# Patient Record
Sex: FEMALE
[De-identification: names, ages, dates, MRNs, and addresses within clinical notes are randomized per-mention and may not be internally consistent; named-entity substitution may affect disease eponyms.]

---

## 2018-12-10 ENCOUNTER — HOSPITAL ENCOUNTER (EMERGENCY)
Dept: HOSPITAL 97 - ER | Age: 23
Discharge: HOME | End: 2018-12-10
Payer: COMMERCIAL

## 2018-12-10 DIAGNOSIS — N20.9: Primary | ICD-10-CM

## 2018-12-10 DIAGNOSIS — Z87.442: ICD-10-CM

## 2018-12-10 LAB
ALBUMIN SERPL BCP-MCNC: 4.3 G/DL (ref 3.4–5)
ALP SERPL-CCNC: 70 U/L (ref 45–117)
ALT SERPL W P-5'-P-CCNC: 33 U/L (ref 12–78)
AST SERPL W P-5'-P-CCNC: 12 U/L (ref 15–37)
BUN BLD-MCNC: 10 MG/DL (ref 7–18)
GLUCOSE SERPLBLD-MCNC: 86 MG/DL (ref 74–106)
HCT VFR BLD CALC: 41.1 % (ref 36–45)
LIPASE SERPL-CCNC: 88 U/L (ref 73–393)
LYMPHOCYTES # SPEC AUTO: 2.9 K/UL (ref 0.7–4.9)
MCH RBC QN AUTO: 30.3 PG (ref 27–35)
MCV RBC: 88.9 FL (ref 80–100)
PMV BLD: 8.4 FL (ref 7.6–11.3)
POTASSIUM SERPL-SCNC: 3.8 MMOL/L (ref 3.5–5.1)
RBC # BLD: 4.62 M/UL (ref 3.86–4.86)
UA COMPLETE W REFLEX CULTURE PNL UR: (no result)

## 2018-12-10 PROCEDURE — 87088 URINE BACTERIA CULTURE: CPT

## 2018-12-10 PROCEDURE — 83690 ASSAY OF LIPASE: CPT

## 2018-12-10 PROCEDURE — 99284 EMERGENCY DEPT VISIT MOD MDM: CPT

## 2018-12-10 PROCEDURE — 81015 MICROSCOPIC EXAM OF URINE: CPT

## 2018-12-10 PROCEDURE — 74176 CT ABD & PELVIS W/O CONTRAST: CPT

## 2018-12-10 PROCEDURE — 87086 URINE CULTURE/COLONY COUNT: CPT

## 2018-12-10 PROCEDURE — 85025 COMPLETE CBC W/AUTO DIFF WBC: CPT

## 2018-12-10 PROCEDURE — 81025 URINE PREGNANCY TEST: CPT

## 2018-12-10 PROCEDURE — 80076 HEPATIC FUNCTION PANEL: CPT

## 2018-12-10 PROCEDURE — 96375 TX/PRO/DX INJ NEW DRUG ADDON: CPT

## 2018-12-10 PROCEDURE — 81003 URINALYSIS AUTO W/O SCOPE: CPT

## 2018-12-10 PROCEDURE — 76377 3D RENDER W/INTRP POSTPROCES: CPT

## 2018-12-10 PROCEDURE — 96361 HYDRATE IV INFUSION ADD-ON: CPT

## 2018-12-10 PROCEDURE — 96374 THER/PROPH/DIAG INJ IV PUSH: CPT

## 2018-12-10 PROCEDURE — 80048 BASIC METABOLIC PNL TOTAL CA: CPT

## 2018-12-10 PROCEDURE — 36415 COLL VENOUS BLD VENIPUNCTURE: CPT

## 2018-12-10 NOTE — EDPHYS
Physician Documentation                                                                           

 [facility]                                                                

Name: Selin Cui                                                                           

Age: 22 yrs                                                                                       

Sex: Female                                                                                       

: 1995                                                                                   

MRN: [id number]                                                                                   

Arrival Date: 12/10/2018                                                                          

Time: 03:19                                                                                       

Account#: [id number]                                                                            

Bed 14                                                                                            

Private MD:                                                                                       

ED Physician Michelet Nichols                                                                     

HPI:                                                                                              

12/10                                                                                             

05:09 This 22 yrs old  Female presents to ER via Ambulatory with complaints of Back   tw4 

      Pain, Abdominal Pain, Blood In Urine.                                                       

05:09 The patient presents with pain that is chronic, with no known mechanism of injury. The  tw4 

      symptoms are located in the low back. Onset: The symptoms/episode began/occurred last       

      week. The pain does not radiate. Associated signs and symptoms: Pertinent positives:        

      hematuria, Pertinent negatives: abdominal pain, chest pain, constipation, fever,            

      incontinence, nausea, numbness, tingling, urinary retention, vomiting, weakness. The        

      problem was sustained without known cause. Modifying factors: The patient symptoms are      

      alleviated by nothing, the patient symptoms are aggravated by nothing. The patient has      

      not experienced similar symptoms in the past.                                               

                                                                                                  

OB/GYN:                                                                                           

03:31 LMP 2018                                                                          jd3 

                                                                                                  

Historical:                                                                                       

- Allergies:                                                                                      

03:31 No Known Allergies;                                                                     jd3 

- Home Meds:                                                                                      

03:31 None [Active];                                                                          jd3 

- PMHx:                                                                                           

03:31 Kidney stones;                                                                          jd3 

- PSHx:                                                                                           

03:31 Lithotripsy;                                                                            jd3 

                                                                                                  

- Immunization history:: Adult Immunizations up to date.                                          

- Social history:: Smoking status: Patient/guardian denies using tobacco.                         

- Ebola Screening: : Patient negative for fever greater than or equal to 101.5 degrees            

  Fahrenheit, and additional compatible Ebola Virus Disease symptoms.                             

                                                                                                  

                                                                                                  

ROS:                                                                                              

05:09 Constitutional: Negative for fever, chills, and weight loss, Eyes: Negative for injury, tw4 

      pain, redness, and discharge, Cardiovascular: Negative for chest pain, palpitations,        

      and edema, Respiratory: Negative for shortness of breath, cough, wheezing, and              

      pleuritic chest pain, Abdomen/GI: Negative for abdominal pain, nausea, vomiting,            

      diarrhea, and constipation.                                                                 

05:09 MS/Extremity: Negative for injury and deformity, Skin: Negative for injury, rash, and       

      discoloration, Neuro: Negative for headache, weakness, numbness, tingling, and seizure.     

05:09 Back: Positive for flank pain, bilaterally.                                                 

                                                                                                  

Exam:                                                                                             

05:09 Constitutional:  This is a well developed, well nourished patient who is awake, alert,  tw4 

      and in no acute distress. Head/Face:  Normocephalic, atraumatic. Chest/axilla:  Normal      

      chest wall appearance and motion.  Nontender with no deformity.  No lesions are             

      appreciated. Cardiovascular:  Regular rate and rhythm with a normal S1 and S2.  No          

      gallops, murmurs, or rubs.  Normal PMI, no JVD.  No pulse deficits. Respiratory:  Lungs     

      have equal breath sounds bilaterally, clear to auscultation and percussion.  No rales,      

      rhonchi or wheezes noted.  No increased work of breathing, no retractions or nasal          

      flaring. Abdomen/GI:  Soft, non-tender, with normal bowel sounds.  No distension or         

      tympany.  No guarding or rebound.  No evidence of tenderness throughout.                    

05:09 MS/ Extremity:  Pulses equal, no cyanosis.  Neurovascular intact.  Full, normal range       

      of motion. Neuro:  Awake and alert, GCS 15, oriented to person, place, time, and            

      situation.  Cranial nerves II-XII grossly intact.  Motor strength 5/5 in all                

      extremities.  Sensory grossly intact.  Cerebellar exam normal.  Normal gait.                

05:09 Back: pain, is absent, ROM is normal, normal spinal alignment noted, CVA tenderness,        

      that is moderate, is noted on the left.                                                     

                                                                                                  

Vital Signs:                                                                                      

03:31  / 86; Pulse 94; Resp 16 S; Temp 99.1(O); Pulse Ox 99% on R/A; Weight 70.31 kg    jd3 

      (R); Height 5 ft. 2 in. (157.48 cm) (R); Pain 6/10;                                         

04:16  / 75; Pulse 81; Resp 15 S; Pulse Ox 98% on R/A;                                  jd3 

05:13  / 79; Pulse 72; Resp 16 S; Pulse Ox 97% on R/A;                                  jd3 

03:31 Body Mass Index 28.35 (70.31 kg, 157.48 cm)                                             jd3 

                                                                                                  

MDM:                                                                                              

03:23 Patient medically screened.                                                             tw4 

05:09 Data reviewed: vital signs, nurses notes. Counseling: I had a detailed discussion with  tw4 

      the patient and/or guardian regarding: the historical points, exam findings, and any        

      diagnostic results supporting the discharge/admit diagnosis, lab results, radiology         

      results. Medication response: morphine relieved the patient's pain. Symptoms have           

      resolved, Toradol partially relieved the patient's pain. Response to treatment: the         

      patient's symptoms have markedly improved after treatment, and as a result, I will          

      discharge patient. Special discussion: Based on the patient's Hx, exam, and Dx              

      evaluation, there is no indication for emergent surgery or inpatient Tx. It is              

      understood by the patient/guardian that if the Sx's persist or worsen they need to          

      return immediately for re-evaluation. Based on the patient's history, exam and DX           

      evaluation, there is no indication for emergent intervention or inpatient TX. It is         

      understood by the patient/guardian that if the SXs persist or worsen they need to           

      return immediately for re-evaluation. Based on the history and exam findings, there is      

      no indication for further emergent testing or inpatient evaluation. I discussed with        

      the patient/guardian the need to see the urologist for further evaluation of the            

      symptoms.                                                                                   

                                                                                                  

12/10                                                                                             

03:23 Order name: Basic Metabolic Panel; Complete Time: 04:58                                 tw4 

12/10                                                                                             

04:59 Interpretation: Normal except: .                                                  tw4 

12/10                                                                                             

03:23 Order name: CBC with Diff; Complete Time: 04:58                                         tw4 

12/10                                                                                             

04:59 Interpretation: Within normal limits.                                                   tw4 

12/10                                                                                             

03:23 Order name: Creatinine for Radiology; Complete Time: 04:58                              tw4 

12/10                                                                                             

03:23 Order name: Hepatic Function; Complete Time: 04:58                                      tw4 

12/10                                                                                             

04:59 Interpretation: Normal except: AST 12.                                                  tw4 

12/10                                                                                             

03:23 Order name: Lipase; Complete Time: 04:58                                                tw4 

12/10                                                                                             

03:23 Order name: Urine Microscopic Only; Complete Time: 04:58                                tw4 

12/10                                                                                             

03:23 Order name: IV Saline Lock; Complete Time: 03:42                                        tw4 

12/10                                                                                             

03:38 Order name: CT Stone Protocol                                                           [facility] 

12/10                                                                                             

04:16 Order name: Urine Dipstick--Ancillary (enter results)                                   [facility] 

12/10                                                                                             

04:16 Order name: Urine Pregnancy--Ancillary (enter results)                                  [facility] 

12/10                                                                                             

04:47 Order name: Urine Culture                                                               [facility] [place]

12/10                                                                                             

03:23 Order name: Labs collected and sent; Complete Time: 03:42                               [facility] 

12/10                                                                                             

03:23 Order name: Urine Dipstick-Ancillary (obtain specimen); Complete Time: 04:15            tw4 

12/10                                                                                             

03:23 Order name: Urine Pregnancy Test (obtain specimen); Complete Time: 04:15                tw4 

                                                                                                  

Administered Medications:                                                                         

03:42 Drug: NS 0.9% 1000 ml Route: IV; Rate: 1 bolus; Site: left antecubital;                 jd3 

05:27 Follow up: Response: No adverse reaction; IV Status: Completed infusion; IV Intake:     jd3 

      1000ml                                                                                      

05:04 Drug: morphine 4 mg Route: IVP; Site: left antecubital;                                 jd3 

05:26 Follow up: Response: No adverse reaction                                                jd3 

05:27 Follow up: Response: Pain is decreased                                                  jd3 

05:04 Drug: Zofran 4 mg Route: IVP; Site: left antecubital;                                   jd3 

05:26 Follow up: Response: No adverse reaction                                                jd3 

                                                                                                  

                                                                                                  

Disposition:                                                                                      

12/10/18 05:12 Discharged to Home. Impression: Calculus of lower urinary tract, unspecified.      

- Condition is Stable.                                                                            

- Discharge Instructions: Kidney Stones, Easy-to-Read.                                            

- Prescriptions for Ibuprofen 800 mg Oral Tablet - take 1 tablet by ORAL route every 8            

  hours As needed take with food; 30 tablet. Tylenol- Codeine #3 300-30 mg Oral Tablet            

  - take 2 tablet by ORAL route every 6 hours As needed; 6 tablet.                                

- Work release form, Medication Reconciliation Form, Thank You Letter, Antibiotic                 

  Education, Prescription Opioid Use form.                                                        

- Follow up: Private Physician; When: Upon discharge from the Emergency Department;               

  Reason: If symptoms return, Recheck today's complaints, Continuance of care.                    

- Problem is an ongoing problem.                                                                  

- Symptoms have improved.                                                                         

                                                                                                  

                                                                                                  

                                                                                                  

Signatures:                                                                                       

Dispatcher [employer]                           Iftikhar Palmer RN                    RN   Michelet Garcia MD MD   tw4                                                  

                                                                                                  

Corrections: (The following items were deleted from the chart)                                    

05:31 05:12 12/10/2018 05:12 Discharged to Home. Impression: Calculus of lower urinary tract, jd3 

      unspecified. Condition is Stable. Forms are Medication Reconciliation Form, Thank You       

      Letter, Antibiotic Education, Prescription Opioid Use. Follow up: Private Physician;        

      When: Upon discharge from the Emergency Department; Reason: If symptoms return, Recheck     

      today's complaints, Continuance of care. Problem is an ongoing problem. Symptoms have       

      improved. tw4                                                                               

                                                                                                  

**************************************************************************************************

## 2018-12-10 NOTE — XMS REPORT
Clinical Summary

 Created on:December 10, 2018



Patient:Selin Cui

Sex:Female

:1995

External Reference #:[id number]





Demographics







 Address  [address] [place] [address]

 

 Home Phone  [phone]

 

 Mobile Phone  [phone]

 

 Email Address  [email or web address]

 

 Preferred Language  English

 

 Marital Status  Single

 

 Caodaism Affiliation  Unknown

 

 Race  White

 

 Ethnic Group   or 









Author







 Organization  [facility]

 

 Address  [address] [place] [address]









Support







 Name  Relationship  Address  Phone

 

 Itzel Cui  Unavailable  [phone]

 

 Jr Wolff  Unavailable  Unavailable  [phone]









Care Team Providers







 Name  Role  Phone

 

 Asked, No Pcp  Primary Care Provider  Unavailable









Allergies

No Known Allergies



Medications

No known medications



Active Problems







 Problem  Noted Date

 

 Left ureteral stone  2017







Encounters







 Date  Type  Specialty  Care Team  Description

 

 2018  Telephone  Urology  Annamaria Vázquez  Flank pain (Primary Dx)



       MD  

 

 2018  Telephone  Urology  Annamaria Vázquez MD  

 

 2018  Hospital Encounter  Radiology  Annamaria Vázquez,  Kidney stones



       MD  

 

 2018  Orders Only  Urology  Annamaria Vázquez,  Kidney stones (Primary



       MD  Dx)

 

 2018  Telephone  Urology  Annamaria Vázquez MD  

 

 2018  Orders Only  Urology  Annamaria Vázquez MD  

 

 2018  Telephone  Urology  Annamaria Vázquez MD  



after 2017



Family History







 Medical History  Relation  Name  Comments

 

 Seizures  Mother    









 Relation  Name  Status  Comments

 

 Father    Alive  

 

 Mother      







Social History







 Tobacco Use  Types  Packs/Day  Years Used  Date

 

 Never Smoker        









 Smokeless Tobacco: Never Used      









 Alcohol Use  Drinks/Week  oz/Week  Comments

 

 No      









 Sex Assigned at Birth  Date Recorded

 

 Not on file  









 Job Start Date  Occupation  Industry

 

 Not on file  Not on file  Not on file









 Travel History  Travel Start  Travel End









 No recent travel history available.







Last Filed Vital Signs

Not on file



Plan of Treatment







 Health Maintenance  Due Date  Last Done  Comments

 

 CHLAMYDIA SCREENING  12/15/2012    

 

 MENINGOCOCCAL VACCINE (1 - 2-dose  12/15/2012    



 series)      

 

 CERVICAL CANCER SCREENING  12/15/2017    

 

 INFLUENZA VACCINE  2018    

 

 HEPATITIS B VACCINES  Aged Out    No longer eligible based on



       patient's age to complete



       this topic

 

 IPV VACCINES  Aged Out    No longer eligible based on



       patient's age to complete



       this topic







Implants







 Implanted  Type  Area    Device  Shelf  Model /



         Identifier  Expiration  Serial /



           Date  Lot

 

 Stent Uretl S-Flx Kwart Retro-Inject 6fr 24cm - [id number]  Peripheral or    
[email or web address]    2020  [id number] /



 Implanted: Qty: 1 on 2017 by Annamaria Vázquez MD  Biliary Stents      
     /



             [id number]

 

 Stent Uretl S-Flx Kwart Retro-Inject 6fr 24cm - [id number]  Peripheral or  N/A:
  [employer]    2020  [id number] /



 Implanted: 05/15/2017 (Quantity not on file)  Biliary Stents  N/A         /



             [id number]

 

 Catheter Uretl 6/10fr 50cm Flx-Tp Dlmn Std Accs - [id number]  Surgical  N/A:  
[email or web address]      [id number] /



 Implanted: 05/15/2017 (Quantity not on file)  Implants;  N/A         /



   Expanders;          



   Extenders;          



   Surgical Wires          







Procedures







 Procedure Name  Priority  Date/Time  Associated  Comments



       Diagnosis  

 

 CT RENAL STONE  Routine  2018 11:17 AM  Kidney stones  Results for this



 PROTOCOL    CDT    procedure are in



         the results



         section.

 

 RENAL ULTRASOUND  Routine  2018    

 

 RENAL ULTRASOUND  Routine  2018    



after 2017



Results

CT Renal Stone Protocol (2018 11:17 AM CDT)





 Narrative  Performed At

 

 CT RENAL STONE PROTOCOL



  [facility]



 CLINICAL INDICATION:N20.0 Calculus of kidney, kindey stones



  



  



  



 TECHNIQUE: Multidetector CT examination of the abdomen and pelvis  



 performed without intravenous contrast per renal stone protocol with  



 automated exposure control and/or iterative reconstruction techniques to  



  radiation dose.



  



  



  



 COMPARISON:None. 



  



  



  



 FINDINGS:



  



  



  



 Note, the lack of intravenous contrast decreases sensitivity of this  



 exam and limits evaluation of the abdominal and pelvic viscera.



  



  



  



 URINARY TRACT:There are nonobstructive calculi involving the left  



 kidney, a couple less than 3 mm calculi in the upper and mid pole with a  



 larger lobulated bilobed 8 mm calculus in the lower pole. There is no  



 ureteral calculus or obstruction. A mildly 



  



 prominent extrarenal pelvis is noted on the left. There is no  



 perinephric stranding. Urinary bladder is unremarkable.



  



  



  



 LUNG BASES:Clear.



  



  



  



 LIVER:Normal.



  



  



  



 BILIARY:Normal.



  



  



  



 SPLEEN:Normal.



  



  



  



 PANCREAS:Normal.



  



  



  



 ADRENALS:Normal. 



  



  



  



 GI:Large and small bowel are normal in caliber.There is moderate  



 stool throughout the distal colon without wall thickening or  



 inflammatory changes.Appendix is visualized and appears normal.



  



  



  



 VASCULAR:Unremarkable



  



  



  



 LYMPH NODES:No enlarged lymph nodes in the abdomen or pelvis.



  



  



  



 PELVIS:No lymphadenopathy or abnormal fluid collection.Urinary  



 bladder is unremarkable. Ovaries appear within normal limits.



  



  



  



 BONES:Normal.



  



  



  



 OTHER: 



  



  



  



 IMPRESSION:



  



  



  



 Nonobstructive left renal calculi as described. 



  



  



  



  



  



 Thank you for allowing us to participate in the care of your patient.



  



 [facility]-[id number]



  



   









 Procedure Note

 

 Hm Interface, Radiology Results Incoming - 2018 11:26 AM CDT



CT RENAL STONE PROTOCOL



 CLINICAL INDICATION:  N20.0 Calculus of kidney, kindey stones



 



 TECHNIQUE: Multidetector CT examination of the abdomen and pelvis performed 
without intravenous contrast per renal stone protocol with automated exposure 
control and/or iterative reconstruction techniques to  radiation dose.



 



 COMPARISON:  None.



 



 FINDINGS:



 



 Note, the lack of intravenous contrast decreases sensitivity of this exam and 
limits evaluation of the abdominal and pelvic viscera.



 



 URINARY TRACT:  There are nonobstructive calculi involving the left kidney, a 
couple less than 3 mm calculi in the upper and mid pole with a larger lobulated 
bilobed 8 mm calculus in the lower pole. There is no



 ureteral calculus or obstruction. A mildly



 prominent extrarenal pelvis is noted on the left. There is no perinephric 
stranding. Urinary bladder is unremarkable.



 



 LUNG BASES:  Clear.



 



 LIVER:    Normal.



 



 BILIARY:  Normal.



 



 SPLEEN:  Normal.



 



 PANCREAS:  Normal.



 



 ADRENALS:  Normal.



 



 GI:  Large and small bowel are normal in caliber.  There is moderate stool 
throughout the distal colon without wall thickening or inflammatory changes.  
Appendix is visualized and appears normal.



 



 VASCULAR:  Unremarkable



 



 LYMPH NODES:  No enlarged lymph nodes in the abdomen or pelvis.



 



 PELVIS:  No lymphadenopathy or abnormal fluid collection.  Urinary bladder is 
unremarkable. Ovaries appear within normal limits.



 



 BONES:  Normal.



 



 OTHER:



 



 IMPRESSION:



 



 Nonobstructive left renal calculi as described.



 



 



 Thank you for allowing us to participate in the care of your patient.



 [facility]-[id number]









 Performing Organization  Address  City/State/Zipcode  Phone Number

 

  [facility]  [address] [place] [address]  



RENAL ULTRASOUND (2018)





 Narrative  Performed At

 

 This result has an attachment that is not available.



  



RENAL ULTRASOUND (2018)after 2017



Insurance







 Payer  Benefit Plan / Group  Subscriber ID  Type  Phone  Address

 

 [facility]  xxxxxxxxxxx  HMO    









 Guarantor Name  Account Type  Relation to  Date of  Phone  Billing



     Patient  Birth    Address

 

 Selin Cui  Personal/Family  Self  1995  [phone]  [address] [facility]







         ([place])  [place]



           [address]







Advance Directives

Patient has advance care planning documents on file. For more information, 
please contact:Shahzad Echevarria[address] [place] [address]

## 2018-12-10 NOTE — ER
Nurse's Notes                                                                                     

 [facility]                                                                

Name: Selin Cui                                                                           

Age: 22 yrs                                                                                       

Sex: Female                                                                                       

: 1995                                                                                   

MRN: [id number]                                                                                   

Arrival Date: 12/10/2018                                                                          

Time: 03:19                                                                                       

Account#: [id number]                                                                            

Bed 14                                                                                            

Private MD:                                                                                       

Diagnosis: Calculus of lower urinary tract, unspecified                                           

                                                                                                  

Presentation:                                                                                     

12/10                                                                                             

03:28 Presenting complaint: Patient states: "I have been having back pain for about a week    jd3 

      and today I started to have lower abdominal pain and blood in my urine. I also have a       

      history of kidney stones so I don't know if it could be that as well.". Transition of       

      care: patient was not received from another setting of care. Onset of symptoms was          

      December 10, 2018. Risk Assessment: Do you want to hurt yourself or someone else?           

      Patient reports no desire to harm self or others. Initial Sepsis Screen: Does the           

      patient meet any 2 criteria? No. Patient's initial sepsis screen is negative. Does the      

      patient have a suspected source of infection? No. Patient's initial sepsis screen is        

      negative. Care prior to arrival: None.                                                      

03:28 Method Of Arrival: Ambulatory                                                           jd3 

03:28 Acuity: AWILDA 3                                                                           jd3 

                                                                                                  

OB/GYN:                                                                                           

03:31 LMP 2018                                                                          jd3 

                                                                                                  

Historical:                                                                                       

- Allergies:                                                                                      

03:31 No Known Allergies;                                                                     jd3 

- Home Meds:                                                                                      

03:31 None [Active];                                                                          jd3 

- PMHx:                                                                                           

03:31 Kidney stones;                                                                          jd3 

- PSHx:                                                                                           

03:31 Lithotripsy;                                                                            jd3 

                                                                                                  

- Immunization history:: Adult Immunizations up to date.                                          

- Social history:: Smoking status: Patient/guardian denies using tobacco.                         

- Ebola Screening: : Patient negative for fever greater than or equal to 101.5 degrees            

  Fahrenheit, and additional compatible Ebola Virus Disease symptoms.                             

                                                                                                  

                                                                                                  

Screenin:36 Abuse screen: Denies threats or abuse. Nutritional screening: No deficits noted.        jd3 

      Tuberculosis screening: No symptoms or risk factors identified. Fall Risk Ambulatory        

      Aid- None/Bed Rest/Nurse Assist (0 pts). Gait- Normal/Bed Rest/Wheelchair (0 pts)           

      Mental Status- Oriented to own ability (0 pts). Total Peres Fall Scale indicates No         

      Risk (0-24 pts).                                                                            

                                                                                                  

Assessment:                                                                                       

03:33 General: Appears in no apparent distress. uncomfortable, Behavior is calm, cooperative, jd3 

      appropriate for age. Pain: Complains of pain in back Pain radiates to suprapubic area       

      Quality of pain is described as aching, radiating, sharp. Neuro: Level of Consciousness     

      is awake, alert, obeys commands, Oriented to person, place, time, situation.                

      Cardiovascular: Capillary refill < 3 seconds Patient's skin is warm and dry.                

      Respiratory: Airway is patent Respiratory effort is even, unlabored, Respiratory            

      pattern is regular, symmetrical. GI: Abdomen is flat, non-distended, Bowel sounds           

      present X 4 quads. Abd is soft and non tender X 4 quads. Patient currently denies           

      nausea, vomiting. : Reports blood in urine. EENT: No signs and/or symptoms were           

      reported regarding the EENT system. Derm: Skin is intact, Skin is dry, Skin is normal,      

      Skin temperature is warm. Musculoskeletal: Circulation, motion, and sensation intact.       

      Range of motion: intact in all extremities.                                                 

04:16 Reassessment: Patient appears in no apparent distress at this time. Patient and/or      jd3 

      family updated on plan of care and expected duration. Pain level reassessed. Patient is     

      alert, oriented x 3, equal unlabored respirations, skin warm/dry/pink.                      

05:12 Reassessment: Patient appears in no apparent distress at this time. Patient and/or      jd3 

      family updated on plan of care and expected duration. Pain level reassessed. Patient is     

      alert, oriented x 3, equal unlabored respirations, skin warm/dry/pink.                      

05:30 Reassessment: Patient appears in no apparent distress at this time. Patient and/or      jd3 

      family updated on plan of care and expected duration. Pain level reassessed. Patient is     

      alert, oriented x 3, equal unlabored respirations, skin warm/dry/pink. Patient states       

      feeling better.                                                                             

                                                                                                  

Vital Signs:                                                                                      

03:31  / 86; Pulse 94; Resp 16 S; Temp 99.1(O); Pulse Ox 99% on R/A; Weight 70.31 kg    jd3 

      (R); Height 5 ft. 2 in. (157.48 cm) (R); Pain 6/10;                                         

04:16  / 75; Pulse 81; Resp 15 S; Pulse Ox 98% on R/A;                                  jd3 

05:13  / 79; Pulse 72; Resp 16 S; Pulse Ox 97% on R/A;                                  jd3 

03:31 Body Mass Index 28.35 (70.31 kg, 157.48 cm)                                             jd3 

                                                                                                  

ED Course:                                                                                        

03:19 Patient arrived in ED.                                                                  ds1 

03:21 Iftikhar Cardozo, RN is Primary Nurse.                                                  jd3 

03:22 Michelet Nichols MD is Attending Physician.                                            tw4 

03:30 Triage completed.                                                                       jd3 

03:33 Arm band placed on.                                                                     jd3 

03:36 Patient has correct armband on for positive identification. Placed in gown. Bed in low  jd3 

      position. Call light in reach. Side rails up X 1. Adult w/ patient.                         

03:42 Inserted saline lock: 20 gauge in left antecubital area, using aseptic technique. Blood oe  

      collected.                                                                                  

03:47 Radiology exam delayed due to pregnancy test not completed at this time.                kw1 

04:24 Patient moved to CT via wheelchair.                                                     kw1 

04:29 CT Stone Protocol In Process Unspecified.                                               EDMS

04:31 CT completed. Patient tolerated procedure well. Patient moved back from CT.             kw1 

05:30 No provider procedures requiring assistance completed. IV discontinued, intact,         jd3 

      bleeding controlled, No redness/swelling at site. Pressure dressing applied.                

                                                                                                  

Administered Medications:                                                                         

03:42 Drug: NS 0.9% 1000 ml Route: IV; Rate: 1 bolus; Site: left antecubital;                 jd3 

05:27 Follow up: Response: No adverse reaction; IV Status: Completed infusion; IV Intake:     jd3 

      1000ml                                                                                      

05:04 Drug: morphine 4 mg Route: IVP; Site: left antecubital;                                 jd3 

05:26 Follow up: Response: No adverse reaction                                                jd3 

05:27 Follow up: Response: Pain is decreased                                                  jd3 

05:04 Drug: Zofran 4 mg Route: IVP; Site: left antecubital;                                   jd3 

05:26 Follow up: Response: No adverse reaction                                                jd3 

                                                                                                  

                                                                                                  

Intake:                                                                                           

05:27 IV: 1000ml; Total: 1000ml.                                                              jd3 

                                                                                                  

Outcome:                                                                                          

05:12 Discharge ordered by MD.                                                                tw4 

05:30 Discharged to home ambulatory, with family.                                             jd3 

05:30 Condition: stable                                                                           

05:30 Discharge instructions given to patient, family, Instructed on discharge instructions,      

      follow up and referral plans. medication usage, Demonstrated understanding of               

      instructions, follow-up care, medications, Prescriptions given X 2.                         

05:31 Patient left the ED.                                                                    jd3 

                                                                                                  

Signatures:                                                                                       

Dispatcher [employer]                           Jill Chávez                                ds1                                                  

Mehran Walters Jonathon, RN RN   jd3                                                  

Swetha Morales                            kw1                                                  

Michelet Nichols MD MD   tw4                                                  

                                                                                                  

**************************************************************************************************

## 2018-12-10 NOTE — RAD REPORT
EXAM DESCRIPTION:  CT - Stone Protocol - 12/10/2018 6:02 am

 

CLINICAL HISTORY:  Flank pain.

ABD PAIN

 

COMPARISON:  CT ABD PELVIS W CONTRAST dated 7/9/2013

 

TECHNIQUE:  Axial images were obtained without oral or IV contrast. Lack of contrast limits solid org
an and vascular assessment. The field-of-view spans the entirety of the  system partially obscuring
 uppermost abdomen and lung bases. Coronal reformatted images were obtained and reviewed.

 

All CT scans are performed using dose optimization technique as appropriate and may include automated
 exposure control or mA/KV adjustment according to patient size.

 

FINDINGS:  The lower lung fields are clear.

 

Imaged portions of the liver and spleen show no suspicious findings on non-contrast imaging. The panc
reas and adrenal glands are normal. No pathologic lymphadenopathy in the abdomen or pelvis.

 

Multiple left-sided renal caliceal stones are present largest in the inferior left renal calyx measur
ing 7 mm. Mild dilatation of the left renal pelvis is present, which is similar to comparative study.


 

No bowel obstruction, free air, free fluid or abscess. Normal appendix noted.

 

No significant bony abnormality.

 

IMPRESSION:  Multiple left-sided caliceal stones are present.

 

Mild chronic dilatation of the left renal pelvis seen.

## 2019-03-03 ENCOUNTER — HOSPITAL ENCOUNTER (EMERGENCY)
Dept: HOSPITAL 97 - ER | Age: 24
Discharge: HOME | End: 2019-03-03
Payer: COMMERCIAL

## 2019-03-03 ENCOUNTER — HOSPITAL ENCOUNTER (EMERGENCY)
Dept: HOSPITAL 97 - ER | Age: 24
LOS: 1 days | Discharge: HOME | End: 2019-03-04
Payer: COMMERCIAL
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
## 2019-03-03 DIAGNOSIS — S06.0X9A: Primary | ICD-10-CM
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
## 2019-03-03 DIAGNOSIS — L50.9: Primary | ICD-10-CM

## 2019-03-03 DIAGNOSIS — S00.01XA: ICD-10-CM

## 2019-03-03 PROCEDURE — 76377 3D RENDER W/INTRP POSTPROCES: CPT

## 2019-03-03 PROCEDURE — 99284 EMERGENCY DEPT VISIT MOD MDM: CPT

## 2019-03-03 PROCEDURE — 99283 EMERGENCY DEPT VISIT LOW MDM: CPT

## 2019-03-03 PROCEDURE — 81025 URINE PREGNANCY TEST: CPT

## 2019-03-03 PROCEDURE — 81003 URINALYSIS AUTO W/O SCOPE: CPT

## 2019-03-03 PROCEDURE — 70486 CT MAXILLOFACIAL W/O DYE: CPT

## 2019-03-03 PROCEDURE — 70450 CT HEAD/BRAIN W/O DYE: CPT

## 2019-03-03 PROCEDURE — 72125 CT NECK SPINE W/O DYE: CPT

## 2019-03-03 NOTE — EDPHYS
Physician Documentation                                                                           

 [facility]                                                                

Name: Selin Cui                                                                           

Age: 23 yrs                                                                                       

Sex: Female                                                                                       

: 1995                                                                                   

MRN: [id number]                                                                                   

Arrival Date: 2019                                                                          

Time: 12:51                                                                                       

Account#: [id number]                                                                            

Bed 24                                                                                            

Private MD:                                                                                       

ED Physician Sae Guo                                                                       

HPI:                                                                                              

                                                                                             

14:00 This 23 yrs old  Female presents to ER via Ambulatory with complaints of        pm1 

      Headache, Nausea.                                                                           

14:00 The patient complains of pain to the left occipital area and right occipital area. The  pm1 

      patient describes the headache as aching. Onset: The symptoms/episode began/occurred        

      yesterday. Associated signs and symptoms: Pertinent positives: nausea, Pertinent            

      negatives: dizziness, fever, paresthesias, vision changes, vomiting, weakness. Severity     

      of symptoms: in the emergency department the pain is unchanged. Headache History:           

      Denies prior headaches. The symptoms are alleviated by nothing. the symptoms are            

      aggravated by nothing. The patient has not experienced similar symptoms in the past.        

      The patient has not recently seen a physician. Patient was dancing last night and her       

      friend was falling. Her friend grabbed onto her to catch her fall resulting in the          

      patient hitting the back of her head against a rail. Patient with LOC, bleeding from        

      the back of her head and nasal pain with a bloody nose..                                    

                                                                                                  

OB/GYN:                                                                                           

13:05 LMP 3/3/2019                                                                            la1 

                                                                                                  

Historical:                                                                                       

- Allergies:                                                                                      

13:05 No Known Allergies;                                                                     la1 

- PMHx:                                                                                           

13:05 Kidney stones;                                                                          la1 

- PSHx:                                                                                           

13:05 kidney stone removal;                                                                   la1 

                                                                                                  

- Immunization history:: Adult Immunizations up to date.                                          

- Social history:: Smoking status: Patient/guardian denies using tobacco.                         

- Ebola Screening: : No symptoms or risks identified at this time.                                

                                                                                                  

                                                                                                  

ROS:                                                                                              

14:00 Constitutional: Negative for fever, chills, and weight loss, Eyes: Negative for injury, pm1 

      pain, redness, and discharge, ENT: Negative for injury, pain, and discharge, Neck:          

      Negative for injury, pain, and swelling, Cardiovascular: Negative for chest pain,           

      palpitations, and edema, Respiratory: Negative for shortness of breath, cough,              

      wheezing, and pleuritic chest pain.                                                         

14:00 Back: Negative for injury and pain, : Negative for injury, bleeding, discharge, and       

      swelling, MS/Extremity: Negative for injury and deformity, Skin: Negative for injury,       

      rash, and discoloration.                                                                    

14:00 Abdomen/GI: Positive for nausea, Negative for abdominal pain, vomiting, diarrhea,           

      constipation.                                                                               

14:00 Neuro: Positive for headache, loss of consciousness, Negative for altered mental            

      status, numbness, tingling, weakness.                                                       

                                                                                                  

Exam:                                                                                             

14:00 Constitutional:  This is a well developed, well nourished patient who is awake, alert,  pm1 

      and in no acute distress.                                                                   

14:00 Eyes:  Pupils equal round and reactive to light, extra-ocular motions intact.  Lids and     

      lashes normal.  Conjunctiva and sclera are non-icteric and not injected.  Cornea within     

      normal limits.  Periorbital areas with no swelling, redness, or edema. ENT:  Nares          

      patent. No nasal discharge, no septal abnormalities noted.  Tympanic membranes are          

      normal and external auditory canals are clear.  Oropharynx with no redness, swelling,       

      or masses, exudates, or evidence of obstruction, uvula midline.  Mucous membranes           

      moist. Neck:  Trachea midline, no thyromegaly or masses palpated, and no cervical           

      lymphadenopathy.  Supple, full range of motion without nuchal rigidity, or vertebral        

      point tenderness.  No Meningismus. Chest/axilla:  Normal chest wall appearance and          

      motion.  Nontender with no deformity.  No lesions are appreciated. Cardiovascular:          

      Regular rate and rhythm with a normal S1 and S2.  No gallops, murmurs, or rubs.  Normal     

      PMI, no JVD.  No pulse deficits. Respiratory:  Lungs have equal breath sounds               

      bilaterally, clear to auscultation and percussion.  No rales, rhonchi or wheezes noted.     

       No increased work of breathing, no retractions or nasal flaring. Abdomen/GI:  Soft,        

      non-tender, with normal bowel sounds.  No distension or tympany.  No guarding or            

      rebound.  No evidence of tenderness throughout. Back:  No spinal tenderness.  No            

      costovertebral tenderness.  Full range of motion. Skin:  Warm, dry with normal turgor.      

      Normal color with no rashes, no lesions, and no evidence of cellulitis. MS/ Extremity:      

      Pulses equal, no cyanosis.  Neurovascular intact.  Full, normal range of motion.            

14:00 Head/face: Exam is negative for zimmer signs, raccoon eyes, Noted is no obvious of          

      injury or deformity except abrasion(s), that are mild, of the  occipital area.              

14:00 Head/face: Noted is contusion, that is superficial, of the  bridge of nose, swelling,       

      that is mild, of the  bridge of nose.                                                       

14:00 Neuro: Orientation: is normal, Motor: moves all fours, Gait: is steady, at a normal         

      pace, without difficulty.                                                                   

                                                                                                  

Vital Signs:                                                                                      

13:05  / 91; Pulse 88; Resp 18; Temp 97.5; Pulse Ox 98% on R/A; Weight 68.04 kg; Height la1 

      5 ft. 2 in. (157.48 cm);                                                                    

14:00  / 74; Pulse 78; Resp 19; Pulse Ox 100% on R/A;                                   ca1 

14:30  / 88; Pulse 85; Resp 18; Pulse Ox 100% on R/A;                                   jp3 

13:05 Body Mass Index 27.44 (68.04 kg, 157.48 cm)                                             la1 

                                                                                                  

MDM:                                                                                              

13:07 Patient medically screened.                                                             pm1 

14:31 Data reviewed: vital signs. Data interpreted: Pulse oximetry: on room air is 100 %.     pm1 

      Interpretation: normal. Counseling: I had a detailed discussion with the patient and/or     

      guardian regarding: the historical points, exam findings, and any diagnostic results        

      supporting the discharge/admit diagnosis, lab results, radiology results, the need for      

      outpatient follow up, to return to the emergency department if symptoms worsen or           

      persist or if there are any questions or concerns that arise at home.                       

                                                                                                  

                                                                                             

13:44 Order name: Urine Dipstick--Ancillary (enter results)                                     

                                                                                             

13:44 Order name: Urine Pregnancy--Ancillary (enter results)                                    

                                                                                             

13:22 Order name: CT Head C Spine; Complete Time: 14:30                                       pm1 

                                                                                             

13:22 Order name: CT Facial Bones W/O Con; Complete Time: 14:30                               pm1 

                                                                                             

13:22 Order name: Urine Pregnancy Test (obtain specimen); Complete Time: 13:50                pm1 

                                                                                             

13:22 Order name: Urine Dipstick-Ancillary (obtain specimen); Complete Time: 13:50            pm1 

                                                                                                  

Administered Medications:                                                                         

14:35 Drug: Zofran 4 mg Route: PO;                                                            ca1 

14:49 Follow up: Response: Medication administered at discharge.                              ca1 

                                                                                                  

                                                                                                  

Disposition:                                                                                      

                                                                                             

11:54 Co-signature as Attending Physician, Sae Guo MD.                                  gs  

                                                                                                  

Disposition:                                                                                      

19 14:32 Discharged to Home. Impression: Concussion, Abrasion of scalp, Superficial         

  injury of head.                                                                                 

- Condition is Stable.                                                                            

- Discharge Instructions: Abrasion, Concussion, Adult, Head Injury, Adult.                        

- Prescriptions for Zofran 4 mg Oral Tablet - take 1 tablet by ORAL route every 12                

  hours As needed; 20 tablet.                                                                     

- Medication Reconciliation Form, Thank You Letter, Work release form form.                       

- Follow up: Emergency Department; When: As needed; Reason: Worsening of condition.               

  Follow up: Private Physician; When: 2 - 3 days; Reason: Recheck today's complaints,             

  Continuance of care, Re-evaluation by your physician.                                           

- Problem is new.                                                                                 

- Symptoms have improved.                                                                         

                                                                                                  

                                                                                                  

                                                                                                  

Signatures:                                                                                       

Dispatcher [employer]                           EDMS                                                 

Pedro Luis Merida RN                         RN   la1                                                  

Ravin Hannon, NP                    NP   pm1                                                  

Sae Guo MD MD                                                      

Acob, Klarissa RN                        RN   ca1                                                  

                                                                                                  

Corrections: (The following items were deleted from the chart)                                    

                                                                                             

14:32 14:32 2019 14:32 Discharged to Home. Impression: Abrasion of scalp; Superficial   pm1 

      injury of head; Concussion. Condition is Stable. Forms are Medication Reconciliation        

      Form, Thank You Letter, Antibiotic Education, Prescription Opioid Use. Follow up:           

      Emergency Department; When: As needed; Reason: Worsening of condition. Follow up:           

      Private Physician; When: 2 - 3 days; Reason: Recheck today's complaints, Continuance of     

      care, Re-evaluation by your physician. Problem is new. Symptoms have improved. pm1          

14:50 14:32 2019 14:32 Discharged to Home. Impression: ConcussionAbrasion of scalp;     ca1 

      Superficial injury of head. Condition is Stable. Forms are Medication Reconciliation        

      Form, Thank You Letter, Antibiotic Education, Prescription Opioid Use. Follow up:           

      Emergency Department; When: As needed; Reason: Worsening of condition. Follow up:           

      Private Physician; When: 2 - 3 days; Reason: Recheck today's complaints, Continuance of     

      care, Re-evaluation by your physician. Problem is new. Symptoms have improved. pm1          

                                                                                                  

**************************************************************************************************

## 2019-03-03 NOTE — RAD REPORT
EXAM DESCRIPTION:  CT - Facial Bones W/ Mpr - 3/3/2019 1:56 pm

 

CLINICAL HISTORY:  Fall, head, neck and facial injury, facial pain

 

COMPARISON:  None.

 

TECHNIQUE:  Axial 2 millimeter thick images of the facial bones were obtained with sagittal and coron
al reconstruction imaging.

 

All CT scans are performed using dose optimization technique as appropriate and may include automated
 exposure control or mA/KV adjustment according to patient size.

 

FINDINGS:  No fracture of the mandible. Condyles are normally positioned. Mastoid air cells are clear
. No skullbase fracture identified. Paranasal sinuses are clear. A mild right deviation of the nasal 
septum is present. No facial fracture identified. No air or foreign body in the soft tissues.

 

IMPRESSION:  No facial bone fracture. Globes, orbital contents and sinuses are without significant fi
nding.
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
## 2019-03-03 NOTE — XMS REPORT
Clinical Summary

 Created on:March 3, 2019



Patient:Selin Cui

Sex:Female

:1995

External Reference #:[id number]





Demographics







 Address  [address] [place] [address]

 

 Home Phone  [phone]

 

 Mobile Phone  [phone]

 

 Email Address  [email or web address]

 

 Preferred Language  English

 

 Marital Status  Single

 

 Anabaptist Affiliation  Unknown

 

 Race  White

 

 Ethnic Group   or 









Author







 Organization  [facility]

 

 Address  [address] [place] [address]









Support







 Name  Relationship  Address  Phone

 

 Itzel Cui  Unavailable  [phone]

 

 Jr Wolff  Unavailable  Unavailable  [phone]









Care Team Providers







 Name  Role  Phone

 

 Asked, No Pcp  Primary Care Provider  Unavailable









Allergies

No Known Allergies



Medications







 Medication  Sig  Dispensed  Refills  Start Date  End Date  Status

 

 acetaminophen-codeine  TK 2 TS PO Q 6 H    0  12/10/2018    Active



 (TYLENOL WITH CODEINE  PRF PAIN CONTROL.          



 #3) 300-30 mg per            



 tablet            

 

 ibuprofen  TK 1 T PO Q 8 H    0  12/10/2018    Active



 (ADVIL,MOTRIN) 800 MG  PRF PAIN CONTROL.          



 tablet            







Active Problems







 Problem  Noted Date

 

 Left ureteral stone  2017







Encounters







 Date  Type  Specialty  Care Team  Description

 

 2019  Telephone  Urology  Maria Isabel Ac MA  

 

 2018  Telephone  Urology  Annamaria Vázquez MD  

 

 2018  Hospital Encounter  Radiology  Annamaria Vázquez,  Kidney stones



       MD  

 

 2018  Office Visit  Urology  Annamaria Vázquez,  Kidney stones (Primary



       MD  Dx)

 

 12/10/2018  Telephone  Urology  Annamaria Vázquez MD  

 

 2018  Telephone  Urology  Annamaria Vázquez,  Flank pain (Primary Dx)



       MD  

 

 2018  Telephone  UrologAnnamaria Connors MD  

 

 2018  Hospital Encounter  Radiology  Annamaria Vázquez,  Kidney stones



       MD  

 

 2018  Orders Only  Urology  Annamaria Vázquez,  Kidney stones (Primary



       MD  Dx)

 

 2018  Telephone  UrologAnnamaria Connors MD  

 

 2018  Orders Only  Urology  Annamaria Vázquez MD  

 

 2018  Telephone  Urology  Annamaria Vázquez MD  



after 2018



Family History







 Medical History  Relation  Name  Comments

 

 Seizures  Mother    









 Relation  Name  Status  Comments

 

 Father    Alive  

 

 Mother      







Social History







 Tobacco Use  Types  Packs/Day  Years Used  Date

 

 Never Smoker        









 Smokeless Tobacco: Never Used      









 Alcohol Use  Drinks/Week  oz/Week  Comments

 

 No      









 Sex Assigned at Birth  Date Recorded

 

 Not on file  









 Job Start Date  Occupation  Industry

 

 Not on file  Not on file  Not on file









 Travel History  Travel Start  Travel End









 No recent travel history available.







Last Filed Vital Signs

Not on file



Plan of Treatment







 Health Maintenance  Due Date  Last Done  Comments

 

 CHLAMYDIA SCREENING  12/15/2012    

 

 CERVICAL CANCER SCREENING  12/15/2017    

 

 INFLUENZA VACCINE  2018    







Implants







 Implanted  Type  Area    Device  Shelf  Model /



         Identifier  Expiration  Serial /



           Date  Lot

 

 Stent Uretl S-Flx Kwart Retro-Inject 6fr 24cm - [id number]  Peripheral or    
[employer] UROLOGICAL    2020  [id number] /



 Implanted: Qty: 1 on 2017 by Annamaria Vázquez MD  Biliary Stents      
     /



             [id number]

 

 Stent Uretl S-Flx Kwart Retro-Inject 6fr 24cm - [id number]  Peripheral or  N/A:
  [employer] UROLOGICAL    2020  [id number] /



 Implanted: 05/15/2017 (Quantity not on file)  Biliary Stents  N/A         /



             [id number]

 

 Catheter Uretl 6/10fr 50cm Flx-Tp Dlmn Std Accs - [id number]  Surgical  N/A:  
[employer] UROLOGICAL      [id number] /



 Implanted: 05/15/2017 (Quantity not on file)  Implants;  N/A         /



   Expanders;          



   Extenders;          



   Surgical Wires          







Procedures







 Procedure Name  Priority  Date/Time  Associated  Comments



       Diagnosis  

 

 XR KUB KIDNEY URETER  Routine  2018  2:37 PM  Kidney stones  Results for 
this



 BLADDER    CST    procedure are in



         the results



         section.

 

 POC URINALYSIS  Routine  2018 10:24 AM  Kidney stones  Results for this



 DIPSTICK    CST    procedure are in



         the results



         section.

 

 CT RENAL STONE  Routine  2018 11:17 AM  Kidney stones  Results for this



 PROTOCOL    CDT    procedure are in



         the results



         section.

 

 RENAL ULTRASOUND  Routine  2018    

 

 RENAL ULTRASOUND  Routine  2018    



after 2018



Results

XR Kub Kidney Ureter Bladder (2018  2:37 PM CST)





 Narrative  Performed At

 

 EXAMINATION: XR KUB KIDNEY URETER BLADDER



  [facility]



  



  



 INDICATION: N20.0 Calculus of kidney, kidney stone - left



  



  



  



 COMPARISON: None



  



  



  



 IMPRESSION:



  



 There are 2 calculi overlying the lower pole of the left renal shadow  



 measuring 5 and 4 mm. Otherwise unremarkable.



  



  



  



 [facility]-[id number]



  



   









 Procedure Note

 

 Hm Interface, Radiology Results Incoming - 2018  2:43 PM CST



EXAMINATION: XR KUB KIDNEY URETER BLADDER



 



 INDICATION: N20.0 Calculus of kidney, kidney stone - left



 



 COMPARISON: None



 



 IMPRESSION:



 There are 2 calculi overlying the lower pole of the left renal shadow 
measuring 5 and 4 mm. Otherwise unremarkable.



 



 [facility]-[id number]









 Performing Organization  Address  City/State/Zipcode  Phone Number

 

 [facility]  [address] [place] [address]  



POC urinalysis dipstick (2018 10:24 AM CST)





 Component  Value  Ref Range  Performed At

 

 Color urine, POC  Yellow    

 

 Clarity urine, POC  Clear    

 

 Glucose urine, POC  Negative  Negative  

 

 Bilirubin urine, POC  Positive (A)  Negative  

 

 Ketones urine, POC  2+ (A)  Negative  

 

 Specific gravity urine, POC  1.025  1.005 - 1.030  

 

 Blood urine, POC  Large (A)  Negative  

 

 pH urine, POC  5.5  5.0, 5.5, 6.0, 6.5, 7.0, 7.5,  



     8.0, 8.5  

 

 Protein urine, POC  Negative  Negative  

 

 Urobilinogen urine, POC  <2.0  <2.0  

 

 Nitrite urine, POC  Negative  Negative  

 

 Leukocyte esterase urine, POC  Negative  Negative  









 Specimen

 

 Urine



CT Renal Stone Protocol (2018 11:17 AM CDT)





 Narrative  Performed At

 

 CT RENAL STONE PROTOCOL



   [facility]



 CLINICAL INDICATION:N20.0 Calculus of kidney, kindey stones



  



  



  



 TECHNIQUE: Multidetector CT examination of the abdomen and pelvis  



 performed without intravenous contrast per renal stone protocol with  



 automated exposure control and/or iterative reconstruction techniques to  



  radiation dose.



  



  



  



 COMPARISON:None. 



  



  



  



 FINDINGS:



  



  



  



 Note, the lack of intravenous contrast decreases sensitivity of this  



 exam and limits evaluation of the abdominal and pelvic viscera.



  



  



  



 URINARY TRACT:There are nonobstructive calculi involving the left  



 kidney, a couple less than 3 mm calculi in the upper and mid pole with a  



 larger lobulated bilobed 8 mm calculus in the lower pole. There is no  



 ureteral calculus or obstruction. A mildly 



  



 prominent extrarenal pelvis is noted on the left. There is no  



 perinephric stranding. Urinary bladder is unremarkable.



  



  



  



 LUNG BASES:Clear.



  



  



  



 LIVER:Normal.



  



  



  



 BILIARY:Normal.



  



  



  



 SPLEEN:Normal.



  



  



  



 PANCREAS:Normal.



  



  



  



 ADRENALS:Normal. 



  



  



  



 GI:Large and small bowel are normal in caliber.There is moderate  



 stool throughout the distal colon without wall thickening or  



 inflammatory changes.Appendix is visualized and appears normal.



  



  



  



 VASCULAR:Unremarkable



  



  



  



 LYMPH NODES:No enlarged lymph nodes in the abdomen or pelvis.



  



  



  



 PELVIS:No lymphadenopathy or abnormal fluid collection.Urinary  



 bladder is unremarkable. Ovaries appear within normal limits.



  



  



  



 BONES:Normal.



  



  



  



 OTHER: 



  



  



  



 IMPRESSION:



  



  



  



 Nonobstructive left renal calculi as described. 



  



  



  



  



  



 Thank you for allowing us to participate in the care of your patient.



  



 [facility]-[id number]



  



   









 Procedure Note

 

 Hm Interface, Radiology Results Incoming - 2018 11:26 AM CDT



CT RENAL STONE PROTOCOL



 CLINICAL INDICATION:  N20.0 Calculus of kidney, kindey stones



 



 TECHNIQUE: Multidetector CT examination of the abdomen and pelvis performed 
without intravenous contrast per renal stone protocol with automated exposure 
control and/or iterative reconstruction techniques to  radiation dose.



 



 COMPARISON:  None.



 



 FINDINGS:



 



 Note, the lack of intravenous contrast decreases sensitivity of this exam and 
limits evaluation of the abdominal and pelvic viscera.



 



 URINARY TRACT:  There are nonobstructive calculi involving the left kidney, a 
couple less than 3 mm calculi in the upper and mid pole with a larger lobulated 
bilobed 8 mm calculus in the lower pole. There is no



 ureteral calculus or obstruction. A mildly



 prominent extrarenal pelvis is noted on the left. There is no perinephric 
stranding. Urinary bladder is unremarkable.



 



 LUNG BASES:  Clear.



 



 LIVER:    Normal.



 



 BILIARY:  Normal.



 



 SPLEEN:  Normal.



 



 PANCREAS:  Normal.



 



 ADRENALS:  Normal.



 



 GI:  Large and small bowel are normal in caliber.  There is moderate stool 
throughout the distal colon without wall thickening or inflammatory changes.  
Appendix is visualized and appears normal.



 



 VASCULAR:  Unremarkable



 



 LYMPH NODES:  No enlarged lymph nodes in the abdomen or pelvis.



 



 PELVIS:  No lymphadenopathy or abnormal fluid collection.  Urinary bladder is 
unremarkable. Ovaries appear within normal limits.



 



 BONES:  Normal.



 



 OTHER:



 



 IMPRESSION:



 



 Nonobstructive left renal calculi as described.



 



 



 Thank you for allowing us to participate in the care of your patient.



 [facility]-[id number]









 Performing Organization  Address  City/State/Zipcode  Phone Number

 

  [facility]  [address] [place] [address]  



RENAL ULTRASOUND (2018)





 Narrative  Performed At

 

 This result has an attachment that is not available.



  



RENAL ULTRASOUND (2018)after 2018



Insurance







 Payer  Benefit Plan / Group  Subscriber ID  Type  Phone  Address

 

 [employer]  xxxxxxxxxxx  HMO    









 Guarantor Name  Account Type  Relation to  Date of  Phone  Billing



     Patient  Birth    Address

 

 Selin Cui  Personal/Family  Self  1995  [phone]  [address] [facility]







         ([place])  [place]



           [address]







Advance Directives

Patient has advance care planning documents on file. For more information, 
please contact:Shahzad Echevarria[address] [place] [address]

## 2019-03-03 NOTE — RAD REPORT
EXAM DESCRIPTION:  CT - [id number] - 3/3/2019 1:56 pm

 

CLINICAL HISTORY:  Fall, head, neck and facial injuries, headache and neck pain

 

COMPARISON:  None.

 

TECHNIQUE:  Axial 5 mm thick images of the head were obtained.  Axial 2 mm thick images of the cervic
al spine were obtained with sagittal and coronal reconstruction images generated and reviewed.

 

All CT scans are performed using dose optimization technique as appropriate and may include automated
 exposure control or mA/KV adjustment according to patient size.

 

FINDINGS:

No intracranial hemorrhage, mass, edema or acute intracranial finding. No suspicion for acute infarct
ion. No extra-axial fluid collections. Mastoid air cells are clear. Sinuses, orbits and facial bones 
are separately detailed.

 

Cervical body height and alignment are normal. No disk space narrowing. No fracture or acute bony abn
ormality.

 

No paraspinal mass or hematoma.

 

IMPRESSION:  Negative CT head examination for acute or significant finding.

 

Negative CT cervical spine examination for acute or significant finding.

## 2019-03-03 NOTE — XMS REPORT
Clinical Summary

 Created on:March 3, 2019



Patient:Selin Cui

Sex:Female

:1995

External Reference #:[id number]





Demographics







 Address  [address] [place] [address]

 

 Home Phone  [phone]

 

 Mobile Phone  [phone]

 

 Email Address  [email or web address]

 

 Preferred Language  English

 

 Marital Status  Single

 

 Jain Affiliation  Unknown

 

 Race  White

 

 Ethnic Group   or 









Author







 Organization  [facility]

 

 Address  [address] [place] [address]









Support







 Name  Relationship  Address  Phone

 

 Itzel Cui  Unavailable  [phone]

 

 Jr Wolff  Unavailable  Unavailable  [phone]









Care Team Providers







 Name  Role  Phone

 

 Asked, No Pcp  Primary Care Provider  Unavailable









Allergies

No Known Allergies



Medications







 Medication  Sig  Dispensed  Refills  Start Date  End Date  Status

 

 acetaminophen-codeine  TK 2 TS PO Q 6 H    0  12/10/2018    Active



 (TYLENOL WITH CODEINE  PRF PAIN CONTROL.          



 #3) 300-30 mg per            



 tablet            

 

 ibuprofen  TK 1 T PO Q 8 H    0  12/10/2018    Active



 (ADVIL,MOTRIN) 800 MG  PRF PAIN CONTROL.          



 tablet            







Active Problems







 Problem  Noted Date

 

 Left ureteral stone  2017







Encounters







 Date  Type  Specialty  Care Team  Description

 

 2019  Telephone  Urology  Maria Isabel Ac MA  

 

 2018  Telephone  Urology  Annamaria Vázquez MD  

 

 2018  Hospital Encounter  Radiology  Annamaria Vázquez,  Kidney stones



       MD  

 

 2018  Office Visit  Urology  Annamaria Vázquez,  Kidney stones (Primary



       MD  Dx)

 

 12/10/2018  Telephone  Urology  Annamaria Vázquez MD  

 

 2018  Telephone  Urology  Annamaria Vázquez,  Flank pain (Primary Dx)



       MD  

 

 2018  Telephone  UrologAnnamaria Connors MD  

 

 2018  Hospital Encounter  Radiology  Annamaria Vázquez,  Kidney stones



       MD  

 

 2018  Orders Only  Urology  Annamaria Vázquez,  Kidney stones (Primary



       MD  Dx)

 

 2018  Telephone  UrologAnnamaria Connors MD  

 

 2018  Orders Only  Urology  Annamaria Vázquze MD  

 

 2018  Telephone  Urology  Annamaria Vázquez MD  



after 2018



Family History







 Medical History  Relation  Name  Comments

 

 Seizures  Mother    









 Relation  Name  Status  Comments

 

 Father    Alive  

 

 Mother      







Social History







 Tobacco Use  Types  Packs/Day  Years Used  Date

 

 Never Smoker        









 Smokeless Tobacco: Never Used      









 Alcohol Use  Drinks/Week  oz/Week  Comments

 

 No      









 Sex Assigned at Birth  Date Recorded

 

 Not on file  









 Job Start Date  Occupation  Industry

 

 Not on file  Not on file  Not on file









 Travel History  Travel Start  Travel End









 No recent travel history available.







Last Filed Vital Signs

Not on file



Plan of Treatment







 Health Maintenance  Due Date  Last Done  Comments

 

 CHLAMYDIA SCREENING  12/15/2012    

 

 CERVICAL CANCER SCREENING  12/15/2017    

 

 INFLUENZA VACCINE  2018    







Implants







 Implanted  Type  Area    Device  Shelf  Model /



         Identifier  Expiration  Serial /



           Date  Lot

 

 Stent Uretl S-Flx Kwart Retro-Inject 6fr 24cm - [id number]  Peripheral or    
[employer]    2020  [id number] /



 Implanted: Qty: 1 on 2017 by Annamaria Vázquez MD  Biliary Stents      
     /



             [id number]

 

 Stent Uretl S-Flx Kwart Retro-Inject 6fr 24cm - [id number]  Peripheral or  N/A:
  [employer]    2020  [id number] /



 Implanted: 05/15/2017 (Quantity not on file)  Biliary Stents  N/A         /



             [id number]

 

 Catheter Uretl 6/10fr 50cm Flx-Tp Dlmn Std Accs - [id number]  Surgical  N/A:  
[employer]      [id number] /



 Implanted: 05/15/2017 (Quantity not on file)  Implants;  N/A         /



   Expanders;          



   Extenders;          



   Surgical Wires          







Procedures







 Procedure Name  Priority  Date/Time  Associated  Comments



       Diagnosis  

 

 XR KUB KIDNEY URETER  Routine  2018  2:37 PM  Kidney stones  Results for 
this



 BLADDER    CST    procedure are in



         the results



         section.

 

 POC URINALYSIS  Routine  2018 10:24 AM  Kidney stones  Results for this



 DIPSTICK    CST    procedure are in



         the results



         section.

 

 CT RENAL STONE  Routine  2018 11:17 AM  Kidney stones  Results for this



 PROTOCOL    CDT    procedure are in



         the results



         section.

 

 RENAL ULTRASOUND  Routine  2018    

 

 RENAL ULTRASOUND  Routine  2018    



after 2018



Results

XR Kub Kidney Ureter Bladder (2018  2:37 PM CST)





 Narrative  Performed At

 

 EXAMINATION: XR KUB KIDNEY URETER BLADDER



  [facility]



  



  



 INDICATION: N20.0 Calculus of kidney, kidney stone - left



  



  



  



 COMPARISON: None



  



  



  



 IMPRESSION:



  



 There are 2 calculi overlying the lower pole of the left renal shadow  



 measuring 5 and 4 mm. Otherwise unremarkable.



  



  



  



 [facility]-[id number]



  



   









 Procedure Note

 

 Hm Interface, Radiology Results Incoming - 2018  2:43 PM CST



EXAMINATION: XR KUB KIDNEY URETER BLADDER



 



 INDICATION: N20.0 Calculus of kidney, kidney stone - left



 



 COMPARISON: None



 



 IMPRESSION:



 There are 2 calculi overlying the lower pole of the left renal shadow 
measuring 5 and 4 mm. Otherwise unremarkable.



 



 [facility]-[id number]









 Performing Organization  Address  City/State/Zipcode  Phone Number

 

 [facility]  [address] [place] [address]  



POC urinalysis dipstick (2018 10:24 AM CST)





 Component  Value  Ref Range  Performed At

 

 Color urine, POC  Yellow    

 

 Clarity urine, POC  Clear    

 

 Glucose urine, POC  Negative  Negative  

 

 Bilirubin urine, POC  Positive (A)  Negative  

 

 Ketones urine, POC  2+ (A)  Negative  

 

 Specific gravity urine, POC  1.025  1.005 - 1.030  

 

 Blood urine, POC  Large (A)  Negative  

 

 pH urine, POC  5.5  5.0, 5.5, 6.0, 6.5, 7.0, 7.5,  



     8.0, 8.5  

 

 Protein urine, POC  Negative  Negative  

 

 Urobilinogen urine, POC  <2.0  <2.0  

 

 Nitrite urine, POC  Negative  Negative  

 

 Leukocyte esterase urine, POC  Negative  Negative  









 Specimen

 

 Urine



CT Renal Stone Protocol (2018 11:17 AM CDT)





 Narrative  Performed At

 

 CT [facility]NAL STONE PROTOCOL



   [facility]



 CLINICAL INDICATION:N20.0 Calculus of kidney, kindey stones



  



  



  



 TECHNIQUE: Multidetector CT examination of the abdomen and pelvis  



 performed without intravenous contrast per renal stone protocol with  



 automated exposure control and/or iterative reconstruction techniques to  



  radiation dose.



  



  



  



 COMPARISON:None. 



  



  



  



 FINDINGS:



  



  



  



 Note, the lack of intravenous contrast decreases sensitivity of this  



 exam and limits evaluation of the abdominal and pelvic viscera.



  



  



  



 URINARY TRACT:There are nonobstructive calculi involving the left  



 kidney, a couple less than 3 mm calculi in the upper and mid pole with a  



 larger lobulated bilobed 8 mm calculus in the lower pole. There is no  



 ureteral calculus or obstruction. A mildly 



  



 prominent extrarenal pelvis is noted on the left. There is no  



 perinephric stranding. Urinary bladder is unremarkable.



  



  



  



 LUNG BASES:Clear.



  



  



  



 LIVER:Normal.



  



  



  



 BILIARY:Normal.



  



  



  



 SPLEEN:Normal.



  



  



  



 PANCREAS:Normal.



  



  



  



 ADRENALS:Normal. 



  



  



  



 GI:Large and small bowel are normal in caliber.There is moderate  



 stool throughout the distal colon without wall thickening or  



 inflammatory changes.Appendix is visualized and appears normal.



  



  



  



 VASCULAR:Unremarkable



  



  



  



 LYMPH NODES:No enlarged lymph nodes in the abdomen or pelvis.



  



  



  



 PELVIS:No lymphadenopathy or abnormal fluid collection.Urinary  



 bladder is unremarkable. Ovaries appear within normal limits.



  



  



  



 BONES:Normal.



  



  



  



 OTHER: 



  



  



  



 IMPRESSION:



  



  



  



 Nonobstructive left renal calculi as described. 



  



  



  



  



  



 Thank you for allowing us to participate in the care of your patient.



  



 [facility]-[id number]



  



   









 Procedure Note

 

 Hm Interface, Radiology Results Incoming - 2018 11:26 AM CDT



CT RENAL STONE PROTOCOL



 CLINICAL INDICATION:  N20.0 Calculus of kidney, kindey stones



 



 TECHNIQUE: Multidetector CT examination of the abdomen and pelvis performed 
without intravenous contrast per renal stone protocol with automated exposure 
control and/or iterative reconstruction techniques to  radiation dose.



 



 COMPARISON:  None.



 



 FINDINGS:



 



 Note, the lack of intravenous contrast decreases sensitivity of this exam and 
limits evaluation of the abdominal and pelvic viscera.



 



 URINARY TRACT:  There are nonobstructive calculi involving the left kidney, a 
couple less than 3 mm calculi in the upper and mid pole with a larger lobulated 
bilobed 8 mm calculus in the lower pole. There is no



 ureteral calculus or obstruction. A mildly



 prominent extrarenal pelvis is noted on the left. There is no perinephric 
stranding. Urinary bladder is unremarkable.



 



 LUNG BASES:  Clear.



 



 LIVER:    Normal.



 



 BILIARY:  Normal.



 



 SPLEEN:  Normal.



 



 PANCREAS:  Normal.



 



 ADRENALS:  Normal.



 



 GI:  Large and small bowel are normal in caliber.  There is moderate stool 
throughout the distal colon without wall thickening or inflammatory changes.  
Appendix is visualized and appears normal.



 



 VASCULAR:  Unremarkable



 



 LYMPH NODES:  No enlarged lymph nodes in the abdomen or pelvis.



 



 PELVIS:  No lymphadenopathy or abnormal fluid collection.  Urinary bladder is 
unremarkable. Ovaries appear within normal limits.



 



 BONES:  Normal.



 



 OTHER:



 



 IMPRESSION:



 



 Nonobstructive left renal calculi as described.



 



 



 Thank you for allowing us to participate in the care of your patient.



 [facility]-[id number]









 Performing Organization  Address  City/State/Zipcode  Phone Number

 

  [facility]  [address] [place] [address]  



RENAL ULTRASOUND (2018)





 Narrative  Performed At

 

 This result has an attachment that is not available.



  



RENAL ULTRASOUND (2018)after 2018



Insurance







 Payer  Benefit Plan / Group  Subscriber ID  Type  Phone  Address

 

 [employer]  xxxxxxxxxxx  HMO    









 Guarantor Name  Account Type  Relation to  Date of  Phone  Billing



     Patient  Birth    Address

 

 Selin Cui  Personal/Family  Self  1995  [phone]  [address] [facility]







         ([place])  [place]



           [address]







Advance Directives

Patient has advance care planning documents on file. For more information, 
please contact:Shahzad Echevarria[address] [place] [address]

## 2019-03-04 ENCOUNTER — HOSPITAL ENCOUNTER (EMERGENCY)
Dept: HOSPITAL 97 - ER | Age: 24
Discharge: HOME | End: 2019-03-04
Payer: COMMERCIAL

## 2019-03-04 DIAGNOSIS — G44.309: Primary | ICD-10-CM

## 2019-03-04 PROCEDURE — 99283 EMERGENCY DEPT VISIT LOW MDM: CPT

## 2019-03-04 NOTE — EDPHYS
Physician Documentation                                                                           

 [facility]                                                                

Name: Selin Cui                                                                           

Age: 23 yrs                                                                                       

Sex: Female                                                                                       

: 1995                                                                                   

MRN: [id number]                                                                                   

Arrival Date: 2019                                                                          

Time: 23:57                                                                                       

Account#: [id number]                                                                            

Bed 5                                                                                             

Private MD:                                                                                       

ED Physician Edward Heath                                                                      

HPI:                                                                                              

                                                                                             

01:00 This 23 yrs old  Female presents to ER via Ambulatory with complaints of Rash - cp  

      on legs.                                                                                    

01:00 The patient's rash thought to be caused by an unknown cause. The rash is located on the cp  

      right leg and left leg. The rash can be described as erythematous, urticarial. Onset:       

      The symptoms/episode began/occurred 2 day(s) ago. Associated signs and symptoms:            

      Pertinent positives: itching. Severity of symptoms: in the emergency department the         

      symptoms are worse. Treatment given at home: Benadryl. Patient reports noticing rash on     

      leg with itching shortly after eating at [employer] Friday. Rash became worse.        

      Patient reports she ate what she usually eats when she eats at [employer]. Denies     

      change in soaps or detergents.                                                              

                                                                                                  

OB/GYN:                                                                                           

00:10 LMP 3/4/2019                                                                            fc  

                                                                                                  

Historical:                                                                                       

- Allergies:                                                                                      

00:29 No Known Allergies;                                                                     fc  

- Home Meds:                                                                                      

00:29 None [Active];                                                                          fc  

- PMHx:                                                                                           

00:29 Kidney stones;                                                                          fc  

- PSHx:                                                                                           

00:29 Removal of kidney stones;                                                               fc  

                                                                                                  

- Immunization history:: Last tetanus immunization: up to date Flu vaccine is up to               

  date.                                                                                           

- Social history:: Smoking status: Patient/guardian denies using tobacco, Patient uses            

  alcohol, occasionally.                                                                          

- Ebola Screening: : Patient negative for fever greater than or equal to 101.5 degrees            

  Fahrenheit, and additional compatible Ebola Virus Disease symptoms Patient denies               

  exposure to infectious person Patient denies travel to an Ebola-affected area in the            

  21 days before illness onset.                                                                   

                                                                                                  

                                                                                                  

ROS:                                                                                              

01:05 Constitutional: Negative for body aches, chills, fever, poor PO intake.                 cp  

01:05 Eyes: Negative for injury, pain, redness, and discharge.                                cp  

01:05 ENT: Negative for drainage from ear(s), ear pain, sore throat, difficulty swallowing,       

      difficulty handling secretions.                                                             

01:05 Cardiovascular: Negative for chest pain, palpitations.                                      

01:05 Respiratory: Negative for cough, shortness of breath, wheezing.                             

01:05 Abdomen/GI: Negative for abdominal pain, nausea, vomiting, and diarrhea.                    

01:05 Skin: Positive for rash, of the right leg and left leg.                                     

01:05 Neuro: Negative for altered mental status, weakness.                                        

01:05 All other systems are negative.                                                             

                                                                                                  

Exam:                                                                                             

01:10 Constitutional: The patient appears in no acute distress, alert, awake, non-toxic, well cp  

      developed, well nourished.                                                                  

01:10 Head/Face:  Normocephalic, atraumatic.                                                  cp  

01:10 Eyes: Periorbital structures: appear normal, Conjunctiva: normal, no exudate, no            

      injection, Lids and lashes: appear normal, bilaterally.                                     

01:10 ENT: External ear(s): are unremarkable, Nose: is normal, Mouth: Lips: moist, Oral           

      mucosa: moist, Posterior pharynx: Airway: no evidence of obstruction, patent, swelling,     

      is not appreciated, erythema, is not appreciated, exudate, is not appreciated.              

01:10 Chest/axilla: Inspection: normal.                                                           

01:10 Cardiovascular: Rate: normal, Rhythm: regular.                                              

01:10 Respiratory: the patient does not display signs of respiratory distress,  Respirations:     

      normal, no use of accessory muscles, no retractions, no splinting, no tachypnea,            

      labored breathing, is not present, Breath sounds: are clear throughout, no decreased        

      breath sounds, no stridor, no wheezing.                                                     

01:10 Abdomen/GI: Inspection: abdomen appears normal.                                             

01:10 Skin: consistent with  urticaria, hives, on the right leg and left leg.                     

                                                                                                  

Vital Signs:                                                                                      

00:10  / 86; Pulse 101; Resp 18; Temp 98.6(O); Pulse Ox 99% on R/A; Weight 68.04 kg     fc  

      (R); Height 5 ft. 2 in. (157.48 cm) (R); Pain 8/10;                                         

01:26  / 80; Pulse 80; Resp 16; Temp 98.6; Pulse Ox 100% on R/A;                        ag4 

00:10 Body Mass Index 27.44 (68.04 kg, 157.48 cm)                                               

                                                                                                  

MDM:                                                                                              

00:11 Patient medically screened.                                                             cp  

01:24 Data reviewed: vital signs, nurses notes, and as a result, I will discharge patient.    cp  

01:24 Differential diagnosis: allergic reaction, parasite infection, cellulitis. Counseling:  cp  

      I had a detailed discussion with the patient and/or guardian regarding: the historical      

      points, exam findings, and any diagnostic results supporting the discharge/admit            

      diagnosis, to return to the emergency department if symptoms worsen or persist or if        

      there are any questions or concerns that arise at home.                                     

                                                                                                  

Administered Medications:                                                                         

01:00 Drug: Pepcid 20 mg Route: PO;                                                           tl1 

01:35 Follow up: Response: No adverse reaction; No change in condition                        tl1 

01:00 Drug: predniSONE 60 mg Route: PO;                                                       tl1 

01:35 Follow up: Response: No adverse reaction; No change in condition                        tl1 

                                                                                                  

                                                                                                  

Disposition:                                                                                      

19 01:25 Discharged to Home. Impression: Urticaria, unspecified.                            

- Condition is Stable.                                                                            

- Discharge Instructions: Hives.                                                                  

- Prescriptions for Pepcid 20 mg Oral Tablet - take 1 tablet by ORAL route every 12               

  hours for 5 days; 10 tablet. Prednisone 20 mg Oral Tablet - take 2 tablet by ORAL               

  route once daily for 5 days; 10 tablet.                                                         

- Medication Reconciliation Form, Thank You Letter, Antibiotic Education, Prescription            

  Opioid Use form.                                                                                

- Work release form (19 11:12).                                                         bd  

- Follow up: Private Physician; When: 2 - 3 days; Reason: Recheck today's complaints.             

- Problem is new.                                                                                 

- Symptoms have improved.                                                                         

                                                                                                  

                                                                                                  

                                                                                                  

Addendum:                                                                                         

2019                                                                                        

     11:17 Co-signature as Attending Physician, Edwadr Heath MD I agree with the assessment and  c
ha

           plan of care.                                                                          

                                                                                                  

Signatures:                                                                                       

Edward Heath MD MD cha Chretien, Felicia RN                   RN                                                      

Itzel Vargas RN                      RN   tl1                                                  

Edward Gamble PA PA cp Dirrim, Barbara bd                                                   

                                                                                                  

Corrections: (The following items were deleted from the chart)                                    

                                                                                             

01:34 01:25 2019 01:25 Discharged to Home. Impression: Urticaria, unspecified.          tl1 

      Condition is Stable. Forms are Medication Reconciliation Form, Thank You Letter,            

      Antibiotic Education, Prescription Opioid Use. Follow up: Private Physician; When: 2 -      

      3 days; Reason: Recheck today's complaints. Problem is new. Symptoms have improved. cp      

16:28  01:10 Constitutional: The patient appears in no acute distress, alert, awake,     cp  

      non-toxic, well developed, well nourished, cp                                               

16: 01:10 Head/Face: Normocephalic, atraumatic. cp                                    cp  

16: 01:10 Eyes: Periorbital structures: appear normal, Conjunctiva: normal, no        cp  

      exudate, no injection, Lids and lashes: appear normal, bilaterally, cp                      

16: 01:10 ENT: External ear(s): are unremarkable, Ear canal(s): are normal, clear,    cp  

      TM's: bulging, is not appreciated, bilaterally, Nose: is normal, Mouth: Lips: moist,        

      Oral mucosa: pink and intact, moist, Posterior pharynx: is normal, airway is patent, no     

      erythema, no exudate, Voice: is normal, cp                                                  

16: 01:10 Chest/axilla: Inspection: normal, cp                                        cp  

16: 01:10 Cardiovascular: Rate: normal, Rhythm: regular, cp                           cp  

16: 01:10 Respiratory: the patient does not display signs of respiratory distress,    cp  

      Respirations: normal, no use of accessory muscles, no retractions, no splinting, no         

      tachypnea, labored breathing, is not present, Breath sounds: are clear throughout, no       

      decreased breath sounds, no stridor, no wheezing, cp                                        

16: 01:10 Abdomen/GI: Inspection: abdomen appears normal, cp                          cp  

16: 01:10 Skin: consistent with urticaria, hives, on the right leg and left leg, cp   cp  

                                                                                                  

**************************************************************************************************

## 2019-03-04 NOTE — ER
Nurse's Notes                                                                                     

 [facility]                                                                

Name: Selin Cui                                                                           

Age: 23 yrs                                                                                       

Sex: Female                                                                                       

: 1995                                                                                   

MRN: [id number]                                                                                   

Arrival Date: 2019                                                                          

Time: 16:08                                                                                       

Account#: [id number]                                                                            

Bed 19                                                                                            

Private MD:                                                                                       

Diagnosis: Acute post-traumatic headache                                                          

                                                                                                  

Presentation:                                                                                     

                                                                                             

16:15 Presenting complaint: Patient states: "I was just here with a concussion yesterday and  aa5 

      I got released to go back to work today". 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       

      [facility] and [facility]". Pt reports she is still having the        

      symptoms and not feeling worse. Transition of care: patient was not received from           

      another setting of care. Onset of symptoms was 2019. Risk Assessment: Do you want     

      to hurt yourself or someone else? Patient reports no desire to harm self or others.         

      Initial Sepsis Screen: Does the patient meet any 2 criteria? No. Patient's initial          

      sepsis screen is negative. Does the patient have a suspected source of infection? No.       

      Patient's initial sepsis screen is negative. Care prior to arrival: None.                   

16:15 Method Of Arrival: Ambulatory                                                           aa5 

16:15 Acuity: AWILDA 5                                                                           aa5 

                                                                                                  

OB/GYN:                                                                                           

16:18 LMP 3/3/2019                                                                            aa5 

                                                                                                  

Historical:                                                                                       

- Allergies:                                                                                      

16:18 No Known Allergies;                                                                     aa5 

- PMHx:                                                                                           

16:18 Kidney stones;                                                                          aa5 

- PSHx:                                                                                           

16:18 Removal of kidney stones;                                                               aa5 

                                                                                                  

- Immunization history:: Flu vaccine is up to date.                                               

- Social history:: Smoking status: Patient/guardian denies using tobacco,                         

  Patient/guardian denies using alcohol, street drugs, The patient lives with family.             

- Ebola Screening: : No symptoms or risks identified at this time.                                

- Family history:: not pertinent.                                                                 

                                                                                                  

                                                                                                  

Screenin:45 Abuse screen: Denies threats or abuse. Denies injuries from another. Nutritional        ph  

      screening: No deficits noted. Tuberculosis screening: No symptoms or risk factors           

      identified. Fall Risk None identified.                                                      

                                                                                                  

Assessment:                                                                                       

16:45 General: Appears in no apparent distress. comfortable, slender, well groomed, Behavior  ph  

      is calm, cooperative, appropriate for age, Denies fever, feeling ill. Pain: Complains       

      of pain in forehead Pain does not radiate. Neuro: Level of Consciousness is awake,          

      alert, obeys commands, Oriented to person, place, time, situation, Reports headache         

      frontal area, Denies weakness blurred vision dizziness, diplopia. Cardiovascular:           

      Capillary refill < 3 seconds in bilateral fingers Patient's skin is warm and dry.           

      Respiratory: Airway is patent Respiratory effort is even, unlabored. GI: Patient            

      currently denies nausea, vomiting. Derm: Skin is intact, is healthy with good turgor,       

      Skin is pink, warm \T\ dry. Musculoskeletal: Circulation, motion, and sensation intact.     

      Range of motion: intact in all extremities.                                                 

                                                                                                  

Vital Signs:                                                                                      

16:18  / 86; Pulse 66; Resp 18 S; Temp 97.9(TE); Pulse Ox 97% on R/A; Weight 68.04 kg   aa5 

      (R); Height 5 ft. 2 in. (157.48 cm) (R); Pain 7/10;                                         

16:18 Body Mass Index 27.44 (68.04 kg, 157.48 cm)                                             aa5 

                                                                                                  

Paramjit Coma Score:                                                                               

16:56 Eye Response: spontaneous(4). Verbal Response: oriented(5). Motor Response: obeys       [facility] 

      commands(6). Total: 15.                                                                     

                                                                                                  

ED Course:                                                                                        

16:08 Patient arrived in ED.                                                                  mr  

16:14 Arm band placed on.                                                                     aa5 

16:18 Triage completed.                                                                       aa5 

16:22 Annie Benites RN is Primary Nurse.                                                    ph  

16:23 Jennifer Casas MD is Attending Physician.                                           ma2 

16:45 Patient has correct armband on for positive identification. Bed in low position. Call   ph  

      light in reach. Side rails up X 1. Pulse ox on. NIBP on.                                    

17:14 No provider procedures requiring assistance completed. Patient did not have IV access   ph  

      during this emergency room visit.                                                           

                                                                                                  

Administered Medications:                                                                         

No medications were administered                                                                  

                                                                                                  

                                                                                                  

Outcome:                                                                                          

16:59 Discharge ordered by MD.                                                                ma2 

17:14 Patient left the ED.                                                                    ph  

17:14 Discharged to home ambulatory.                                                          ph  

17:14 Condition: good                                                                             

17:14 Discharge instructions given to patient, Instructed on discharge instructions, follow       

      up and referral plans. Demonstrated understanding of instructions, follow-up care.          

                                                                                                  

Signatures:                                                                                       

Alis Holm                                 mr DuffAnn Marie RN                     RN   [facility]                                                  

Annie Beintes RN                      RN                                                      

Jennifer Casas MD MD   [facility]                                                  

                                                                                                  

**************************************************************************************************

## 2019-03-04 NOTE — EDPHYS
Physician Documentation                                                                           

 [facility]                                                                

Name: Selin Cui                                                                           

Age: 23 yrs                                                                                       

Sex: Female                                                                                       

: 1995                                                                                   

MRN: [id number]                                                                                   

Arrival Date: 2019                                                                          

Time: 16:08                                                                                       

Account#: [id number]                                                                            

Bed 19                                                                                            

Private MD:                                                                                       

ED Physician Jennifer Casas                                                                    

HPI:                                                                                              

                                                                                             

16:56 This 23 yrs old  Female presents to ER via Ambulatory with complaints of        ma2 

      Headache.                                                                                   

16:56 The patient complains of pain to the forehead. Onset: The symptoms/episode              ma2 

      began/occurred gradually, 3 day(s) ago. Associated signs and symptoms: Pertinent            

      negatives: dizziness, malaise, paresthesias, rash, vomiting, weakness. Severity of          

      symptoms: At its worst the pain was mild, in the emergency department the pain is           

      unchanged.                                                                                  

                                                                                                  

OB/GYN:                                                                                           

16:18 LMP 3/3/2019                                                                            aa5 

                                                                                                  

Historical:                                                                                       

- Allergies:                                                                                      

16:18 No Known Allergies;                                                                     aa5 

- PMHx:                                                                                           

16:18 Kidney stones;                                                                          aa5 

- PSHx:                                                                                           

16:18 Removal of kidney stones;                                                               aa5 

                                                                                                  

- Immunization history:: Flu vaccine is up to date.                                               

- Social history:: Smoking status: Patient/guardian denies using tobacco,                         

  Patient/guardian denies using alcohol, street drugs, The patient lives with family.             

- Ebola Screening: : No symptoms or risks identified at this time.                                

- Family history:: not pertinent.                                                                 

                                                                                                  

                                                                                                  

ROS:                                                                                              

16:56 Constitutional: Negative for fever, chills, and weight loss.                            ma2 

16:56 All other systems are negative.                                                             

                                                                                                  

Exam:                                                                                             

16:56 Constitutional:  This is a well developed, well nourished patient who is awake, alert,  ma2 

      and in no acute distress. Chest/axilla:  Normal chest wall appearance and motion.           

      Nontender with no deformity.  No lesions are appreciated. Cardiovascular:  Regular rate     

      and rhythm with a normal S1 and S2.  No gallops, murmurs, or rubs.  Normal PMI, no JVD.     

       No pulse deficits. Respiratory:  Lungs have equal breath sounds bilaterally, clear to      

      auscultation and percussion.  No rales, rhonchi or wheezes noted.  No increased work of     

      breathing, no retractions or nasal flaring. Abdomen/GI:  Soft, non-tender, with normal      

      bowel sounds.  No distension or tympany.  No guarding or rebound.  No evidence of           

      tenderness throughout. MS/ Extremity:  Pulses equal, no cyanosis.  Neurovascular            

      intact.  Full, normal range of motion. Neuro:  Awake and alert, GCS 15, oriented to         

      person, place, time, and situation.  Cranial nerves II-XII grossly intact.  Motor           

      strength 5/5 in all extremities.  Sensory grossly intact.  Cerebellar exam normal.          

      Normal gait.                                                                                

                                                                                                  

Vital Signs:                                                                                      

16:18  / 86; Pulse 66; Resp 18 S; Temp 97.9(TE); Pulse Ox 97% on R/A; Weight 68.04 kg   aa5 

      (R); Height 5 ft. 2 in. (157.48 cm) (R); Pain 7/10;                                         

16:18 Body Mass Index 27.44 (68.04 kg, 157.48 cm)                                             aa5 

                                                                                                  

Paramjit Coma Score:                                                                               

16:56 Eye Response: spontaneous(4). Verbal Response: oriented(5). Motor Response: obeys       ma2 

      commands(6). Total: 15.                                                                     

                                                                                                  

MDM:                                                                                              

16:23 Patient medically screened.                                                             ma2 

16:56 Differential diagnosis: tension headache. Data reviewed: vital signs, nurses notes.     ma2 

      Counseling: I had a detailed discussion with the patient and/or guardian regarding: the     

      historical points, exam findings, and any diagnostic results supporting the                 

      discharge/admit diagnosis, the presence of at least one elevated blood pressure reading     

      (>120/80) during this emergency department visit, the need for outpatient follow up.        

      Response to treatment: declined rx as her headache resolved her efor work clearance         

      only, headache post fall, workup yesterday negative including cth.                          

                                                                                                  

Administered Medications:                                                                         

No medications were administered                                                                  

                                                                                                  

                                                                                                  

Disposition:                                                                                      

19 16:59 Discharged to Home. Impression: Acute post-traumatic headache.                     

- Condition is Stable.                                                                            

- Discharge Instructions: Concussion, Adult.                                                      

                                                                                                  

- Work release form, Medication Reconciliation Form, Thank You Letter, Antibiotic                 

  Education, Prescription Opioid Use form.                                                        

- Follow up: Private Physician; When: Tomorrow; Reason: Continuance of care.                      

                                                                                                  

                                                                                                  

                                                                                                  

Signatures:                                                                                       

Ann Marie Duff RN                     RN   aa5                                                  

Annie Benites RN                      RN                                                      

Jennifer Casas MD MD   ma2                                                  

                                                                                                  

Corrections: (The following items were deleted from the chart)                                    

17:14 16:59 2019 16:59 Discharged to Home. Impression: Acute post-traumatic headache.   ph  

      Condition is Stable. Forms are Medication Reconciliation Form, Thank You Letter,            

      Antibiotic Education, Prescription Opioid Use. Follow up: Private Physician; When:          

      Tomorrow; Reason: Continuance of care. ma2                                                  

                                                                                                  

**************************************************************************************************

## 2019-03-04 NOTE — ER
Nurse's Notes                                                                                     

 [facility]                                                                

Name: Selin Cui                                                                           

Age: 23 yrs                                                                                       

Sex: Female                                                                                       

: 1995                                                                                   

MRN: [id number]                                                                                   

Arrival Date: 2019                                                                          

Time: 23:57                                                                                       

Account#: [id number]                                                                            

Bed 5                                                                                             

Private MD:                                                                                       

Diagnosis: Urticaria, unspecified                                                                 

                                                                                                  

Presentation:                                                                                     

                                                                                             

00:10 Presenting complaint: Patient states: that on Friday she noticed a rash on her right      

      thigh. She took allergy medication and it went away. Tonight it came back to both her       

      legs and is very itchy and burning. Denies anything new. Transition of care: patient        

      was not received from another setting of care. Onset of symptoms was 2019.        

      Risk Assessment: Do you want to hurt yourself or someone else? Patient reports no           

      desire to harm self or others. Initial Sepsis Screen: Does the patient meet any 2           

      criteria? HR > 90 bpm. Yes Does the patient have a suspected source of infection? No.       

      Patient's initial sepsis screen is negative. Care prior to arrival: None.                   

00:10 Method Of Arrival: Ambulatory                                                             

00:10 Acuity: AWILDA 4                                                                             

                                                                                                  

Triage Assessment:                                                                                

00:10 General: Appears comfortable, slender, Behavior is calm, cooperative, appropriate for     

      age. Pain: Complains of pain in right leg and left leg Pain currently is 8 out of 10 on     

      a pain scale. Quality of pain is described as burning, Is continuous. EENT: No deficits     

      noted. Neuro: Level of Consciousness is awake, alert, obeys commands, Oriented to           

      person, place, time, situation, Appropriate for age. Cardiovascular: No deficits noted.     

      Respiratory: No deficits noted. GI: No deficits noted. : No deficits noted. Derm:         

      Skin is pink, warm \T\ dry. Rash noted that is itchy, red, raised, urticaria, on right      

      leg and left leg. Musculoskeletal: Circulation, motion, and sensation intact. Capillary     

      refill < 3 seconds, Range of motion: intact in all extremities.                             

                                                                                                  

OB/GYN:                                                                                           

00:10 LMP 3/4/2019                                                                              

                                                                                                  

Historical:                                                                                       

- Allergies:                                                                                      

00:29 No Known Allergies;                                                                     fc  

- Home Meds:                                                                                      

00:29 None [Active];                                                                          fc  

- PMHx:                                                                                           

00:29 Kidney stones;                                                                          fc  

- PSHx:                                                                                           

00:29 Removal of kidney stones;                                                               fc  

                                                                                                  

- Immunization history:: Last tetanus immunization: up to date Flu vaccine is up to               

  date.                                                                                           

- Social history:: Smoking status: Patient/guardian denies using tobacco, Patient uses            

  alcohol, occasionally.                                                                          

- Ebola Screening: : Patient negative for fever greater than or equal to 101.5 degrees            

  Fahrenheit, and additional compatible Ebola Virus Disease symptoms Patient denies               

  exposure to infectious person Patient denies travel to an Ebola-affected area in the            

  21 days before illness onset.                                                                   

                                                                                                  

                                                                                                  

Screenin:28 Abuse screen: Denies threats or abuse. Nutritional screening: No deficits noted.          

      Tuberculosis screening: No symptoms or risk factors identified. Fall Risk None              

      identified.                                                                                 

                                                                                                  

Assessment:                                                                                       

01:30 Reassessment: Patient appears in no apparent distress at this time. Patient and/or      tl1 

      family updated on plan of care and expected duration. Pain level reassessed. Patient is     

      alert, oriented x 3, equal unlabored respirations, skin warm/dry/pink. General: Appears     

      in no apparent distress. Derm: Rash noted that is itchy, raised, urticaria, on left leg     

      and right leg.                                                                              

                                                                                                  

Vital Signs:                                                                                      

00:10  / 86; Pulse 101; Resp 18; Temp 98.6(O); Pulse Ox 99% on R/A; Weight 68.04 kg       

      (R); Height 5 ft. 2 in. (157.48 cm) (R); Pain 8/10;                                         

01:26  / 80; Pulse 80; Resp 16; Temp 98.6; Pulse Ox 100% on R/A;                        ag4 

00:10 Body Mass Index 27.44 (68.04 kg, 157.48 cm)                                               

                                                                                                  

ED Course:                                                                                        

                                                                                             

23:57 Patient arrived in ED.                                                                  am2 

04                                                                                             

00:10 Arm band placed on Patient placed in an exam room, on a stretcher.                      fc  

00:11 Edward Gamble PA is PHCP.                                                                cp  

00:11 Edward Heath MD is Attending Physician.                                             cp  

00:27 Triage completed.                                                                       fc  

00:28 Patient has correct armband on for positive identification. Bed in low position. Call     

      light in reach. Side rails up X 1. Pulse ox on. NIBP on.                                    

00:53 Itzel Vargas, RN is Primary Nurse.                                                    tl1 

01:31 No provider procedures requiring assistance completed. Patient did not have IV access   tl1 

      during this emergency room visit.                                                           

                                                                                                  

Administered Medications:                                                                         

01:00 Drug: Pepcid 20 mg Route: PO;                                                           tl1 

01:35 Follow up: Response: No adverse reaction; No change in condition                        tl1 

01:00 Drug: predniSONE 60 mg Route: PO;                                                       tl1 

01:35 Follow up: Response: No adverse reaction; No change in condition                        tl1 

                                                                                                  

                                                                                                  

Outcome:                                                                                          

01:25 Discharge ordered by MD. south  

01:31 Discharged to home ambulatory.                                                          tl1 

01:31 Condition: good                                                                             

01:31 Discharge instructions given to patient, Instructed on discharge instructions, follow       

      up and referral plans. medication usage, Demonstrated understanding of instructions,        

      follow-up care, medications, Prescriptions given X 2.                                       

01:34 Patient left the ED.                                                                    tl1 

                                                                                                  

Signatures:                                                                                       

Paris Branch RN                   RN                                                      

Itzel Vargas RN                      RN   tl1                                                  

Edward Gamble PA PA cp Moreno, Amanda                               am2                                                  

Dennys Mcduffie                                 ag4                                                  

                                                                                                  

**************************************************************************************************

## 2019-03-04 NOTE — XMS REPORT
Clinical Summary

 Created on:2019



Patient:Selin Cui

Sex:Female

:1995

External Reference #:[id number]





Demographics







 Address  [address] [place] [address]

 

 Home Phone  [phone]

 

 Mobile Phone  [phone]

 

 Email Address  [email or web address]

 

 Preferred Language  English

 

 Marital Status  Single

 

 Bahai Affiliation  Unknown

 

 Race  White

 

 Ethnic Group   or 









Author







 Organization  [facility]

 

 Address  [address] [place] [id number]









Support







 Name  Relationship  Address  Phone

 

 Itzel Cui  Unavailable  [phone]

 

 Jr Wolff  Unavailable  Unavailable  [phone]









Care Team Providers







 Name  Role  Phone

 

 Asked, No Pcp  Primary Care Provider  Unavailable









Allergies

No Known Allergies



Medications







 Medication  Sig  Dispensed  Refills  Start Date  End Date  Status

 

 acetaminophen-codeine  TK 2 TS PO Q 6 H    0  12/10/2018    Active



 (TYLENOL WITH CODEINE  PRF PAIN CONTROL.          



 #3) 300-30 mg per            



 tablet            

 

 ibuprofen  TK 1 T PO Q 8 H    0  12/10/2018    Active



 (ADVIL,MOTRIN) 800 MG  PRF PAIN CONTROL.          



 tablet            







Active Problems







 Problem  Noted Date

 

 Left ureteral stone  2017







Encounters







 Date  Type  Specialty  Care Team  Description

 

 2019  Telephone  Urology  Maria Isabel Ac MA  

 

 2018  Telephone  Urology  Annamaria Vázquez MD  

 

 2018  Hospital Encounter  Radiology  Annamaria Vázquez,  Kidney stones



       MD  

 

 2018  Office Visit  Urology  Annamaria Vázquez,  Kidney stones (Primary



       MD  Dx)

 

 12/10/2018  Telephone  Urology  Annamaria Vázquez MD  

 

 2018  Telephone  Urology  Annamaria Vázquez,  Flank pain (Primary Dx)



       MD  

 

 2018  Telephone  UrologAnnamaria Connors MD  

 

 2018  Hospital Encounter  Radiology  Annamaria Vázquez,  Kidney stones



       MD  

 

 2018  Orders Only  Urology  Annamaria Vázquez,  Kidney stones (Primary



       MD  Dx)

 

 2018  Telephone  UrologAnnamaria Connors MD  

 

 2018  Orders Only  Urology  Annamaria Vázquez MD  

 

 2018  Telephone  Urology  Annamaria Vázquez MD  



after 2018



Family History







 Medical History  Relation  Name  Comments

 

 Seizures  Mother    









 Relation  Name  Status  Comments

 

 Father    Alive  

 

 Mother      







Social History







 Tobacco Use  Types  Packs/Day  Years Used  Date

 

 Never Smoker        









 Smokeless Tobacco: Never Used      









 Alcohol Use  Drinks/Week  oz/Week  Comments

 

 No      









 Sex Assigned at Birth  Date Recorded

 

 Not on file  









 Job Start Date  Occupation  Industry

 

 Not on file  Not on file  Not on file









 Travel History  Travel Start  Travel End









 No recent travel history available.







Last Filed Vital Signs

Not on file



Plan of Treatment







 Health Maintenance  Due Date  Last Done  Comments

 

 CHLAMYDIA SCREENING  12/15/2012    

 

 CERVICAL CANCER SCREENING  12/15/2017    

 

 INFLUENZA VACCINE  2018    







Implants







 Implanted  Type  Area    Device  Shelf  Model /



         Identifier  Expiration  Serial /



           Date  Lot

 

 Stent Uretl S-Flx Kwart Retro-Inject 6fr 24cm - [id number]  Peripheral or    
[id number]    2020  [id number] /



 Implanted: Qty: 1 on 2017 by Annamaria Vázquez MD  Biliary Stents      
     /



             [id number]

 

 Stent Uretl S-Flx Kwart Retro-Inject 6fr 24cm - [id number]  Peripheral or  N/A:
  [employer]OLOG[employer]    2020  [id number] /



 Implanted: 05/15/2017 (Quantity not on file)  Biliary Stents  N/A         /



             [id number]

 

 Catheter Uretl 6/10fr 50cm Flx-Tp Dlmn Std Accs - [id number]  Surgical  N/A:  
[employer]OLOGICAL      [id number] /



 Implanted: 05/15/2017 (Quantity not on file)  Implants;  N/A         /



   Expanders;          



   Extenders;          



   Surgical Wires          







Procedures







 Procedure Name  Priority  Date/Time  Associated  Comments



       Diagnosis  

 

 XR KUB KIDNEY URETER  Routine  2018  2:37 PM  Kidney stones  Results for 
this



 BLADDER    CST    procedure are in



         the results



         section.

 

 POC URINALYSIS  Routine  2018 10:24 AM  Kidney stones  Results for this



 DIPSTICK    CST    procedure are in



         the results



         section.

 

 CT RENAL STONE  Routine  2018 11:17 AM  Kidney stones  Results for this



 PROTOCOL    CDT    procedure are in



         the results



         section.

 

 RENAL ULTRASOUND  Routine  2018    

 

 RENAL ULTRASOUND  Routine  2018    



after 2018



Results

XR Kub Kidney Ureter Bladder (2018  2:37 PM CST)





 Narrative  Performed At

 

 EXAMINATION: XR KUB KIDNEY URETER BLADDER



  [facility]



  



  



 INDICATION: N20.0 Calculus of kidney, kidney stone - left



  



  



  



 COMPARISON: None



  



  



  



 IMPRESSION:



  



 There are 2 calculi overlying the lower pole of the left renal shadow  



 measuring 5 and 4 mm. Otherwise unremarkable.



  



  



  



 [facility]-[id number]



  



   









 Procedure Note

 

 Hm Interface, Radiology Results Incoming - 2018  2:43 PM CST



EXAMINATION: XR KUB KIDNEY URETER BLADDER



 



 INDICATION: N20.0 Calculus of kidney, kidney stone - left



 



 COMPARISON: None



 



 IMPRESSION:



 There are 2 calculi overlying the lower pole of the left renal shadow 
measuring 5 and 4 mm. Otherwise unremarkable.



 



 [facility]-[id number]









 Performing Organization  Address  City/State/Zipcode  Phone Number

 

 [facility]  [address] [place] [address]  



POC urinalysis dipstick (2018 10:24 AM CST)





 Component  Value  Ref Range  Performed At

 

 Color urine, POC  Yellow    

 

 Clarity urine, POC  Clear    

 

 Glucose urine, POC  Negative  Negative  

 

 Bilirubin urine, POC  Positive (A)  Negative  

 

 Ketones urine, POC  2+ (A)  Negative  

 

 Specific gravity urine, POC  1.025  1.005 - 1.030  

 

 Blood urine, POC  Large (A)  Negative  

 

 pH urine, POC  5.5  5.0, 5.5, 6.0, 6.5, 7.0, 7.5,  



     8.0, 8.5  

 

 Protein urine, POC  Negative  Negative  

 

 Urobilinogen urine, POC  <2.0  <2.0  

 

 Nitrite urine, POC  Negative  Negative  

 

 Leukocyte esterase urine, POC  Negative  Negative  









 Specimen

 

 Urine



CT Renal Stone Protocol (2018 11:17 AM CDT)





 Narrative  Performed At

 

 CT [facility]NAL STONE PROTOCOL



   [facility]



 CLINICAL INDICATION:N20.0 Calculus of kidney, kindey stones



  



  



  



 TECHNIQUE: Multidetector CT examination of the abdomen and pelvis  



 performed without intravenous contrast per renal stone protocol with  



 automated exposure control and/or iterative reconstruction techniques to  



  radiation dose.



  



  



  



 COMPARISON:None. 



  



  



  



 FINDINGS:



  



  



  



 Note, the lack of intravenous contrast decreases sensitivity of this  



 exam and limits evaluation of the abdominal and pelvic viscera.



  



  



  



 URINARY TRACT:There are nonobstructive calculi involving the left  



 kidney, a couple less than 3 mm calculi in the upper and mid pole with a  



 larger lobulated bilobed 8 mm calculus in the lower pole. There is no  



 ureteral calculus or obstruction. A mildly 



  



 prominent extrarenal pelvis is noted on the left. There is no  



 perinephric stranding. Urinary bladder is unremarkable.



  



  



  



 LUNG BASES:Clear.



  



  



  



 LIVER:Normal.



  



  



  



 BILIARY:Normal.



  



  



  



 SPLEEN:Normal.



  



  



  



 PANCREAS:Normal.



  



  



  



 ADRENALS:Normal. 



  



  



  



 GI:Large and small bowel are normal in caliber.There is moderate  



 stool throughout the distal colon without wall thickening or  



 inflammatory changes.Appendix is visualized and appears normal.



  



  



  



 VASCULAR:Unremarkable



  



  



  



 LYMPH NODES:No enlarged lymph nodes in the abdomen or pelvis.



  



  



  



 PELVIS:No lymphadenopathy or abnormal fluid collection.Urinary  



 bladder is unremarkable. Ovaries appear within normal limits.



  



  



  



 BONES:Normal.



  



  



  



 OTHER: 



  



  



  



 IMPRESSION:



  



  



  



 Nonobstructive left renal calculi as described. 



  



  



  



  



  



 Thank you for allowing us to participate in the care of your patient.



  



 [facility]-[id number]



  



   









 Procedure Note

 

 Hm Interface, Radiology Results Incoming - 2018 11:26 AM CDT



CT RENAL STONE PROTOCOL



 CLINICAL INDICATION:  N20.0 Calculus of kidney, kindey stones



 



 TECHNIQUE: Multidetector CT examination of the abdomen and pelvis performed 
without intravenous contrast per renal stone protocol with automated exposure 
control and/or iterative reconstruction techniques to  radiation dose.



 



 COMPARISON:  None.



 



 FINDINGS:



 



 Note, the lack of intravenous contrast decreases sensitivity of this exam and 
limits evaluation of the abdominal and pelvic viscera.



 



 URINARY TRACT:  There are nonobstructive calculi involving the left kidney, a 
couple less than 3 mm calculi in the upper and mid pole with a larger lobulated 
bilobed 8 mm calculus in the lower pole. There is no



 ureteral calculus or obstruction. A mildly



 prominent extrarenal pelvis is noted on the left. There is no perinephric 
stranding. Urinary bladder is unremarkable.



 



 LUNG BASES:  Clear.



 



 LIVER:    Normal.



 



 BILIARY:  Normal.



 



 SPLEEN:  Normal.



 



 PANCREAS:  Normal.



 



 ADRENALS:  Normal.



 



 GI:  Large and small bowel are normal in caliber.  There is moderate stool 
throughout the distal colon without wall thickening or inflammatory changes.  
Appendix is visualized and appears normal.



 



 VASCULAR:  Unremarkable



 



 LYMPH NODES:  No enlarged lymph nodes in the abdomen or pelvis.



 



 PELVIS:  No lymphadenopathy or abnormal fluid collection.  Urinary bladder is 
unremarkable. Ovaries appear within normal limits.



 



 BONES:  Normal.



 



 OTHER:



 



 IMPRESSION:



 



 Nonobstructive left renal calculi as described.



 



 



 Thank you for allowing us to participate in the care of your patient.



 [facility]-[id number]









 Performing Organization  Address  City/State/Zipcode  Phone Number

 

  [facility]  [address] [place] [address]  



RENAL ULTRASOUND (2018)





 Narrative  Performed At

 

 This result has an attachment that is not available.



  



RENAL ULTRASOUND (2018)after 2018



Insurance







 Payer  Benefit Plan / Group  Subscriber ID  Type  Phone  Address

 

 [employer]  xxxxxxxxxxx  HMO    









 Guarantor Name  Account Type  Relation to  Date of  Phone  Billing



     Patient  Birth    Address

 

 Selin Cui  Personal/Family  Self  1995  [phone]  [address] [facility]







         ([place])  [place]



           [address]







Advance Directives

Patient has advance care planning documents on file. For more information, 
please contact:[facility] Echevarria[address] [place] [address]

## 2019-04-15 ENCOUNTER — HOSPITAL ENCOUNTER (EMERGENCY)
Dept: HOSPITAL 97 - ER | Age: 24
Discharge: HOME | End: 2019-04-15
Payer: COMMERCIAL

## 2019-04-15 DIAGNOSIS — N13.2: Primary | ICD-10-CM

## 2019-04-15 LAB — UA COMPLETE W REFLEX CULTURE PNL UR: (no result)

## 2019-04-15 PROCEDURE — 81025 URINE PREGNANCY TEST: CPT

## 2019-04-15 PROCEDURE — 99284 EMERGENCY DEPT VISIT MOD MDM: CPT

## 2019-04-15 PROCEDURE — 74176 CT ABD & PELVIS W/O CONTRAST: CPT

## 2019-04-15 PROCEDURE — 81003 URINALYSIS AUTO W/O SCOPE: CPT

## 2019-04-15 PROCEDURE — 76377 3D RENDER W/INTRP POSTPROCES: CPT

## 2019-04-15 PROCEDURE — 81015 MICROSCOPIC EXAM OF URINE: CPT

## 2019-04-15 NOTE — RAD REPORT
EXAM DESCRIPTION:  CT - Stone Protocol - 4/15/2019 4:28 pm

 

CLINICAL HISTORY:  Abdominal pain. Left flank pain

 

COMPARISON:  December 2018

 

TECHNIQUE:  Computed axial tomography of the abdomen pelvis was obtained without oral or IV contrast.
 Lack of IV and oral contrast limits evaluation of solid organs, bowel, and vessels. Coronal reformat
maddy images were obtained and reviewed.

 

All CT scans are performed using dose optimization technique as appropriate and may include automated
 exposure control or mA/KV adjustment according to patient size.

 

FINDINGS:  Multiple left renal calculi. Go mild left hydronephrosis is unchanged. A ureteral calculus
 is not seen. A bladder calculus is not noted.

 

A right renal calculus is not seen. .

 

The liver, spleen, pancreas and adrenals appear grossly normal

 

There is no evidence of diverticulitis. The appendix appears normal

 

IMPRESSION:  Multiple left renal calculi

 

Chronic mild left hydronephrosis

## 2019-04-15 NOTE — XMS REPORT
Clinical Summary

 Created on:April 15, 2019



Patient:Selin Cui

Sex:Female

:1995

External Reference #:[id number]





Demographics







 Address  [address] [place] [address]

 

 Home Phone  [phone]

 

 Mobile Phone  [phone]

 

 Email Address  [email or web address]

 

 Preferred Language  English

 

 Marital Status  Single

 

 Rastafari Affiliation  Unknown

 

 Race  White

 

 Ethnic Group   or 









Author







 Organization  [facility]

 

 Address  [address] [place] [id number]









Support







 Name  Relationship  Address  Phone

 

 Itzel Cui  Unavailable  [phone]

 

 Jr Wolff  Unavailable  Unavailable  [phone]









Care Team Providers







 Name  Role  Phone

 

 Asked, No Pcp  Primary Care Provider  Unavailable









Allergies

No Known Allergies



Medications







 Medication  Sig  Dispensed  Refills  Start Date  End Date  Status

 

 acetaminophen-codeine  TK 2 TS PO Q 6 H    0  12/10/2018    Active



 (TYLENOL WITH CODEINE  PRF PAIN CONTROL.          



 #3) 300-30 mg per            



 tablet            

 

 ibuprofen  TK 1 T PO Q 8 H    0  12/10/2018    Active



 (ADVIL,MOTRIN) 800 MG  PRF PAIN CONTROL.          



 tablet            







Active Problems







 Problem  Noted Date

 

 Left ureteral stone  2017







Encounters







 Date  Type  Specialty  Care Team  Description

 

 04/10/2019  Orders Only  Urology  Annamaria Vázquez,  Kidney stones (Primary Dx
);



       MD  Left ureteral stone

 

 04/10/2019  Telephone  Urology  Annamaria Vázquez MD  

 

 2019  Telephone  Urology  Annamaria Vázquez MD  

 

 2019  Telephone  Urology  Maria Isable Ac MA  

 

 2018  Telephone  Urology  Annamaria Vázquez MD  

 

 2018  Hospital Encounter  Radiology  Annamaria Vázquez,  Kidney stones



       MD  

 

 2018  Office Visit  Urology  Annamaria Vázquez,  Kidney stones (Primary



       MD  Dx)

 

 12/10/2018  Telephone  Urology  Annamaria Vázquez MD  

 

 2018  Telephone  Urology  Annamaria Vázquez,  Flank pain (Primary Dx)



       MD  

 

 2018  Telephone  Urology  Annamaria Vázquez MD  

 

 2018  Hospital Encounter  Radiology  Annamaria Vázquez,  Kidney stones



       MD  

 

 2018  Orders Only  Urology  Annamaria Vázquez,  Kidney stones (Primary



       MD  Dx)

 

 2018  Telephone  Urology  Annamaria Vázquez MD  

 

 2018  Orders Only  Urology  Annamaria Vázquez MD  



after 2018



Family History







 Medical History  Relation  Name  Comments

 

 Seizures  Mother    









 Relation  Name  Status  Comments

 

 Father    Alive  

 

 Mother      







Social History







 Tobacco Use  Types  Packs/Day  Years Used  Date

 

 Never Smoker        









 Smokeless Tobacco: Never Used      









 Alcohol Use  Drinks/Week  oz/Week  Comments

 

 No      









 Sex Assigned at Birth  Date Recorded

 

 Not on file  









 Job Start Date  Occupation  Industry

 

 Not on file  Not on file  Not on file









 Travel History  Travel Start  Travel End









 No recent travel history available.







Last Filed Vital Signs

Not on file



Plan of Treatment







 Health Maintenance  Due Date  Last Done  Comments

 

 CHLAMYDIA SCREENING  12/15/2012    

 

 CERVICAL CANCER SCREENING  12/15/2017    

 

 INFLUENZA VACCINE  2019    







Implants







 Implanted  Type  Area    Device  Shelf  Model /



         Identifier  Expiration  Serial /



           Date  Lot

 

 Stent Uretl S-Flx Kwart Retro-Inject 6fr 24cm - [id number]  Peripheral or    
[employer]    2020  [id number] /



 Implanted: Qty: 1 on 2017 by Annamaria Vázquez MD  Biliary Stents      
     /



             [id number]

 

 Stent Uretl S-Flx Kwart Retro-Inject 6fr 24cm - [id number]  Peripheral or  N/A:
  [employer]    2020  [id number] /



 Implanted: 05/15/2017 (Quantity not on file)  Biliary Stents  N/A         /



             [id number]

 

 Catheter Uretl 6/10fr 50cm Flx-Tp Dlmn Std Accs - [id number]  Surgical  N/A:  
[employer] UROLOGICAL      [id number] /



 Implanted: 05/15/2017 (Quantity not on file)  Implants;  N/A         /



   Expanders;          



   Extenders;          



   Surgical Wires          







Procedures







 Procedure Name  Priority  Date/Time  Associated  Comments



       Diagnosis  

 

 XR KUB KIDNEY URETER  Routine  2018  2:37 PM  Kidney stones  Results for 
this



 BLADDER    CST    procedure are in



         the results



         section.

 

 POC URINALYSIS  Routine  2018 10:24 AM  Kidney stones  Results for this



 DIPSTICK    CST    procedure are in



         the results



         section.

 

 CT RENAL STONE  Routine  2018 11:17 AM  Kidney stones  Results for this



 PROTOCOL    CDT    procedure are in



         the results



         section.

 

 RENAL ULTRASOUND  Routine  2018    

 

 RENAL ULTRASOUND  Routine  2018    



after 2018



Results

XR Kub Kidney Ureter Bladder (2018  2:37 PM CST)





 Narrative  Performed At

 

 EXAMINATION: XR KUB KIDNEY URETER BLADDER



   [facility]



  



  



 INDICATION: N20.0 Calculus of kidney, kidney stone - left



  



  



  



 COMPARISON: None



  



  



  



 IMPRESSION:



  



 There are 2 calculi overlying the lower pole of the left renal shadow  



 measuring 5 and 4 mm. Otherwise unremarkable.



  



  



  



 [facility]-[id number]



  



   









 Procedure Note

 

  Interface, Radiology Results Incoming - 2018  2:43 PM CST



EXAMINATION: XR KUB KIDNEY URETER BLADDER



 



 INDICATION: N20.0 Calculus of kidney, kidney stone - left



 



 COMPARISON: None



 



 IMPRESSION:



 There are 2 calculi overlying the lower pole of the left renal shadow 
measuring 5 and 4 mm. Otherwise unremarkable.



 



 [facility]-[id number]









 Performing Organization  Address  City/State/Zipcode  Phone Number

 

  [facility]  [address] [place] [address]  



POC urinalysis dipstick (2018 10:24 AM CST)





 Component  Value  Ref Range  Performed At

 

 Color urine, POC  Yellow    

 

 Clarity urine, POC  Clear    

 

 Glucose urine, POC  Negative  Negative  

 

 Bilirubin urine, POC  Positive (A)  Negative  

 

 Ketones urine, POC  2+ (A)  Negative  

 

 Specific gravity urine, POC  1.025  1.005 - 1.030  

 

 Blood urine, POC  Large (A)  Negative  

 

 pH urine, POC  5.5  5.0, 5.5, 6.0, 6.5, 7.0, 7.5,  



     8.0, 8.5  

 

 Protein urine, POC  Negative  Negative  

 

 Urobilinogen urine, POC  <2.0  <2.0  

 

 Nitrite urine, POC  Negative  Negative  

 

 Leukocyte esterase urine, POC  Negative  Negative  









 Specimen

 

 Urine



CT Renal Stone Protocol (2018 11:17 AM CDT)





 Narrative  Performed At

 

 CT RENAL STONE PROTOCOL



   [facility]



 CLINICAL INDICATION:N20.0 Calculus of kidney, kindey stones



  



  



  



 TECHNIQUE: Multidetector CT examination of the abdomen and pelvis  



 performed without intravenous contrast per renal stone protocol with  



 automated exposure control and/or iterative reconstruction techniques to  



  radiation dose.



  



  



  



 COMPARISON:None. 



  



  



  



 FINDINGS:



  



  



  



 Note, the lack of intravenous contrast decreases sensitivity of this  



 exam and limits evaluation of the abdominal and pelvic viscera.



  



  



  



 URINARY TRACT:There are nonobstructive calculi involving the left  



 kidney, a couple less than 3 mm calculi in the upper and mid pole with a  



 larger lobulated bilobed 8 mm calculus in the lower pole. There is no  



 ureteral calculus or obstruction. A mildly 



  



 prominent extrarenal pelvis is noted on the left. There is no  



 perinephric stranding. Urinary bladder is unremarkable.



  



  



  



 LUNG BASES:Clear.



  



  



  



 LIVER:Normal.



  



  



  



 BILIARY:Normal.



  



  



  



 SPLEEN:Normal.



  



  



  



 PANCREAS:Normal.



  



  



  



 ADRENALS:Normal. 



  



  



  



 GI:Large and small bowel are normal in caliber.There is moderate  



 stool throughout the distal colon without wall thickening or  



 inflammatory changes.Appendix is visualized and appears normal.



  



  



  



 VASCULAR:Unremarkable



  



  



  



 LYMPH NODES:No enlarged lymph nodes in the abdomen or pelvis.



  



  



  



 PELVIS:No lymphadenopathy or abnormal fluid collection.Urinary  



 bladder is unremarkable. Ovaries appear within normal limits.



  



  



  



 BONES:Normal.



  



  



  



 OTHER: 



  



  



  



 IMPRESSION:



  



  



  



 Nonobstructive left renal calculi as described. 



  



  



  



  



  



 Thank you for allowing us to participate in the care of your patient.



  



 [facility]-[id number]



  



   









 Procedure Note

 

  Interface, Radiology Results Incoming - 2018 11:26 AM CDT



CT RENAL STONE PROTOCOL



 CLINICAL INDICATION:  N20.0 Calculus of kidney, kindey stones



 



 TECHNIQUE: Multidetector CT examination of the abdomen and pelvis performed 
without intravenous contrast per renal stone protocol with automated exposure 
control and/or iterative reconstruction techniques to  radiation dose.



 



 COMPARISON:  None.



 



 FINDINGS:



 



 Note, the lack of intravenous contrast decreases sensitivity of this exam and 
limits evaluation of the abdominal and pelvic viscera.



 



 URINARY TRACT:  There are nonobstructive calculi involving the left kidney, a 
couple less than 3 mm calculi in the upper and mid pole with a larger lobulated 
bilobed 8 mm calculus in the lower pole. There is no



 ureteral calculus or obstruction. A mildly



 prominent extrarenal pelvis is noted on the left. There is no perinephric 
stranding. Urinary bladder is unremarkable.



 



 LUNG BASES:  Clear.



 



 LIVER:    Normal.



 



 BILIARY:  Normal.



 



 SPLEEN:  Normal.



 



 PANCREAS:  Normal.



 



 ADRENALS:  Normal.



 



 GI:  Large and small bowel are normal in caliber.  There is moderate stool 
throughout the distal colon without wall thickening or inflammatory changes.  
Appendix is visualized and appears normal.



 



 VASCULAR:  Unremarkable



 



 LYMPH NODES:  No enlarged lymph nodes in the abdomen or pelvis.



 



 PELVIS:  No lymphadenopathy or abnormal fluid collection.  Urinary bladder is 
unremarkable. Ovaries appear within normal limits.



 



 BONES:  Normal.



 



 OTHER:



 



 IMPRESSION:



 



 Nonobstructive left renal calculi as described.



 



 



 Thank you for allowing us to participate in the care of your patient.



 [facility]-[id number]









 Performing Organization  Address  City/State/Zipcode  Phone Number

 

 [facility]  [address] [place] [address]  



RENAL ULTRASOUND (2018)





 Narrative  Performed At

 

 This result has an attachment that is not available.



  



RENAL ULTRASOUND (2018)after 2018



Insurance







 Payer  Benefit Plan / Group  Subscriber ID  Type  Phone  Address

 

 [employer]  xxxxxxxxxxx  HMO    









 Guarantor Name  Account Type  Relation to  Date of  Phone  Billing



     Patient  Birth    Address

 

 Selin Cui  Personal/Family  Self  1995  [phone]  [address] [facility]







         ([place])  [place]



           [address]







Advance Directives

Patient has advance care planning documents on file. For more information, 
please contact:Shahzad Echevarria[address] [place] [address]

## 2019-04-15 NOTE — ER
Nurse's Notes                                                                                     

 [facility]                                                                 

Name: Selin Cui                                                                           

Age: 23 yrs                                                                                       

Sex: Female                                                                                       

: 1995                                                                                   

MRN: [id number]                                                                                   

Arrival Date: 04/15/2019                                                                          

Time: 15:45                                                                                       

Account#: [id number]                                                                            

Bed 12                                                                                            

Private MD:                                                                                       

Diagnosis: Calculus of kidney                                                                     

                                                                                                  

Presentation:                                                                                     

04/15                                                                                             

16:07 Presenting complaint: Patient states: passed a kidney stone 1 week ago, but began       ss  

      having L flank pain 3 days. ago. Transition of care: patient was not received from          

      another setting of care. Onset of symptoms was 2019. Risk Assessment: Do you      

      want to hurt yourself or someone else? Patient reports no desire to harm self or            

      others. Initial Sepsis Screen: Does the patient meet any 2 criteria? No. Patient's          

      initial sepsis screen is negative. Does the patient have a suspected source of              

      infection? No. Patient's initial sepsis screen is negative. Care prior to arrival: None.    

16:07 Method Of Arrival: Ambulatory                                                           ss  

16:07 Acuity: AWILDA 3                                                                           ss  

                                                                                                  

Historical:                                                                                       

- Allergies:                                                                                      

16:09 No Known Allergies;                                                                     ss  

- Home Meds:                                                                                      

16:09 None [Active];                                                                          ss  

- PMHx:                                                                                           

16:09 Kidney stones;                                                                          ss  

- PSHx:                                                                                           

16:09 Lithotripsy;                                                                            ss  

                                                                                                  

- Immunization history:: Adult Immunizations up to date.                                          

- Social history:: Smoking status: Patient/guardian denies using tobacco.                         

- Ebola Screening: : Patient denies exposure to infectious person Patient denies travel           

  to an Ebola-affected area in the 21 days before illness onset.                                  

                                                                                                  

                                                                                                  

Screenin:12 Abuse screen: Denies threats or abuse. Denies injuries from another. Nutritional        aj1 

      screening: No deficits noted. Tuberculosis screening: No symptoms or risk factors           

      identified. Fall Risk None identified.                                                      

                                                                                                  

Assessment:                                                                                       

17:12 General: Appears in no apparent distress. uncomfortable, Behavior is calm, cooperative, aj1 

      appropriate for age. Pain: Complains of pain in left flank. Neuro: Level of                 

      Consciousness is awake, alert, obeys commands, Oriented to person, place, time,             

      situation. Cardiovascular: Patient's skin is warm and dry. Respiratory: Airway is           

      patent Respiratory effort is even, unlabored, Respiratory pattern is regular,               

      symmetrical. GI: No signs and/or symptoms were reported involving the gastrointestinal      

      system.                                                                                     

                                                                                                  

Vital Signs:                                                                                      

16:09  / 80; Pulse 72; Resp 15; Temp 98.6(TE); Pulse Ox 97% on R/A; Weight 65.77 kg;      

      Height 5 ft. 2 in. (157.48 cm); Pain 6/10;                                                  

16:09 Body Mass Index 26.52 (65.77 kg, 157.48 cm)                                               

                                                                                                  

ED Course:                                                                                        

15:45 Patient arrived in ED.                                                                  as  

16:06 Samantha Bernal FNP-C is [facility].                                                        kb  

16:06 Edward Heath MD is Attending Physician.                                             kb  

16:08 Triage completed.                                                                       ss  

16:09 Arm band placed on left wrist.                                                          ss  

16:17 Patient moved to CT via wheelchair.                                                     jg6 

16:19 Rafaela Amezcua, RN is Primary Nurse.                                                   aj1 

16:28 CT Stone Protocol In Process Unspecified.                                               EDMS

17:12 Patient has correct armband on for positive identification. Bed in low position. Call   aj1 

      light in reach. Side rails up X 1.                                                          

17:12 No provider procedures requiring assistance completed. Patient did not have IV access   aj 

      during this emergency room visit.                                                           

                                                                                                  

Administered Medications:                                                                         

No medications were administered                                                                  

                                                                                                  

                                                                                                  

Outcome:                                                                                          

16:56 Discharge ordered by MD.                                                                kb  

17:12 Discharged to home ambulatory.                                                          aj1 

17:12 Condition: good                                                                             

17:12 Discharge instructions given to patient, Instructed on discharge instructions, follow       

      up and referral plans. medication usage, Demonstrated understanding of instructions,        

      follow-up care, medications, Prescriptions given X 1.                                       

17:15 Patient left the ED.                                                                    aj1 

                                                                                                  

Signatures:                                                                                       

Dispatcher [employer]                           EDMS                                                 

Samantha Bernal FNP-C FNP-Ckb Johnson, Angela, RN                     RN   aj                                                  

Leonarda Katz Shelby, RN RN                                                      

Jesica Ivey                              jg6                                                  

                                                                                                  

**************************************************************************************************

## 2019-04-15 NOTE — EDPHYS
Physician Documentation                                                                           

 [facility]                                                                 

Name: Selin Cui                                                                           

Age: 23 yrs                                                                                       

Sex: Female                                                                                       

: 1995                                                                                   

MRN: [id number]                                                                                   

Arrival Date: 04/15/2019                                                                          

Time: 15:45                                                                                       

Account#: [id number]                                                                            

Bed 12                                                                                            

Private MD:                                                                                       

ED Physician Edward Heath                                                                      

HPI:                                                                                              

04/15                                                                                             

16:26 This 23 yrs old  Female presents to ER via Ambulatory with complaints of Flank  kb  

      Pain, Urinary Problem.                                                                      

16:26 The patient complains of pain in the left flank. The pain does not radiate. Onset: The  kb  

      symptoms/episode began/occurred 2 day(s) ago. Modifying factors: The symptoms are           

      alleviated by nothing. the symptoms are aggravated by nothing. Associated signs and         

      symptoms: Pertinent positives: dysuria, hematuria, Pertinent negatives: diarrhea,           

      dizziness, fever, urinary frequency, headache, nausea, pain radiating to the lower          

      extremities, vomiting. Severity of pain: At its worst the pain was moderate in the          

      emergency department the pain is unchanged. The patient has experienced similar             

      episodes in the past, a few times. The patient has not recently seen a physician. Pt        

      reports she passed a kidney stone 10 days ago, then started having left flank pain on       

      Friday. Saturday started having dysuria and hematuria. Pain has continued today. Pt         

      reports history of kidney stones and they are always on the left side.                      

                                                                                                  

Historical:                                                                                       

- Allergies:                                                                                      

16:09 No Known Allergies;                                                                     ss  

- Home Meds:                                                                                      

16:09 None [Active];                                                                          ss  

- PMHx:                                                                                           

16:09 Kidney stones;                                                                          ss  

- PSHx:                                                                                           

16:09 Lithotripsy;                                                                            ss  

                                                                                                  

- Immunization history:: Adult Immunizations up to date.                                          

- Social history:: Smoking status: Patient/guardian denies using tobacco.                         

- Ebola Screening: : Patient denies exposure to infectious person Patient denies travel           

  to an Ebola-affected area in the 21 days before illness onset.                                  

                                                                                                  

                                                                                                  

ROS:                                                                                              

16:25 Constitutional: Negative for fever, chills, and weight loss, Neck: Negative for injury, kb  

      pain, and swelling, Cardiovascular: Negative for chest pain, palpitations, and edema,       

      Respiratory: Negative for shortness of breath, cough, wheezing, and pleuritic chest         

      pain, Abdomen/GI: Negative for abdominal pain, nausea, vomiting, diarrhea, and              

      constipation, MS/Extremity: Negative for injury and deformity, Skin: Negative for           

      injury, rash, and discoloration, Neuro: Negative for headache, weakness, numbness,          

      tingling, and seizure.                                                                      

16:25 : Positive for urinary symptoms, flank pain, hematuria, burning with urination.           

                                                                                                  

Exam:                                                                                             

16:25 Constitutional:  This is a well developed, well nourished patient who is awake, alert,  kb  

      and in no acute distress. Head/Face:  Normocephalic, atraumatic. ENT:  Nares patent. No     

      nasal discharge, no septal abnormalities noted.  Tympanic membranes are normal and          

      external auditory canals are clear.  Oropharynx with no redness, swelling, or masses,       

      exudates, or evidence of obstruction, uvula midline.  Mucous membranes moist. Neck:         

      Trachea midline, no thyromegaly or masses palpated, and no cervical lymphadenopathy.        

      Supple, full range of motion without nuchal rigidity, or vertebral point tenderness.        

      No Meningismus. Chest/axilla:  Normal chest wall appearance and motion.  Nontender with     

      no deformity.  No lesions are appreciated. Cardiovascular:  Regular rate and rhythm         

      with a normal S1 and S2.  No gallops, murmurs, or rubs.  Normal PMI, no JVD.  No pulse      

      deficits. Respiratory:  Lungs have equal breath sounds bilaterally, clear to                

      auscultation and percussion.  No rales, rhonchi or wheezes noted.  No increased work of     

      breathing, no retractions or nasal flaring. Abdomen/GI:  Soft, non-tender, with normal      

      bowel sounds.  No distension or tympany.  No guarding or rebound.  No evidence of           

      tenderness throughout. Skin:  Warm, dry with normal turgor.  Normal color with no           

      rashes, no lesions, and no evidence of cellulitis. MS/ Extremity:  Pulses equal, no         

      cyanosis.  Neurovascular intact.  Full, normal range of motion. Neuro:  Awake and           

      alert, GCS 15, oriented to person, place, time, and situation.  Cranial nerves II-XII       

      grossly intact.  Motor strength 5/5 in all extremities.  Sensory grossly intact.            

      Cerebellar exam normal.  Normal gait.                                                       

16:25 Back: CVA tenderness, that is moderate, that is severe, is noted on the left.               

                                                                                                  

Vital Signs:                                                                                      

16:09  / 80; Pulse 72; Resp 15; Temp 98.6(TE); Pulse Ox 97% on R/A; Weight 65.77 kg;    ss  

      Height 5 ft. 2 in. (157.48 cm); Pain 6/10;                                                  

16:09 Body Mass Index 26.52 (65.77 kg, 157.48 cm)                                             ss  

                                                                                                  

MDM:                                                                                              

16:20 Patient medically screened.                                                             kb  

16:25 Data reviewed: vital signs, nurses notes. Data interpreted: Pulse oximetry: on room air kb  

      is 97 %. Interpretation: normal.                                                            

16:56 Counseling: I had a detailed discussion with the patient and/or guardian regarding: the kb  

      historical points, exam findings, and any diagnostic results supporting the                 

      discharge/admit diagnosis, lab results, radiology results, the need for outpatient          

      follow up, a family practitioner, a urologist, to return to the emergency department if     

      symptoms worsen or persist or if there are any questions or concerns that arise at home.    

                                                                                                  

04/15                                                                                             

16:10 Order name: Urine Microscopic Only                                                      kb  

04/15                                                                                             

16:16 Order name: Urine Pregnancy--Ancillary (enter results)                                  kb  

04/15                                                                                             

16:10 Order name: Urine Pregnancy Test (obtain specimen); Complete Time: 16:19                kb  

04/15                                                                                             

16:10 Order name: Urine Dipstick-Ancillary (obtain specimen); Complete Time: 16:20            kb  

04/15                                                                                             

16:15 Order name: CT Stone Protocol; Complete Time: 16:47                                     kb  

04/15                                                                                             

16:16 Order name: Urine Dipstick--Ancillary (enter results)                                   kb  

                                                                                                  

Administered Medications:                                                                         

No medications were administered                                                                  

                                                                                                  

                                                                                                  

Disposition:                                                                                      

                                                                                             

07:00 Co-signature as Attending Physician, Edward Heath MD I agree with the assessment and  rox 

      plan of care.                                                                               

                                                                                                  

Disposition:                                                                                      

04/15/19 16:56 Discharged to Home. Impression: Calculus of kidney.                                

- Condition is Stable.                                                                            

- Discharge Instructions: Kidney Stones, Easy-to-Read.                                            

- Prescriptions for Diclofenac Sodium 75 mg Oral Tablet, Delayed Release (E.C.) - take            

  1 tablet by ORAL route 2 times per day As needed; 30 tablet.                                    

- Medication Reconciliation Form, Thank You Letter, Antibiotic Education, Prescription            

  Opioid Use form.                                                                                

- Follow up: Emergency Department; When: As needed; Reason: Worsening of condition.               

  Follow up: Private Physician; When: 2 - 3 days; Reason: Recheck today's complaints,             

  Continuance of care, Re-evaluation by your physician.                                           

                                                                                                  

                                                                                                  

                                                                                                  

Signatures:                                                                                       

Dispatcher [employer]                           Samantha Wilde, MUMTAZ-C                 MUMTAZ-Rafaela Camara RN                     RN   aj1                                                  

KumarEdward MD MD cha Smirch, Shelby, RN                      RN   ss                                                   

                                                                                                  

Corrections: (The following items were deleted from the chart)                                    

04/15                                                                                             

16:28 16:26 Pt reports she passed a kidney stone 10 days ago, then started having left flank  kb  

      pain on Friday. Saturday started having dysuria and hematuria. Pain has continued           

      today. kb                                                                                   

17:15 16:56 04/15/2019 16:56 Discharged to Home. Impression: Calculus of kidney. Condition is aj1 

      Stable. Forms are Medication Reconciliation Form, Thank You Letter, Antibiotic              

      Education, Prescription Opioid Use. Follow up: Emergency Department; When: As needed;       

      Reason: Worsening of condition. Follow up: Private Physician; When: 2 - 3 days; Reason:     

      Recheck today's complaints, Continuance of care, Re-evaluation by your physician. kb        

                                                                                                  

**************************************************************************************************

## 2019-07-30 ENCOUNTER — HOSPITAL ENCOUNTER (EMERGENCY)
Dept: HOSPITAL 97 - ER | Age: 24
Discharge: HOME | End: 2019-07-30
Payer: COMMERCIAL

## 2019-07-30 DIAGNOSIS — N39.0: Primary | ICD-10-CM

## 2019-07-30 LAB
ALBUMIN SERPL BCP-MCNC: 4.6 G/DL (ref 3.4–5)
ALP SERPL-CCNC: 91 U/L (ref 45–117)
ALT SERPL W P-5'-P-CCNC: 30 U/L (ref 12–78)
AST SERPL W P-5'-P-CCNC: 15 U/L (ref 15–37)
BUN BLD-MCNC: 13 MG/DL (ref 7–18)
GLUCOSE SERPLBLD-MCNC: 91 MG/DL (ref 74–106)
HCT VFR BLD CALC: 42.4 % (ref 36–45)
LIPASE SERPL-CCNC: 91 U/L (ref 73–393)
LYMPHOCYTES # SPEC AUTO: 2.8 K/UL (ref 0.7–4.9)
PMV BLD: 8.2 FL (ref 7.6–11.3)
POTASSIUM SERPL-SCNC: 3.6 MMOL/L (ref 3.5–5.1)
RBC # BLD: 4.78 M/UL (ref 3.86–4.86)
UA COMPLETE W REFLEX CULTURE PNL UR: (no result)

## 2019-07-30 PROCEDURE — 36415 COLL VENOUS BLD VENIPUNCTURE: CPT

## 2019-07-30 PROCEDURE — 87086 URINE CULTURE/COLONY COUNT: CPT

## 2019-07-30 PROCEDURE — 99284 EMERGENCY DEPT VISIT MOD MDM: CPT

## 2019-07-30 PROCEDURE — 85025 COMPLETE CBC W/AUTO DIFF WBC: CPT

## 2019-07-30 PROCEDURE — 74176 CT ABD & PELVIS W/O CONTRAST: CPT

## 2019-07-30 PROCEDURE — 76377 3D RENDER W/INTRP POSTPROCES: CPT

## 2019-07-30 PROCEDURE — 81025 URINE PREGNANCY TEST: CPT

## 2019-07-30 PROCEDURE — 80048 BASIC METABOLIC PNL TOTAL CA: CPT

## 2019-07-30 PROCEDURE — 83690 ASSAY OF LIPASE: CPT

## 2019-07-30 PROCEDURE — 87088 URINE BACTERIA CULTURE: CPT

## 2019-07-30 PROCEDURE — 96375 TX/PRO/DX INJ NEW DRUG ADDON: CPT

## 2019-07-30 PROCEDURE — 80076 HEPATIC FUNCTION PANEL: CPT

## 2019-07-30 PROCEDURE — 81003 URINALYSIS AUTO W/O SCOPE: CPT

## 2019-07-30 PROCEDURE — 96365 THER/PROPH/DIAG IV INF INIT: CPT

## 2019-07-30 PROCEDURE — 96361 HYDRATE IV INFUSION ADD-ON: CPT

## 2019-07-30 PROCEDURE — 81015 MICROSCOPIC EXAM OF URINE: CPT

## 2019-07-30 NOTE — XMS REPORT
Clinical Summary

 Created on:2019



Patient:Selin Cui

Sex:Female

:1995

External Reference #:[id number]





Demographics







 Address  [address] [place] [address]

 

 Home Phone  [phone]

 

 Mobile Phone  [phone]

 

 Email Address  [email or web address]

 

 Preferred Language  English

 

 Marital Status  Single

 

 U[employer]din Affiliation  Unknown

 

 Race  White

 

 Ethnic Group   or 









Author







 Organization  [facility]

 

 Address  [address] [place] [address]









Support







 Name  Relationship  Address  Phone

 

 Rebeca Cui  Parent  Unavailable  [phone]









Care Team Providers







 Name  Role  Phone

 

 Asked, No Pcp  Primary Care Provider  Unavailable









Allergies







 Active Allergy  Reactions  Severity  Noted Date  Comments

 

 Hydrocodone  GI Intolerance    2019  Nausea







Medications







 Medication  Sig  Dispensed  Refills  Start Date  End Date  Status

 

 acetaminophen-code  TK 2 TS PO Q 6 H    0  12/10/2018  07/16/20  Discontinued



 ine (TYLENOL WITH  PRF PAIN CONTROL.        19  



 CODEINE #3) 300-30            



 mg per tablet            

 

 ibuprofen  TK 1 T PO Q 8 H    0  12/10/2018  07/16/20  Discontinued



 (ADVIL,MOTRIN) 800  PRF PAIN CONTROL.        19  



 MG tablet            

 

 docusate sodium  Take 1 capsule  14 capsule  0  2019  



 (COLACE) 100 MG  (100 mg total) by        19  



 capsule  mouth 2 (two)          



   times a day as          



   needed for          



   constipation for          



   up to 7 days.          

 

 ciprofloxacin  Take 1 tablet  6 tablet  0  2019  



 (CIPRO) 500 MG  (500 mg total) by        19  



 tablet  mouth 2 (two)          



   times a day for 3          



   days.          

 

 acetaminophen  Take 2 tablets  28 tablet  0  2019  



 (TYLENOL) 325 MG  (650 mg total) by        19  



 tablet  mouth every 6          



   (six) hours as          



   needed for          



   moderate pain for          



   up to 7 days.          

 

 traMADol (ULTRAM)  Take 1 tablet (50  10 tablet  0  2019  




 50 mg tablet  mg total) by        19  



   mouth every 6          



   (six) hours as          



   needed for severe          



   pain for up to 7          



   days.          







Active Problems







 Problem  Noted Date

 

 Hydronephrosis, left  2019

 

 Left ureteral stone  2017







Encounters







 Date  Type  Specialty  Care Team  Description

 

 2019  Telephone  Urology  Maria Isabel Ac MA  

 

 2019  Telephone  Urology  Amina Benito, MA  

 

 2019  Office Visit  Urology  Sergio,  Hydronephrosis of left



       MD Amalia  kidney (Primary Dx)

 

 2019 -  Hospital Encounter  General Internal  Sergio,  Hydronephrosis, 
left



 2019    Medicine  MD Amalia  

 

 07/15/2019  Telephone  Urology  Amalia Hill MD  

 

 2019  Hospital Encounter  Radiology  Amalia Hill MD  

 

 2019  Telephone  Urology  Maria Isabel Ac, MA  

 

 2019  Office Visit  Urology  Sergio,  Hydronephrosis, left (Primary Dx);



       MD Amalia  Nephrolithiasis

 

 2019  Telephone  Urology  Annamaria Vázquez MD  

 

 2019  Hospital Encounter  Radiology  Gurpreet,  Hydronephrosis, left



       Annamaria CHAPMAN MD  

 

 2019  Office Visit  Urology  Gurpreet,  Kidney stones (Primary Dx);



       Annamaria CHAPMAN MD  Hydronephrosis, left

 

 2019  Telephone  Urology  Annamaria Vázquez MD  

 

 2019  Orders Only  Urology  Annamaria Vázquez MD  

 

 04/10/2019  Orders Only  Urology  Gurpreet,  Kidney stones (Primary Dx);



       Annamaria CHAPMAN MD  Left ureteral stone

 

 04/10/2019  Telephone  Urology  Annamaria Vázquez MD  

 

 2019  Telephone  Urology  Annamaria Vázquez MD  

 

 2019  Telephone  Urology  Maria Isabel Ac MA  

 

 2018  Telephone  Urology  Annamaria Vázquez MD  

 

 2018  Hospital Encounter  Radiology  Gurpreet,  Kidney stones



       Annamaria CHAPMAN MD  

 

 2018  Office Visit  Urology  Gurpreet,  Kidney stones (Primary



       Annamaria CHAPMAN MD  Dx)

 

 12/10/2018  Telephone  Urology  Annamaria Vázquez MD  

 

 2018  Telephone  Urology  Gurpreet  Flank pain (Primary Dx)



       Annamaria CHAPMAN MD  



after 2018



Family History







 Medical History  Relation  Name  Comments

 

 Seizures  Mother    









 Relation  Name  Status  Comments

 

 Father    Alive  

 

 Mother      







Social History







 Tobacco Use  Types  Packs/Day  Years Used  Date

 

 Never Smoker        









 Smokeless Tobacco: Never Used      









 Alcohol Use  Drinks/Week  oz/Week  Comments

 

 No      









 Sex Assigned at Birth  Date Recorded

 

 Not on file  









 Job Start Date  Occupation  Industry

 

 Not on file  Not on file  Not on file









 Travel History  Travel Start  Travel End









 No recent travel history available.







Last Filed Vital Signs







 Vital Sign  Reading  Time Taken

 

 Blood Pressure  104/67  2019  6:18 AM CDT

 

 Pulse  67  2019  6:18 AM CDT

 

 Temperature  35.8 C (96.5 F)  2019  4:45 AM CDT

 

 Respiratory Rate  18  2019  6:18 AM CDT

 

 Oxygen Saturation  100%  2019  4:45 AM CDT

 

 Inhaled Oxygen Concentration  -  -

 

 Weight  68 kg (150 lb)  2019  9:45 AM CDT

 

 Height  157.5 cm (5' 2")  2019  9:45 AM CDT

 

 Body Mass Index  27.44  2019  9:45 AM CDT







Plan of Treatment







 Date  Type  Specialty  Care Team  Description

 

 2019  Office Visit  Urology  Amalia Hill MD



  



       [address] [facility]



  



       [address]



  



       [place] [address] [phone] (Fax)  









 Health Maintenance  Due Date  Last Done  Comments

 

 CHLAMYDIA SCREENING  12/15/2012    

 

 INFLUENZA VACCINE  2019    







Implants







 Implanted  Type  Area    Device  Shelf  Model /



         Identifier  Expiration  Serial /



           Date  Lot

 

 Stent Uretl S-Flx Kwart Retro-Inject 6fr 24cm - [id number]  Peripheral or    
[employer]    2020  [id number] /



 Implanted: Qty: 1 on 2017 by Annamaria Vázquez MD  Biliary Stents      
     /



             [id number]

 

 Stent Uretl S-Flx Kwart Retro-Inject 6fr 24cm - [id number]  Peripheral or  N/A:
  [employer]    2020  [id number] /



 Implanted: 05/15/2017 (Quantity not on file)  Biliary Stents  N/A         /



             [id number]

 

 Catheter Uretl 6/10fr 50cm Flx-Tp Dlmn Std Accs - [id number]  Surgical  N/A:  
[employer]      [id number] /



 Implanted: 05/15/2017 (Quantity not on file)  Implants;  N/A         /



   Expanders;          



   Extenders;          



   Surgical Wires          







Procedures







 Procedure Name  Priority  Date/Time  Associated  Comments



       Diagnosis  

 

 HC COMPLETE BLD COUNT  Routine  2019  4:50    Results for this



 W/AUTO DIFF    AM CDT    procedure are in



         the results



         section.

 

 ESTIMATED GFR  Routine  2019  4:00    Results for this



     AM CDT    procedure are in



         the results



         section.

 

 BASIC METABOLIC PANEL  Routine  2019  4:00    Results for this



     AM CDT    procedure are in



         the results



         section.

 

 POC GLUCOSE  Routine  2019  9:07    Results for this



     PM CDT    procedure are in



         the results



         section.

 

 IR LEFT NEPHROSTOMY  Routine  2019 11:19  Hydronephrosis,  Results for 
this



 INITIAL PLACEMENT    AM CDT  left  procedure are in



         the results



         section.

 

 URINE CULTURE  Routine  2019 10:29  Hydronephrosis,  Results for this



     AM CDT  left  procedure are in



         the results



         section.

 

 MICROSCOPIC EXAMINATION  Routine  2019 10:28    Results for this



     AM CDT    procedure are in



         the results



         section.

 

 URINALYSIS, AUTOMATED  Routine  2019 10:28  Hydronephrosis,  Results for 
this



 WITH MICROSCOPY    AM CDT  left  procedure are in



         the results



         section.

 

 PROTHROMBIN TIME WITH  Routine  2019 10:14  Hydronephrosis,  Results for 
this



 INR    AM CDT  left  procedure are in



         the results



         section.

 

 PARTIAL THROMBOPLASTIN  Routine  2019 10:14  Hydronephrosis,  Results 
for this



 TIME (PTT)    AM CDT  left  procedure are in



         the results



         section.

 

 COMPREHENSIVE METABOLIC  Routine  2019 10:14  Hydronephrosis,  Results 
for this



 PANEL    AM CDT  left  procedure are in



         the results



         section.

 

 CBC WITH PLATELET AND  Routine  2019 10:14  Hydronephrosis,  Results for 
this



 DIFFERENTIAL    AM CDT  left  procedure are in



         the results



         section.

 

 NM RENAL SCAN WFLOW  Routine  2019  1:09  Hydronephrosis,  Results for 
this



 FUNCT SGL INT W/MAG 3    PM CDT  left  procedure are in



         the results



         section.

 

 [id number]  Routine  2019  9:25  Kidney stones  Results for this



     AM CDT    procedure are in



         the results



         section.

 

 POC URINALYSIS DIPSTICK  Routine  2019  9:25  Kidney stones  Results for 
this



     AM CDT    procedure are in



         the results



         section.

 

 CT ABDOMEN PELVIS WO  Routine  2019    



 CONTRAST        

 

 CT ABD/PELVIC EXTERNAL  Routine  04/15/2019  4:27    Results for this



 STUDY    PM CDT    procedure are in



         the results



         section.

 

 XR KUB KIDNEY URETER  Routine  2018  2:37  Kidney stones  Results for 
this



 BLADDER    PM CST    procedure are in



         the results



         section.

 

 POC URINALYSIS DIPSTICK  Routine  2018 10:24  Kidney stones  Results for 
this



     AM CST    procedure are in



         the results



         section.



after 2018



Results

CBC with platelet and differential (2019  4:50 AM CDT)Only the most 
recent of2 resultswithin the time period is included.





 Component  Value  Ref Range  Performed At  Pathologist



         Signature

 

 WBC  6.87  4.50 - 11.00  [facility]  



     k/uL  HOSPITAL  

 

 RBC  4.33  4.20 - 5.50  [facility]  



     m/uL  \[facility]\""  

 

 HGB  12.8  12.0 - 16.0  [facility]  



     g/dL  HOSPITAL  

 

 HCT  40.4  37.0 - 47.0 %  [facility]  

 

 MCV  93.3  82.0 - 100.0  [facility]  

 

 MCH  29.6  27.0 - 34.0 pg  [facility]  

 

 MCHC  31.7  31.0 - 37.0  [facility]  

 

 RDW - SD  42.3  37.0 - 55.0 fL  [facility]  

 

 MPV  9.7  8.8 - 13.2 fL  [facility]  

 

 Platelet count  258  150 - 400 k/uL  [facility]  

 

 Nucleated RBC  0.00  /100 WBC  [facility]  

 

 Neutrophils  49.9  39.0 - 69.0 %  [facility]  

 

 Lymphocytes  40.5  25.0 - 45.0 %  [facility]  

 

 Monocytes  7.6  0.0 - 10.0 %  [facility]  

 

 Eosinophils  1.3  0.0 - 5.0 %  [facility]  

 

 Basophils  0.1  0.0 - 1.0 %  [facility]  

 

 Immature granulocytes  0.6Comment:  0.0 - 1.0 %  [facility]  



   "Immature    HOSPITAL  



   granulocytes"      



   (promyelocytes      



   , myelocytes,      



   metamyelocytes      



   )      









 Specimen

 

 Blood









 Performing Organization  Address  City/State/Zipcode  Phone Number

 

 [facility] DEPARTMENT OF PATHOLOGY AND  [address] [place] [address]  



 GENOMIC MEDICINE      

 

 [address] [address]  



Estimated GFR (2019  4:00 AM CDT)





 Component  Value  Ref Range  Performed At  Pathologist



         Signature

 

 Estimated GFR  >=90  mL/min/1.73  [facility]  



   Comment:  m2  HOSPITAL  



   CatergoryUnitsInterpretation      



   G1 >=90 Normal or high      



   G2 60-89Mildly decreased      



   D7d01-54Edksfi to moderately decreased      



   X7s58-10Nsaywsgwns to severely decreased      



   G4 15-29Severely decreased      



   G5 <15Kidney failure      



   The eGFR was calculated using the Chronic Kidney Disease      



   Epidemiology Collaboration (CKD-EPI) equation.      



   Interpretation is based on recommendations of the      



   National Kidney Foundation-Kidney Disease Outcomes Quality      



   Initiative (NKF-KDOQI) published in 2014.      



         









 Specimen

 

 Plasma specimen









 Performing Organization  Address  City/State/Zipcode  Phone Number

 

 [facility] DEPARTMENT OF PATHOLOGY AND  [address] [place] [address] [phone] [place] [phone]  



Basic metabolic panel (2019  4:00 AM CDT)





 Component  Value  Ref Range  Performed At  Pathologist Signature

 

 Sodium  140  135 - 148 mEq/L  [facility]  

 

 Potassium  4.5  3.5 - 5.0 mEq/L  [facility]  

 

 Chloride  104  98 - 112 mEq/L  [facility]  

 

 CO2  24  24 - 31 mEq/L  [facility]  

 

 Anion gap  12@ANIO  7 - 15 mEq/L  [facility]  

 

 BUN  9  6 - 20 mg/dL  [facility]  

 

 Creatinine  0.62  0.50 - 0.90 mg/dL  [facility]  

 

 Glucose  94  65 - 99 mg/dL  [facility]  

 

 Calcium  9.3  8.3 - 10.2 mg/dL  [facility]  









 Specimen

 

 Plasma specimen









 Performing Organization  Address  City/[facility]  Phone Number

 

 [facility] DEPARTMENT OF PATHOLOGY AND  [address] [place] [id number]  



POC glucose (2019  9:07 PM CDT)





 Component  Value  Ref Range  Performed At  Pathologist Signature

 

 POC glucose  107 (H)  65 - 99 mg/dL  [facility]  



   Comment:    HOSPITAL  



   No Action Needed      



   Meter ID: [id number]      



   : Julia Gage      



         









 Specimen

 

 









 Performing Organization  Address  City/State/Zipcode  Phone Number

 

 [facility] DEPARTMENT OF PATHOLOGY AND  [address] [place] [address] [phone] [place] [phone]  



IR Nephrostomy Insertion Left (2019 11:19 AM CDT)





 Specimen

 

 









 Narrative  Performed At

 

 PROCEDURE: Left percutaneous nephrostomy catheter placement



  [facility]



  



  



 Procedural Personnel



  



 Attending physician(s): Castillo Jones



  



 Fellow physician(s): None



  



 Resident physician(s): None



  



 Advanced practice provider(s): None



  



  



  



 Pre-procedure diagnosis: Nephrolithiasis



  



 Post-procedure diagnosis: Same



  



 Indication: Antegrade nephrostogram and access for possible lithotripsy



  



 Additional clinical history: None



  



  



  



 Complications: No immediate complications.



  



  



  



 IMPRESSION:



  



  



  



 1.Percutaneous placement of a 8.5 French drainage catheter into  



 interpole of the left kidney.



  



  



  



 2.Antegrade nephrostogram from the proximal third of the ureter  



 demonstrates expected flow of contrast into the urinary bladder with no  



 evidence of ureteral obstruction.



  



  



  



 Plan: 



  



  



  



 Left nephrostomy tube to gravity bag drainage. Access may be used for  



 possible future lithotripsy per urology.



  



 ______________________________________________________________________



  



  



  



 PROCEDURE SUMMARY:



  



 - Left nephrostomy catheter placement under ultrasound and fluoroscopic  



 guidance



  



 - Additional procedure(s): None



  



  



  



 PROCEDURE DETAILS:



  



  



  



 Pre-procedure



  



 Consent: Informed consent for the procedure including risks, benefits  



 and alternatives was obtained and time-out was performed prior to the  



 procedure.



  



 Preparation: The left flank site was prepared and draped using maximal  



 sterile barrier technique including cutaneous antisepsis.



  



  



  



 Anesthesia/sedation



  



 Level of anesthesia/sedation: Moderate sedation (conscious sedation)



  



 Anesthesia/sedation administered by: Independent trained observer under  



 attending supervision with continuous monitoring of the patient's level  



 of consciousness and physiologic status



  



 Total intra-service sedation time (minutes): 45



  



  



  



 Drainage catheter placement



  



 The patient was positioned prone. Initial imaging was performed. Local  



 anesthesia was administered. Initially, the 22-gauge needle was used to  



 attempt access to the lower pole of the left kidney where the echogenic  



 calculus was noted. However after 



  



 attempts to access the inferior pole calyx unsuccessfully, the 22-gauge  



 needle was used to subsequently accessed the left interpole calyx. After  



 a Nitrex wire was advanced into the left ureter, the needle was  



 exchanged for a AccuStick catheter the inner 



  



 dilator was removed and the Nitrex wire was exchanged for a short  



 Amplatz wire over which the 8.5 French nephrostomy catheter was placed  



 after dilation with an 8 French dilator. Position of the nephrostomy  



 catheter within the left renal pelvis was 



  



 confirmed. 



  



 - Initial imaging findings: Moderate left hydronephrosis is noted.  



 Antegrade nephrostogram demonstrated free flow of contrast through the  



 left ureter into the urinary bladder. 



  



 -Left nephrostomy catheter placed: 8.5 French multipurpose drainage  



 catheter



  



 - External catheter securement: Non-absorbable suture



  



 - Post-drainage imaging findings: Pigtail within the left renal pelvis.  



 There is minimal flow to the proximal left ureter from the renal pelvis  



 when final nephrostogram was performed which may be secondary to  



 ureteral peristalsis or possible stenosis at 



  



 the ureteropelvic junction.



  



  



  



 Radiation Dose



  



 Reference air kerma (mGy): 4 



  



  



  



 Additional Details



  



 Additional description of procedure: None



  



 Equipment details: None



  



 Specimens removed: Aspirated fluid was not sent for analysis.



  



 Estimated blood loss (mL): Less than 10



  



 Standardized report: [email or web address]_v2



  



  



  



 Attestation



  



 Signer name: Castillo Jones M.D.



  



 I attest that I was present for the entire procedure. I reviewed the  



 stored images and agree with the report as written.



  



  



  



  



  



  



  



   









 Procedure Note

 

 Hm Interface, Radiology Results Incoming - 2019  5:44 PM CDT



PROCEDURE: Left percutaneous nephrostomy catheter placement



 



 Procedural Personnel



 Attending physician(s): Castillo Jones



 Fellow physician(s): None



 Resident physician(s): None



 Advanced practice provider(s): None



 



 Pre-procedure diagnosis: Nephrolithiasis



 Post-procedure diagnosis: Same



 Indication: Antegrade nephrostogram and access for possible lithotripsy



 Additional clinical history: None



 



 Complications: No immediate complications.



 



 IMPRESSION:



 



 1.  Percutaneous placement of a 8.5 French drainage catheter into interpole of 
the left kidney.



 



 2.  Antegrade nephrostogram from the proximal third of the ureter demonstrates 
expected flow of contrast into the urinary bladder with no evidence of ureteral 
obstruction.



 



 Plan:



 



 Left nephrostomy tube to gravity bag drainage. Access may be used for possible 
future lithotripsy per urology.



 ______________________________________________________________________



 



 PROCEDURE SUMMARY:



 - Left nephrostomy catheter placement under ultrasound and fluoroscopic 
guidance



 - Additional procedure(s): None



 



 PROCEDURE DETAILS:



 



 Pre-procedure



 Consent: Informed consent for the procedure including risks, benefits and 
alternatives was obtained and time-out was performed prior to the procedure.



 Preparation: The left flank site was prepared and draped using maximal sterile 
barrier technique including cutaneous antisepsis.



 



 Anesthesia/sedation



 Level of anesthesia/sedation: Moderate sedation (conscious sedation)



 Anesthesia/sedation administered by: Independent trained observer under 
attending supervision with continuous monitoring of the patient's level of 
consciousness and physiologic status



 Total intra-service sedation time (minutes): 45



 



 Drainage catheter placement



 The patient was positioned prone. Initial imaging was performed. Local 
anesthesia was administered. Initially, the 22-gauge needle was used to attempt 
access to the lower pole of the left kidney where the echogenic calculus was 
noted. However after



 attempts to access the inferior pole calyx unsuccessfully, the 22-gauge needle 
was used to subsequently accessed the left interpole calyx. After a Nitrex wire 
was advanced into the left ureter, the needle was exchanged



 for a AccuStick catheter the inner



 dilator was removed and the Nitrex wire was exchanged for a short Amplatz wire 
over which the 8.5 French nephrostomy catheter was placed after dilation with 
an 8 French dilator. Position of the nephrostomy catheter within the left renal 
pelvis was



 confirmed.



 - Initial imaging findings: Moderate left hydronephrosis is noted. Antegrade 
nephrostogram demonstrated free flow of contrast through the left ureter into 
the urinary bladder.



 -Left nephrostomy catheter placed: 8.5 French multipurpose drainage catheter



 - External catheter securement: Non-absorbable suture



 - Post-drainage imaging findings: Pigtail within the left renal pelvis. There 
is minimal flow to the proximal left ureter from the renal pelvis when final 
nephrostogram was performed which may be secondary to ureteral



 peristalsis or possible stenosis at



 the ureteropelvic junction.



 



 Radiation Dose



 Reference air kerma (mGy): 4



 



 Additional Details



 Additional description of procedure: None



 Equipment details: None



 Specimens removed: Aspirated fluid was not sent for analysis.



 Estimated blood loss (mL): Less than 10



 Standardized report: SIR_DrainPlacement_v2



 



 Attestation



 Signer name: Castillo Jones M.D.



 I attest that I was present for the entire procedure. I reviewed the stored 
images and agree with the report as written.









 Performing Organization  Address  City/State/Zipcode  Phone Number

 

 [facility]  [address] [place] [address]  



Urine culture (2019 10:29 AM CDT)





 Component  Value  Ref Range  Performed At  Pathologist Signature

 

 Urine culture  No growth    [facility]  









 Specimen

 

 Urine









 Narrative  Performed At

 

 Performed at:[facility] - [facility]



  [facility]



 [facility] [place], [id number]



  



 : Dalton Rice MD, Phone:[phone]  









 Performing Organization  Address  City/[facility] [place]  Phone Number

 

 [facility]      



Microscopic Examination (2019 10:28 AM CDT)





 Component  Value  Ref Range  Performed At  Pathologist [facility]

 

 WBC, UA  0-5  0 - 5 /hpf  [facility]  

 

 RBC, UA  0-2  0 - 2 /hpf  [facility]  

 

 Epithelial cells (non  0-10  0 - 10 /hpf  [facility]  



 renal)        

 

 Mucus, UA  Present  Not Estab.  [facility]  

 

 Bacteria, UA  None seen  None seen/Few  [facility]  









 Specimen

 

 









 Narrative  Performed At

 

 Performed at: [facility]



  [facility]



 [address] [place], [place] [id number]



  



 : Dalton Rice MD, Phone:[phone]  









 Performing Organization  Address  City[place] [place]  Phone Number

 

 [facility]      



Urinalysis, automated with microscopy (2019 10:28 AM CDT)





 Component  Value  Ref Range  Performed At  Pathologist



         Signature

 

 Specific gravity,  1.014  1.005 - 1.030  [facility]  



 urine        

 

 pH, urine  6.0  5.0 - 7.5  [facility]  

 

 Color, UA  Yellow  Yellow  [facility]  

 

 Appearance  Clear  Clear  [facility]  

 

 WBC esterase, urine  Negative  Negative  [facility]  

 

 Protein, UA  Negative  Negative/Trace  [facility]  

 

 Glucose, urine  Negative  Negative  [facility]  

 

 Ketones, UA  Negative  Negative  [facility]  

 

 Occult blood, urine  Negative  Negative  [facility]  

 

 Bilirubin, UA  Negative  Negative  [facility]  

 

 Urobilinogen, UA  0.2  0.2 - 1.0 mg/dL  [facility]  

 

 Nitrite, UA  Negative  Negative  [facility]  

 

 Microscopic  CommentComment:    [facility]  



 examination  Microscopic follows      



   if indicated.      

 

 Microscopic  See below:Comment:    [facility]  



 examination  Microscopic was      



   indicated and was      



   performed.      









 Specimen

 

 Urine









 Narrative  Performed At

 

 Performed at:  [facility]



  [id number]



  



 : Dalton Rice MD, Phone:[phone]  









 Performing Organization  Address  [place] [place]  Phone [place]

 

 [facility]      



Partial thromboplastin time, activated (2019 10:14 AM CDT)





 Component  Value  Ref Range  Performed At  Pathologist [facility]

 

 aPTT  30  24 - 33 sec  [facility]  



   Comment:      



   This test has not been validated for monitoring unfractionated heparin      



   therapy. aPTT-based therapeutic ranges for unfractionated heparin      



   therapy have not been established. For general guidelines on      



   Heparin monitoring, refer to the [facility] Directory of Services.      



         









 Specimen

 

 Blood









 Narrative  Performed At

 

 Performed at: [id number]



  



 : Dalton Rice MD, Phone:[phone]  









 Performing Organization  Address  [place] [place]

 

 [facility]      



Prothrombin time with INR (2019 10:14 AM CDT)





 Component  Value  Ref Range  Performed At  Pathologist



         Signature

 

 INR  1.0  0.8 - 1.2  [facility]  



   Comment:      



   Reference interval is for non-anticoagulated patients.      



   Suggested INR therapeutic range for Vitamin K      



   antagonist therapy:      



    Standard Dose (moderate intensity      



   therapeutic range): 2.0 - 
3.0      



    Higher intensity therapeutic range 2.5 - 3.5      



         

 

 Prothrombin time  10.6  9.1 - 12.0 sec  [facility]  









 Specimen

 

 Blood









 Narrative  Performed At

 

 Performed at: [id number]



  



 : Dalton Rice MD, Phone:[phone]  









 Performing Organization  Address  [place] [place]

 

 [facility]      



Comprehensive metabolic panel (2019 10:14 AM CDT)





 Component  Value  Ref Range  Performed At  Pathologist Signature

 

 Glucose  84  65 - 99 mg/dL  [facility]  

 

 BUN, whole blood  7  6 - 20 mg/dL  [facility]  

 

 Creatinine  0.65  0.57 - 1.00 mg/dL  [facility]  

 

 EGFR Non-Afr. American  126  >59 mL/min/1.73  [facility]  

 

 EGFR African American  146  >59 mL/min/1.73  [facility]  

 

 BUN/creatinine ratio  11  9 - 23  [facility]  

 

 Sodium  139  134 - 144 mmol/L  [facility]  

 

 Potassium  4.3  3.5 - 5.2 mmol/L  [facility]  

 

 Chloride  98  96 - 106 mmol/L  [facility]  

 

 CO2  25  20 - 29 mmol/L  [facility]  

 

 Calcium  9.6  8.7 - 10.2 mg/dL  [facility]  

 

 Protein  7.3  6.0 - 8.5 g/dL  [facility]  

 

 Albumin, S  4.9  3.5 - 5.5 g/dL  [facility]  

 

 Globulin, total  2.4  1.5 - 4.5 g/dL  [facility]  

 

 Albumin/globulin ratio  2.0  1.2 - 2.2  [facility]  

 

 Total bilirubin  0.5  0.0 - 1.2 mg/dL  [facility]  

 

 Alkaline phosphatase  76  39 - 117 IU/L  [facility]  

 

 AST  20  0 - 40 IU/L  [facility]  

 

 ALT  27  0 - 32 IU/L  [facility]  









 Specimen

 

 Blood









 Narrative  Performed At

 

 [facility] at: - [facility]



  [facility]



 [address], [place]



  



 : Dalton Rice MD, Phone:[phone]  









 Performing Organization  Address  City/State/Zipcode  Phone Number

 

 [facility]      



NM Renal Scan Wflow Funct Sgl Int W/Mag 3 (2019  1:09 PM CDT)





 Specimen

 

 









 Narrative  Performed At

 

 PROCEDURE: NM RENAL SCAN WFLOW FUNCT SGL INT W MAG 3



   [facility]



  



  



 INDICATION: N13.30 Unspecified hydronephrosis, left hydronephrosis -  



 suspect left UPJ obstruction



  



  



  



 COMPARISON: No prior comparison examinations.



  



  



  



 TECHNIQUE: The patient was injected with 10 mCi of Tc-99m MAG-3, IV.  



 Dynamic images of the abdomen were acquired for 40 minutes. At 20  



 minutes, 34 mg of IV lasix was administered.



  



  



  



 FINDINGS:



  



  



  



 Perfusion to the bilateral kidneys is preserved. Tracer uptake,  



 extraction, and excretion is preserved bilaterally. There is some  



 pooling of excreted tracer in the renal pelves/collecting systems more  



 pronounced on the left. Urinary clearance proceeds 



  



 without significant impediment following diuretic administration.



  



  



  



 SPLIT FUNCTION:



  



  



  



 Left kidney=47%



  



  



  



 Right kidney=53%



  



  



  



 IMPRESSION:



  



  



  



 1.Bilateral kidneys demonstrate preserved perfusion and overall  



 function. There is pooling of excreted tracer in the renal  



 pelves/collecting system, more pronounced on the left, that clears with  



 diuretic indicating the absence of any physiologically 



  



 significant urinary obstruction.



  



  



  



 2.Split function as quantified above.



  



  



  



  



  



 [facility]-[id number]  









 Procedure Note

 

  Interface, Radiology Results Incoming - 2019  4:01 PM CDT



PROCEDURE: NM RENAL SCAN WFLOW FUNCT SGL INT W MAG 3



 



 INDICATION: N13.30 Unspecified hydronephrosis, left hydronephrosis - suspect 
left UPJ obstruction



 



 COMPARISON: No prior comparison examinations.



 



 TECHNIQUE: The patient was injected with 10 mCi of Tc-99m MAG-3, IV. Dynamic 
images of the abdomen were acquired for 40 minutes. At 20 minutes, 34 mg of IV 
lasix was administered.



 



 FINDINGS:



 



 Perfusion to the bilateral kidneys is preserved. Tracer uptake, extraction, 
and excretion is preserved bilaterally. There is some pooling of excreted 
tracer in the renal pelves/collecting systems more pronounced



 on the left. Urinary clearance proceeds



 without significant impediment following diuretic administration.



 



 SPLIT FUNCTION:



 



 Left kidney=47%



 



 Right kidney=53%



 



 IMPRESSION:



 



 1.  Bilateral kidneys demonstrate preserved perfusion and overall function. 
There is pooling of excreted tracer in the renal pelves/collecting system, more 
pronounced on the left, that clears with



 diuretic indicating the absence of any physiologically



 significant urinary obstruction.



 



 2.  Split function as quantified above.



 



 



 [facility]-[id number]









 Performing Organization  Address  City/[facility]  Phone Number

 

  [facility]  [address] [place] [address]  



POC BLADDER SCAN/PVR (2019  9:25 AM CDT)





 Component  Value  Ref Range  Performed At  Pathologist Signature

 

 PVR volume  0ml      









 Specimen

 

 Urine



POC urinalysis dipstick (2019  9:25 AM CDT)Only the most recent of2 
resultswithin the time period is included.





 Component  Value  Ref Range  Performed At  Pathologist Signature

 

 Color urine, POC  Yellow      

 

 Clarity urine, POC  Clear      

 

 Glucose urine, POC  Negative  Negative    

 

 Bilirubin urine, POC  Negative  Negative    

 

 Ketones urine, POC  Negative  Negative    

 

 Specific gravity urine,  >/=1.030  1.005 - 1.030    



 POC        

 

 Blood urine, POC  Large (A)  Negative    

 

 pH urine, POC  5.5  5.0, 5.5, 6.0,    



     6.5, 7.0, 7.5,    



     8.0, 8.5    

 

 Protein urine, POC  Trace (A)  Negative    

 

 Urobilinogen urine, POC  <2.0  <2.0    

 

 Nitrite urine, POC  Negative  Negative    

 

 Leukocyte esterase  Negative  Negative    



 urine, POC        









 Specimen

 

 Urine



CT Abdomen Pelvis Wo Contrast (2019)





 Narrative  Performed At

 

 This result has an attachment that is not available.



  



CT Abd/Pelvic External Study (04/15/2019  4:27 PM CDT)





 Specimen

 

 









 Narrative  Performed At

 

 This exam was not acquired at a [facility] facility and has not been 



  [facility]



 interpreted by a [facility] Provider.The exam was imported into our 



  



 imaging system for comparisons purposes.  









 Performing Organization  Address  City/[facility]/Zipcode  Phone Number

 

 [facility]  [address] [place] [address]  



XR Kub Kidney Ureter Bladder (2018  2:37 PM CST)





 Specimen

 

 









 Narrative  Performed At

 

 EXAMINATION: XR KUB KIDNEY URETER BLADDER



  [facility]



  



  



 INDICATION: N20.0 Calculus of kidney, kidney stone - left



  



  



  



 COMPARISON: None



  



  



  



 IMPRESSION:



  



 There are 2 calculi overlying the lower pole of the left renal shadow  



 measuring 5 and 4 mm. Otherwise unremarkable.



  



  



  



 [facility]-[id number]



  



   









 Procedure Note

 

 Hm Interface, Radiology Results Incoming - 2018  2:43 PM CST



EXAMINATION: XR KUB KIDNEY URETER BLADDER



 



 INDICATION: N20.0 Calculus of kidney, kidney stone - left



 



 COMPARISON: None



 



 IMPRESSION:



 There are 2 calculi overlying the lower pole of the left renal shadow 
measuring 5 and 4 mm. Otherwise unremarkable.



 



 [facility]-[id number]









 Performing Organization  Address  City/State/Zipcode  Phone Number

 

  [facility]  [address] [place] [address]  



after 2018



Insurance







 Payer  Benefit Plan / Group  Subscriber ID  Effective Dates  Phone  Address  
Type

 

 [employer]  CIG OPEN ACCESS/NETWORK  xxxxxxxxxxx  2007-Present      HMO









 Guarantor Name  Account Type  Relation to  Date of  Phone  Billing



     Patient  Birth    Address

 

 Selin Cui  Personal/Family  Self  1995  [phone]  [address]







         (Home)  [place]



           [address]







Advance Directives

Patient has advance care planning documents on file. For more information, 
please contact:Shahzad Echevarria[address] [place] [address]

## 2019-07-30 NOTE — ER
Nurse's Notes                                                                                     

 [facility]                                                                 

Name: Selin Cui                                                                           

Age: 23 yrs                                                                                       

Sex: Female                                                                                       

: 1995                                                                                   

MRN: [id number]                                                                                   

Arrival Date: 2019                                                                          

Time: 04:17                                                                                       

Account#: [id number]                                                                            

Bed 19                                                                                            

Private MD:                                                                                       

Diagnosis: Hematuria, unspecified;Urinary tract infection, site not specified                     

                                                                                                  

Presentation:                                                                                     

                                                                                             

04:29 Presenting complaint: Patient states: Woke up with abdominal pain that feel like        ao  

      passing kidney stones. Patient report hx of kidney stones. Patient report hematuria.        

      Negative for nausea or vomiting. Transition of care: patient was not received from          

      another setting of care. Onset of symptoms is unknown. Risk Assessment: Do you want to      

      hurt yourself or someone else? Patient reports no desire to harm self or others.            

      Initial Sepsis Screen: Does the patient meet any 2 criteria? No. Patient's initial          

      sepsis screen is negative. Does the patient have a suspected source of infection? No.       

      Patient's initial sepsis screen is negative. Care prior to arrival: None.                   

04:29 Method Of Arrival: Ambulatory                                                           ao  

04:29 Acuity: AWILDA 3                                                                           ao  

                                                                                                  

OB/GYN:                                                                                           

07:08 LMP N/A -                                                                               ao  

                                                                                                  

Historical:                                                                                       

- Allergies:                                                                                      

04:31 No Known Allergies;                                                                     ao  

- Home Meds:                                                                                      

04:31 Tylenol #3 Oral [Active];                                                               ao  

- PMHx:                                                                                           

04:31 Kidney stones;                                                                          ao  

- PSHx:                                                                                           

04:31 None;                                                                                   ao  

                                                                                                  

- Immunization history:: Adult Immunizations up to date.                                          

- Social history:: Smoking status: Patient/guardian denies using tobacco, Patient uses            

  alcohol, street drugs.                                                                          

- Ebola Screening: : Patient negative for fever greater than or equal to 101.5 degrees            

  Fahrenheit, and additional compatible Ebola Virus Disease symptoms Patient denies               

  exposure to infectious person Patient denies travel to an Ebola-affected area in the            

  21 days before illness onset.                                                                   

                                                                                                  

                                                                                                  

Screenin:34 Abuse screen: Denies threats or abuse. Denies injuries from another. Nutritional        ao  

      screening: No deficits noted. Tuberculosis screening: No symptoms or risk factors           

      identified. Fall Risk None identified.                                                      

                                                                                                  

Assessment:                                                                                       

04:33 General: Appears in no apparent distress. comfortable, Behavior is calm, cooperative,   ao  

      appropriate for age. Pain: Complains of pain in abdomen. Neuro: Level of Consciousness      

      is awake, alert, obeys commands, Oriented to person, place, time, situation,                

      Appropriate for age Moves all extremities. Full function Speech is normal.                  

      Cardiovascular: Capillary refill < 3 seconds Patient's skin is warm and dry.                

      Respiratory: Airway is patent Respiratory effort is even, unlabored, Respiratory            

      pattern is regular, symmetrical. GI: Bowel sounds present X 4 quads. Abd is soft and        

      non tender Abd is non tender. : Urine is blood tinged. : Reports pain in bilateral      

      lower quadrant(s) urinary frequency. EENT: No signs and/or symptoms were reported           

      regarding the EENT system. Derm: No signs and/or symptoms reported regarding the            

      dermatologic system. Musculoskeletal: No signs and/or symptoms reported regarding the       

      musculoskeletal system.                                                                     

05:20 Reassessment: Patient appears in no apparent distress at this time. Patient and/or      ao  

      family updated on plan of care and expected duration. Pain level reassessed.                

06:23 Reassessment: Patient appears in no apparent distress at this time. Patient and/or      ao  

      family updated on plan of care and expected duration. Pain level reassessed. Patient        

      states feeling better.                                                                      

07:00 General: Appears in no apparent distress. comfortable, Behavior is calm, cooperative,   hj  

      appropriate for age. Pain: Complains of pain in left mid back and abdomen. Neuro: Level     

      of Consciousness is awake, alert, obeys commands, Oriented to person, place, time,          

      situation, Appropriate for age. Cardiovascular: Capillary refill < 3 seconds Patient's      

      skin is warm and dry. Respiratory: Airway is patent Respiratory effort is even,             

      unlabored, Respiratory pattern is regular, symmetrical. GI: Bowel sounds Abd is soft        

      and non tender. : Urine is blood tinged, Reports pain urinary frequency. EENT: No         

      signs and/or symptoms were reported regarding the EENT system. Derm: No signs and/or        

      symptoms reported regarding the dermatologic system. Musculoskeletal: No signs and/or       

      symptoms reported regarding the musculoskeletal system.                                     

08:06 Reassessment: Patient and/or family updated on plan of care and expected duration. Pain hj  

      level reassessed. Patient is alert, oriented x 3, equal unlabored respirations, skin        

      warm/dry/pink. Patient states feeling better. Patient states symptoms have improved.        

                                                                                                  

Vital Signs:                                                                                      

04:32  / 92; Pulse 96; Resp 18; Temp 99.0(O); Pulse Ox 98% on R/A; Weight 68.04 kg (R); ao  

      Height 5 ft. 2 in. (157.48 cm) (R); Pain 8/10;                                              

05:23  / 73; Pulse 77; Resp 19; Pulse Ox 99% ; Pain 6/10;                               ao  

06:23  / 70; Pulse 65; Resp 16; Pulse Ox 100% on R/A;                                   ao  

07:00  / 69; Pulse 69; Resp 18; Pulse Ox 100% on R/A;                                   hj  

08:07  / 72; Pulse 75; Resp 18; Pulse Ox 100% on R/A;                                   hj  

04:32 Body Mass Index 27.44 (68.04 kg, 157.48 cm)                                             ao  

                                                                                                  

ED Course:                                                                                        

04:17 Patient arrived in ED.                                                                  do  

04:21 Michelet Nichols MD is Attending Physician.                                            tw4 

04:29 Leif Ellison, RN is Primary Nurse.                                                       ao  

04:31 Triage completed.                                                                       ao  

04:32 Arm band placed on right wrist. Patient placed in an exam room, on a stretcher, on      ao  

      oxygen, on pulse oximetry, Patient notified of wait time.                                   

04:34 Patient has correct armband on for positive identification. Pulse ox on. NIBP on.       ao  

05:06 Inserted saline lock: 22 gauge in right antecubital area, using aseptic technique.      ao  

      ,using aseptic technique. By Ramana  cecy Blood collected.                                 

07:08 Report given to LEODAN Poe.                                                              ao  

07:11 Attending Physician role handed off by Michelet Nichols MD                             rn  

07:11 Antoine Sterling MD is Attending Physician.                                                rn  

07:18 CT Stone Protocol In Process Unspecified.                                               EDMS

08:02 Benny Blackwood, RN is Primary Nurse.                                                    hj  

08:42 No provider procedures requiring assistance completed. IV discontinued, intact,         hj  

      bleeding controlled, No redness/swelling at site. Pressure dressing applied.                

                                                                                                  

Administered Medications:                                                                         

05:00 Drug: NS 0.9% 1000 ml Route: IV; Rate: 1 bolus; Site: right antecubital;                ao  

06:31 Follow up: IV Status: Completed infusion; IV Intake: 1000ml                             ao  

05:00 Drug: TORadol 30 mg Route: IVP; Site: right antecubital;                                ao  

05:24 Follow up: Response: Adverse reaction, Physician notified; Pain is decreased            ao  

06:15 Drug: Rocephin - (cefTRIAXone) 1 grams Route: IVPB; Infused Over: 30 mins; Site: right  ao  

      antecubital;                                                                                

07:00 Follow up: IV Status: Completed infusion; IV Intake: 20ml                               hj  

                                                                                                  

                                                                                                  

Intake:                                                                                           

06:31 IV: 1000ml; Total: 1000ml.                                                              ao  

07:00 IV: 20ml; Total: 1020ml.                                                                hj  

                                                                                                  

Outcome:                                                                                          

08:21 Discharge ordered by MD.                                                                rn  

08:42 Discharged to home ambulatory.                                                          hj  

08:42 Condition: stable                                                                           

08:42 Discharge instructions given to patient, Instructed on discharge instructions, follow       

      up and referral plans. medication usage, Demonstrated understanding of instructions,        

      follow-up care, medications, Prescriptions given X 3.                                       

08:44 Patient left the ED.                                                                      

                                                                                                  

Signatures:                                                                                       

Dispatcher [employer]                           EDMS                                                 

Antoine Sterling MD MD rn Joaquin, Henry, RN RN hj Ortiz, Alex, RN RN ao Ogletree, Michelet Diop MD MD   tw4                                                  

                                                                                                  

**************************************************************************************************

## 2019-07-30 NOTE — EDPHYS
Physician Documentation                                                                           

 [facility]                                                                 

Name: Selin Cui                                                                           

Age: 23 yrs                                                                                       

Sex: Female                                                                                       

: 1995                                                                                   

MRN: [id number]                                                                                   

Arrival Date: 2019                                                                          

Time: 04:17                                                                                       

Account#: [id number]                                                                            

Bed 19                                                                                            

Private MD:                                                                                       

ED Physician Antoine Sterling                                                                         

HPI:                                                                                              

                                                                                             

05:42 This 23 yrs old  Female presents to ER via Ambulatory with complaints of        tw4 

      Possible Kidney Stone.                                                                      

05:42 The patient complains of pain in the left mid back. The pain radiates. Onset: The       tw4 

      symptoms/episode began/occurred today. Modifying factors: The symptoms are alleviated       

      by nothing. the symptoms are aggravated by nothing. Associated signs and symptoms: The      

      patient has no apparent associated signs or symptoms. Severity of pain: At its worst        

      the pain was moderate in the emergency department the pain is unchanged. The patient        

      has not experienced similar symptoms in the past.                                           

                                                                                                  

OB/GYN:                                                                                           

07:08 LMP N/A -                                                                               ao  

                                                                                                  

Historical:                                                                                       

- Allergies:                                                                                      

04:31 No Known Allergies;                                                                     ao  

- Home Meds:                                                                                      

04:31 Tylenol #3 Oral [Active];                                                               ao  

- PMHx:                                                                                           

04:31 Kidney stones;                                                                          ao  

- PSHx:                                                                                           

04:31 None;                                                                                   ao  

                                                                                                  

- Immunization history:: Adult Immunizations up to date.                                          

- Social history:: Smoking status: Patient/guardian denies using tobacco, Patient uses            

  alcohol, street drugs.                                                                          

- Ebola Screening: : Patient negative for fever greater than or equal to 101.5 degrees            

  Fahrenheit, and additional compatible Ebola Virus Disease symptoms Patient denies               

  exposure to infectious person Patient denies travel to an Ebola-affected area in the            

  21 days before illness onset.                                                                   

                                                                                                  

                                                                                                  

ROS:                                                                                              

05:42 Constitutional: Negative for fever, chills, and weight loss, Eyes: Negative for injury, tw4 

      pain, redness, and discharge, Cardiovascular: Negative for chest pain, palpitations,        

      and edema, Respiratory: Negative for shortness of breath, cough, wheezing, and              

      pleuritic chest pain, Abdomen/GI: Negative for abdominal pain, nausea, vomiting,            

      diarrhea, and constipation.                                                                 

05:42 Back: Positive for flank pain, on the left.                                                 

                                                                                                  

Exam:                                                                                             

05:42 Constitutional:  This is a well developed, well nourished patient who is awake, alert,  tw4 

      and in no acute distress. Head/Face:  Normocephalic, atraumatic. Chest/axilla:  Normal      

      chest wall appearance and motion.  Nontender with no deformity.  No lesions are             

      appreciated. Cardiovascular:  Regular rate and rhythm with a normal S1 and S2.  No          

      gallops, murmurs, or rubs.  Normal PMI, no JVD.  No pulse deficits. Respiratory:  Lungs     

      have equal breath sounds bilaterally, clear to auscultation and percussion.  No rales,      

      rhonchi or wheezes noted.  No increased work of breathing, no retractions or nasal          

      flaring. Abdomen/GI:  Soft, non-tender, with normal bowel sounds.  No distension or         

      tympany.  No guarding or rebound.  No evidence of tenderness throughout.                    

05:42 MS/ Extremity:  Pulses equal, no cyanosis.  Neurovascular intact.  Full, normal range       

      of motion.                                                                                  

05:42 Back: pain, is absent, ROM is normal, CVA tenderness, that is mild, is noted on the         

      left, pt has a nephrostomy tube draining read colored urine.                                

                                                                                                  

Vital Signs:                                                                                      

04:32  / 92; Pulse 96; Resp 18; Temp 99.0(O); Pulse Ox 98% on R/A; Weight 68.04 kg (R); ao  

      Height 5 ft. 2 in. (157.48 cm) (R); Pain 8/10;                                              

05:23  / 73; Pulse 77; Resp 19; Pulse Ox 99% ; Pain 6/10;                               ao  

06:23  / 70; Pulse 65; Resp 16; Pulse Ox 100% on R/A;                                   ao  

07:00  / 69; Pulse 69; Resp 18; Pulse Ox 100% on R/A;                                   hj  

08:07  / 72; Pulse 75; Resp 18; Pulse Ox 100% on R/A;                                   hj  

04:32 Body Mass Index 27.44 (68.04 kg, 157.48 cm)                                             ao  

                                                                                                  

MDM:                                                                                              

04:21 Patient medically screened.                                                             tw4 

05:42 Differential diagnosis: nephrolithiasis, pyelonephritis. Data reviewed: vital signs,    tw4 

      nurses notes. Counseling: I had a detailed discussion with the patient and/or guardian      

      regarding: the historical points, exam findings, and any diagnostic results supporting      

      the discharge/admit diagnosis.                                                              

08:20 ED course: Pt improved, CT stone shows nephrostomy tube in position with decreased      rn  

      hydro, will treat as UTI given 99 temp, elevated wbc, and bacteria in urine. Has f/u        

      with her urologist on Monday. Return precautions given and understood..                     

                                                                                                  

                                                                                             

04:23 Order name: Urine Microscopic Only; Complete Time: 05:46                                                                                                                             

05:46 Interpretation: Abnormal: UWBC >50; UBACT >50; URBC TNTC.                                                                                                                            

04:23 Order name: Basic Metabolic Panel; Complete Time: 05:46                                                                                                                              

05:47 Interpretation: Within normal limits.                                                                                                                                                

04:23 Order name: CBC with Diff; Complete Time: 05:46                                                                                                                                      

05:46 Interpretation: Normal except: WBC 12.2.                                                                                                                                             

04:23 Order name: Creatinine for Radiology; Complete Time: 05:46                                                                                                                           

04:23 Order name: Hepatic Function; Complete Time: 05:46                                                                                                                                   

05:46 Interpretation: Normal except: TP 8.5; GLOB 3.9.                                         

                                                                                             

04:23 Order name: Lipase; Complete Time: 05:46                                                                                                                                             

04:23 Order name: Urine Dipstick-Ancillary (obtain specimen); Complete Time: 05:05                                                                                                         

05:06 Order name: Urine Dipstick--Ancillary (enter results); Complete Time: 05:46             [facility] 

                                                                                             

05:47 Interpretation: Normal except: UKET 1+; UBLD 3+; UPROT 3+; UESTR 3+.                                                                                                                 

05:06 Order name: Urine Pregnancy--Ancillary (enter results); Complete Time: 05:46            [facility] 

                                                                                             

05:47 Order name: Urine Culture                                                               [facility]

                                                                                             

06:59 Order name: CT Stone Protocol; Complete Time: 08:11                                     [facility] 

                                                                                             

04:23 Order name: IV Saline Lock; Complete Time: 05:04                                                                                                                                     

04:23 Order name: Labs collected and sent; Complete Time: 05:04                                

                                                                                                  

Administered Medications:                                                                         

05:00 Drug: NS 0.9% 1000 ml Route: IV; Rate: 1 bolus; Site: right antecubital;                ao  

06:31 Follow up: IV Status: Completed infusion; IV Intake: 1000ml                             ao  

05:00 Drug: TORadol 30 mg Route: IVP; Site: right antecubital;                                ao  

05:24 Follow up: Response: Adverse reaction, Physician notified; Pain is decreased            ao  

06:15 Drug: Rocephin - (cefTRIAXone) 1 grams Route: IVPB; Infused Over: 30 mins; Site: right  ao  

      antecubital;                                                                                

07:00 Follow up: IV Status: Completed infusion; IV Intake: 20ml                                 

                                                                                                  

                                                                                                  

Disposition:                                                                                      

19 08:21 Discharged to Home. Impression: Hematuria, unspecified, Urinary tract              

  infection, site not specified.                                                                  

- Condition is Stable.                                                                            

- Discharge Instructions: Hematuria, Adult, Percutaneous Nephrostomy, Care After,                 

  Urinary Tract Infection, Adult, Percutaneous Nephrostomy Home Guide.                            

- Prescriptions for Zofran ODT 4 mg Oral tablet,disintegrating - place 1 tablet by                

  TRANSLINGUAL route every 8 hours As needed; 20 tablet. Tylenol- Codeine #3 300-30 mg            

  Oral Tablet - take 1 tablet by ORAL route every 6 hours As needed; 20 tablet.                   

  cefpodoxime 100 mg Oral Tablet - take 2 tablet by ORAL route every 12 hours for 10              

  days take with food; 40 tablet.                                                                 

- Medication Reconciliation Form, Thank You Letter, Antibiotic Education, Prescription            

  Opioid Use form.                                                                                

- Follow up: Private Physician; When: As needed; Reason: Recheck today's complaints,              

  Re-evaluation by your physician.                                                                

- Problem is new.                                                                                 

- Symptoms have improved.                                                                         

                                                                                                  

                                                                                                  

                                                                                                  

Signatures:                                                                                       

Dispatcher [employer]                           EDAntoine Cortes MD MD rn Joaquin, Henry, RN RN hj Ortiz, Alex, RN RN ao Wadley, Terrence, MD MD   tw4                                                  

                                                                                                  

Corrections: (The following items were deleted from the chart)                                    

08:44 08:21 2019 08:21 Discharged to Home. Impression: Hematuria, unspecified; Urinary  hj  

      tract infection, site not specified. Condition is Stable. Forms are Medication              

      Reconciliation Form, Thank You Letter, Antibiotic Education, Prescription Opioid Use.       

      Follow up: Private Physician; When: As needed; Reason: Recheck today's complaints,          

      Re-evaluation by your physician. Problem is new. Symptoms have improved. rn                 

                                                                                                  

**************************************************************************************************

## 2019-07-30 NOTE — RAD REPORT
EXAM DESCRIPTION:  CT - Stone Protocol - 7/30/2019 7:18 am

 

CLINICAL HISTORY:  Flank pain.

FLANK PAIN

 

COMPARISON:  <Comparisons>

 

TECHNIQUE:  Axial images were obtained without oral or IV contrast. Lack of contrast limits solid org
an and vascular assessment. The field-of-view spans the entirety of the  system partially obscuring
 uppermost abdomen and lung bases. Coronal reformatted images were obtained and reviewed.

 

All CT scans are performed using dose optimization technique as appropriate and may include automated
 exposure control or mA/KV adjustment according to patient size.

 

FINDINGS:  The lower lung fields are clear.

 

Imaged portions of the liver and spleen show no suspicious findings on non-contrast imaging. The panc
reas and adrenal glands are normal. No pathologic lymphadenopathy in the abdomen or pelvis.

 

Left PCN is present. Air is present in the left collecting system. 8 mm stone is present in the left 
renal pelvis. 3 mm stone is present in the superior anterior calyx left kidney. Mild residual hydrone
phrosis is seen on the left. No right-sided stone or hydronephrosis evident.

 

No bowel obstruction, free air, free fluid or abscess. Normal appendix noted.

 

No significant bony abnormality.

 

IMPRESSION:  Left PCN is noted with mild residual left hydronephrosis. Air is present in the left col
lecting system which may be related to recent intervention although urinary tract infection cannot be
 completely ruled out.

 

Left nephrolithiasis is present. No stone within the left ureter or within the urinary bladder.

## 2019-08-17 ENCOUNTER — HOSPITAL ENCOUNTER (EMERGENCY)
Dept: HOSPITAL 97 - ER | Age: 24
Discharge: HOME | End: 2019-08-17
Payer: COMMERCIAL

## 2019-08-17 DIAGNOSIS — G89.18: ICD-10-CM

## 2019-08-17 DIAGNOSIS — N10: Primary | ICD-10-CM

## 2019-08-17 DIAGNOSIS — Z98.890: ICD-10-CM

## 2019-08-17 LAB
ALBUMIN SERPL BCP-MCNC: 4.3 G/DL (ref 3.4–5)
ALP SERPL-CCNC: 77 U/L (ref 45–117)
ALT SERPL W P-5'-P-CCNC: 34 U/L (ref 12–78)
AST SERPL W P-5'-P-CCNC: 18 U/L (ref 15–37)
BUN BLD-MCNC: 11 MG/DL (ref 7–18)
GLUCOSE SERPLBLD-MCNC: 94 MG/DL (ref 74–106)
HCT VFR BLD CALC: 41.3 % (ref 36–45)
LIPASE SERPL-CCNC: 92 U/L (ref 73–393)
LYMPHOCYTES # SPEC AUTO: 2.6 K/UL (ref 0.7–4.9)
PMV BLD: 8.4 FL (ref 7.6–11.3)
POTASSIUM SERPL-SCNC: 3.7 MMOL/L (ref 3.5–5.1)
RBC # BLD: 4.63 M/UL (ref 3.86–4.86)
UA COMPLETE W REFLEX CULTURE PNL UR: (no result)

## 2019-08-17 PROCEDURE — 80076 HEPATIC FUNCTION PANEL: CPT

## 2019-08-17 PROCEDURE — 96375 TX/PRO/DX INJ NEW DRUG ADDON: CPT

## 2019-08-17 PROCEDURE — 36415 COLL VENOUS BLD VENIPUNCTURE: CPT

## 2019-08-17 PROCEDURE — 87088 URINE BACTERIA CULTURE: CPT

## 2019-08-17 PROCEDURE — 99284 EMERGENCY DEPT VISIT MOD MDM: CPT

## 2019-08-17 PROCEDURE — 74176 CT ABD & PELVIS W/O CONTRAST: CPT

## 2019-08-17 PROCEDURE — 96361 HYDRATE IV INFUSION ADD-ON: CPT

## 2019-08-17 PROCEDURE — 81015 MICROSCOPIC EXAM OF URINE: CPT

## 2019-08-17 PROCEDURE — 81003 URINALYSIS AUTO W/O SCOPE: CPT

## 2019-08-17 PROCEDURE — 87186 SC STD MICRODIL/AGAR DIL: CPT

## 2019-08-17 PROCEDURE — 87040 BLOOD CULTURE FOR BACTERIA: CPT

## 2019-08-17 PROCEDURE — 96374 THER/PROPH/DIAG INJ IV PUSH: CPT

## 2019-08-17 PROCEDURE — 80048 BASIC METABOLIC PNL TOTAL CA: CPT

## 2019-08-17 PROCEDURE — 87086 URINE CULTURE/COLONY COUNT: CPT

## 2019-08-17 PROCEDURE — 85025 COMPLETE CBC W/AUTO DIFF WBC: CPT

## 2019-08-17 PROCEDURE — 83690 ASSAY OF LIPASE: CPT

## 2019-08-17 PROCEDURE — 81025 URINE PREGNANCY TEST: CPT

## 2019-08-17 PROCEDURE — 87077 CULTURE AEROBIC IDENTIFY: CPT

## 2019-08-17 NOTE — ER
Nurse's Notes                                                                                     

 [facility]                                                                 

Name: Selin Cui                                                                           

Age: 23 yrs                                                                                       

Sex: Female                                                                                       

: 1995                                                                                   

MRN: [id number]                                                                                   

Arrival Date: 2019                                                                          

Time: 04:22                                                                                       

Account#: [id number]                                                                            

Bed 5                                                                                             

Private MD:                                                                                       

Diagnosis: Acute tubulo-interstitial nephritis;Other acute postprocedural pain                    

                                                                                                  

Presentation:                                                                                     

                                                                                             

04:33 Presenting complaint: Patient states: her nephrostomy tube was first placed on  was ak1 

      not draining, replaced tube on 8/15 pt c/o increased pain with this tube placement. pt      

      stated tube is draining better than the first one that was placed but she is having         

      more pain. pt c/o abd pain and vomiting tonight. pt stated the tube was placed at           

      [facility]. pt stated she has tylenol #3 for pain, pain is not relieved by medication at     

      home. Transition of care: patient was not received from another setting of care. Onset      

      of symptoms is unknown. Risk Assessment: Do you want to hurt yourself or someone else?      

      Patient reports no desire to harm self or others. Initial Sepsis Screen: Does the           

      patient meet any 2 criteria? No. Patient's initial sepsis screen is negative. Does the      

      patient have a suspected source of infection? No. Patient's initial sepsis screen is        

      negative. Care prior to arrival: None.                                                      

04:33 Method Of Arrival: Ambulatory                                                           ak1 

04:33 Acuity: AWILDA 3                                                                           ak1 

                                                                                                  

Triage Assessment:                                                                                

04:36 General: Appears uncomfortable, slender, well groomed, Behavior is cooperative, crying. ak1 

      Pain: Complains of pain in left flank, abd. EENT: No signs and/or symptoms were             

      reported regarding the EENT system. EENT:. Neuro: No deficits noted. Cardiovascular: No     

      deficits noted. Respiratory: No deficits noted. GI: Abdomen is flat, Bowel sounds           

      present X 4 quads. : nephrostomy tube at left flank with urine in tube. Reports pain      

      in left flank(s). Derm: No signs and/or symptoms reported regarding the dermatologic        

      system. Musculoskeletal: No signs and/or symptoms reported regarding the                    

      musculoskeletal system.                                                                     

                                                                                                  

OB/GYN:                                                                                           

04:36 LMP 2019                                                                           ak1 

                                                                                                  

Historical:                                                                                       

- Allergies:                                                                                      

04:36 No Known Allergies;                                                                     ak1 

- Home Meds:                                                                                      

04:36 Tylenol #3 Oral [Active];                                                               ak1 

- PMHx:                                                                                           

04:36 Kidney stones;                                                                          ak1 

- PSHx:                                                                                           

04:36 nephrostomy tube placement; Lithotripsy;                                                ak1 

                                                                                                  

- Immunization history:: Adult Immunizations unknown.                                             

- Social history:: Smoking status: unknown.                                                       

- Ebola Screening: : No symptoms or risks identified at this time.                                

                                                                                                  

                                                                                                  

Screenin:39 Abuse screen: Denies threats or abuse. Denies injuries from another. Nutritional        ak1 

      screening: No deficits noted. Tuberculosis screening: No symptoms or risk factors           

      identified. Fall Risk None identified.                                                      

                                                                                                  

Assessment:                                                                                       

04:50 Reassessment: Patient appears in no apparent distress at this time. No changes from     ak1 

      previously documented assessment. Patient and/or family updated on plan of care and         

      expected duration. Pain level reassessed. see triage assessment.                            

05:36 Reassessment: Patient appears in no apparent distress at this time. No changes from     ak1 

      previously documented assessment. Patient and/or family updated on plan of care and         

      expected duration. Pain level reassessed. PT TAKEN TO CT.                                   

06:04 Reassessment: Patient appears in no apparent distress at this time. pt resting with     ak1 

      eyes closed, resp even and unlabored pt with family at bedside. Patient denies pain at      

      this time. Patient states feeling better. Patient states symptoms have improved.            

06:46 Reassessment: dr. Nichols at bedside, pt stated that pain is returning.                  ak1 

08:20 General: Appears in no apparent distress. comfortable, well developed, Behavior is      sv  

      calm, cooperative, appropriate for age. Pain: Denies pain. Neuro: Level of                  

      Consciousness is awake, alert, obeys commands, Oriented to person, place, time,             

      situation, Moves all extremities. Full function. Respiratory: Respiratory effort is         

      even, unlabored, Respiratory pattern is regular, symmetrical. : left nephrostomy in       

      place and draining yellow urine. Derm: Skin is pink, warm \T\ dry.                          

08:20 Reassessment: Pt given yamilex crackers to eat before Norco to be given. Informed pt     sv  

      that Dr Guo spoke with her nephrologist and he wants to monitor her for a couple more     

      hours. Mother at the bedside.                                                               

09:09 Reassessment: Patient appears in no apparent distress at this time. No changes from     sv  

      previously documented assessment. Patient and/or family updated on plan of care and         

      expected duration. Pain level reassessed. Patient is alert, oriented x 3, equal             

      unlabored respirations, skin warm/dry/pink.                                                 

                                                                                                  

Vital Signs:                                                                                      

04:36  / 90; Pulse 117; Resp 20; Temp 98.8(O); Pulse Ox 99% ; Weight 68.04 kg (R);      ak1 

      Height 5 ft. 2 in. (157.48 cm) (R); Pain 8/10;                                              

06:06 BP 85 / 50; Pulse 73; Resp 16; Temp 98.6; Pulse Ox 99% on R/A; Pain 2/10;               ak1 

07:00 BP 87 / 61; Pulse 77; Resp 18; Pulse Ox 100% ;                                          sv  

07:45  / 68; Pulse 88; Resp 16; Pulse Ox 100% ;                                         sv  

08:26  / 67; Pulse 95; Resp 16; Temp 98(O); Pulse Ox 99% ;                              sv  

09:01  / 75; Pulse 86; Resp 16; Pulse Ox 100% ;                                         sv  

04:36 Body Mass Index 27.44 (68.04 kg, 157.48 cm)                                             ak1 

                                                                                                  

ED Course:                                                                                        

04:22 Patient arrived in ED.                                                                  am2 

04:35 Triage completed.                                                                       ak1 

04:36 Arm band placed on Patient placed in an exam room, on a stretcher, on pulse oximetry,   ak1 

      Patient notified of wait time.                                                              

04:39 Patient has correct armband on for positive identification. Bed in low position. Call   ak1 

      light in reach. Side rails up X 1. Pulse ox on. NIBP on.                                    

04:43 Michelet Nichols MD is Attending Physician.                                            tw4 

04:50 Bertha Fontenot RN is Primary Nurse.                                                     ak1 

04:50 Initial lab(s) drawn, by ED staff, sent to lab. Inserted saline lock: 20 gauge in right ak1 

      antecubital area, using aseptic technique. ,using aseptic technique. placed by Clarence ALCOCER     

      Blood collected.                                                                            

05:56 CT Abd/Pelvis - Without Contrast In Process Unspecified.                                EDMS

07:08 Bobbi Gagnon FNP-C is PHCP.                                                        snw 

07:12 Attending Physician role handed off by Michelet Nichols MD                             gs  

07:12 Sae Guo MD is Attending Physician.                                              gs  

07:51 Primary Nurse role handed off by Bertha Fontenot RN                                      sv  

07:51 Eduardo, Marleni, RN is Primary Nurse.                                                  sv  

07:52 First set of blood cultures drawn by me, by venipuncture 23G to right ac.               dh3 

07:57 called and left message for Dr. Hill at the [facility]      eb  

      answering service. Spoke with Loarine at [phone]. Dr. Hill will be paged to          

      return the phone call.                                                                      

08:[address] Tran the urologist on call for Dr. Hill with Dr. Guo for patient   eb  

      consultation.                                                                               

08:12 Second set of blood cultures drawn by me, by venipuncture 23G to left ac.               dh3 

09:07 No provider procedures requiring assistance completed. IV discontinued, intact,         sv  

      bleeding controlled, No redness/swelling at site. Pressure dressing applied.                

                                                                                                  

Administered Medications:                                                                         

05:03 Drug: morphine 4 mg {Note: RASS SCORE 0.} Route: IVP; Site: right antecubital;          ak1 

05:37 Follow up: Response: No adverse reaction; Pain is decreased; RASS: Drowsy (-1)          ak1 

05:03 Drug: NS 0.9% 1000 ml Route: IV; Rate: 1 bolus; Site: right antecubital;                ak1 

06:07 Follow up: IV Status: Completed infusion; IV Intake: 1000ml                             ak1 

05:03 Drug: Zofran 4 mg Route: IVP; Site: right antecubital;                                  ak1 

05:36 Follow up: Response: No adverse reaction; Nausea is decreased                           ak1 

05:04 Drug: TORadol 30 mg Route: IVP; Site: right antecubital;                                ak1 

05:37 Follow up: Response: No adverse reaction; Pain is decreased                             ak1 

06:48 CANCELLED (Duplicate Order): NS 0.9% 1000 ml IV at 1 bolus Per protocol; 1000 mL bolus  ak1 

06:52 Drug: NS 0.9% 1000 ml Route: IV; Rate: 1 bolus; Site: right antecubital;                ak1 

08:00 Follow up: Response: No adverse reaction; IV Status: Completed infusion; IV Intake:     sv  

      1000ml                                                                                      

08:20 Drug: Rocephin - (cefTRIAXone) 1 grams Route: IVPB; Infused Over: 30 mins; Site: right  sv  

      antecubital;                                                                                

08:22 Follow up: Response: No adverse reaction; IV Status: Completed infusion; IV Intake: 10mlsv  

08:47 Drug: Norco 5 mg-325 mg 1 tabs {Note: RASS 0.} Route: PO;                               sv  

09:09 Follow up: Response: No adverse reaction; No change in condition; RASS: Alert and Calm  sv  

      (0)                                                                                         

                                                                                                  

                                                                                                  

Intake:                                                                                           

06:07 IV: 1000ml; Total: 1000ml.                                                              ak1 

08:00 IV: 1000ml; Total: 2000ml.                                                              sv  

08:22 IV: 10ml; Total: 2010ml.                                                                sv  

                                                                                                  

Outcome:                                                                                          

08:48 Discharge ordered by MD.                                                                gs  

09:07 Discharged to home ambulatory, with family.                                             sv  

09:07 Condition: stable                                                                           

09:07 Discharge instructions given to patient, family, Instructed on discharge instructions,      

      follow up and referral plans. no drinking with medication, no driving heavy equipment,      

      medication usage, Demonstrated understanding of instructions, follow-up care,               

      medications, Prescriptions given X 2.                                                       

09:09 Patient left the ED.                                                                    sv  

                                                                                                  

Signatures:                                                                                       

Dispatcher [employer]                           EDMarleni Thompson RN RN   sv                                                   

Bobbi Gagnon, FNP-C                 FNP-Csnw                                                  

Bertha Fontenot RN                       RN   Henny Perez Deanna                              3                                                  

Sae Guo MD MD gs Wadley, Terrence, MD MD   4                                                  

Yany Gan                                                   

                                                                                                  

Corrections: (The following items were deleted from the chart)                                    

08:26 08:26  / 67; Pulse 95bpm; Resp 16bpm; Pulse Ox 99%; sv                            sv  

09:09 08:47 Norco 5 mg-325 mg 1 tabs PO sv                                                    sv  

                                                                                                  

**************************************************************************************************

## 2019-08-17 NOTE — EDPHYS
Physician Documentation                                                                           

 [facility]                                                                 

Name: Selin Cui                                                                           

Age: 23 yrs                                                                                       

Sex: Female                                                                                       

: 1995                                                                                   

MRN: [id number]                                                                                   

Arrival Date: 2019                                                                          

Time: 04:22                                                                                       

Account#: [id number]                                                                            

Bed 5                                                                                             

Private MD:                                                                                       

ED Physician Sae Guo                                                                       

HPI:                                                                                              

                                                                                             

05:53 This 23 yrs old  Female presents to ER via Ambulatory with complaints of left   tw4 

      kidney pain.                                                                                

05:53 The patient complains of pain in the left mid back. The pain does not radiate. Onset:   tw4 

      The symptoms/episode began/occurred yesterday. Modifying factors: The symptoms are          

      alleviated by nothing. the symptoms are aggravated by nothing. Associated signs and         

      symptoms: The patient has no apparent associated signs or symptoms. Severity of pain:       

      At its worst the pain was moderate in the emergency department the pain is unchanged.       

      The patient has not experienced similar symptoms in the past.                               

                                                                                                  

OB/GYN:                                                                                           

04:36 LMP 2019                                                                           ak1 

                                                                                                  

Historical:                                                                                       

- Allergies:                                                                                      

04:36 No Known Allergies;                                                                     ak1 

- Home Meds:                                                                                      

04:36 Tylenol #3 Oral [Active];                                                               ak1 

- PMHx:                                                                                           

04:36 Kidney stones;                                                                          ak1 

- PSHx:                                                                                           

04:36 nephrostomy tube placement; Lithotripsy;                                                ak1 

                                                                                                  

- Immunization history:: Adult Immunizations unknown.                                             

- Social history:: Smoking status: unknown.                                                       

- Ebola Screening: : No symptoms or risks identified at this time.                                

                                                                                                  

                                                                                                  

ROS:                                                                                              

05:53 Constitutional: Negative for fever, chills, and weight loss, Eyes: Negative for injury, tw4 

      pain, redness, and discharge, Cardiovascular: Negative for chest pain, palpitations,        

      and edema, Respiratory: Negative for shortness of breath, cough, wheezing, and              

      pleuritic chest pain, Abdomen/GI: Negative for abdominal pain, nausea, vomiting,            

      diarrhea, and constipation.                                                                 

05:53 Back: Positive for pain at rest, flank pain, on the left.                                   

                                                                                                  

Exam:                                                                                             

05:53 Constitutional:  This is a well developed, well nourished patient who is awake, alert,  tw4 

      and in no acute distress. Head/Face:  Normocephalic, atraumatic. Chest/axilla:  Normal      

      chest wall appearance and motion.  Nontender with no deformity.  No lesions are             

      appreciated. Cardiovascular:  Regular rate and rhythm with a normal S1 and S2.  No          

      gallops, murmurs, or rubs.  Normal PMI, no JVD.  No pulse deficits. Respiratory:  Lungs     

      have equal breath sounds bilaterally, clear to auscultation and percussion.  No rales,      

      rhonchi or wheezes noted.  No increased work of breathing, no retractions or nasal          

      flaring. Abdomen/GI:  Soft, non-tender, with normal bowel sounds.  No distension or         

      tympany.  No guarding or rebound.  No evidence of tenderness throughout.                    

05:53 MS/ Extremity:  Pulses equal, no cyanosis.  Neurovascular intact.  Full, normal range       

      of motion. Neuro:  Awake and alert, GCS 15, oriented to person, place, time, and            

      situation.  Cranial nerves II-XII grossly intact.  Motor strength 5/5 in all                

      extremities.  Sensory grossly intact.  Cerebellar exam normal.  Normal gait.                

05:53 Back: CVA tenderness, is noted on the left.                                                 

                                                                                                  

Vital Signs:                                                                                      

04:36  / 90; Pulse 117; Resp 20; Temp 98.8(O); Pulse Ox 99% ; Weight 68.04 kg (R);      ak1 

      Height 5 ft. 2 in. (157.48 cm) (R); Pain 8/10;                                              

06:06 BP 85 / 50; Pulse 73; Resp 16; Temp 98.6; Pulse Ox 99% on R/A; Pain 2/10;               ak1 

07:00 BP 87 / 61; Pulse 77; Resp 18; Pulse Ox 100% ;                                          sv  

07:45  / 68; Pulse 88; Resp 16; Pulse Ox 100% ;                                         sv  

08:26  / 67; Pulse 95; Resp 16; Temp 98(O); Pulse Ox 99% ;                              sv  

09:01  / 75; Pulse 86; Resp 16; Pulse Ox 100% ;                                         sv  

04:36 Body Mass Index 27.44 (68.04 kg, 157.48 cm)                                             ak1 

                                                                                                  

MDM:                                                                                              

04:47 Patient medically screened.                                                             tw4 

05:53 Differential diagnosis: nephrolithiasis, pyelonephritis. Data reviewed: vital signs,    tw4 

      nurses notes. Data interpreted: Pulse oximetry: Interpretation: normal. Counseling: I       

      had a detailed discussion with the patient and/or guardian regarding: the historical        

      points, exam findings, and any diagnostic results supporting the discharge/admit            

      diagnosis.                                                                                  

08:45 ED course: pt seen and examined better pain controlled, discussed with godwin whitfield  

      with pain control abx, pt given instructions for follow up and return.                      

                                                                                                  

                                                                                             

04:48 Order name: Basic Metabolic Panel; Complete Time: 06:43                                 tw4 

                                                                                             

04:48 Order name: CBC with Diff; Complete Time: 06:43                                                                                                                                      

04:48 Order name: Creatinine for Radiology; Complete Time: 06:43                              tw4 

                                                                                             

04:48 Order name: Hepatic Function; Complete Time: 06:43                                                                                                                                   

04:48 Order name: Lipase; Complete Time: 06:43                                                tw4 

                                                                                             

05:13 Order name: Urine Microscopic Only; Complete Time: 07:12                                ar5 

                                                                                             

04:49 Order name: CT Abd/Pelvis - Without Contrast                                            tw4 

                                                                                             

05:15 Order name: Urine Dipstick--Ancillary (enter results); Complete Time: 06:43                                                                                                          

05:15 Order name: Urine Pregnancy--Ancillary (enter results); Complete Time: 06:43                                                                                                         

06:52 Order name: Urine Culture                                                               EDMS

                                                                                             

07:20 Order name: Blood Culture*                                                                

                                                                                             

04:48 Order name: IV Saline Lock; Complete Time: 04:51                                        tw4 

                                                                                             

04:48 Order name: Labs collected and sent; Complete Time: 04:51                                

                                                                                                  

Administered Medications:                                                                         

05:03 Drug: morphine 4 mg {Note: RASS SCORE 0.} Route: IVP; Site: right antecubital;          ak1 

05:37 Follow up: Response: No adverse reaction; Pain is decreased; RASS: Drowsy (-1)          ak1 

05:03 Drug: NS 0.9% 1000 ml Route: IV; Rate: 1 bolus; Site: right antecubital;                ak1 

06:07 Follow up: IV Status: Completed infusion; IV Intake: 1000ml                             ak1 

05:03 Drug: Zofran 4 mg Route: IVP; Site: right antecubital;                                  ak1 

05:36 Follow up: Response: No adverse reaction; Nausea is decreased                           ak1 

05:04 Drug: TORadol 30 mg Route: IVP; Site: right antecubital;                                ak1 

05:37 Follow up: Response: No adverse reaction; Pain is decreased                             ak1 

06:48 CANCELLED (Duplicate Order): NS 0.9% 1000 ml IV at 1 bolus Per protocol; 1000 mL bolus  ak1 

06:52 Drug: NS 0.9% 1000 ml Route: IV; Rate: 1 bolus; Site: right antecubital;                ak1 

08:00 Follow up: Response: No adverse reaction; IV Status: Completed infusion; IV Intake:     sv  

      1000ml                                                                                      

08:20 Drug: Rocephin - (cefTRIAXone) 1 grams Route: IVPB; Infused Over: 30 mins; Site: right  sv  

      antecubital;                                                                                

08:22 Follow up: Response: No adverse reaction; IV Status: Completed infusion; IV Intake: 10mlsv  

08:47 Drug: Norco 5 mg-325 mg 1 tabs {Note: RASS 0.} Route: PO;                               sv  

09:09 Follow up: Response: No adverse reaction; No change in condition; RASS: Alert and Calm  sv  

      (0)                                                                                         

                                                                                                  

                                                                                                  

Disposition:                                                                                      

19 08:48 Discharged to Home. Impression: Acute tubulo-interstitial nephritis, Other         

  acute postprocedural pain.                                                                      

- Condition is Stable.                                                                            

- Discharge Instructions: Pyelonephritis, Adult, Pain Relief Preoperatively and                   

  Postoperatively.                                                                                

- Prescriptions for Keflex 500 mg Oral Capsule - take 2 capsule by ORAL route every 12            

  hours for 10 days; 40 capsule. Tylenol- Codeine #4 300-60 mg Oral Tablet - take 1               

  tablet by ORAL route every 6 hours As needed; 12 tablet.                                        

- Medication Reconciliation Form, Thank You Letter, Antibiotic Education, Prescription            

  Opioid Use form.                                                                                

- Follow up: Private Physician; When: 2 - 3 days; Reason: Re-evaluation by your                   

  physician.                                                                                      

                                                                                                  

                                                                                                  

                                                                                                  

Signatures:                                                                                       

Dispatcher [employer]                           Marleni Jackson RN RN sv Krenek, Amber, RN                       RN   ak1                                                  

Sae Guo MD MD                                                      

Michelet Nichols MD MD   tw4                                                  

                                                                                                  

Corrections: (The following items were deleted from the chart)                                    

06:48 06:47 NS 0.9% 1000 ml IV at 1 bolus Per protocol; 1000 mL bolus ordered. tw4            ak1 

09:09 08:48 2019 08:48 Discharged to Home. Impression: Acute tubulo-interstitial        sv  

      nephritis; Other acute postprocedural pain. Condition is Stable. Forms are Medication       

      Reconciliation Form, Thank You Letter, Antibiotic Education, Prescription Opioid Use.       

      Follow up: Private Physician; When: 2 - 3 days; Reason: Re-evaluation by your               

      physician.                                                                                

                                                                                                  

**************************************************************************************************

## 2019-08-17 NOTE — XMS REPORT
Clinical Summary

 Created on:2019



Patient:Selin Cui

Sex:Female

:1995

External Reference #:[id number]





Demographics







 Address  [address] [place] [address]

 

 Home Phone  [phone]

 

 Mobile Phone  [phone]

 

 Email Address  [email or web address]

 

 Preferred Language  English

 

 Marital Status  Single

 

 Taoist Affiliation  Unknown

 

 Race  White

 

 Ethnic Group   or 









Author







 Organization  [facility]

 

 Address  [address] [place] [address]









Support







 Name  Relationship  Address  Phone

 

 Rebeca Cui  Parent  Unavailable  [phone]

 

 Geoff Cui  Parent  [address] [facility]  [phone]



     [place] [address]  









Care Team Providers







 Name  Role  Phone

 

 Asked, No Pcp  Primary Care Provider  Unavailable









Allergies







 Active Allergy  Reactions  Severity  Noted Date  Comments

 

 Hydrocodone  GI Intolerance    2019  Nausea







Medications







 Medication  Sig  Dispensed  Refills  Start Date  End Date  Status

 

 acetaminophen-code  Take 1-2 tablets  30 tablet  0  2019  
Active



 ine (TYLENOL WITH  by mouth every 6        19  



 CODEINE #3) 300-30  (six) hours as          



 mg per tablet  needed for          



   moderate pain for          



   up to 30 doses.          

 

 docusate sodium  Take 1 capsule  60 capsule  0  2019  Active



 (COLACE) 100 MG  (100 mg total) by        19  



 capsule  mouth 2 (two)          



   times a day for          



   30 days.          

 

 acetaminophen-code  TK 2 TS PO Q 6 H    0  12/10/2018  07/16/20  Discontinued



 ine (TYLENOL WITH  PRF PAIN CONTROL.        19  



 CODEINE #3) 300-30            



 mg per tablet            

 

 ibuprofen  TK 1 T PO Q 8 H    0  12/10/2018  07/16/20  Discontinued



 (ADVIL,MOTRIN) 800  PRF PAIN CONTROL.        19  



 MG tablet            

 

 docusate sodium  Take 1 capsule  14 capsule  0  2019  



 (COLACE) 100 MG  (100 mg total) by        19  



 capsule  mouth 2 (two)          



   times a day as          



   needed for          



   constipation for          



   up to 7 days.          

 

 ciprofloxacin  Take 1 tablet  6 tablet  0  2019  



 (CIPRO) 500 MG  (500 mg total) by        19  



 tablet  mouth 2 (two)          



   times a day for 3          



   days.          

 

 acetaminophen  Take 2 tablets  28 tablet  0  2019  



 (TYLENOL) 325 MG  (650 mg total) by        19  



 tablet  mouth every 6          



   (six) hours as          



   needed for          



   moderate pain for          



   up to 7 days.          

 

 traMADol (ULTRAM)  Take 1 tablet (50  10 tablet  0  2019  




 50 mg tablet  mg total) by        19  



   mouth every 6          



   (six) hours as          



   needed for severe          



   pain for up to 7          



   days.          

 

 cefpodoxime  TK 2 TS PO Q 12 H    0  2019  Discontinued



 (VANTIN) 100 MG  FOR 10 DAYS WF        19  



 tablet            

 

 nitrofurantoin,  TK ONE C PO BID    0  2019  Discontinued



 macrocrystal-monoh          19  



 ydrate, (MACROBID)            



 100 MG capsule            

 

 acetaminophen-code  TK 1 T PO Q 6 H    0  2019  Discontinued



 ine (TYLENOL WITH  PRN P        19  



 CODEINE #3) 300-30            



 mg per tablet            







Active Problems







 Problem  Noted Date

 

 Hydronephrosis, left  2019

 

 Left ureteral stone  2017







Encounters







 Date  Type  Specialty  Care Team  Description

 

 08/15/2019  Hospital Encounter  Radiology  Chelsea  HydronephrosisToshia MD  unspecified



         hydronephrosis type

 

 08/15/2019  Transcribe Orders  Urology  Lucita Vázquez Julie N., MD  unspecified



         hydronephrosis type



         (Primary Dx)

 

 08/15/2019  Orders Only  Urology  Segundo Tran Hydronephrosis, MD  unspecified



         hydronephrosis type



         (Primary Dx)

 

 08/15/2019  Orders Only  Urology  Lizzy Hillithiasis (Primary Dx);



       MD Amalia  Hydronephrosis, unspecified hydronephrosis type

 

 2019  Office Visit  Dameon Chavez (Primary Dx);



       MD Amalia  Hydronephrosis, unspecified hydronephrosis type;



         Flank pain;



         Ureteropelvic junction (UPJ) obstruction, left

 

 2019  Surgery  Urology  Sergio  CYSTOSCOPY W/ LEFT



       MD Amalia  ANTEGRADE AND



         RETROGRADE PYELOGRAM

 

 2019  Anesthesia Event  Urology  García Paz MD  

 

 2019  Hospital Encounter  Urology  Amalia Hill MD  

 

 2019  Office Visit  Urology  Sergio  Hydronephrosis,



       MD Amalia  unspecified



         hydronephrosis type



         (Primary Dx)

 

 2019  Telephone  Urology  Amina Benito MA  

 

 2019  Telephone  Urology  Maria Isabel Ac MA  

 

 2019  Telephone  Urology  Amina Benito MA  

 

 2019  Office Visit  Urology  Sergio,  Hydronephrosis of left



       MD Amalia  kidney (Primary Dx)

 

 2019 -  Hospital Encounter  General Internal  Sergio,  Hydronephrosis, 
left



 2019    Medicine  MD Amalia  

 

 07/15/2019  Telephone  Urology  Amalia Hill MD  

 

 2019  Hospital Encounter  Radiology  Amalia Hill MD  

 

 2019  Telephone  Urology  Maria Isabel Ac MA  

 

 2019  Office Visit  Urology  Sergio,  Hydronephrosis, left (Primary Dx);



       MD Amalia  Nephrolithiasis

 

 2019  Telephone  UrologAnnamaria Connors MD  

 

 2019  Hospital Encounter  Radiology  Gurpreet,  Hydronephrosis, left



       Annamaria CHAPMAN MD  

 

 2019  Office Visit  Urology  Gurpreet,  Kidney stones (Primary Dx);



       Annamaria CHAPMAN MD  Hydronephrosis, left

 

 2019  Telephone  Urology  Annamaria Vázquez MD  

 

 2019  Orders Only  Urology  Annamaria Vázquez MD  

 

 04/10/2019  Orders Only  Urology  Gurpreet,  Kidney stones (Primary Dx);



       Annamaria CHAPMAN MD  Left ureteral stone

 

 04/10/2019  Telephone  Urology  Annamaria Vázquez MD  

 

 2019  Telephone  Urology  Annamaria Vázquez MD  

 

 2019  Telephone  Urology  Maria Isabel Ac MA  

 

 2018  Telephone  Urology  Annamaria Vázquez MD  

 

 2018  Hospital Encounter  Radiology  Gurpreet,  Kidney stones



       Annamaria CHAPMAN MD  

 

 2018  Office Visit  Urology  Gurpreet  Kidney stones (Primary



       Annamaria CHAPMAN MD  Dx)

 

 12/10/2018  Telephone  Annamaria Garrett MD  

 

 2018  Telephone  Urology  Gurpreet  Flank pain (Primary Dx)



       Annamaria CHAPMAN MD  



after 2018



Family History







 Medical History  Relation  Name  Comments

 

 Seizures  Mother    









 Relation  Name  Status  Comments

 

 Father    Alive  

 

 Mother      







Social History







 Tobacco Use  Types  Packs/Day  Years Used  Date

 

 Never Smoker        









 Smokeless Tobacco: Never Used      









 Alcohol Use  Drinks/Week  oz/Week  Comments

 

 No      









 Sex Assigned at Birth  Date Recorded

 

 Not on file  









 Job Start Date  Occupation  Industry

 

 Not on file  Not on file  Not on file









 Travel History  Travel Start  Travel End









 No recent travel history available.







Last Filed Vital Signs







 Vital Sign  Reading  Time Taken

 

 Blood Pressure  133/85  08/15/2019  5:30 PM CDT

 

 Pulse  104  08/15/2019  5:30 PM CDT

 

 Temperature  36.8 C (98.3 F)  08/15/2019  4:30 PM CDT

 

 Respiratory Rate  20  08/15/2019  4:30 PM CDT

 

 Oxygen Saturation  97%  08/15/2019  5:30 PM CDT

 

 Inhaled Oxygen Concentration  -  -

 

 Weight  68 kg (150 lb)  08/15/2019  2:20 PM CDT

 

 Height  157.5 cm (5' 2")  08/15/2019  2:20 PM CDT

 

 Body Mass Index  27.44  08/15/2019  2:20 PM CDT







Plan of Treatment







 Date  Type  Specialty  Care Team  Description

 

 2019  Hospital Encounter  Urology  Amalia Hill MD



  



       [address] [facility]



  



       [address]



  



       [place] [address]



  



       [phone] (Fax)  

 

 2019  Surgery  Urology  Amalia Hill MD



  LEFT ROBOTIC ASSISTED



       [address] [facility]



  LAPARSCOPIC PYELOPLASTY,



       [address]



  LEFT PYELOTHILOITOMY,



       [place] [address]



  LEFT PERCUTANEOUS TUBE



       [phone]



  REMOVAL, LEFT STENT



       [phone] (Fax)  PLACEMENT









 Health Maintenance  Due Date  Last Done  Comments

 

 CHLAMYDIA SCREENING  12/15/2012    

 

 INFLUENZA VACCINE  2019    







Implants







 Implanted  Type  Area    Device  Shelf  Model /



         Identifier  Expiration  Serial /



           Date  Lot

 

 Stent Uretl S-Flx Kwart Retro-Inject 6fr 24cm - [id number]  Peripheral or    
[employer] UROLOGICAL    2020  [id number] /



 Implanted: Qty: 1 on 2017 by Annamaria Vázquez MD  Biliary           /



   Stents          [id number]

 

 Stent Uretl S-Flx Kwart Retro-Inject 6fr 24cm - [id number]  Peripheral or  N/A:
  [employer] UROLOGICAL    2020  [id number] /



 Implanted: 05/15/2017 (Quantity not on file)  Biliary  N/A         /



   Stents          [id number]

 

 Catheter Uretl 6/10fr 50cm Flx-Tp Dlmn Std Accs - [id number]  Surgical  N/A:  
[facility]ICAL      [id number] /



 Implanted: 05/15/2017 (Quantity not on file)  Implants;  N/A         /



   Expanders;          



   Extenders;          



   Surgical          



   Wires          

 

 Catheter Uretrl Flxble Tip Open Ended Flexima 5fr 70cm - [id number]  
Urological  N/A:  [facility] UROLOGY    2022  [id number] /



 Implanted: Qty: 1 on 2019 by Amalia Hill MD  Implants or  N/A       
  /



   Sets          [id number]







Procedures







 Procedure Name  Priority  Date/Time  Associated Diagnosis  Comments

 

 HC NEPHROSTOMY/PYELOS  Routine  08/15/2019  4:44  Hydronephrosis,  Results for 
this



 TUBE CHANGE    PM CDT  unspecified  procedure are in



       hydronephrosis type  the results



         section.

 

 IR LEFT NEPHROSTOMY  Routine  08/15/2019    



 EVAL/EXCHANGE        

 

 CONSULT TO  Routine  08/15/2019    



 INTERVENTIONAL        



 RADIOLOGY        

 

 MICROSCOPIC  Routine  2019 11:19    Results for this



 EXAMINATION    AM CDT    procedure are in



         the results



         section.

 

 URINALYSIS, AUTOMATED  Routine  2019 11:19  Nephrolithiasis



  Results for this



 WITH MICROSCOPY    AM CDT  Hydronephrosis,  procedure are in



       unspecified  the results



       hydronephrosis type  section.

 

 URINE CULTURE  Routine  2019 11:19  Nephrolithiasis



  Results for this



     AM CDT  Hydronephrosis,  procedure are in



       unspecified  the results



       hydronephrosis type  section.

 

 URINE CULTURE  Routine  2019 11:19  Nephrolithiasis



  Results for this



     AM CDT  Hydronephrosis,  procedure are in



       unspecified  the results



       hydronephrosis type  section.

 

 FL PYELOGRAM  Routine  2019  8:11    Results for this



 RETROGRADE    AM CDT    procedure are in



         the results



         section.

 

 NV AN ELECTIVE  Routine  2019  7:42    



 SUPRAGLOTTIC AIRWAY    AM CDT    









   Procedure Note - Pati Dailey CRNA - 2019  7:42 AM CDT



Airway



   Date/Time: 2019 7:42 AM



   Performed by: Pati Dailey CRNA



   Authorized by: Prema Pittman MD



   



   Location:  OR



   Urgency:  Elective



   Difficult Airway: No



   Anesthesiologist:  Prema Pittman MD



   Resident/CRNA/AA:  Pati Dailey CRNA



   Preoxygenated with 100% O2: Yes



   C-spine Precautions Maintained Throughout: Yes



   Mask Ventilation:  Not attempted



   Final Airway Type:  Supraglottic airway



   Final LMA:  Classic



   LMA Size:  4



   Number of Attempts at Approach:  1









 CYSTO WITH URETEROSCOPY    2019  7:30 AM CDT  Hydronephrosis, left



  



       Ureteral stent retained  









   Special Needs







   EST 1 HR









 PARTIAL THROMBOPLASTIN  Routine  2019 10:43  Hydronephrosis,  Results 
for this



 TIME (PTT)    AM CDT  unspecified  procedure are in



       hydronephrosis type  the results



         section.

 

 PROTHROMBIN TIME WITH  Routine  2019 10:43  Hydronephrosis,  Results for 
this



 INR    AM CDT  unspecified  procedure are in



       hydronephrosis type  the results



         section.

 

 MICROSCOPIC  Routine  2019 10:27    Results for this



 EXAMINATION    AM CDT    procedure are in



         the results



         section.

 

 URINALYSIS, AUTOMATED  Routine  2019 10:27  Hydronephrosis,  Results for 
this



 WITH MICROSCOPY    AM CDT  unspecified  procedure are in



       hydronephrosis type  the results



         section.

 

 URINE CULTURE  Routine  2019 10:27  Hydronephrosis,  Results for this



     AM CDT  unspecified  procedure are in



       hydronephrosis type  the results



         section.

 

 HC COMPLETE BLD COUNT  Routine  2019  4:50    Results for this



 W/AUTO DIFF    AM CDT    procedure are in



         the results



         section.

 

 ESTIMATED GFR  Routine  2019  4:00    Results for this



     AM CDT    procedure are in



         the results



         section.

 

 BASIC METABOLIC PANEL  Routine  2019  4:00    Results for this



     AM CDT    procedure are in



         the results



         section.

 

 POC GLUCOSE  Routine  2019  9:07    Results for this



     PM CDT    procedure are in



         the results



         section.

 

 IR LEFT NEPHROSTOMY  Routine  2019 11:19  Hydronephrosis, left  Results 
for this



 INITIAL PLACEMENT    AM CDT    procedure are in



         the results



         section.

 

 URINE CULTURE  Routine  2019 10:29  Hydronephrosis, left  Results for 
this



     AM CDT    procedure are in



         the results



         section.

 

 MICROSCOPIC  Routine  2019 10:28    Results for this



 EXAMINATION    AM CDT    procedure are in



         the results



         section.

 

 URINALYSIS, AUTOMATED  Routine  2019 10:28  Hydronephrosis, left  
Results for this



 WITH MICROSCOPY    AM CDT    procedure are in



         the results



         section.

 

 PROTHROMBIN TIME WITH  Routine  2019 10:14  Hydronephrosis, left  
Results for this



 INR    AM CDT    procedure are in



         the results



         section.

 

 PARTIAL THROMBOPLASTIN  Routine  2019 10:14  Hydronephrosis, left  
Results for this



 TIME (PTT)    AM CDT    procedure are in



         the results



         section.

 

 COMPREHENSIVE  Routine  2019 10:14  Hydronephrosis, left  Results for 
this



 METABOLIC PANEL    AM CDT    procedure are in



         the results



         section.

 

 CBC WITH PLATELET AND  Routine  2019 10:14  Hydronephrosis, left  
Results for this



 DIFFERENTIAL    AM CDT    procedure are in



         the results



         section.

 

 NM RENAL SCAN WFLOW  Routine  2019  1:09  Hydronephrosis, left  Results 
for this



 FUNCT SGL INT W/MAG 3    PM CDT    procedure are in



         the results



         section.

 

 [id number]  Routine  2019  9:25  Kidney stones  Results for this



     AM CDT    procedure are in



         the results



         section.

 

 POC URINALYSIS  Routine  2019  9:25  Kidney stones  Results for this



 DIPSTICK    AM CDT    procedure are in



         the results



         section.

 

 CT ABDOMEN PELVIS WO  Routine  2019    



 CONTRAST        

 

 CT ABD/PELVIC EXTERNAL  Routine  04/15/2019  4:27    Results for this



 STUDY    PM CDT    procedure are in



         the results



         section.

 

 XR KUB KIDNEY URETER  Routine  2018  2:37  Kidney stones  Results for 
this



 BLADDER    PM CST    procedure are in



         the results



         section.

 

 POC URINALYSIS  Routine  2018 10:24  Kidney stones  Results for this



 DIPSTICK    AM CST    procedure are in



         the results



         section.



after 2018



Results

IR Left Nephrostomy Eval/Exchange (08/15/2019  4:44 PM CDT)Only the most recent 
of2 resultswithin the time period is included.





 Specimen

 

 









 Narrative  Performed At

 

 EXAMINATION:IR LEFT NEPHROSTOMY EVAL EXCHANGE



  [facility]



  



  



 CLINICAL HISTORY:N13.30 Unspecified hydronephrosis, Left  



 Hydronephrosis



  



  



  



 COMPARISON:None.



  



  



  



 PROCEDURE: Genitourinary catheter exchange



  



  



  



 Procedural Personnel



  



 Attending physician(s): Darrius Mcwilliams MD



  



  



  



 Pre-procedure diagnosis: Hydronephrosis with possible tube dysfunction



  



 Post-procedure diagnosis: Same



  



 Indication: Catheter obstruction



  



 Additional clinical history: None



  



  



  



 Complications: No immediate complications.



  



  



  



 IMPRESSION:Successful left nephrostomy tube exchange.



  



  



  



 Plan: Discharge home



  



 _______________________________________________________________



  



  



  



 PROCEDURE SUMMARY



  



 - Target organ: Unilateral native kidney



  



 - Antegrade nephrostogram(s) via the existing access



  



 - Nephrostomy tube exchange



  



 - Additional procedure(s): None



  



  



  



 PROCEDURE DETAILS:



  



  



  



 Pre-procedure



  



 Consent: Informed consent for the procedure including risks, benefits  



 and alternatives was obtained and time-out was performed prior to the  



 procedure.



  



 Preparation: The site was prepared and draped using maximal sterile  



 barrier technique including cutaneous antisepsis.



  



  



  



 Anesthesia/sedation



  



 Level of anesthesia/sedation: Moderate sedation (conscious sedation)



  



 Anesthesia/sedation administered by: Independent trained observer under  



 attending supervision with continuous monitoring of the patient's level  



 of consciousness and physiologic status



  



 Total intra-service sedation time (minutes): 13



  



  



  



 Left genitourinary catheter exchange



  



 Local anesthesia was administered. Initial nephrostogram was performed.  



 A wire was placed through the existing tube and it was removed. The new  



 tube was advanced over the wire and position was confirmed with contrast  



 injection.



  



 Pre-existing genitourinary catheter: Cook 8.5 French nephrostomy  



 catheter



  



 Genitourinary catheter(s) placed: Cook 8.5 French nephrostomy catheter 



  



 Findings: Mild left hydronephrosis to the UPJ. The ureter is nondilated  



 and patent. Findings suggest UPJ obstruction.



  



 External catheter securement: Non-absorbable suture 



  



  



  



 Additional genitourinary system intervention



  



 Genitourinary intervention: None



  



 Location of intervention: Not applicable



  



 Device used: Not applicable



  



 Description of intervention: Not applicable



  



 Post-intervention findings: Not applicable



  



  



  



 Contrast



  



 Contrast agent: Visipaque 270



  



 Contrast volume (mL): 20



  



  



  



 Radiation Dose



  



 Reference air kerma (mGy): 9 



  



  



  



 Additional Details



  



 Additional description of procedure: None



  



 Equipment details: None



  



 Specimens removed: None



  



 Estimated blood loss (mL): Less than 10



  



 Standardized report: [email or web address]_v2



  



  



  



  



  



 [facility]-[id number]  









 Procedure Note

 

 Hm Interface, Radiology Results Incoming - 08/15/2019  6:24 PM CDT



EXAMINATION:  IR LEFT NEPHROSTOMY EVAL EXCHANGE



 



 CLINICAL HISTORY:  N13.30 Unspecified hydronephrosis, Left Hydronephrosis



 



 COMPARISON:  None.



 



 PROCEDURE: Genitourinary catheter exchange



 



 Procedural Personnel



 Attending physician(s): Darrius Mcwilliams MD



 



 Pre-procedure diagnosis: Hydronephrosis with possible tube dysfunction



 Post-procedure diagnosis: Same



 Indication: Catheter obstruction



 Additional clinical history: None



 



 Complications: No immediate complications.



 



 IMPRESSION:  Successful left nephrostomy tube exchange.



 



 Plan: Discharge home



 _______________________________________________________________



 



 PROCEDURE SUMMARY



 - Target organ: Unilateral native kidney



 - Antegrade nephrostogram(s) via the existing access



 - Nephrostomy tube exchange



 - Additional procedure(s): None



 



 PROCEDURE DETAILS:



 



 Pre-procedure



 Consent: Informed consent for the procedure including risks, benefits and 
alternatives was obtained and time-out was performed prior to the procedure.



 Preparation: The site was prepared and draped using maximal sterile barrier 
technique including cutaneous antisepsis.



 



 Anesthesia/sedation



 Level of anesthesia/sedation: Moderate sedation (conscious sedation)



 Anesthesia/sedation administered by: Independent trained observer under 
attending supervision with continuous monitoring of the patient's level of 
consciousness and physiologic status



 Total intra-service sedation time (minutes): 13



 



 Left genitourinary catheter exchange



 Local anesthesia was administered. Initial nephrostogram was performed. A wire 
was placed through the existing tube and it was removed. The new tube was 
advanced over the wire and position was confirmed with contrast injection.



 Pre-existing genitourinary catheter: Cook 8.5 French nephrostomy catheter



 Genitourinary catheter(s) placed: Cook 8.5 French nephrostomy catheter



 Findings: Mild left hydronephrosis to the UPJ. The ureter is nondilated and 
patent. Findings suggest UPJ obstruction.



 External catheter securement: Non-absorbable suture



 



 Additional genitourinary system intervention



 Genitourinary intervention: None



 Location of intervention: Not applicable



 Device used: Not applicable



 Description of intervention: Not applicable



 Post-intervention findings: Not applicable



 



 Contrast



 Contrast agent: Visipaque 270



 Contrast volume (mL): 20



 



 Radiation Dose



 Reference air kerma (mGy): 9



 



 Additional Details



 Additional description of procedure: None



 Equipment details: None



 Specimens removed: None



 Estimated blood loss (mL): Less than 10



 Standardized report: [email or web address]_v2



 



 



 [facility]-[id number]









 Performing Organization  Address  City/State/Zipcode  Phone Number

 

 [facility]  [address] [place] [address]  



Consult To Interventional Radiology (08/15/2019)





 Narrative  Performed At

 

 This result has an attachment that is not available.



  



Microscopic Examination (2019 11:19 AM CDT)Only the most recent of3 
resultswithin the time period is included.





 Component  Value  Ref Range  Performed At  Pathologist Signature

 

 WBC, UA  0-5  0 - 5 /hpf  [facility]  

 

 RBC, UA  3-10 (A)  0 - 2 /hpf  [facility]  

 

 Epithelial cells  0-10  0 - 10 /hpf  [facility]  



 (non renal)        

 

 Casts  Present (A)  None seen /lpf  [facility]  

 

 Cast type  Hyaline casts  N/A  [facility]  

 

 Crystals, urine  Present (A)  N/A  [facility]  

 

 Crystal type  Calcium Oxalate  N/A  [facility]  

 

 Mucus, UA  Present  Not Estab.  [facility]  

 

 Bacteria, UA  None seen  None seen/Few  [facility]  









 Specimen

 

 









 Narrative  Performed At

 

 Performed at:[address] [place], [place] [id number]



  



 : Dalton Rice MD, Phone:[phone]  









 Performing Organization  Address  City/[facility]  Phone Number

 

 [facility]      



Urinalysis, automated with microscopy (2019 11:19 AM CDT)Only the most 
recent of3 resultswithin the time period is included.





 Component  Value  Ref Range  Performed At  Pathologist



         Signature

 

 Specific gravity,  1.027  1.005 - 1.030  [facility]  



 urine        

 

 pH, urine  6.0  5.0 - 7.5  [facility]  

 

 Color, UA  Yellow  Yellow  [facility]  

 

 Appearance  Clear  Clear  [facility]  

 

 WBC esterase, urine  Negative  Negative  [facility]  

 

 Protein, UA  Negative  Negative/Trace  [facility]  

 

 Glucose, urine  Negative  Negative  [facility]  

 

 Ketones, UA  Negative  Negative  [facility]  

 

 Occult blood, urine  Trace (A)  Negative  [facility]  

 

 Bilirubin, UA  Negative  Negative  [facility]  

 

 Urobilinogen, UA  0.2  0.2 - 1.0 mg/dL  [facility]  

 

 Nitrite, UA  Negative  Negative  [facility]  

 

 Microscopic  See below:Comment:    [facility]  



 examination  Microscopic was      



   indicated and was      



   performed.      









 Specimen

 

 Urine









 Narrative  Performed At

 

 Performed at:[address] [place], [place] [id number]



  



 : Dalton Rice MD, Phone:[phone]  









 Performing Organization  Address  City/[facility]  Phone Number

 

 [facility]      



Urine culture (2019 11:19 AM CDT)Only the most recent of4 resultswithin 
the time period is included.





 Component  Value  Ref Range  Performed At  Pathologist [facility]

 

 Urine culture  Mixed urogenital seda    [facility]  



   10,000-25,000 colony forming units per mL      



         









 Specimen

 

 Urine









 Narrative  Performed At

 

 Performed at:  [address] [id number]



  



 : Dalton Rice MD, Phone:[phone]  









 Performing Organization  Address  City/[facility]  Phone Number

 

 [facility]      



FL Pyelogram Retrograde (2019  8:11 AM CDT)





 Specimen

 

 









 Narrative  Performed At

 

 IMPRESSION:C-arm Fluoroscopy under 1 hour was provided in the OR for   
[facility]



 the referring physician.A radiologist was not present during the  



 procedure. Refer to the Operative report issued by the performing  



 provider for procedure details.  









 Procedure Note

 

 Hm Interface, Radiology Results Incoming - 2019  4:11 PM CDT







 IMPRESSION:  C-arm Fluoroscopy under 1 hour was provided in the OR for the 
referring



 physician.  A radiologist was not present during the procedure. Refer to the



 Operative report issued by the performing provider for procedure details.









 Performing Organization  Address  City/State/[facility]  Phone Number

 

  [facility]  [address] [place] [address]  



Partial thromboplastin time, activated (2019 10:43 AM CDT)Only the most 
recent of2 resultswithin the time period is included.





 Component  Value  Ref Range  Performed At  Pathologist [facility]

 

 aPTT  28  24 - 33 sec  [facility]  



   Comment:      



   This test has not been validated for monitoring unfractionated heparin      



   therapy. aPTT-based therapeutic ranges for unfractionated heparin      



   therapy have not been established. For general guidelines on      



   Heparin monitoring, refer to the [facility] Directory of Services.      



         









 Specimen

 

 Blood









 Narrative  Performed At

 

 Performed at:[address] [place], [place] [id number]



  



 : Dalton Rice MD, Phone:[phone]  









 Performing Organization  Address  City/[facility]  Phone Number

 

 [facility]      



Prothrombin time with INR (2019 10:43 AM CDT)Only the most recent of2 
resultswithin the time period is included.





 Component  Value  Ref Range  Performed At  Pathologist



         Signature

 

 INR  1.0  0.8 - 1.2  [facility]  



   Comment:      



   Reference interval is for non-anticoagulated patients.      



   Suggested INR therapeutic range for Vitamin K      



   antagonist therapy:      



    Standard Dose (moderate intensity      



   therapeutic range): 2.0 - 
3.0      



    Higher intensity therapeutic range 2.5 - 3.5      



         

 

 Prothrombin time  10.4  9.1 - 12.0 sec  [facility]  









 Specimen

 

 Blood









 Narrative  Performed At

 

 Performed at:01 - [facility]



  [facility]



 [address], [id number]



  



 : Dalton Rice MD, Phone:[phone]  









 Performing Organization  Address  [facility]  Phone Number

 

 [facility]      



CBC with platelet and differential (2019  4:50 AM CDT)Only the most 
recent of2 resultswithin the time period is included.





 Component  Value  Ref Range  Performed At  Pathologist



         Signature

 

 WBC  6.87  4.50 - 11.00  [facility]  



     k/uL  HOSPITAL  

 

 RBC  4.33  4.20 - 5.50  [facility]  



     m/uL  [facility]  

 

 HGB  12.8  12.0 - 16.0  [facility]  



     g/dL  [facility]  

 

 HCT  40.4  37.0 - 47.0 %  [facility]  

 

 MCV  93.3  82.0 - 100.0  [facility]  

 

 MCH  29.6  27.0 - 34.0 pg  [facility]  

 

 MCHC  31.7  31.0 - 37.0  [facility]  



     [facility]  

 

 RDW - SD  42.3  37.0 - 55.0 fL  [facility]  

 

 MPV  9.7  8.8 - 13.2 fL  [facility]  

 

 Platelet count  258  150 - 400 k/uL  [facility]  

 

 Nucleated RBC  0.00  /100 WBC  [facility]  

 

 Neutrophils  49.9  39.0 - 69.0 %  [facility]  

 

 Lymphocytes  40.5  25.0 - 45.0 %  [facility]  

 

 Monocytes  7.6  0.0 - 10.0 %  [facility]  

 

 Eosinophils  1.3  0.0 - 5.0 %  [facility]  

 

 Basophils  0.1  0.0 - 1.0 %  [facility]  

 

 Immature granulocytes  0.6Comment:  0.0 - 1.0 %  [facility]  



   "Immature    HOSPITAL  



   granulocytes"      



   (promyelocytes      



   , myelocytes,      



   metamyelocytes      



   )      









 Specimen

 

 Blood









 Performing Organization  Address  City/[facility]/Zipcode  Phone Number

 

 [facility] DEPARTMENT OF PATHOLOGY AND  [address] [address]  



Estimated GFR (2019  4:00 AM CDT)





 Component  Value  Ref Range  Performed At  [facility]

 

 Estimated GFR  >=90  mL/min/1.73  [facility]  



   Comment:  [address]  



   CatergoryUnitsInterpretation      



   G1 >=90 Normal or high      



   G2 60-89Mildly decreased      



   [id number] to moderately decreased      



   F3x65-40Dcipqzayrg to severely decreased      



   G4 15-29Severely decreased      



   G5 <15Kidney failure      



   The eGFR was calculated using the Chronic Kidney Disease      



   Epidemiology Collaboration (CKD-EPI) equation.      



   Interpretation is based on recommendations of the      



   National Kidney Foundation-Kidney Disease Outcomes Quality      



   Initiative (NKF-KDOQI) published in 2014.      



         









 Specimen

 

 Plasma specimen









 Performing Organization  Address  [facility]  Phone Number

 

 [facility] DEPARTMENT OF PATHOLOGY AND  [address] [place] [id number]  



Basic metabolic panel (2019  4:00 AM CDT)





 Component  Value  Ref Range  Performed At  [facility]

 

 Sodium  140  135 - 148 mEq/L  [facility]  

 

 Potassium  4.5  3.5 - 5.0 mEq/L  [facility]  

 

 Chloride  104  98 - 112 mEq/L  [facility]  

 

 CO2  24  24 - 31 mEq/L  [facility]  

 

 Anion gap  12@ANIO  7 - 15 mEq/L  [facility]  

 

 BUN  9  6 - 20 mg/dL  [facility]  

 

 Creatinine  0.62  0.50 - 0.90 mg/dL  [facility]  

 

 Glucose  94  65 - 99 mg/dL  [facility]  

 

 Calcium  9.3  8.3 - 10.2 mg/dL  [facility]  









 Specimen

 

 Plasma specimen









 Performing Organization  Address  City/[facility]/Zipcode  Phone Number

 

 [facility] DEPARTMENT OF PATHOLOGY AND  [address] [place] [id number]  



POC glucose (2019  9:07 PM CDT)





 Component  Value  Ref Range  Performed At  [facility]

 

 POC glucose  107 (H)  65 - 99 mg/dL  [facility]  



   Comment:    HOSPITAL  



   No Action Needed      



   Meter ID: [id number]      



   : Julia Gage      



         









 Specimen

 

 









 Performing Organization  Address  City/[facility]/Zipcode  Phone Number

 

 [facility] DEPARTMENT OF PATHOLOGY AND  [address] [place] [address]  



 GENOMIC MEDICINE      

 

 [facility]  [address] [place] [address]  



IR Nephrostomy Insertion Left (2019 11:19 AM CDT)





 Specimen

 

 









 Narrative  Performed At

 

 PROCEDURE: Left percutaneous nephrostomy catheter placement



  [facility]



  



  



 Procedural Personnel



  



 Attending physician(s): Castillo Jones



  



 Fellow physician(s): None



  



 Resident physician(s): None



  



 Advanced practice provider(s): None



  



  



  



 Pre-procedure diagnosis: Nephrolithiasis



  



 Post-procedure diagnosis: Same



  



 Indication: Antegrade nephrostogram and access for possible lithotripsy



  



 Additional clinical history: None



  



  



  



 Complications: No immediate complications.



  



  



  



 IMPRESSION:



  



  



  



 1.Percutaneous placement of a 8.5 French drainage catheter into  



 interpole of the left kidney.



  



  



  



 2.Antegrade nephrostogram from the proximal third of the ureter  



 demonstrates expected flow of contrast into the urinary bladder with no  



 evidence of ureteral obstruction.



  



  



  



 Plan: 



  



  



  



 Left nephrostomy tube to gravity bag drainage. Access may be used for  



 possible future lithotripsy per urology.



  



 ______________________________________________________________________



  



  



  



 PROCEDURE SUMMARY:



  



 - Left nephrostomy catheter placement under ultrasound and fluoroscopic  



 guidance



  



 - Additional procedure(s): None



  



  



  



 PROCEDURE DETAILS:



  



  



  



 Pre-procedure



  



 Consent: Informed consent for the procedure including risks, benefits  



 and alternatives was obtained and time-out was performed prior to the  



 procedure.



  



 Preparation: The left flank site was prepared and draped using maximal  



 sterile barrier technique including cutaneous antisepsis.



  



  



  



 Anesthesia/sedation



  



 Level of anesthesia/sedation: Moderate sedation (conscious sedation)



  



 Anesthesia/sedation administered by: Independent trained observer under  



 attending supervision with continuous monitoring of the patient's level  



 of consciousness and physiologic status



  



 Total intra-service sedation time (minutes): 45



  



  



  



 Drainage catheter placement



  



 The patient was positioned prone. Initial imaging was performed. Local  



 anesthesia was administered. Initially, the 22-gauge needle was used to  



 attempt access to the lower pole of the left kidney where the echogenic  



 calculus was noted. However after 



  



 attempts to access the inferior pole calyx unsuccessfully, the 22-gauge  



 needle was used to subsequently accessed the left interpole calyx. After  



 a Nitrex wire was advanced into the left ureter, the needle was  



 exchanged for a AccuStick catheter the inner 



  



 dilator was removed and the Nitrex wire was exchanged for a short  



 Amplatz wire over which the 8.5 French nephrostomy catheter was placed  



 after dilation with an 8 French dilator. Position of the nephrostomy  



 catheter within the left renal pelvis was 



  



 confirmed. 



  



 - Initial imaging findings: Moderate left hydronephrosis is noted.  



 Antegrade nephrostogram demonstrated free flow of contrast through the  



 left ureter into the urinary bladder. 



  



 -Left nephrostomy catheter placed: 8.5 French multipurpose drainage  



 catheter



  



 - External catheter securement: Non-absorbable suture



  



 - Post-drainage imaging findings: Pigtail within the left renal pelvis.  



 There is minimal flow to the proximal left ureter from the renal pelvis  



 when final nephrostogram was performed which may be secondary to  



 ureteral peristalsis or possible stenosis at 



  



 the ureteropelvic junction.



  



  



  



 Radiation Dose



  



 Reference air kerma (mGy): 4 



  



  



  



 Additional Details



  



 Additional description of procedure: None



  



 Equipment details: None



  



 Specimens removed: Aspirated fluid was not sent for analysis.



  



 Estimated blood loss (mL): Less than 10



  



 Standardized report: [email or web address]_[email or web address]



  



  



  



 Attestation



  



 Signer name: Castillo Jones M.D.



  



 I attest that I was present for the entire procedure. I reviewed the  



 stored images and agree with the report as written.



  



  



  



  



  



  



  



   









 Procedure Note

 

 Hm Interface, Radiology Results Incoming - 2019  5:44 PM CDT



PROCEDURE: Left percutaneous nephrostomy catheter placement



 



 Procedural Personnel



 Attending physician(s): Castillo Jones



 Fellow physician(s): None



 Resident physician(s): None



 Advanced practice provider(s): None



 



 Pre-procedure diagnosis: Nephrolithiasis



 Post-procedure diagnosis: Same



 Indication: Antegrade nephrostogram and access for possible lithotripsy



 Additional clinical history: None



 



 Complications: No immediate complications.



 



 IMPRESSION:



 



 1.  Percutaneous placement of a 8.5 French drainage catheter into interpole of 
the left kidney.



 



 2.  Antegrade nephrostogram from the proximal third of the ureter demonstrates 
expected flow of contrast into the urinary bladder with no evidence of ureteral 
obstruction.



 



 Plan:



 



 Left nephrostomy tube to gravity bag drainage. Access may be used for possible 
future lithotripsy per urology.



 ______________________________________________________________________



 



 PROCEDURE SUMMARY:



 - Left nephrostomy catheter placement under ultrasound and fluoroscopic 
guidance



 - Additional procedure(s): None



 



 PROCEDURE DETAILS:



 



 Pre-procedure



 Consent: Informed consent for the procedure including risks, benefits and 
alternatives was obtained and time-out was performed prior to the procedure.



 Preparation: The left flank site was prepared and draped using maximal sterile 
barrier technique including cutaneous antisepsis.



 



 Anesthesia/sedation



 Level of anesthesia/sedation: Moderate sedation (conscious sedation)



 Anesthesia/sedation administered by: Independent trained observer under 
attending supervision with continuous monitoring of the patient's level of 
consciousness and physiologic status



 Total intra-service sedation time (minutes): 45



 



 Drainage catheter placement



 The patient was positioned prone. Initial imaging was performed. Local 
anesthesia was administered. Initially, the 22-gauge needle was used to attempt 
access to the lower pole of the left kidney where the echogenic calculus was 
noted. However after



 attempts to access the inferior pole calyx unsuccessfully, the 22-gauge needle 
was used to subsequently accessed the left interpole calyx. After a Nitrex wire 
was advanced into the left ureter, the needle was exchanged



 for a AccuStick catheter the inner



 dilator was removed and the Nitrex wire was exchanged for a short Amplatz wire 
over which the 8.5 French nephrostomy catheter was placed after dilation with 
an 8 French dilator. Position of the nephrostomy catheter within the left renal 
pelvis was



 confirmed.



 - Initial imaging findings: Moderate left hydronephrosis is noted. Antegrade 
nephrostogram demonstrated free flow of contrast through the left ureter into 
the urinary bladder.



 -Left nephrostomy catheter placed: 8.5 French multipurpose drainage catheter



 - External catheter securement: Non-absorbable suture



 - Post-drainage imaging findings: Pigtail within the left renal pelvis. There 
is minimal flow to the proximal left ureter from the renal pelvis when final 
nephrostogram was performed which may be secondary to ureteral



 peristalsis or possible stenosis at



 the ureteropelvic junction.



 



 Radiation Dose



 Reference air kerma (mGy): 4



 



 Additional Details



 Additional description of procedure: None



 Equipment details: None



 Specimens removed: Aspirated fluid was not sent for analysis.



 Estimated blood loss (mL): Less than 10



 Standardized report: SIR_DrainPlacement_v2



 



 Attestation



 Signer name: Castillo Jones M.D.



 I attest that I was present for the entire procedure. I reviewed the stored 
images and agree with the report as written.









 Performing Organization  Address  [facility]  Phone Number

 

  [facility]  [address] [place] [address]  



Comprehensive metabolic panel (2019 10:14 AM CDT)





 Component  Value  Ref Range  Performed At  Pathologist Signature

 

 Glucose  84  65 - 99 mg/dL  [facility]  

 

 BUN, whole blood  7  6 - 20 mg/dL  [facility]  

 

 Creatinine  0.65  0.57 - 1.00 mg/dL  [facility]  

 

 EGFR Non-Afr. American  126  >59 mL/min/1.73  [facility]  

 

 EGFR African American  146  >59 mL/min/1.73  [facility]  

 

 BUN/creatinine ratio  11  9 - 23  [facility]  

 

 Sodium  139  134 - 144 mmol/L  [facility]  

 

 Potassium  4.3  3.5 - 5.2 mmol/L  [facility]  

 

 Chloride  98  96 - 106 mmol/L  [facility]  

 

 CO2  25  20 - 29 mmol/L  [facility]  

 

 Calcium  9.6  8.7 - 10.2 mg/dL  [facility]  

 

 Protein  7.3  6.0 - 8.5 g/dL  [facility]  

 

 Albumin, S  4.9  3.5 - 5.5 g/dL  [facility]  

 

 Globulin, total  2.4  1.5 - 4.5 g/dL  [facility]  

 

 Albumin/globulin ratio  2.0  1.2 - 2.2  [facility]  

 

 Total bilirubin  0.5  0.0 - 1.2 mg/dL  [facility]  

 

 Alkaline phosphatase  76  39 - 117 IU/L  [facility]  

 

 AST  20  0 - 40 IU/L  [facility]  

 

 ALT  27  0 - 32 IU/L  [facility]  









 Specimen

 

 Blood









 Narrative  Performed At

 

 Performed at:01 - [facility]



  [facility]



 [address], [place]



  



 : Dalton Rice MD, Phone:[phone]  









 Performing Organization  Address  [facility]  Phone Number

 

 [facility]      



NM Renal Scan Wflow Funct Sgl Int W/Mag 3 (2019  1:09 PM CDT)





 Specimen

 

 









 Narrative  Performed At

 

 PROCEDURE: NM RENAL SCAN WFLOW FUNCT SGL INT W MAG 3



  [facility]



  



  



 INDICATION: N13.30 Unspecified hydronephrosis, left hydronephrosis -  



 suspect left UPJ obstruction



  



  



  



 COMPARISON: No prior comparison examinations.



  



  



  



 TECHNIQUE: The patient was injected with 10 mCi of Tc-99m MAG-3, IV.  



 Dynamic images of the abdomen were acquired for 40 minutes. At 20  



 minutes, 34 mg of IV lasix was administered.



  



  



  



 FINDINGS:



  



  



  



 Perfusion to the bilateral kidneys is preserved. Tracer uptake,  



 extraction, and excretion is preserved bilaterally. There is some  



 pooling of excreted tracer in the renal pelves/collecting systems more  



 pronounced on the left. Urinary clearance proceeds 



  



 without significant impediment following diuretic administration.



  



  



  



 SPLIT FUNCTION:



  



  



  



 Left kidney=47%



  



  



  



 Right kidney=53%



  



  



  



 IMPRESSION:



  



  



  



 1.Bilateral kidneys demonstrate preserved perfusion and overall  



 function. There is pooling of excreted tracer in the renal  



 pelves/collecting system, more pronounced on the left, that clears with  



 diuretic indicating the absence of any physiologically 



  



 significant urinary obstruction.



  



  



  



 2.Split function as quantified above.



  



  



  



  



  



 [facility]-[id number]  









 Procedure Note

 

  Interface, Radiology Results Incoming - 2019  4:01 PM CDT



PROCEDURE: NM RENAL SCAN WFLOW FUNCT SGL INT W MAG 3



 



 INDICATION: N13.30 Unspecified hydronephrosis, left hydronephrosis - suspect 
left UPJ obstruction



 



 COMPARISON: No prior comparison examinations.



 



 TECHNIQUE: The patient was injected with 10 mCi of Tc-99m MAG-3, IV. Dynamic 
images of the abdomen were acquired for 40 minutes. At 20 minutes, 34 mg of IV 
lasix was administered.



 



 FINDINGS:



 



 Perfusion to the bilateral kidneys is preserved. Tracer uptake, extraction, 
and excretion is preserved bilaterally. There is some pooling of excreted 
tracer in the renal pelves/collecting systems more pronounced



 on the left. Urinary clearance proceeds



 without significant impediment following diuretic administration.



 



 SPLIT FUNCTION:



 



 Left kidney=47%



 



 Right kidney=53%



 



 IMPRESSION:



 



 1.  Bilateral kidneys demonstrate preserved perfusion and overall function. 
There is pooling of excreted tracer in the renal pelves/collecting system, more 
pronounced on the left, that clears with



 diuretic indicating the absence of any physiologically



 significant urinary obstruction.



 



 2.  Split function as quantified above.



 



 



 [facility]-[id number]









 Performing Organization  Address  City/State/Zipcode  Phone Number

 

  [facility]  [address] [place] [address]  



POC BLADDER SCAN/PVR (2019  9:25 AM CDT)





 Component  Value  Ref Range  Performed At  Pathologist Signature

 

 PVR volume  0ml      









 Specimen

 

 Urine



POC urinalysis dipstick (2019  9:25 AM CDT)Only the most recent of2 
resultswithin the time period is included.





 Component  Value  Ref Range  Performed At  Pathologist Signature

 

 Color urine, POC  Yellow      

 

 Clarity urine, POC  Clear      

 

 Glucose urine, POC  Negative  Negative    

 

 Bilirubin urine, POC  Negative  Negative    

 

 Ketones urine, POC  Negative  Negative    

 

 Specific gravity urine,  >/=1.030  1.005 - 1.030    



 POC        

 

 Blood urine, POC  Large (A)  Negative    

 

 pH urine, POC  5.5  5.0, 5.5, 6.0,    



     6.5, 7.0, 7.5,    



     8.0, 8.5    

 

 Protein urine, POC  Trace (A)  Negative    

 

 Urobilinogen urine, POC  <2.0  <2.0    

 

 Nitrite urine, POC  Negative  Negative    

 

 Leukocyte esterase  Negative  Negative    



 urine, POC        









 Specimen

 

 Urine



CT Abdomen Pelvis Wo Contrast (2019)





 Narrative  Performed At

 

 This result has an attachment that is not available.



  



CT Abd/Pelvic External Study (04/15/2019  4:27 PM CDT)





 Specimen

 

 









 Narrative  Performed At

 

 This exam was not acquired at a [facility] facility and has not been 



   [facility]



 interpreted by a [facility] Provider.The exam was imported into our 



  



 imaging system for comparisons purposes.  









 Performing Organization  Address  City[facility]  Phone Number

 

 [employer] [facility]  [address] [place] [address]  



XR Kub Kidney Ureter Bladder (2018  2:37 PM CST)





 Specimen

 

 









 Narrative  Performed At

 

 EXAMINATION: XR KUB KIDNEY URETER BLADDER



   [facility]



  



  



 INDICATION: N20.0 Calculus of kidney, kidney stone - left



  



  



  



 COMPARISON: None



  



  



  



 IMPRESSION:



  



 There are 2 calculi overlying the lower pole of the left renal shadow  



 measuring 5 and 4 mm. Otherwise unremarkable.



  



  



  



 [facility]-[id number]



  



   









 Procedure Note

 

 Hm Interface, Radiology Results Incoming - 2018  2:43 PM CST



EXAMINATION: XR KUB KIDNEY URETER BLADDER



 



 INDICATION: N20.0 Calculus of kidney, kidney stone - left



 



 COMPARISON: None



 



 IMPRESSION:



 There are 2 calculi overlying the lower pole of the left renal shadow 
measuring 5 and 4 mm. Otherwise unremarkable.



 



 [facility]-[id number]









 Performing Organization  Address  City/State/Zipcode  Phone Number

 

 [employer]  [address] [place] [address]  



after 2018



Insurance







 Payer  Benefit Plan / Group  Subscriber ID  Effective Dates  Phone  Address  
Type

 

 [employer]  [employer] OPEN ACCESS/NETWORK  xxxxxxxxxxx  2007-Present      HMO









 Guarantor Name  Account Type  Relation to  Date of  Phone  Billing



     Patient  Birth    Address

 

 Selin Cui  Personal/Family  Self  1995  [phone]  [address] [facility]        ([place])  [place], [place]



           [address]







Advance Directives

Patient has advance care planning documents on file. For more information, 
please contact:Shahzad Echevarria[address] [place] [address]

## 2019-08-19 NOTE — RAD REPORT
EXAM DESCRIPTION:   CT Abdomen and Pelvis Without Intravenous Contrast

 

CLINICAL HISTORY:  The patient is 23 years old and is Female; ABD PAIN

 

TECHNIQUE:  Axial computed tomography images of the abdomen and pelvis without intravenous contrast. 
  Sagittal and coronal reformatted images were created and reviewed.   This CT exam was performed usi
ng one or more of the following dose reduction techniques:   automated exposure control, adjustment o
f the mA and/or kV according to patient size, and/or use of iterative reconstruction technique.

 

COMPARISON:  CT of the abdomen and pelvis July 30, 2019.

 

FINDINGS:  LUNG BASES:   Unremarkable.   No mass.   No consolidation.

ABDOMEN:

   LIVER:   Homogeneous without focal mass.

   GALLBLADDER AND BILE DUCTS:   No calcified stones.   No ductal dilation.

   PANCREAS:   Unremarkable.   No ductal dilation.

   SPLEEN:   Unremarkable.

   ADRENALS:   Unremarkable.   No mass.

   KIDNEYS AND URETERS:   A left nephrostomy tube is present with persistent left hydronephrosis. Lef
t intrarenal calcifications are present. Prominence of the right renal pelvis is noted.

   STOMACH AND BOWEL:   The stomach is decompressed. The small bowel is relatively normal in caliber.
 Stool is present throughout the colon. There is no mucosal thickening or evidence of bowel obstructi
on.

PELVIS:

   APPENDIX:   The appendix is normal in caliber without surrounding inflammation.

   BLADDER:   The bladder is moderately distended.   No stones.

   REPRODUCTIVE:   Unremarkable as visualized.

ABDOMEN and PELVIS:

   INTRAPERITONEAL SPACE:   Unremarkable.   No free air.   No significant fluid collection.

   BONES/JOINTS:   No acute fracture.

   SOFT TISSUES:   The soft tissues are normal.

   VASCULATURE:   Unremarkable.   No abdominal aortic aneurysm.

   LYMPH NODES:   Unremarkable. No enlarged lymph nodes.

 

IMPRESSION:  1.   Persistent left nephrostomy tube. Resolution of the previously demonstrated air wit
hin the left renal collecting system. Mild persistent left hydronephrosis is noted. Left nephrolithia
sis. No obstructing ureteral calculus.

2.   Persistent prominent right renal pelvis, unchanged.

 

Electronically signed by:   Judy Abraham MD   8/17/2019 6:20 AM CDT Workstation: [id number]

 

 

 

Due to temporary technical issues with the PACS/Fluency reporting system, reports are being signed by
 the in house radiologist as a courtesy to ensure prompt reporting. The interpreting radiologist is f
ully responsible for the content of the report.

## 2019-10-18 ENCOUNTER — HOSPITAL ENCOUNTER (EMERGENCY)
Dept: HOSPITAL 97 - ER | Age: 24
Discharge: ANOTHER HOSPITAL | End: 2019-10-18
Payer: COMMERCIAL

## 2019-10-18 VITALS — SYSTOLIC BLOOD PRESSURE: 98 MMHG | TEMPERATURE: 101.5 F | DIASTOLIC BLOOD PRESSURE: 82 MMHG

## 2019-10-18 VITALS — OXYGEN SATURATION: 100 %

## 2019-10-18 DIAGNOSIS — R50.9: Primary | ICD-10-CM

## 2019-10-18 LAB
ALBUMIN SERPL BCP-MCNC: 4.1 G/DL (ref 3.4–5)
ALP SERPL-CCNC: 93 U/L (ref 45–117)
ALT SERPL W P-5'-P-CCNC: 31 U/L (ref 12–78)
ANISOCYTOSIS BLD QL: (no result)
AST SERPL W P-5'-P-CCNC: 18 U/L (ref 15–37)
BLD SMEAR INTERP: (no result)
BUN BLD-MCNC: 7 MG/DL (ref 7–18)
GLUCOSE SERPLBLD-MCNC: 148 MG/DL (ref 74–106)
HCT VFR BLD CALC: 40.3 % (ref 36–45)
INR BLD: 1.39
LIPASE SERPL-CCNC: 75 U/L (ref 73–393)
LYMPHOCYTES # SPEC AUTO: 0.9 K/UL (ref 0.7–4.9)
MORPHOLOGY BLD-IMP: (no result)
PMV BLD: 7.6 FL (ref 7.6–11.3)
POTASSIUM SERPL-SCNC: 3.7 MMOL/L (ref 3.5–5.1)
RBC # BLD: 4.62 M/UL (ref 3.86–4.86)
TROPONIN (EMERG DEPT USE ONLY): < 0.02 NG/ML (ref 0–0.04)
UA COMPLETE W REFLEX CULTURE PNL UR: (no result)

## 2019-10-18 PROCEDURE — 85025 COMPLETE CBC W/AUTO DIFF WBC: CPT

## 2019-10-18 PROCEDURE — 96365 THER/PROPH/DIAG IV INF INIT: CPT

## 2019-10-18 PROCEDURE — 80048 BASIC METABOLIC PNL TOTAL CA: CPT

## 2019-10-18 PROCEDURE — 87086 URINE CULTURE/COLONY COUNT: CPT

## 2019-10-18 PROCEDURE — 81025 URINE PREGNANCY TEST: CPT

## 2019-10-18 PROCEDURE — 80076 HEPATIC FUNCTION PANEL: CPT

## 2019-10-18 PROCEDURE — 36415 COLL VENOUS BLD VENIPUNCTURE: CPT

## 2019-10-18 PROCEDURE — 71045 X-RAY EXAM CHEST 1 VIEW: CPT

## 2019-10-18 PROCEDURE — 85610 PROTHROMBIN TIME: CPT

## 2019-10-18 PROCEDURE — 74176 CT ABD & PELVIS W/O CONTRAST: CPT

## 2019-10-18 PROCEDURE — 84484 ASSAY OF TROPONIN QUANT: CPT

## 2019-10-18 PROCEDURE — 96361 HYDRATE IV INFUSION ADD-ON: CPT

## 2019-10-18 PROCEDURE — 81003 URINALYSIS AUTO W/O SCOPE: CPT

## 2019-10-18 PROCEDURE — 82550 ASSAY OF CK (CPK): CPT

## 2019-10-18 PROCEDURE — 76377 3D RENDER W/INTRP POSTPROCES: CPT

## 2019-10-18 PROCEDURE — 96375 TX/PRO/DX INJ NEW DRUG ADDON: CPT

## 2019-10-18 PROCEDURE — 93005 ELECTROCARDIOGRAM TRACING: CPT

## 2019-10-18 PROCEDURE — 81015 MICROSCOPIC EXAM OF URINE: CPT

## 2019-10-18 PROCEDURE — 83605 ASSAY OF LACTIC ACID: CPT

## 2019-10-18 PROCEDURE — 87088 URINE BACTERIA CULTURE: CPT

## 2019-10-18 PROCEDURE — 87040 BLOOD CULTURE FOR BACTERIA: CPT

## 2019-10-18 PROCEDURE — 83690 ASSAY OF LIPASE: CPT

## 2019-10-18 PROCEDURE — 84145 PROCALCITONIN (PCT): CPT

## 2019-10-18 PROCEDURE — 99285 EMERGENCY DEPT VISIT HI MDM: CPT

## 2019-10-18 NOTE — EKG
Test Date:    2019-10-18               Test Time:    14:23:33

Technician:   CALIXTO                                     

                                                     

MEASUREMENT RESULTS:                                       

Intervals:                                           

Rate:         136                                    

MN:           88                                     

QRSD:         96                                     

QT:           382                                    

QTc:          574                                    

Axis:                                                

P:                                                   

MN:           88                                     

QRS:          31                                     

T:            24                                     

                                                     

INTERPRETIVE STATEMENTS:                                       

                                                     

Sinus tachycardia with short MN

Nonspecific ST abnormality

Abnormal ECG

No previous ECG available for comparison



Electronically Signed On 10-18-19 17:18:07 CDT by Judson Oliver

## 2019-10-18 NOTE — ER
Nurse's Notes                                                                                     

 [facility]                                                                 

Name: Selin Cui                                                                           

Age: 23 yrs                                                                                       

Sex: Female                                                                                       

: 1995                                                                                   

MRN: [id number]                                                                                   

Arrival Date: 10/18/2019                                                                          

Time: 13:40                                                                                       

Account#: [id number]                                                                            

Bed 14                                                                                            

Private MD:                                                                                       

Diagnosis: Fever of other and unknown origin                                                      

                                                                                                  

Presentation:                                                                                     

10/18                                                                                             

13:51 Presenting complaint: Patient states: fever off and on for 2 weeks, also reports        em  

      abdominal pain and N/V, denies diarrhea, also reports a headache, also reports having a     

      left kidney stent removed yesterday. Transition of care: patient was not received from      

      another setting of care. Onset of symptoms was 2019. Risk Assessment: Do        

      you want to hurt yourself or someone else? Patient reports no desire to harm self or        

      others. Initial Sepsis Screen: Does the patient meet any 2 criteria? Temp <36.0*C           

      (96.8*F)) or > 38.3*C (100.9*F). HR > 90 bpm. Yes Does the patient have a suspected         

      source of infection? No. Patient's initial sepsis screen is negative. If YES to both,       

      name of provider notified: Sae Guo MD. Care prior to arrival: Sudafed.                

13:51 Method Of Arrival: Ambulatory                                                           em  

13:57 Acuity: AWILDA 2                                                                           ss  

                                                                                                  

Triage Assessment:                                                                                

19:36 Pain: Pain began gradually, Also complains of nausea.                                     

19:37 Headache History: The patient has had previous headaches.                                 

                                                                                                  

OB/GYN:                                                                                           

13:55 LMP 10/3/2019                                                                           em  

                                                                                                  

Historical:                                                                                       

- Allergies:                                                                                      

13:55 No Known Allergies;                                                                     em  

- Home Meds:                                                                                      

13:55 Tylenol #3 Oral [Active];                                                               em  

- PMHx:                                                                                           

13:55 Kidney stones;                                                                          em  

- PSHx:                                                                                           

13:55 left ureter surgery; left kidney stent;                                                 em  

                                                                                                  

- Immunization history:: Adult Immunizations up to date, .                                        

- Social history:: Smoking status: Patient/guardian denies using tobacco.                         

- Ebola Screening: : Patient negative for fever greater than or equal to 101.5 degrees            

  Fahrenheit, and additional compatible Ebola Virus Disease symptoms Patient denies               

  exposure to infectious person Patient denies travel to an Ebola-affected area in the            

  21 days before illness onset No symptoms or risks identified at this time.                      

                                                                                                  

                                                                                                  

Screenin:55 Abuse screen: Denies threats or abuse. Nutritional screening: No deficits noted.        em  

      Tuberculosis screening: No symptoms or risk factors identified. Fall Risk None              

      identified.                                                                                 

                                                                                                  

Assessment:                                                                                       

13:55 Pain: Complains of pain in head, abdomen Pain currently is 8 out of 10 on a pain scale. em  

      Neuro: Level of Consciousness is awake, alert, obeys commands, Oriented to person,          

      place, time, situation, Appropriate for age Reports headache. Cardiovascular: Capillary     

      refill < 3 seconds Patient's skin is warm and dry. Respiratory: Airway is patent            

      Respiratory effort is even, unlabored, Respiratory pattern is regular, symmetrical,         

      Denies cough. GI: Abdomen is flat, Bowel sounds present X 4 quads. Abd is soft X 4          

      quads Abdomen is tender to palpation in abdomen diffusely Reports nausea, vomiting,         

      Patient currently denies diarrhea. : Denies burning with urination, discharge,            

      vaginal bleeding. Derm: Skin is intact, is healthy with good turgor, Skin is pink, warm     

      \T\ dry. Musculoskeletal: Capillary refill < 3 seconds, Range of motion: intact in all      

      extremities, Swelling absent.                                                               

13:55 General: Appears in no apparent distress. comfortable, Behavior is calm, cooperative,   em  

      appropriate for age, Reports fever for 2 weeks.                                             

14:00 General: The previous assessment is accurate. Call light remains within reach. .        ss  

14:40 Reassessment: Patient appears in no apparent distress at this time. Patient and/or      em  

      family updated on plan of care and expected duration. Pain level reassessed. Patient is     

      alert, oriented x 3, equal unlabored respirations, skin warm/dry/pink. reports              

      headache, rates pain 8/10.                                                                  

15:33 Reassessment: Patient appears in no apparent distress at this time. Patient and/or      em  

      family updated on plan of care and expected duration. Pain level reassessed. Patient is     

      alert, oriented x 3, equal unlabored respirations, skin warm/dry/pink. rates pain 5/10,     

      reports being nauseous, provider notified.                                                  

16:32 Reassessment: Patient appears in no apparent distress at this time. Patient and/or      em  

      family updated on plan of care and expected duration. Pain level reassessed. Patient is     

      alert, oriented x 3, equal unlabored respirations, skin warm/dry/pink.                      

17:47 Reassessment: Spoke with Marly with [facility] who states that   ss  

      she is still awaiting to have her page returned from the physician at [facility].            

18:10 Reassessment: Patient appears in no apparent distress at this time. Patient and/or      em  

      family updated on plan of care and expected duration. Pain level reassessed. Patient is     

      alert, oriented x 3, equal unlabored respirations, skin warm/dry/pink. Patient states       

      feeling better. Patient states symptoms have improved.                                      

19:32 Reassessment: Patient appears in no apparent distress at this time. Patient and/or      wh  

      family updated on plan of care and expected duration. Pain level reassessed. Patient is     

      alert, oriented x 3, equal unlabored respirations, skin warm/dry/pink. Report Called to     

      Elizabeth Paulino RN at [facility].                                                             

20:05 Reassessment: Discussed with MD Heath Pt has fever with order to give Tylenol 650 PO wh  

      and Motrin 600 PO.                                                                          

                                                                                                  

Vital Signs:                                                                                      

13:55  / 75; Pulse 148; Resp 22; Temp 103.0; Pulse Ox 98% on R/A; Weight 65.77 kg;      em  

      Height 5 ft. 2 in. (157.48 cm); Pain 8/10;                                                  

14:39  / 75; Pulse 127; Resp 18; Pulse Ox 100% on R/A;                                  em  

14:54 Temp 101.0(O);                                                                          em  

15:32  / 66; Pulse 109; Resp 18; Pulse Ox 97% on R/A; Pain 5/10;                        em  

16:19  / 61; Pulse 102; Resp 18; Temp 99.4(O); Pulse Ox 98% on R/A;                     mh5 

17:20 BP 92 / 68; Pulse 89; Resp 17; Temp 97.4(O); Pulse Ox 99% on R/A;                       mh5 

18:09  / 76; Pulse 94; Resp 18; Pulse Ox 100% on R/A;                                   em  

19:33 BP 98 / 82; Pulse 109; Resp 18; Temp 101.5(TE); Pulse Ox 100% ;                         wh  

13:55 Body Mass Index 26.52 (65.77 kg, 157.48 cm)                                             em  

                                                                                                  

ED Course:                                                                                        

13:40 Patient arrived in ED.                                                                  as  

13:45 Sae Guo MD is Attending Physician.                                              gs  

13:51 Santos Cortez LVN is Primary Nurse.                                                     em  

13:55 Arm band placed on.                                                                     em  

13:57 Triage completed.                                                                       ss  

14:05 Radiology exam delayed due to pregnancy test not completed at this time.                nj  

14:28 Chest Single View XRAY In Process Unspecified.                                          EDMS

14:28 EKG done, by EKG tech. reviewed by Sae Guo MD.                                    at1 

14:30 Second set of blood cultures drawn by me.                                               mh5 

14:37 Patient has correct armband on for positive identification. Placed in gown. Bed in low  mh5 

      position. Call light in reach. Side rails up X2. Adult w/ patient. Pillow given. WET        

      WAS CLOTH. Pulse ox on. NIBP on.                                                            

14:46 Radiology exam delayed due to pregnancy test not completed at this time.                sj  

15:13 Radiology exam delayed due to pregnancy test not completed at this time.                sj  

15:30 Urine Microscopic Only Sent.                                                            mh5 

15:30 Urine collected: clean catch specimen.                                                  mh5 

15:43 CT Stone Protocol In Process Unspecified.                                               EDMS

15:45 Urine Culture Sent.                                                                     mh5 

16:43 initiated a transfer with Marly at the [facility] transfer center.                       eb  

18:28 called to check on the status of the transfer at [facility] [place], [place]. Per Teodora granados  eb  

      are waiting for the doctor to call back.                                                    

19:54 Attending Physician role handed off by Sae Guo MD                               rox 

19:54 Edward Heath MD is Attending Physician.                                             rox 

20:27 No provider procedures requiring assistance completed. Patient transferred, IV remains  wh  

      in place.                                                                                   

                                                                                                  

Administered Medications:                                                                         

14:04 Drug: Tylenol 1000 mg Route: PO;                                                        ss  

15:31 Follow up: Response: No adverse reaction; Temperature is decreased                      em  

14:14 Drug: NS 0.9% (30 ml/kg) 30 ml/kg Route: IV; Rate: bolus; Site: right antecubital;      ss  

16:31 Follow up: IV Status: Completed infusion; IV Intake: 2000ml                             em  

14:14 Not Given (Other Intervention Used): NS 0.9% 1000 ml IV at 1 bolus Per protocol; 1000   ss  

      mL bolus                                                                                    

14:39 Not Given (Other Intervention Used): Rocephin - (cefTRIAXone) 1 grams IVPB once over 30 ss  

      mins; (mix in 50 mL NS)                                                                     

14:39 Drug: Rocephin 1 grams Route: IV; Rate: calculated rate; Site: right antecubital;       ss  

15:32 Follow up: Response: No adverse reaction; IV Status: Completed infusion; IV Intake: 10mlem  

14:54 Drug: TORadol - Ketorolac 15 mg Route: IVP; Site: right antecubital;                    ss  

15:32 Follow up: Response: No adverse reaction; Pain is decreased                             em  

15:57 Drug: Zofran 4 mg Route: IVP; Site: right antecubital;                                  ss  

20:25 Follow up: Response: No adverse reaction; Nausea is decreased                             

17:55 Drug: NS 0.9% 1000 ml Route: IV; Rate: 1 bolus; Site: right antecubital;                em  

18:56 Follow up: IV Status: Completed infusion; IV Intake: 1000ml                             em  

20:05 Drug: Motrin 600 mg Route: PO;                                                            

20:25 Follow up: Response: No adverse reaction                                                  

20:05 Drug: Tylenol 650 mg Route: PO;                                                           

20:26 Follow up: Response: No adverse reaction                                                  

20:24 Drug: Zofran 4 mg Route: IVP; Site: right antecubital;                                    

20:26 Follow up: Response: No adverse reaction                                                  

                                                                                                  

                                                                                                  

Intake:                                                                                           

15:32 IV: 10ml; Total: 10ml.                                                                  em  

16:31 IV: 2000ml; Total: 2010ml.                                                              em  

18:56 IV: 1000ml; Total: 3010ml.                                                              em  

                                                                                                  

Outcome:                                                                                          

18:59 ER care complete, transfer ordered by MD.                                                 

20:27 Transferred by ground EMS to [facility].                                      

20:27 Transferred to [facility], Transfer form completed. X-rays sent w/ patient.     

20:27 Condition: stable                                                                           

20:27 Condition: stable                                                                           

20:27 Discharge instructions given to patient, Report given to [facility] Instructed on     

      the need for admit.                                                                         

20:28 Patient left the ED.                                                                      

                                                                                                  

Signatures:                                                                                       

Dispatcher [employer]                           EDMS                                                 

Edward Heath MD MD cha Jones, Britany Cortez, Santos, LVN                       LVN  em                                                   

Leonarda Katz Shelby, LEODAN                      RN   ss                                                   

Henny Carpenter, EKG Tech              EKG Tat1                                                  

Ridge Talavera, Emily                              5                                                  

Rupali Green                                                                                   

Sae Guo MD MD gs Botello, Elizabeth eb                                                   

                                                                                                  

Corrections: (The following items were deleted from the chart)                                    

13:58 13:51 Presenting complaint: Patient states: fever off and on for 2 weeks, also reports  em  

      abdominal pain and N/V, denies diarrhea, also reports a headache em                         

18:10 13:55 Pain: Complains of pain in head, abdomen Pain currently is 8 out of 10 on a pain  em  

      scale. em                                                                                   

20:28 20:27 Discharge instructions given to patient, Instructed on the need for admit, micaela hinojosa  

                                                                                                  

**************************************************************************************************

## 2019-10-18 NOTE — EDPHYS
Physician Documentation                                                                           

 [facility]                                                                 

Name: Selin Cui                                                                           

Age: 23 yrs                                                                                       

Sex: Female                                                                                       

: 1995                                                                                   

MRN: [id number]                                                                                   

Arrival Date: 10/18/2019                                                                          

Time: 13:40                                                                                       

Account#: [id number]                                                                            

Bed 14                                                                                            

Private MD:                                                                                       

ED Physician Edward Heath                                                                      

HPI:                                                                                              

10/18                                                                                             

18:11 This 23 yrs old  Female presents to ER via Ambulatory with complaints of Fever. gs  

18:11 Onset: The symptoms/episode began/occurred 3 week(s) ago. Modifying factors: there are  gs  

      no obvious modifying factors. Associated signs and symptoms: Pertinent positives:           

      abdominal pain, arthralgias, chills, headache, vomiting, Pertinent negatives: cough.        

      Severity of symptoms: At their worst the symptoms were severe in the emergency              

      department the symptoms are unchanged. The patient has not experienced similar symptoms     

      in the past. The patient has been recently seen by a physician: a urologist, with           

      different complaint(s).                                                                     

                                                                                                  

OB/GYN:                                                                                           

13:55 LMP 10/3/2019                                                                           em  

                                                                                                  

Historical:                                                                                       

- Allergies:                                                                                      

13:55 No Known Allergies;                                                                     em  

- Home Meds:                                                                                      

13:55 Tylenol #3 Oral [Active];                                                               em  

- PMHx:                                                                                           

13:55 Kidney stones;                                                                          em  

- PSHx:                                                                                           

13:55 left ureter surgery; left kidney stent;                                                 em  

                                                                                                  

- Immunization history:: Adult Immunizations up to date, .                                        

- Social history:: Smoking status: Patient/guardian denies using tobacco.                         

- Ebola Screening: : Patient negative for fever greater than or equal to 101.5 degrees            

  Fahrenheit, and additional compatible Ebola Virus Disease symptoms Patient denies               

  exposure to infectious person Patient denies travel to an Ebola-affected area in the            

  21 days before illness onset No symptoms or risks identified at this time.                      

                                                                                                  

                                                                                                  

ROS:                                                                                              

18:11 All other systems are negative.                                                         gs  

                                                                                                  

Exam:                                                                                             

18:11 Head/Face:  Normocephalic, atraumatic. Eyes:  Pupils equal round and reactive to light, gs  

      extra-ocular motions intact.  Lids and lashes normal.  Conjunctiva and sclera are           

      non-icteric and not injected.  Cornea within normal limits.  Periorbital areas with no      

      swelling, redness, or edema. ENT:  Nares patent. No nasal discharge, no septal              

      abnormalities noted.  Tympanic membranes are normal and external auditory canals are        

      clear.  Oropharynx with no redness, swelling, or masses, exudates, or evidence of           

      obstruction, uvula midline.  Mucous membranes moist.                                        

18:11 Chest/axilla:  Normal chest wall appearance and motion.  Nontender with no deformity.       

      No lesions are appreciated. Respiratory:  Lungs have equal breath sounds bilaterally,       

      clear to auscultation and percussion.  No rales, rhonchi or wheezes noted.  No              

      increased work of breathing, no retractions or nasal flaring. Abdomen/GI:  Soft,            

      non-tender, with normal bowel sounds.  No distension or tympany.  No guarding or            

      rebound.  No evidence of tenderness throughout.                                             

18:11 Skin:  Warm, dry with normal turgor.  Normal color with no rashes, no lesions, and no       

      evidence of cellulitis. MS/ Extremity:  Pulses equal, no cyanosis.  Neurovascular           

      intact.  Full, normal range of motion. Neuro:  Awake and alert, GCS 15, oriented to         

      person, place, time, and situation.  Cranial nerves II-XII grossly intact.  Motor           

      strength 5/5 in all extremities.  Sensory grossly intact.  Cerebellar exam normal.          

      Normal gait.                                                                                

18:11 Constitutional: The patient appears alert, awake, uncomfortable.                            

18:11 Neck: External neck: is normal, ROM/movement: Meningeal signs: are not present, nuchal      

      rigidity, is not appreciated.                                                               

18:11 Cardiovascular: Rate: tachycardic, Rhythm: regular, Pulses: no pulse deficits are           

      appreciated, Heart sounds: normal.                                                          

18:11 Back: CVA tenderness, that is mild, is noted on the left.                                   

                                                                                                  

Vital Signs:                                                                                      

13:55  / 75; Pulse 148; Resp 22; Temp 103.0; Pulse Ox 98% on R/A; Weight 65.77 kg;      em  

      Height 5 ft. 2 in. (157.48 cm); Pain 8/10;                                                  

14:39  / 75; Pulse 127; Resp 18; Pulse Ox 100% on R/A;                                  em  

14:54 Temp 101.0(O);                                                                          em  

15:32  / 66; Pulse 109; Resp 18; Pulse Ox 97% on R/A; Pain 5/10;                        em  

16:19  / 61; Pulse 102; Resp 18; Temp 99.4(O); Pulse Ox 98% on R/A;                     mh5 

17:20 BP 92 / 68; Pulse 89; Resp 17; Temp 97.4(O); Pulse Ox 99% on R/A;                       mh5 

18:09  / 76; Pulse 94; Resp 18; Pulse Ox 100% on R/A;                                   em  

19:33 BP 98 / 82; Pulse 109; Resp 18; Temp 101.5(TE); Pulse Ox 100% ;                         wh  

13:55 Body Mass Index 26.52 (65.77 kg, 157.48 cm)                                             em  

                                                                                                  

MDM:                                                                                              

14:34 Patient medically screened.                                                             gs  

18:11 Differential diagnosis: viral Infection, bacterial infection, pneumonia UTI. Data       gs  

      reviewed: vital signs, nurses notes. Counseling: I had a detailed discussion with the       

      patient and/or guardian regarding: the historical points, exam findings, and any            

      diagnostic results supporting the discharge/admit diagnosis, lab results, radiology         

      results, the need to transfer to another facility. Response to treatment: the patient's     

      symptoms have markedly improved after treatment.                                            

                                                                                                  

10/18                                                                                             

13:57 Order name: Basic Metabolic Panel; Complete Time: 15:01                                 kdr 

10/18                                                                                             

13:57 Order name: Blood Culture Adult (2)                                                     kdr 

10/18                                                                                             

13:57 Order name: CBC with Diff; Complete Time: 16:02                                         kdr 

10/18                                                                                             

13:57 Order name: CPK; Complete Time: 15:01                                                   kdr 

10/18                                                                                             

13:57 Order name: Lactate; Complete Time: 15:01                                               kdr 

10/18                                                                                             

13:57 Order name: LFT's; Complete Time: 15:01                                                 kdr 

10/18                                                                                             

13:57 Order name: Lipase; Complete Time: 15:01                                                kdr 

10/18                                                                                             

13:57 Order name: Procalcitonin; Complete Time: 15:01                                         kdr 

10/18                                                                                             

13:57 Order name: Protime (+inr); Complete Time: 15:01                                        kdr 

10/18                                                                                             

13:57 Order name: Troponin (emerg Dept Use Only); Complete Time: 15:01                        kdr 

10/18                                                                                             

13:57 Order name: Urine Microscopic Only; Complete Time: 16:02                                kdr 

10/18                                                                                             

15:03 Order name: CBC Smear Scan; Complete Time: 16:02                                        EDMS

10/18                                                                                             

15:36 Order name: Urine Dipstick--Ancillary (enter results); Complete Time: 16:02             eb  

10/18                                                                                             

15:36 Order name: Urine Pregnancy--Ancillary (enter results); Complete Time: 16:02            eb  

10/18                                                                                             

13:57 Order name: Chest Single View XRAY; Complete Time: 15:04                                kdr 

10/18                                                                                             

13:57 Order name: Accucheck; Complete Time: 14:19                                             kdr 

10/18                                                                                             

13:57 Order name: CT Stone Protocol; Complete Time: 16:02                                     kdr 

10/18                                                                                             

15:38 Order name: Urine Culture                                                               EDMS

10/18                                                                                             

15:44 Order name: EKG Electrocardiogram; Complete Time: 15:44                                 EDMS

10/18                                                                                             

13:57 Order name: Cardiac monitoring; Complete Time: 14:19                                    kdr 

10/18                                                                                             

13:57 Order name: EKG - Nurse/Tech; Complete Time: 14:19                                      kdr 

10/18                                                                                             

13:57 Order name: IV Saline Lock - Large Bore; Complete Time: 14:19                           kdr 

10/18                                                                                             

13:57 Order name: Labs collected and sent; Complete Time: 14:19                               kdr 

10/18                                                                                             

13:57 Order name: O2 Per Protocol; Complete Time: 14:19                                       kdr 

10/18                                                                                             

13:57 Order name: O2 Sat Monitoring; Complete Time: 14:19                                     kdr 

10/18                                                                                             

13:57 Order name: Urine Dipstick-Ancillary (obtain specimen); Complete Time: 19:15            kdr 

10/18                                                                                             

13:57 Order name: Urine Pregnancy Test (obtain specimen); Complete Time: 15:29                kdr 

                                                                                                  

Administered Medications:                                                                         

14:04 Drug: Tylenol 1000 mg Route: PO;                                                        ss  

15:31 Follow up: Response: No adverse reaction; Temperature is decreased                      em  

14:14 Drug: NS 0.9% (30 ml/kg) 30 ml/kg Route: IV; Rate: bolus; Site: right antecubital;      ss  

16:31 Follow up: IV Status: Completed infusion; IV Intake: 2000ml                             em  

14:14 Not Given (Other Intervention Used): NS 0.9% 1000 ml IV at 1 bolus Per protocol; 1000   ss  

      mL bolus                                                                                    

14:39 Not Given (Other Intervention Used): Rocephin - (cefTRIAXone) 1 grams IVPB once over 30 ss  

      mins; (mix in 50 mL NS)                                                                     

14:39 Drug: Rocephin 1 grams Route: IV; Rate: calculated rate; Site: right antecubital;       ss  

15:32 Follow up: Response: No adverse reaction; IV Status: Completed infusion; IV Intake: 10mlem  

14:54 Drug: TORadol - Ketorolac 15 mg Route: IVP; Site: right antecubital;                    ss  

15:32 Follow up: Response: No adverse reaction; Pain is decreased                             em  

15:57 Drug: Zofran 4 mg Route: IVP; Site: right antecubital;                                  ss  

20:25 Follow up: Response: No adverse reaction; Nausea is decreased                           wh  

17:55 Drug: NS 0.9% 1000 ml Route: IV; Rate: 1 bolus; Site: right antecubital;                em  

18:56 Follow up: IV Status: Completed infusion; IV Intake: 1000ml                             em  

20:05 Drug: Motrin 600 mg Route: PO;                                                            

20:25 Follow up: Response: No adverse reaction                                                wh  

20:05 Drug: Tylenol 650 mg Route: PO;                                                           

20:26 Follow up: Response: No adverse reaction                                                  

20:24 Drug: Zofran 4 mg Route: IVP; Site: right antecubital;                                    

20:26 Follow up: Response: No adverse reaction                                                  

                                                                                                  

                                                                                                  

Disposition:                                                                                      

10/18/19 18:59 Transfer ordered to [facility]. Diagnosis is Fever of other    

  and unknown origin.                                                                             

- Reason for transfer: Higher level of care.                                                      

- Accepting physician is HORAN.                                                                    

- Condition is Stable.                                                                            

- Problem is new.                                                                                 

- Symptoms have improved.                                                                         

                                                                                                  

                                                                                                  

                                                                                                  

Signatures:                                                                                       

Dispatcher [employer]                           Wilfrido Galo MD MD   kdr                                                  

Diego, Santos, LVN                       LVN  em                                                   

Kelsie Nunez, RN                       RN   crHalie Roman RN RN                                                      

Rupali Green                                                                                   

Sae Guo MD MD                                                      

                                                                                                  

Corrections: (The following items were deleted from the chart)                                    

20:28 18:59 10/18/2019 18:59 Transfer ordered to [facility]. Diagnosis is   

      Fever of other and unknown origin. Reason for transfer: Higher level of care. Accepting     

      physician is HORAN. Condition is Stable. Problem is new. Symptoms have improved. gs          

                                                                                                  

**************************************************************************************************

## 2019-11-19 ENCOUNTER — HOSPITAL ENCOUNTER (EMERGENCY)
Dept: HOSPITAL 97 - ER | Age: 24
Discharge: HOME | End: 2019-11-19
Payer: COMMERCIAL

## 2019-11-19 VITALS — SYSTOLIC BLOOD PRESSURE: 123 MMHG | OXYGEN SATURATION: 98 % | TEMPERATURE: 97.9 F | DIASTOLIC BLOOD PRESSURE: 87 MMHG
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
## 2019-11-19 DIAGNOSIS — S61.031A: Primary | ICD-10-CM
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
## 2019-11-19 PROCEDURE — 99283 EMERGENCY DEPT VISIT LOW MDM: CPT

## 2019-11-19 NOTE — XMS REPORT
[facility]

 Created on:2019



Patient:Selin Cui

Sex:Female

:1995

External Reference #:[id number]





Demographics







 Address  [address] [place] [address]

 

 Phone  [phone]

 

 Preferred Language  en

 

 Marital Status  Unknown

 

 Islam Affiliation  Unknown

 

 Race  Other Race

 

 Ethnic Group  Unknown









Author







 Organization  [employer]









Care Team Providers







 Name  Role  Phone

 

 Eden Corona  Provider Role  Unavailable









Allergies, Adverse Reactions, Alerts







 Substance  Reaction  Event Type

 

 N.K.D.A.  Info Not Available  Non Drug Allergy







Problems







 Problem Type  Condition  Code  Onset Dates  Condition Status

 

 Assessment  Hospital discharge follow-up  Z09    Active

 

 Problem  Hospital discharge follow-up  Z09    Active







Medications







 Medication  Code  Code  Instructions  Start  End  Status  Dosage



   System      Date  Date    

 

 Ferrous Sulfate  NDC  29896-43162  325 MG Orally      Active  as directed

 

 Methocarbamol  NDC  [id number]  500 MG Orally      Active  1.5 tablets



       every 4 hrs        

 

 Butalbital-Aceta  NDC  [id number]   MG      Active  1 tablet as



 minophen      Orally every 4        needed



       hrs        

 

 Acetaminophen  NDC  [id number]  325 MG Orally      Active  2 tablet as



       every 4 hrs        needed

 

 Ondansetron  NDC  [id number]  4 MG Orally      Active  1 tablet on



       Once a day        the tongue



               and allow



               to dissolve

 

 Oxybutynin  NDC  [id number]  5 MG Orally      Active  as directed



 Chloride              







Results

No Known Results



Summary Purpose

[employer] Submission

## 2019-11-19 NOTE — ER
Nurse's Notes                                                                                     

 [facility]                                                                 

Name: Selin Cui                                                                           

Age: 23 yrs                                                                                       

Sex: Female                                                                                       

: 1995                                                                                   

MRN: [id number]                                                                                   

Arrival Date: 2019                                                                          

Time: 16:23                                                                                       

Account#: [id number]                                                                            

Bed 13                                                                                            

Private MD:                                                                                       

Diagnosis: Puncture wound right thumb                                                             

                                                                                                  

Presentation:                                                                                     

                                                                                             

16:26 Presenting complaint: Small laceration to right thumb while opening strep reagent in    hb  

      the lab 15 mins PTA. Transition of care: patient was not received from another setting      

      of care. Complicating Factors: There are no complicating factors for this patient.          

      Onset of symptoms was 2019. Risk Assessment: Do you want to hurt yourself      

      or someone else? Patient reports no desire to harm self or others. Initial Sepsis           

      Screen: Does the patient meet any 2 criteria? No. Patient's initial sepsis screen is        

      negative. Does the patient have a suspected source of infection? No. Patient's initial      

      sepsis screen is negative. Care prior to arrival: None.                                     

16:26 Method Of Arrival: Ambulatory                                                             

16:26 Acuity: AWILDA 4                                                                           hb  

                                                                                                  

OB/GYN:                                                                                           

16:27 LMP 2019                                                                           hb  

                                                                                                  

Historical:                                                                                       

- Allergies:                                                                                      

16:27 No Known Allergies;                                                                     hb  

- PMHx:                                                                                           

16:27 Kidney stones;                                                                          hb  

- PSHx:                                                                                           

16:27 left ureter surgery; left kidney stent;                                                 hb  

                                                                                                  

- Immunization history:: Adult Immunizations up to date.                                          

- Social history:: Smoking status: Patient/guardian denies using tobacco.                         

- Ebola Screening: : No symptoms or risks identified at this time.                                

                                                                                                  

                                                                                                  

Screenin:34 Abuse screen: Denies threats or abuse. Denies injuries from another. Nutritional        ph  

      screening: No deficits noted. Tuberculosis screening: No symptoms or risk factors           

      identified. Fall Risk None identified.                                                      

                                                                                                  

Assessment:                                                                                       

17:32 General: Appears in no apparent distress. comfortable, slender, well groomed, Behavior  ph  

      is calm, cooperative, appropriate for age. Pain: Denies pain. Neuro: Level of               

      Consciousness is awake, alert, obeys commands, Oriented to person, place, time,             

      situation. Cardiovascular: Capillary refill < 3 seconds in bilateral fingers Patient's      

      skin is warm and dry. Respiratory: Airway is patent Respiratory effort is even,             

      unlabored. Derm: Skin is healthy with good turgor. Musculoskeletal: Circulation,            

      motion, and sensation intact. Range of motion: intact in all extremities. Injury            

      Description: Puncture sustained to palmar aspect of proximal phalanx of right thumb.        

                                                                                                  

Vital Signs:                                                                                      

16:27  / 87; Pulse 76; Resp 16; Temp 97.9; Pulse Ox 98% on R/A; Weight 63.5 kg; Height  hb  

      5 ft. 2 in. (157.48 cm); Pain 1/10;                                                         

16:27 Body Mass Index 25.61 (63.50 kg, 157.48 cm)                                             hb  

                                                                                                  

ED Course:                                                                                        

16:23 Patient arrived in ED.                                                                  as  

16:27 Triage completed.                                                                       hb  

16:27 Arm band placed on.                                                                     hb  

16:47 Annie Benites, RN is Primary Nurse.                                                    ph  

16:50 Yosvany Bardales MD is Attending Physician.                                             ps1 

17:35 Patient has correct armband on for positive identification. Bed in low position. Call   ph  

      light in reach. Side rails up X 1. Pulse ox on. NIBP on.                                    

17:35 No provider procedures requiring assistance completed. Patient did not have IV access   ph  

      during this emergency room visit.                                                           

                                                                                                  

Administered Medications:                                                                         

No medications were administered                                                                  

                                                                                                  

                                                                                                  

Outcome:                                                                                          

17:10 Discharge ordered by MD.                                                                ps1 

17:35 Discharged to home ambulatory.                                                          ph  

17:35 Condition: good                                                                             

17:35 Discharge instructions given to patient, Instructed on discharge instructions, follow       

      up and referral plans. Demonstrated understanding of instructions, follow-up care.          

17:35 Patient left the ED.                                                                    ph  

                                                                                                  

Signatures:                                                                                       

Leonarda Katz                             as                                                   

Annie Benites, RN                      RN                                                      

Luly Pitts, RN                     RN                                                      

Yosvany Bardales MD MD   ps1                                                  

                                                                                                  

**************************************************************************************************

## 2019-11-19 NOTE — EDPHYS
Physician Documentation                                                                           

 [facility]                                                                 

Name: Selin Cui                                                                           

Age: 23 yrs                                                                                       

Sex: Female                                                                                       

: 1995                                                                                   

MRN: [id number]                                                                                   

Arrival Date: 2019                                                                          

Time: 16:23                                                                                       

Account#: [id number]                                                                            

Bed 13                                                                                            

Private MD:                                                                                       

ED Physician Yosvany Bardales                                                                      

HPI:                                                                                              

                                                                                             

17:03 This 23 yrs old  Female presents to ER via Ambulatory with complaints of        ps1 

      puncture wound.                                                                             

17:03 right hand dominant female. 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 

      ampule of strep reagent. Got small puncture wound in base of right thumb. Bleeding          

      controlled and no obvious FB present. Pain is rated as mild. .                              

                                                                                                  

OB/GYN:                                                                                           

16:27 LMP 2019                                                                           hb  

                                                                                                  

Historical:                                                                                       

- Allergies:                                                                                      

16:27 No Known Allergies;                                                                     hb  

- PMHx:                                                                                           

16:27 Kidney stones;                                                                          hb  

- PSHx:                                                                                           

16:27 left ureter surgery; left kidney stent;                                                 hb  

                                                                                                  

- Immunization history:: Adult Immunizations up to date.                                          

- Social history:: Smoking status: Patient/guardian denies using tobacco.                         

- Ebola Screening: : No symptoms or risks identified at this time.                                

                                                                                                  

                                                                                                  

ROS:                                                                                              

17:03 Constitutional: Negative for fever, chills, and weight loss, Neuro: Negative for        ps1 

      headache, weakness, numbness, tingling, and seizure.                                        

17:03 MS/Extremity: Negative for injury and deformity.                                            

17:03 Skin: Positive for puncture, Negative for cellulitis.                                       

                                                                                                  

Exam:                                                                                             

17:03 Constitutional:  This is a well developed, well nourished patient who is awake, alert,  ps1 

      and in no acute distress. Head/Face:  Normocephalic, atraumatic. Eyes:  Pupils equal        

      round and reactive to light, extra-ocular motions intact.  Lids and lashes normal.          

      Conjunctiva and sclera are non-icteric and not injected. Cardiovascular:  Regular rate      

      and rhythm.  No gallops, murmurs, or rubs.  Normal PMI, no JVD.  No pulse deficits.         

      Respiratory:  Lungs have equal breath sounds bilaterally, clear to auscultation and         

      percussion.  No rales, rhonchi or wheezes noted.  No increased work of breathing, no        

      retractions or nasal flaring. Abdomen/GI:  Soft, non-tender, with normal bowel sounds.      

      No distension or tympany.  No guarding or rebound.  No evidence of tenderness               

      throughout.                                                                                 

17:03 Skin: Appearance: normal except for affected area, injury, puncture(s), that are            

      superficial, of the palmar aspect of proximal phalanx of right thumb.                       

                                                                                                  

Vital Signs:                                                                                      

16:27  / 87; Pulse 76; Resp 16; Temp 97.9; Pulse Ox 98% on R/A; Weight 63.5 kg; Height  hb  

      5 ft. 2 in. (157.48 cm); Pain /10;                                                         

16:27 Body Mass Index 25.61 (63.50 kg, 157.48 cm)                                             hb  

                                                                                                  

MDM:                                                                                              

17:02 Patient medically screened.                                                             ps1 

17:03 Differential diagnosis: FB, laceration, puncture wound, cellulitis, and others. Data    ps1 

      reviewed: vital signs, nurses notes. Counseling: I had a detailed discussion with the       

      patient and/or guardian regarding: the historical points, exam findings, and any            

      diagnostic results supporting the discharge/admit diagnosis, to return to the emergency     

      department if symptoms worsen or persist or if there are any questions or concerns that     

      arise at home.                                                                              

                                                                                                  

Administered Medications:                                                                         

No medications were administered                                                                  

                                                                                                  

                                                                                                  

Disposition:                                                                                      

19 17:10 Discharged to Home. Impression: Puncture wound right thumb.                        

- Condition is Stable.                                                                            

- Discharge Instructions: Puncture Wound.                                                         

                                                                                                  

- Medication Reconciliation Form, Thank You Letter, Antibiotic Education, Prescription            

  Opioid Use form.                                                                                

- Follow up: Emergency Department; When: As needed; Reason: Fever > 102 F, Worsening of           

  condition.                                                                                      

- Problem is new.                                                                                 

- Symptoms have improved.                                                                         

                                                                                                  

                                                                                                  

                                                                                                  

Signatures:                                                                                       

Annie Benites RN RN   ph                                                   

Luly Pitts RN RN                                                      

Yosvany Bardales MD MD   ps1                                                  

                                                                                                  

Corrections: (The following items were deleted from the chart)                                    

17:35 17:10 2019 17:10 Discharged to Home. Impression: Puncture wound right thumb.      ph  

      Condition is Stable. Forms are Medication Reconciliation Form, Thank You Letter,            

      Antibiotic Education, Prescription Opioid Use. Follow up: Emergency Department; When:       

      As needed; Reason: Fever > 102 F, Worsening of condition. Problem is new. Symptoms have     

      improved. ps1                                                                               

                                                                                                  

**************************************************************************************************

## 2020-05-31 ENCOUNTER — HOSPITAL ENCOUNTER (EMERGENCY)
Dept: HOSPITAL 97 - ER | Age: 25
Discharge: HOME | End: 2020-05-31
Payer: SELF-PAY

## 2020-05-31 VITALS — TEMPERATURE: 98.9 F

## 2020-05-31 VITALS — DIASTOLIC BLOOD PRESSURE: 73 MMHG | SYSTOLIC BLOOD PRESSURE: 122 MMHG | OXYGEN SATURATION: 98 %

## 2020-05-31 DIAGNOSIS — K52.9: ICD-10-CM

## 2020-05-31 DIAGNOSIS — Z87.442: ICD-10-CM

## 2020-05-31 DIAGNOSIS — N23: Primary | ICD-10-CM

## 2020-05-31 LAB
ALBUMIN SERPL BCP-MCNC: 3.8 G/DL (ref 3.4–5)
ALP SERPL-CCNC: 63 U/L (ref 45–117)
ALT SERPL W P-5'-P-CCNC: 27 U/L (ref 12–78)
AST SERPL W P-5'-P-CCNC: 9 U/L (ref 15–37)
BUN BLD-MCNC: 12 MG/DL (ref 7–18)
GLUCOSE SERPLBLD-MCNC: 107 MG/DL (ref 74–106)
HCT VFR BLD CALC: 35.4 % (ref 36–45)
LIPASE SERPL-CCNC: 100 U/L (ref 73–393)
LYMPHOCYTES # SPEC AUTO: 2.7 K/UL (ref 0.7–4.9)
PMV BLD: 7.8 FL (ref 7.6–11.3)
POTASSIUM SERPL-SCNC: 3.9 MMOL/L (ref 3.5–5.1)
RBC # BLD: 4.05 M/UL (ref 3.86–4.86)

## 2020-05-31 PROCEDURE — 96374 THER/PROPH/DIAG INJ IV PUSH: CPT

## 2020-05-31 PROCEDURE — 96375 TX/PRO/DX INJ NEW DRUG ADDON: CPT

## 2020-05-31 PROCEDURE — 74176 CT ABD & PELVIS W/O CONTRAST: CPT

## 2020-05-31 PROCEDURE — 81003 URINALYSIS AUTO W/O SCOPE: CPT

## 2020-05-31 PROCEDURE — 83690 ASSAY OF LIPASE: CPT

## 2020-05-31 PROCEDURE — 81025 URINE PREGNANCY TEST: CPT

## 2020-05-31 PROCEDURE — 80048 BASIC METABOLIC PNL TOTAL CA: CPT

## 2020-05-31 PROCEDURE — 99284 EMERGENCY DEPT VISIT MOD MDM: CPT

## 2020-05-31 PROCEDURE — 80076 HEPATIC FUNCTION PANEL: CPT

## 2020-05-31 PROCEDURE — 76377 3D RENDER W/INTRP POSTPROCES: CPT

## 2020-05-31 PROCEDURE — 36415 COLL VENOUS BLD VENIPUNCTURE: CPT

## 2020-05-31 PROCEDURE — 85025 COMPLETE CBC W/AUTO DIFF WBC: CPT

## 2020-05-31 PROCEDURE — 93005 ELECTROCARDIOGRAM TRACING: CPT

## 2020-05-31 NOTE — XMS REPORT
Patient Summary Document

                             Created on:May 31, 2020



Patient:ABEBA REYES

Sex:Female

:1995

External Reference #:[id number]





Demographics







                          Address                   [address] [place] [address]

 

                          Home Phone                [phone]

 

                          Work Phone                [phone]

 

                          Mobile Phone              [phone]

 

                          Email Address             [email or web address]

 

                          Preferred Language        English

 

                          Marital Status            Unknown

 

                          Muslim Affiliation     Unknown

 

                          Race                      Unknown

 

                          Additional Race(s)        Unavailable



                                                    White

 

                          Ethnic Group              Unknown









Author







                          Organization              [facility]

t

 

                          Address                   [address] [place] [address] [place] [address]

 

                          Phone                     [phone]









Support







                Name            Relationship    Address         Phone

 

                See          Parent          Unavailable     [phone]

 

                See          Parent          [address] [facility]    [phone]



                                                [place] [address] 









Care Team Providers







                    Name                Role                Phone

 

                    Asked,  Pcp         Primary Care Physician Unavailable

 

                    Sergio LOPEZ           Attending Clinician [phone]

 

                    Maura LOPEZ          Attending Clinician [phone]

 

                    Alex MCKAY           Attending Clinician [phone]

 

                    Thong LEOS           Attending Clinician Unavailable

 

                    Maddie MCKAY             Attending Clinician [phone]

 

                    Nash LOPEZ            Attending Clinician [phone]

 

                    Demarcus Tyler DO Attending Clinician [phone]

 

                    Dana Pedersen MD    Attending Clinician [phone]

 

                    Terrell              Attending Clinician Unavailable

 

                    Chelsea LOPEZ          Attending Clinician [phone]

 

                    Gurpreet LOPEZ,  N.     Attending Clinician [phone]

 

                    Chelsea LOPEZ          Attending Clinician [phone]

 

                    Cherry Pittman MD   Attending Clinician [phone]

 

                    KEISHA Paz MD     Attending Clinician [phone]

 

                    Osorio LEOS            Attending Clinician Unavailable









Payers







           Payer Name Policy Type Policy Number Effective Date Expiration Date SARAN cortes

 

           [employer]            xxxxxxxxxxx 2007            [facility]



           ACCESS/NETWORKxx                       00:00:00              [facility]

t



           xxxxxxxx                                             



           [id number]                                             







Problems







       Condition Condition Condition Status Onset  Resolution Last   Treating Co

mments 

Source



       Name   Details Category        Date   Date   Treatment Clinician        



                                                 Date                 

 

       Fever  Fever  Disease Active 2019                             [facility]



                                   0-18                               [facility]



                                   00:00:                             st



                                   00                                 

 

       UPJ    UPJ    Disease Active                              [facility]



       obstructio obstructio               9-03                               Me

[facility]



       n,     n,                   00:00:                             st



       congenital congenital               00                                 

 

       Complicate Complicate Disease Active                              [facility]

[place]



       d UTI  d UTI                8-20                               [facility]



       (urinary (urinary               00:00:                             st



       tract  tract                00                                 



       infection) infection)                                                  

 

       Hydronephr Hydronephr Disease Active                              [facility]

[place]



       osis, left osis, left               7-19                               Me

[facility]



                                   00:00:                             st



                                   00                                 

 

       Left   Left   Disease Active                              [facility]



       ureteral ureteral               5-04                               Method

i



       stone  stone                00:00:                             st



                                   00                                 

 

       [facility] Diagnosis Active                                    [facility]

 St



       discharge discharge                                                  Luke

s -



       follow-up follow-up                                                  [facility]

anup



                                                                      l



                                                                      [facility]



                                                                      ent



                                                                      Clinics

 

       Fatigue, Fatigue, Diagnosis Active                                    [facility]

 St



       unspecifie unspecifie                                                  [facility]

kes -



       d type d type                                                  [facility]



                                                                      l



                                                                      [facility]



                                                                      ent



                                                                      Clinics







Allergies, Adverse Reactions, Alerts







       Allergy Allergy Status Severity Reaction(s) Onset  Inactive Treating Comm

ents 

Source



       Name   Type                        Date   Date   Clinician        

 

       Hydrocod Propensi Active        GI                    Nausea [facility]

n



       one    ty to                Intolerance 7-16                        [facility]

di



              adverse                      00:00:                      st



              reaction                      00                          



              s to                                                    



              drug                                                    







Family History







           Family Member Diagnosis  Comments   Start Date Stop Date  Source

 

           Natural father Diabetes                                    [facility]

[facility]od[facility]

 

           Natural mother Seizures                                    [facility]

[facility]







Social History







           Social Habit Start Date Stop Date  Quantity   Comments   Source

 

           History [facility] Meth

odist



           Alcohol Std Drinks                                             

 

           History [facility] Meth

odist



           Alcohol Binge                                             

 

           Sex Assigned At                                             [facility]

ethodist



           Birth                                                  

 

           Alcohol intake 2019-10-19 2019-10-19 Current drinker            [facility]

on [facility]



                      00:00:00   00:00:00   of alcohol            



                                            (finding)             

 

           History [facility] 2019-10-19 2019-10-19 3                     [facility]

odist



           Alcohol Frequency 00:00:00   00:00:00                         

 

           Alcohol Comment 2019-10-19 2019-10-19 8 drinks on            [facility] 

[facility]



                      00:00:00   00:00:00   weekends              









                Smoking Status  Start Date      Stop Date       Source

 

                Never smoker                                    [facility]

t







Medications







       Ordered Filled Start  Stop   Current Ordering Indication Dosage Frequency

 Signature

                    Comments            Components          Source



     Medication Medication Date Date Medication? Clinician                (SIG) 

          



     Name Name                                                   

 

     acetaminoph      2019 2019- No             650mg Q6H  Take 2           [facility]

[place]



     en        0-21 11-20                          tablets           [facility]



     (TYLENOL)      00:00: 23:59                          (650 mg           st



     325 MG      00   :00                           total) by           



     tablet                                         mouth           



                                                  every 6           



                                                  (six)           



                                                  hours as           



                                                  needed for           



                                                  mild pain           



                                                  for up to           



                                                  30 days.           

 

     butalbital-      2019- No             1{tbl} Q4H  Take 1           [facility]

[place]



     acetaminoph      0--20                          tablet by           Me

[facility]



     en-caff      00:00: 23:59                          mouth           st



     (FIORICET)      00   :00                           every 4           



     -40                                         (four)           



     mg per                                         hours as           



     tablet                                         needed for           



                                                  headaches           



                                                  for up to           



                                                  30 days.           

 

     ferrous      2019- No             325mg Q.5D Take 1           [place]

n



     sulfate 325                                tablet           [facility]

di



     (65 FE) MG      00:00: 23:59                          (325 mg           st



     tablet      00   :00                           total) by           



                                                  mouth 2           



                                                  (two)           



                                                  times a           



                                                  day with           



                                                  meals for           



                                                  30 days.           

 

     methocarbam      2019- No             500mg Q.25D Take 1           [facility]

[place]



     ol        -                          tablet           [facility]



     (ROBAXIN)      00:00: 23:59                          (500 mg           st



     500 MG      00   :00                           total) by           



     tablet                                         mouth 4           



                                                  (four)           



                                                  times a           



                                                  day for 30           



                                                  days.           

 

     ondansetron      2019- No             4mg  Q8H  Take 1           [facility]

[facility]



     ODT                                 tablet (4           [facility]



     (ZOFRAN-ODT      00:00: 23:59                          mg total)           

st



     ) 4 MG      00   :00                           by mouth           



     disintegrat                                         every 8           



     ing tablet                                         (eight)           



                                                  hours as           



                                                  needed for           



                                                  nausea or           



                                                  vomiting           



                                                  for up to           



                                                  30 days.           

 

     oxybutynin      2020- No             5mg  QD   Take 1           [facility]

[facility]



     XL                                  tablet (5           [facility]



     (DITROPAN      00:00: 23:59                          mg total)           st



     XL) 5 MG 24      00   :00                           by mouth           



     hr tablet                                         daily.           

 

     phenazopyri      2019- No             100mg [id number] Take 1       

    [facility]



     dine                           [id number] tablet           Method

i



     (PYRIDIUM)      00:00: 23:59                     3D   (100 mg           st



     100 MG      00   :00                           total) by           



     tablet                                         mouth 3           



                                                  (three)           



                                                  times a           



                                                  day for 3           



                                                  days.           

 

     traMADol      2019- No        acute pain 50mg Q6H  Take 1           

[facility]



     (ULTRAM) 50                                tablet (50           M

[facility]



     mg tablet      00:00: 23:59                          mg total)           st



               00   :00                           by mouth           



                                                  every 6           



                                                  (six)           



                                                  hours as           



                                                  needed for           



                                                  severe           



                                                  pain for           



                                                  up to 30           



                                                  doses           



                                                  .Acute           



                                                  Pain.           

 

     docusate       2019- No             100mg Q.5D Take 1           [facility]

on



     sodium      9-05 10-05                          capsule           [facility]



     (COLACE)      00:00: 23:59                          (100 mg           st



     100 MG      00   :00                           total) by           



     capsule                                         mouth 2           



                                                  (two)           



                                                  times a           



                                                  day for 30           



                                                  days.           

 

     tamsulosin       2019- No             .4mg Q24H Take 1           [facility]

[place]



     (FLOMAX)      9-05 10-05                          capsule           [facility]



     0.4 mg      00:00: 23:59                          (0.4 mg           st



     capsule      00   :00                           total) by           



                                                  mouth           



                                                  daily as           



                                                  needed           



                                                  (stent           



                                                  discomfort           



                                                  ) for up           



                                                  to 30           



                                                  days.           

 

     traMADol      2019- No        acute pain 50mg Q6H  Take 1           

[facility]



     (ULTRAM) 50                                tablet (50           M

[facility]i



     mg tablet      00:00: 00:00                          mg total)           st



               00   :00                           by mouth           



                                                  every 6           



                                                  (six)           



                                                  hours as           



                                                  needed for           



                                                  severe           



                                                  pain for           



                                                  up to 15           



                                                  doses           



                                                  .Acute           



                                                  Pain.           

 

     cephalexin      2019- No             500mg Q.5D Take 500           [facility]

[place]



     (KEFLEX)      -                          mg by           [facility]



     500 MG      13:57: 00:00                          mouth 2           st



     capsule      45   :00                           (two)           



                                                  times a           



                                                  day.           

 

     levoFLOXaci      2019- No             750mg QD   Take 1           [facility]

[place]



     n                                   tablet           [facility]



     (LEVAQUIN)      00:00: 00:00                          (750 mg           st



     750 MG      00   :00                           total) by           



     tablet                                         mouth           



                                                  daily for           



                                                  13 days.           

 

     docusate       2019- No             100mg Q.5D Take 1           [facility]

on



     sodium                                capsule           [facility]



     (COLACE)      00:00: 00:00                          (100 mg           st



     100 MG      00   :00                           total) by           



     capsule                                         mouth 2           



                                                  (two)           



                                                  times a           



                                                  day for 30           



                                                  days.           

 

     acetaminoph       2019- No             1{tbl} Q6H  Take 1-2          

 [facility]



     en-codeine      -                          tablets by           Me

[place]



     (TYLENOL      00:00: 23:59                          mouth           st



     WITH      00   :00                           every 6           



     CODEINE #3)                                         (six)           



     300-30 mg                                         hours as           



     per tablet                                         needed for           



                                                  moderate           



                                                  pain for           



                                                  up to 30           



                                                  doses.           

 

     nitrofurant      2019- No                       TK ONE C           [facility]

[place]



     oin,       08-08                          PO BID           [facility]



     macrocrysta      00:00: 00:00                                         st



     l-monohydra      00   :00                                          



     te,                                                         



     (MACROBID)                                                        



     100 MG                                                        



     capsule                                                        

 

     cefpodoxime       2019- No                       TK 2 TS PO          

 [facility]



     (VANTIN)      -08                          Q 12 H FOR           [facility]

[facility]



     100 MG      00:00: 00:00                          10 DAYS WF           st



     tablet      00   :00                                          

 

     acetaminoph      2019- No                       TK 1 T PO           

[facility]



     en-codeine                                Q 6 H PRN           [facility]

[facility]



     (TYLENOL      00:00: 00:00                          P              st



     WITH      00   :00                                          



     CODEINE #3)                                                        



     300-30 mg                                                        



     per tablet                                                        

 

     docusate      2019- No             100mg Q.5D Take 1           [facility]

on



     sodium                                capsule           [facility]



     (COLACE)      00:00: 23:59                          (100 mg           st



     100 MG      00   :00                           total) by           



     capsule                                         mouth 2           



                                                  (two)           



                                                  times a           



                                                  day as           



                                                  needed for           



                                                  constipati           



                                                  on for up           



                                                  to 7 days.           

 

     acetaminoph      2019- No             650mg Q6H  Take 2           [facility]

[place]



     en                                  tablets           [facility]



     (TYLENOL)      00:00: 23:59                          (650 mg           st



     325 MG      00   :00                           total) by           



     tablet                                         mouth           



                                                  every 6           



                                                  (six)           



                                                  hours as           



                                                  needed for           



                                                  moderate           



                                                  pain for           



                                                  up to 7           



                                                  days.           

 

     traMADol      2019- No             50mg Q6H  Take 1           [place]

n



     (ULTRAM) 50                                tablet (50           M

[facility]



     mg tablet      00:00: 23:59                          mg total)           st



               00   :00                           by mouth           



                                                  every 6           



                                                  (six)           



                                                  hours as           



                                                  needed for           



                                                  severe           



                                                  pain for           



                                                  up to 7           



                                                  days.           

 

     ciprofloxac       2019- No             500mg Q.5D Take 1           [facility]

[place]



     in (CIPRO)                                tablet           Method

i



     500 MG      00:00: 23:59                          (500 mg           st



     tablet      00   :00                           total) by           



                                                  mouth 2           



                                                  (two)           



                                                  times a           



                                                  day for 3           



                                                  days.           

 

     acetaminoph      2018- No                       TK 2 TS PO          

 [place]



     en-codeine      2-10 07-16                          Q 6 H PRF           Met

hodi



     (TYLENOL      00:00: 00:00                          PAIN           st



     WITH      00   :00                           CONTROL.           



     CODEINE #3)                                                        



     300-30 mg                                                        



     per tablet                                                        

 

     ibuprofen      2018 2019- No                       TK 1 T PO           Ho

ton



     (ADVIL,MOTR      2-10 07-16                          Q 8 H PRF           Me

thodi



     IN) 800 MG      00:00: 00:00                          PAIN           st



     tablet      00   :00                           CONTROL.           

 

     Ferrous Ferrous           Yes  Rita                as             [facility]



     Sulfate Sulfate                Valencia                directed           [facility]

[facility] -



                                                                 [facility]



                                                                 l



                                                                 [facility]



                                                                 [facility]



                                                                 Clinics

 

     Methocarbam Methocarbam           Yes  Rita                1.5            

[facility]



     ol   ol                  [facility]                tablets           [facility]



                                                                 [facility]



                                                                 [facility]



                                                                 [facility]



                                                                 [facility]



                                                                 Clinics

 

     Butalbital- Butalbital-           Yes  Rita                1 tablet       

    [facility]



     Acetaminoph Acetaminoph                [facility]                as needed      

     [facility]



     en   en                                                     [facility]



                                                                 l



                                                                 [facility]



                                                                 [facility]



                                                                 Clinics

 

     Acetaminoph Acetaminoph           Yes  Rita                2 tablet       

    [facility]



     en   en                  [facility]encia                as needed           [facility]



                                                                 [facility]



                                                                 l



                                                                 [facility]



                                                                 [facility]



                                                                 Clinics

 

     Ondansetron Ondansetron           Yes  Rita                1 tablet       

    [facility]



                              [facility]                on the           [facility]



                                                  tongue and           [facility]



                                                  allow to           l



                                                  dissolve           [facility]



                                                                 ent



                                                                 Clinics

 

     Oxybutynin Oxybutynin           Yes  Rita                as             [facility]

[facility]



     Chloride Chloride                Valencia                directed           

[facility] -



                                                                 [facility]



                                                                 l



                                                                 [facility]



                                                                 [facility]



                                                                 Clinics







Vital Signs







             Vital Name   Observation Time Observation Value Comments     Source

 

             Systolic blood 2019-10-21 12:13:48 107 mm[Hg]                Wiliamto

n [facility]



             pressure                                            

 

             Diastolic blood 2019-10-21 12:13:48 71 mm[Hg]                 [facility]

on [facility]



             pressure                                            

 

             Heart rate   2019-10-21 12:13:48 73 /min                   Shahzad Echevarria

 

             Body temperature 2019-10-21 12:13:48 36.39 Giselle                 [facility]

[facility]

 

             Respiratory rate 2019-10-21 12:13:48 16 /min                   [facility]

[facility]

 

             Oxygen saturation in 2019-10-21 12:13:48 99 /min                   

Shahzad Echevarria



             Arterial blood by                                        



             Pulse oximetry                                        

 

             Body height  2019 06:23:00 157.5 cm                  Shahzad Echevarria

 

             Body weight  2019 06:23:00 64.184 kg                 Shahzad Echevarria

 

             BMI          2019 06:23:00 25.88 kg/m2               Shahzad Echevarria







Procedures







                Procedure       Date / Time     Performing Clinician Source



                                Performed                       

 

                US RENAL        2019-10-21 09:17:12 Salomón Still               

 

                 COMPLETE BLD COUNT 2019-10-21 04:10:00 Karim, Sanbecky shin [facility]



                W/AUTO DIFF                                     

 

                COMPREHENSIVE METABOLIC 2019-10-21 04:00:00 Angel Sanchez Yovani

[place] [facility]



                PANEL                                           

 

                ESTIMATED GFR   2019-10-21 04:00:00 MauraKatt Pike Met

[facility]

 

                COMPREHENSIVE METABOLIC 2019-10-20 04:00:00 JuvencionoeAngel Timmy Pina

[place] [facility]



                PANEL                                           

 

                RICKETTSIA TYPHI (TYPHUS 2019-10-20 04:00:00 Arash Grewal [facility]



                FEVER) ABS IGG/IGM                 [facility]   

 

                CYTOMEG IGG/IGM 2019-10-20 04:00:00 Arash Grewal Meth

odist



                                                [facility]   

 

                HSV TYPE 1/2 COMBINED AB, 2019-10-20 04:00:00 Arash Grewal [facility]



                IGM                             [facility]   

 

                HEPATITIS ACUTE PANEL 2019-10-20 04:00:00 Arash Grewal

n [facility]



                                                [facility]   

 

                RHEUMATOID FACTOR 2019-10-20 04:00:00 Arash Grewal Me

thodist



                                                [facility]   

 

                C3 COMPLEMENT COMPONENT 2019-10-20 04:00:00 Arash Grewal [facility]



                                                [facility]   

 

                ESTIMATED GFR   2019-10-20 04:00:00 MauraKatt Pike Met

hodist

 

                HC COMPLETE BLD COUNT 2019-10-20 03:50:00 Angel Sanchez [facility]



                W/AUTO DIFF                                     

 

                HEMOGLOBIN A1C  2019-10-20 03:50:00 Daniel Angel Timmy Pike Me

thodist

 

                STEPAN BARR VIRUS (EBV) 2019-10-20 03:50:00 Arash Grewal [facility]



                BY PCR                          [facility]   

 

                CYTOMEGALOVIRUS BY PCR 2019-10-20 03:50:00 Arash Grewal

[facility]



                                                [facility]   

 

                HERPES SIMPLEX VIRUS BY 2019-10-20 03:50:00 Arash Grewal [facility]



                PCR                             [facility]   

 

                FUNGUS CULTURE  2019-10-19 18:07:00 Arash Grewal Meth

odist



                                                [facility]   

 

                AFB CULTURE     2019-10-19 18:07:00 Uri, Antoinekeith Pike Meth

odist



                                                [facility]   

 

                IR LUMBAR PUNCTURE 2019-10-19 17:12:00 Grewal, Antoinekeith Pike KIMO

ethodist



                                                [facility]   

 

                CSF CULTURE     2019-10-19 17:07:00 Grweal, Arash Pike Meth

odist



                                                [facility]   

 

                CRYPTOCOCCAL ANTIGEN 2019-10-19 17:07:00 Arash Grewal

 Jewish



                SCREEN                          [facility]   

 

                GRAM STAIN      2019-10-19 17:07:00 Katt Lorenzo Met

hodist

 

                CSF CELL COUNT WITH 2019-10-19 17:07:00 Arash Grewal



                DIFFERENTIAL                    [facility]   

 

                GLUCOSE LEVEL, CSF 2019-10-19 17:07:00 Uri Arash Pike KIMO

ethodist



                                                [facility]   

 

                IGG SYNTHESIS RATE STUDY 2019-10-19 17:07:00 Arash Grewal [facility]



                                                [facility]   

 

                [place] VIRUS ANTIBODY 2019-10-19 17:07:00 Arash Grewal [facility]



                PANEL, CSF                      [facility]   

 

                OLIGOCLONAL BANDING, CSF 2019-10-19 17:07:00 Arash Grewal [facility]



                                                [facility]   

 

                VDRL, CSF SCREEN 2019-10-19 17:07:00 Arash Grewal Met

hodist



                                                [facility]   

 

                CYTOMEGALOVIRUS BY PCR 2019-10-19 17:07:00 Katt Lorenzo

 

                ENTEROVIRUS BY PCR 2019-10-19 17:07:00 Katt Lorenzo

 

                HERPES SIMPLEX VIRUS BY 2019-10-19 17:07:00 Katt Lorenzo



                PCR                                             

 

                VARICELLA ZOSTER BY PCR 2019-10-19 17:07:00 Katt Lorenzo

 

                TOXOPLASMA GONDII BY PCR 2019-10-19 17:07:00 Katt Lorenzo

 

                PROTHROMBIN TIME WITH INR 2019-10-19 13:25:00 Arash Grewal



                                                [facility]   

 

                PARTIAL THROMBOPLASTIN 2019-10-19 13:25:00 Arash Grewal

on [facility]



                TIME (PTT)                      [facility]ecile Jaeger   

 

                INFLUENZA ANTIGEN TEST, 2019-10-19 13:20:00 Gemma Brody [facility]



                REFLEX NEGATIVE TO RPP                                 

 

                RESPIRATORY PATHOGEN PANEL 2019-10-19 13:20:00 Katt Lorenzo 

Jewish

 

                XR CHEST 1 VW PORTABLE 2019-10-19 10:03:44 Gemma Brody

on [facility]

 

                KUSH             2019-10-19 04:42:00 Gemma Brody Meth

odist

 

                SEDIMENTATION RATE 2019-10-19 04:42:00 Gemma Brody M

ethodist

 

                C-REACTIVE PROTEIN 2019-10-19 04:42:00 Gemma Brody

ethodist

 

                HIV AG/AB COMBINATION 2019-10-19 04:42:00 Gemma Brody

n [facility]

 

                INFECTIOUS MONONUCLEOSIS 2019-10-19 04:42:00 Gemma Brodyu

[place] [facility]



                SCREEN                                          

 

                URINE CULTURE   2019-10-19 01:37:00 Katt Lorenzo Met

hodist

 

                GRAM STAIN      2019-10-19 01:37:00 Katt Lorenzo Met

hodist

 

                URINALYSIS SCREEN AND 2019-10-19 00:25:00 Angel Sanchez [facility]



                MICROSCOPY, WITH REFLEX TO                                 



                CULTURE                                         

 

                BLOOD CULTURE, AEROBIC & 2019-10-19 00:15:00 Angel Sanchez



                ANAEROBIC                                       

 

                BLOOD CULTURE, AEROBIC & 2019-10-19 00:05:00 Angel Sanchez [facility]



                ANAEROBIC                                       

 

                HC COMPLETE BLD COUNT 2019-10-19 00:05:00 Angel Sanchez [facility]



                W/AUTO DIFF                                     

 

                LACTIC ACID LEVEL 2019-10-19 00:05:00 Angel Sanchez

 

                FERRITIN LEVEL  2019-10-19 00:05:00 Angel Sanchez Me

thodist

 

                TOTAL IRON BINDING 2019-10-19 00:05:00 Angel Sanchez



                CAPACITY                                        

 

                VITAMIN B12 LEVEL 2019-10-19 00:05:00 Angel Sanchez

 

                THYROID STIMULATING 2019-10-19 00:05:00 Angel Sanchez [facility]



                HORMONE                                         

 

                FOLATE LEVEL    2019-10-19 00:05:00 Angel Sanchez Me

thodist

 

                HAPTOGLOBIN     2019-10-19 00:05:00 Angel Sanchez Me

thodist

 

                LDH             2019-10-19 00:05:00 Angel Sanchez Me

thodist

 

                MAGNESIUM LEVEL 2019-10-19 00:05:00 Katt Lorenzo Met

hodist

 

                PHOSPHORUS LEVEL 2019-10-19 00:05:00 OlivierbrayanyashKatt Me

thodist

 

                ESTIMATED GFR   2019-10-19 00:05:00 Maura Katt Pike Met

hodist

 

                COMPREHENSIVE METABOLIC 2019-10-19 00:05:00 Katt Lorenzo [facility]



                PANEL                                           

 

                ECG 12-LEAD     2019-10-18 23:54:02 Angel Sanchez Me

thodist

 

                CT ABD/PELVIC EXTERNAL 2019-10-18 15:40:00 Katt Lorenzo



                STUDY                                           

 

                HEMOGLOBIN & HEMATOCRIT 2019 06:56:00 Katerina Denis

 

                BASIC METABOLIC PANEL 2019 06:56:00 Katerina Denis

 

                ESTIMATED GFR   2019 06:56:00 Amalia Hill

 

                HC COMPLETE BLD COUNT 2019 06:56:00 Amalia Hill



                W/AUTO DIFF                                     

 

                HEMOGLOBIN & HEMATOCRIT 2019 03:22:00 Salomón Thibodeaux



                                                W               

 

                BASIC METABOLIC PANEL 2019 03:22:00 Salomón Thibodeaux



                                                W               

 

                ESTIMATED GFR   2019 03:22:00 Amalia Hill

 

                SURGICAL PATHOLOGY REQUEST 2019 13:00:00 Amalia Hill

 

                XR ABDOMEN 1 VW PORTABLE 2019 12:11:15 Amalia Hill

 

                HEMOGLOBIN & HEMATOCRIT 2019 11:30:00 Amalia Hill [facility]

 

                BASIC METABOLIC PANEL 2019 11:30:00 Amalia Hill

 

                ESTIMATED GFR   2019 11:30:00 Amalia Hill

 

                CALCULI ANALYSIS WITH 2019 09:53:00 Amalia Hill [facility]



                PHOTO                                           

 

                AK AN ELECTIVE  2019 07:46:31 Vero Simpson

ethodist



                ENDOTRACHEAL AIRWAY                                 

 

                PYELOPLASTY, LAPAROSCOPIC, 2019 07:27:00 Amalia Hill



                ROBOT-ASSISTED                                  

 

                TYPE AND SCREEN 2019 06:18:00 Salomón Thibodeaux



                                                W               

 

                PROTHROMBIN TIME WITH INR 2019 05:55:00 Eder Ojeda

 

                HC COMPLETE BLD COUNT 2019 05:35:00 Eder Ojeda [facility]



                W/AUTO DIFF                                     

 

                COMPREHENSIVE METABOLIC 2019 05:35:00 Eder Ojeda



                PANEL                                           

 

                PROTHROMBIN TIME WITH INR 2019 05:35:00 Eder Ojeda

 

                ESTIMATED GFR   2019 05:35:00 Eder Ojeda

 

                CT RENAL STONE PROTOCOL 2019 01:26:38 Rex Tyler



                                                [facility]          

 

                URINE CULTURE   2019 23:29:00 Jenifer Renae

[facility]a

 

                GRAM STAIN      2019 23:29:00 Jenifer Renae

[facility]a

 

                URINALYSIS SCREEN AND 2019 22:23:00 Jenifer Renae [facility]



                MICROSCOPY, WITH REFLEX TO                                 



                CULTURE                                         

 

                HCG QUALITATIVE, URINE 2019 22:23:00 Rex Tyler [facility]



                SCREEN                          [facility]          

 

                HC COMPLETE BLD COUNT 2019 21:43:00 Jenifer Renae [facility]



                W/AUTO DIFF                                     

 

                PROTHROMBIN TIME WITH INR 2019 21:43:00 Jenifer Renae

 

                PARTIAL THROMBOPLASTIN 2019 21:43:00 Jenifer Renae

on [facility]



                TIME (PTT)                                      

 

                COMPREHENSIVE METABOLIC 2019 21:43:00 Jenifer Renae [facility]



                PANEL                                           

 

                LACTIC ACID LEVEL 2019 21:43:00 Jenifer Renae Me

thodist

 

                ESTIMATED GFR   2019 21:43:00 Jenifer Renae

 

                HC NEPHROSTOMY/PYELOS TUBE 2019-08-15 16:44:00 Annamaria Váqzuez



                CHANGE                                          

 

                CONSULT TO INTERVENTIONAL 2019-08-15 00:00:00 Amalia Hill



                RADIOLOGY                                       

 

                IR LEFT NEPHROSTOMY 2019-08-15 00:00:00 Amalia Hill



                EVAL/EXCHANGE                                   

 

                URINE CULTURE   2019 11:19:00 Amalia Hill

 

                URINALYSIS, AUTOMATED WITH 2019 11:19:00 Amalia Hill



                MICROSCOPY                                      

 

                MICROSCOPIC EXAMINATION 2019 11:19:00 Amalia Hill

 

                FL PYELOGRAM RETROGRADE 2019 08:11:00 Amalia Hill

 

                AK AN ELECTIVE  2019 07:42:21 Pati Dailey

ethodist



                SUPRAGLOTTIC AIRWAY                                 

 

                CYSTO WITH URETEROSCOPY 2019 07:25:00 Amalia Hill

 

                PROTHROMBIN TIME WITH INR 2019 10:43:00 Amalia Hill

 

                PARTIAL THROMBOPLASTIN 2019 10:43:00 Amalia Hill

on [facility]



                TIME (PTT)                                      

 

                URINE CULTURE   2019 10:27:00 Amalia Hill

 

                URINALYSIS, AUTOMATED WITH 2019 10:27:00 Amalia Hill



                MICROSCOPY                                      

 

                MICROSCOPIC EXAMINATION 2019 10:27:00 Amalia Hill

 

                HC COMPLETE BLD COUNT 2019 04:50:00 Elizabeth Power



                W/AUTO DIFF                                     

 

                BASIC METABOLIC PANEL 2019 04:00:00 Elizabeth Power

 

                ESTIMATED GFR   2019 04:00:00 Amalia Hill

 

                POC GLUCOSE     2019 21:07:00 Amalia Hill

 

                IR LEFT NEPHROSTOMY 2019 11:19:00 Amalia Hill



                INITIAL PLACEMENT                                 

 

                URINE CULTURE   2019 10:29:00 Amalia Hill

 

                URINALYSIS, AUTOMATED WITH 2019 10:28:00 Amalia Hill



                MICROSCOPY                                      

 

                MICROSCOPIC EXAMINATION 2019 10:28:00 Amalia Hill

 

                CBC WITH PLATELET AND 2019 10:14:00 Amalia Hill



                DIFFERENTIAL                                    

 

                COMPREHENSIVE METABOLIC 2019 10:14:00 Amalia Hill



                PANEL                                           

 

                PARTIAL THROMBOPLASTIN 2019 10:14:00 Amalia Hill

on [facility]



                TIME (PTT)                                      

 

                PROTHROMBIN TIME WITH INR 2019 10:14:00 Amalia Hill

 

                NM RENAL SCAN WFLOW FUNCT 2019 13:09:55 Annamaria Vázquez



                SGL INT W/MAG 3                                 

 

                POC URINALYSIS DIPSTICK 2019 09:25:00 Gwendolyn Heath

 

                [id number]         2019 09:25:00 Gwendolyn Heath







Plan of Care







             Planned Activity Planned Date Details      Comments     Source

 

             Future Scheduled 2020   INFLUENZA VACCINE              Frank Echevarria



             Test         00:00:00     [code = INFLUENZA              



                                       VACCINE]                  

 

             Future Scheduled 2017-12-15   Screening for              [facility]

thodist



             Test         00:00:00     malignant neoplasm              



                                       of cervix                 



                                       (procedure) [code =              



                                       [id number]]                

 

             Future Scheduled 2012-12-15   CHLAMYDIA SCREENING              Wiliam shin Jewish



             Test         00:00:00     [code = CHLAMYDIA              



                                       SCREENING]                







Encounters







        Start   End     Encounter Admission Attending Care    Care    Encounter 

Source



        Date/Time Date/Time Type    Type    Clinicians Facility Department ID   

   

 

        2019-10-30 2019-10-30 Outpatient                 Lauren Gould 28

[id number] [facility]



        11:00:00 11:00:00                         [facility]



                                                                        [facility]



                                                                        Clinics

 

        2019-10-24 2019-10-24 Outpatient                 [facility] [address]

[id number] [facility] [address]



        10:00:00 10:00:00                         [facility]



                                                                        ent



                                                                        Clinics







Results







           Test Description Test Time  Test Comments Results    Result Comments 

Source









                    AFB culture         2019 00:13:53 









                      Test Item  Value      Reference Range Interpretation Comme

nts









             AFB culture isolate No growth after 6 weeks of                     

      Specimen InformationSpecimen



             (test code = 543-9) incubation.                            Source: 

CSF (Spinal



                                                                 Fluid)Specimen 

Site: CSF (spinal



                                                                 fluid)



[facility] MethodistFungus [id number] 00:15:15





             Test Item    Value        Reference Range Interpretation Comments

 

             Fungus culture No growth                              Specimen



             isolate (test after 4 weeks                           InformationSp

ecimen



             code = 1441) of                                     Source: CSF (Sp

inal



                          incubation.                            Fluid)Specimen 

Site: CSF



                                                                 (spinal fluid)



Shahzad MethodistToxoplasma gondii by [id number] 13:46:38





             Test Item    Value        Reference Range Interpretation Comments

 

             Toxoplasma gondii CSF                                    



             source (test code                                        



             = 71146-6)                                          

 

             Toxoplasma gondii Not Detected                           NOT DETECT

ED - A



             by PCR (test code                                        negative r

esult does



             = 18969-8)                                          not rule out



                                                                 thepresence of 

PCR



                                                                 inhibitors in t

he



                                                                 patient specime

n or



                                                                 assayspecific n

ucleic



                                                                 acid in concent

rations



                                                                 below the level



                                                                 ofdetection by 

the



                                                                 assay.INTERPRET

JULIO



                                                                 INFORMATION: To

xoplasma



                                                                 gondii by PCRTh

is test



                                                                 was developed a

nd its



                                                                 performance



                                                                 characteristics



                                                                 determined by A

[facility]



                                                                 Laboratories. T

he [place]



                                                                 Food and Drug



                                                                 Administration 

has not



                                                                 approved or olivier

ared



                                                                 this test; garza

cesar, FDA



                                                                 clearance or ap

proval



                                                                 is not currentl

y



                                                                 required for cl

inical



                                                                 use. The result

s are



                                                                 not intended to

 be used



                                                                 as the sole samir

ns for



                                                                 clinical diagno

sis or



                                                                 patient managem

ent



                                                                 decisions.Perfo

rmed by



                                                                 [facility]

es,[address]



                                                                 [address], 

[place]



                                                                 [id number]



                                                                 [phone]

.[employer], Dion Lu MD, Lab. Direct

or



Shahzad MethodistRickettsia typhi (typhus fever) Abs, IgG/[id number] 16:23:37





             Test Item    Value        Reference Range Interpretation Comments

 

             R typhi IgG (test <1:64        <1:64                     INTERPRETI

VE INFORMATION:



             code = 5324-9)                                        Typhus Fever 

Antibody, IgG



                                                                 Less than 1:64 

.......



                                                                 Negative - No s

ignificant



                                                                 level of



                                                                     IgG antibod

y detected.



                                                                 1:64 - 1:128 ..

.......



                                                                 Equivocal - Que

stionable



                                                                 presence



                                                                     of IgG anti

body



                                                                 detected. Repea

t



                                                                              te

sting in



                                                                 10-14 days may 

be



                                                                               h

elpful.



                                                                 1:256 or greate

r .....



                                                                 Positive - Pres

ence of IgG



                                                                 antibody



                                                                     to detected

, suggestive



                                                                 of current



                                                                        or past



                                                                 infection.Antib

kristine



                                                                 reactivity to R

ickettsia



                                                                 typhi antigen s

hould be



                                                                 considered grou

p-reactive



                                                                 for the Typhus 

Fever group,



                                                                 which includes 

Rickettsia



                                                                 prowazekii. Ser

oconversion



                                                                 between acute a

nd



                                                                 convalescent se

ra is



                                                                 considered stro

ng evidence



                                                                 of recent infec

tion. The



                                                                 best evidence f

or infection



                                                                 is a significan

t change



                                                                 (fourfold diffe

rence in



                                                                 titer) on two a

ppropriately



                                                                 timed specimens

, where both



                                                                 tests are done 

in the same



                                                                 laboratory at t

he same time.



                                                                 Acute-phase spe

cimens are



                                                                 collected durin

g the first



                                                                 week of illness

 and



                                                                 convalescent -p

hase samples



                                                                 are generally o

btained 2-4



                                                                 weeks after res

olution of



                                                                 illness. Ideall

y these



                                                                 samples should 

be tested



                                                                 simultaneously 

at the same



                                                                 facility. If th

e samples



                                                                 submitted was c

ollected



                                                                 during the acut

e phase of



                                                                 illness, submit

 a marked



                                                                 convalecsent sa

mple within



                                                                 25 days for lissette

red testing.

 

             R typhi IgM (test <1:64        <1:64                     INTERPRETI

VE INFORMATION:



             code = 5325-6)                                        Typhus Fever 

Antibody, IgM



                                                                 Less than 1:64 

......



                                                                 Negative-No sig

nificant



                                                                 level of



                                                                    IgM antibody

 detected.



                                                                 1:64 or greater

 .....



                                                                 Positive-Presen

ce of IgM



                                                                 antibody



                                                                    detected, wh

ich may



                                                                 indicate a



                                                                      current or

 rectent



                                                                 infection;



                                                                      however, l

ow levels of



                                                                 IgM



                                                                 antibodies may 

occasionally



                                                                 persist



                                                                   for more than

 12 months



                                                                 



                                                                 post-infection.

Antibody



                                                                 reactivity to R

ickettsia



                                                                 typhi antigen s

hould be



                                                                 considered grou

p-reactive



                                                                 for the Typhus 

Fever group,



                                                                 which includes 

Rickettsia



                                                                 prowazekii. Ser

oconversion



                                                                 between acute a

nd



                                                                 convalescent se

ra is



                                                                 considered stro

ng evidence



                                                                 of recent infec

tion. The



                                                                 best evidence i

s a



                                                                 significant rox

nge (fourfold



                                                                 difference in t

iter) on two



                                                                 appropriately t

imed



                                                                 specimens, wher

e both tests



                                                                 are done in the

 same



                                                                 laboratory at t

he same time.



                                                                 Acute-phase spe

cimens are



                                                                 collected durin

g the first



                                                                 week of illness

 and



                                                                 convalescent-ph

ase samples



                                                                 are generally o

btained 2-4



                                                                 weeks after res

olution of



                                                                 illness. Ideall

y these



                                                                 samples should 

be tested



                                                                 simultaneously 

at the same



                                                                 facility. If th

e sample



                                                                 submitted was c

ollected



                                                                 during the actu

e-phase of



                                                                 illness, submit

 a marked



                                                                 convalescent sa

mple within



                                                                 25 days for lissette

red



                                                                 testing.Perform

ed by [facility]



                                                                 [facility],[address]

[address] [facility]

08



                                                                 [phone]

.[employer],



                                                                 Dion Martins MD, Lab.



                                                                 Director



[place] MethodistRickettsia rickettsii (RMSF) Abs IgG/IgM2019-10-24 16:23:30





             Test Item    Value        Reference Range Interpretation Comments

 

             R rickettsii IgG (test <1:64        <1:64                     INTER

PRETIVE INFORMATION:



             code = 5307-4)                                        Rickettsia ri

ckettsii



                                                                 ([address]



                                                                            Spot

maddy Fever)



                                                                 Ab, IgG        

 Less than



                                                                 1:64 ....... Ne

gative - No



                                                                 significant lev

el of



                                                                                

   IgG



                                                                 antibody detect

ed.  1:64 -



                                                                 1:128 .........

 Low



                                                                 Positive - Pres

ence of IgG



                                                                 



                                                                 Antibody detect

ed,



                                                                 suggestive of



                                                                             cur

rent or



                                                                 past infection.

  1:256 or



                                                                 greater ..... P

ositive -



                                                                 Presence of IgG



                                                                               a

ntibody



                                                                 detected, sugge

stive of



                                                                 



                                                                 current or past



                                                                 infection.Antib

kristine



                                                                 reactivity to R

ickettsia



                                                                 rickettsii anti

gen should



                                                                 be considered S

potted



                                                                 Fever group kenneth

ctive.



                                                                 Other organisms

 within the



                                                                 group include R

. akari, R.



                                                                 conorrii, R. au

stralis and



                                                                 R.



                                                                 sibirica.Seroco

nversion, a



                                                                 fourfold or gre

ater rise



                                                                 in antibody tit

er, between



                                                                 acute and conva

lescent



                                                                 sera is conside

red strong



                                                                 evidence of rec

ent



                                                                 infection. Acut

e-phase



                                                                 specimens are c

ollected



                                                                 during the firs

t week of



                                                                 illness and



                                                                 convalescent-ph

ase samples



                                                                 are generally o

btained 2-4



                                                                 weeks after res

olution of



                                                                 illness. Ideall

y these



                                                                 samples should 

be tested



                                                                 simultaneously 

at the same



                                                                 facility. If th

e sample



                                                                 submitted was c

ollected



                                                                 during the acut

e-phase of



                                                                 illness, submit

 a marked



                                                                 convalescent sa

mple within



                                                                 25 days for lissette

red



                                                                 testing.

 

             R rickettsii IgM (test <1:64        <1:64                     INTER

PRETIVE INFORMATION:



             code = 5308-2)                                        Rickettsia ri

ckettsii



                                                                 ([address]



                                                                            Spot

maddy Fever)



                                                                 Ab, IgM  Less t

shepherd 1:64



                                                                 ....... Negativ

e - No



                                                                 significant lev

el of



                                                                                

   IgM



                                                                 antibody detect

ed.  1:64



                                                                 or greater ....

.. Positive



                                                                 - Presence of I

gM antibody



                                                                 



                                                                 detected, which

 may



                                                                 indicate a



                                                                          curren

t or recent



                                                                 infection;



                                                                          howeve

r, low



                                                                 levels of IgM



                                                                             ant

ibodies may



                                                                 occasionally pe

rsist



                                                                                

   for more



                                                                 than 12 months



                                                                 



                                                                 post-infection.

Antibody



                                                                 reactivity to R

ickettsia



                                                                 rickettsii anti

gen should



                                                                 be considered S

potted



                                                                 Fever group kenneth

ctive.



                                                                 Other organisms

 within the



                                                                 group include R

. akari, R.



                                                                 conorrii, R. au

stralis and



                                                                 R.



                                                                 sibirica.Seroco

nversion, a



                                                                 fourfold or gre

ater rise



                                                                 in antibody tit

er, between



                                                                 acute and conva

lescent



                                                                 sera is conside

red strong



                                                                 evidence of rec

ent



                                                                 infection. Acut

e-phase



                                                                 specimens are c

ollected



                                                                 during the firs

t week of



                                                                 illness and



                                                                 convalescent-ph

ase samples



                                                                 are generally o

btained 2-4



                                                                 weeks after res

olution of



                                                                 illness. Ideall

y these



                                                                 samples should 

be tested



                                                                 simultaneously 

at the same



                                                                 facility. If th

e sample



                                                                 submitted was c

ollected



                                                                 during the acut

e-phase of



                                                                 illness, submit

 a marked



                                                                 convalescent sa

mple within



                                                                 25 days for lissette

red



                                                                 testing.The CDC

 does not



                                                                 use IgM results

 for



                                                                 routine diagnos

tic testing



                                                                 of [place]

in Spotted



                                                                 Fever, as the r

esponse may



                                                                 not be specific

 for the



                                                                 agent (resultin

g in false



                                                                 positives) and 

the IgM



                                                                 response may be

 persistent



                                                                 from past



                                                                 infection.Perfo

rmed by



                                                                 [facility]

[address]



                                                                 [address], 

[place] [address]



                                                                 [phone]

[email or web address].Dion arriola MD, Lab.



                                                                 Director



[facility], [facility] &amp; anaerobic2019-10-24 05:33:11





             Test Item    Value        Reference Range Interpretation Comments

 

             Blood culture No growth                              Specimen



             isolate (test after 5 days                           InformationSpe

[facility]



             code = 600-7) of                                     Source: BloodS

pecimen



                          incubation.                            [facility], CSF2019-10-23 14:39:02





             Test Item    Value        Reference Range Interpretation Comments

 

             West Nile virus Not-Detected Not-Detected              



             PCR, CSF (test                                        



             code = 86800-4)                                        

 

             West Nile virus See link below for                           Case N

umber:



             PCR, CSF (test PDF Lab Report                           [id number]

9



             code = 2510)                                        



[facility]2019-10-23 05:27:57





             Test Item    Value        Reference Range Interpretation Comments

 

             CSF culture  growth after                           Specimen



             isolate (test 72 hours                               InformationSpe

cimen



             code = 606-4)                                        Source: CSF (S

faisal



                                                                 Fluid)Specimen 

Site: CSF



                                                                 (spinal fluid)



[facility] [id number] 05:27:57





             Test Item    Value        Reference Range Interpretation Comments

 

             Gram stain   No WBC's or                            Specimen



             isolate (test organisms seen.                           Information

Specimen



             code = 1469)                                        Source: CSF (Sp

inal



                                                                 Fluid)Specimen 

Site: CSF



                                                                 (spinal fluid)



[facility] antigen, screen2019-10-23 05:27:57





             Test Item    Value        Reference    Interpretation Comments



                                       Range                     

 

             Cryptococcal Ag Negative - No                           Specimen



             (test code = Cryptococcus                           InformationSpec

imen



             9820-2)      antigen detected.                           Source: CS

F (Spinal



                                                                 Fluid)Specimen 

Site:



                                                                 CSF (spinal flu

id)



[facility] JewishHSV type 1/2 combined Ab, IgM2019-10-23 04:01:49





             Test Item    Value        Reference Range Interpretation Comments

 

             HSV 1/2 combined Ab, 0.46         <=0.89 IV                 INTERPR

ETIVE INFORMATION:



             IgM (test code =                                        Herpes Simp

magdalena Virus Type



             07993-7)                                            1 and/or 2 Anti

bodies, IgM



                                                                 by LAURA  0.89 

IV or Less



                                                                 .......... Not 

Detected



                                                                 0.90 - 1.09 IV 

...........



                                                                 Indeterminate- 

Repeat



                                                                 testing in



                                                                             10-

14 days may



                                                                 be helpful.  1.

10 IV or



                                                                 Greater .......



                                                                 Detected-IgM an

tibody to



                                                                 HSV



                                                                      detected, 

which may



                                                                 indicate a



                                                                             cur

rent or



                                                                 recent infectio

n.



                                                                 



                                                                 However, low le

vels of IgM



                                                                 



                                                                  antibodies may



                                                                 occasionally



                                                                               p

ersist for



                                                                 more than 12



                                                                               m

onths



                                                                 post-infection.

Performed



                                                                 by [address]



                                                                 [address], [place]

[place] [address]



                                                                 [phone]

[email or web address]



                                                                 m, Dion bobo MD, Lab.



                                                                 Director



[facility], [facility]2019-10-22 17:23:16





             Test Item    Value        Reference Range Interpretation Comments

 

             West Nile IgG, CSF 0.03         <=1.29 IV                 INTERPRET

JULIO INFORMATION:



             (test code =                                        West Nile Virus

 Ab IgG by



             98417-6)                                            LAURA, CSF  1.2

9 IV or less



                                                                 ....... Negativ

e: No



                                                                 significant



                                                                        level of

 West Nile



                                                                 virus



                                                                  IgG antibody d

etected.  1.30



                                                                 - 1.49 IV .....

... Equivocal:



                                                                 Questionable



                                                                         presenc

e of West Nile



                                                                 



                                                                 virus IgG antib

kristine detected.



                                                                 



                                                                 Repeat testing 

in 10-14 days



                                                                                

         may



                                                                 be helpful.  1.

50 IV or



                                                                 greater .... Po

sitive:



                                                                 Presence of IgG



                                                                            anti

body to West



                                                                 Nile virus



                                                                       detected,

 suggestive of



                                                                 



                                                                 current or past



                                                                 infection.This 

test is



                                                                 intended to be 

used as a



                                                                 semi-quantitati

ve means of



                                                                 detecting West 

Nile



                                                                 virus-specific 

IgG in CSF



                                                                 samples in [facility] there is a



                                                                 clinical suspic

ion of West



                                                                 Nile Virus infe

ction. This



                                                                 test should not

 be used



                                                                 solely for wilder

titative



                                                                 purposes, nor s

hould the



                                                                 results be used

 without



                                                                 correlation to 

clinical



                                                                 history or othe

r data.



                                                                 Because other m

embers of the



                                                                 Flaviviridae fa

zoya, such as



                                                                 [place] encep

halitis virus,



                                                                 show extensive



                                                                 cross-reactivit

y with West



                                                                 Nile virus, ser

ologic testing



                                                                 specific for th

andrea species



                                                                 should be consi

dered.The



                                                                 detection of an

tibodies to



                                                                 West Nile virus

 in



                                                                 cerebrospinal f

luid may



                                                                 indicate centra

l nervous



                                                                 system infectio

n. However,



                                                                 consideration m

ust be given



                                                                 to possible con

tamination by



                                                                 blood or transf

er of serum



                                                                 antibodies acro

ss the



                                                                 blood-brain bar

rier.Test



                                                                 developed and c

haracteristics



                                                                 determined by A

[facility]



                                                                 [employer]

ee Compliance



                                                                 Statement B: ar

[employer]

 

             West Nile IgM, CSF 0.00         <=0.89 IV                 INTERPRET

JULIO INFORMATION:



             (test code =                                        West Nile Virus

 Ab IgM by



             08992-4)                                            LAURA, CSF0.89 

IV or less



                                                                 ...... Negative

 - No



                                                                 significant lev

el



                                                                            of W

est Nile virus



                                                                 IgM antibody



                                                                       detected.

0.90-1.10 IV



                                                                 ......... Equiv

ocal -



                                                                 Questionable pr

esence



                                                                                

of West Nile



                                                                 virus IgM antib

kristine



                                                                             det

ected. Repeat



                                                                 testing in



                                                                     10-14 days 

may be



                                                                 helpful.1.11 IV

 or greater



                                                                 ... Positive - 

Presence of



                                                                 IgM



                                                                 antibody to Sam

t Nile virus



                                                                                

     detected,



                                                                 suggestive of c

urrent



                                                                               o

r recent



                                                                 infection.This 

test is



                                                                 intended to be 

used as a



                                                                 semi-quantitati

ve means of



                                                                 detecting West 

Nile



                                                                 virus-specific 

IgM in CSF



                                                                 samples in [facility] there is a



                                                                 clinical suspic

ion of West



                                                                 Nile virus infe

ction. This



                                                                 test should not

 be used



                                                                 solely for wilder

titative



                                                                 purposes, nor s

hould the



                                                                 results be used

 without



                                                                 correlation to 

clinical



                                                                 history or othe

r data.



                                                                 Because other m

embers of the



                                                                 Flaviviridae fa

zoya, such as



                                                                 [place] encep

halitis virus,



                                                                 show extensive



                                                                 cross-reactivit

y with West



                                                                 Nile virus, ser

ologic testing



                                                                 specific for th

andrea species



                                                                 should be consi

dered.The



                                                                 detection of an

tibodies to



                                                                 West Nile virus

 in



                                                                 cerebrospinal f

luid may



                                                                 indicate centra

l nervous



                                                                 system infectio

n. However,



                                                                 consideration m

ust be given



                                                                 to possible con

tamination by



                                                                 blood or transf

er of serum



                                                                 antibodies acro

ss the



                                                                 blood-brain bar

rier.Test



                                                                 developed and c

haracteristics



                                                                 determined by A

[facility]



                                                                 Laboratories. S

ee Compliance



                                                                 Statement B:



                                                                 [employer]

erformed by



                                                                 [facility]

,[address],[place] [address]



                                                                 [phone]

.[employer],



                                                                 Dion Martins MD, Lab.



                                                                 Director



[facility] IgG/IgM2019-10-21 22:40:30





             Test Item    Value        Reference Range Interpretation Comments

 

             Cytomegalovirus Ab, Negative     Negative                  



             IgM (test code =                                        



             0740538)                                            

 

             Cytomegalovirus Ab, Positive     Negative     A            Positive

; IgG antibody



             IgG (test code =                                        to CMV dete

cted which



             47348-2)                                            may indicateexp

osure to



                                                                 CMV infection.

 

             Lab Interpretation Abnormal                               



             (test code = 58661-5)                                        



[facility], CSF screen2019-10-21 22:11:51





             Test Item    Value        Reference Range Interpretation Comments

 

             VDRL, CSF screen (test code = Non-reactive Non-reactive            

  



             3046)                                               



[facility] MethodistVaricella zoster by PCR2019-10-21 21:58:01





             Test Item    Value        Reference Range Interpretation Comments

 

             VZV result (test Not-Detected Not-Detected              



             code = 49050-9)              copies/mL                 

 

             Varicella zoster, See link below                           Case Num

martín:



             pcr (test code = for PDF Lab                            [id number]

9



             124)         Report                                 



[facility] MethodistEnterovirus by PCR2019-10-21 21:54:15





             Test Item    Value        Reference Range Interpretation Comments

 

             Enterovirus PCR (test See link below Not-Detected              Case

 Number:



             code = 27741-5) for PDF Lab                            [id number]



                          Report                                 



[facility] MethodistOligoclonal banding, CSF2019-10-21 17:52:58





             Test Item    Value        Reference Range Interpretation Comments

 

             Protein, CSF (test 18 mg/dL     15-45                     



             code = 2880-3)                                        

 

             Prealbumin, CSF (test 5.2 %        3.5-11.1                  



             code = 13973-3)                                        

 

             Albumin, CSF (test 57.4 %       40.8-66.2                 



             code = 34860-2)                                        

 

             Alpha 1, CSF (test 3.7 %        2.3-6.4                   



             code = 64366-9)                                        

 

             Alpha 2, CSF (test 8.3 %        6.1-12.6                  



             code = 23446-5)                                        

 

             Beta, CSF (test code = 16.8 %       11.7-24.1                 



             83299-8)                                            

 

             Gamma, CSF (test code 8.6 %        5.6-12.2                  



             = 64727-9)                                          

 

             CSF extended See Comment                            A nondiagnostic

 CSF



             interpretation (test                                        protein

 study with



             code = 40838-7)                                        an increased

 IgG



                                                                 index.



                                                                 Nooligoclonal b

ands



                                                                 seen.

 

             CSF interpretation See Comment                            Geoff Ghosh, PhD;



             (test code = 1163)                                        Betty Mar MD;



                                                                 uYli Lord, PhD; Igor Dacosta MD

,PhD



[facility] EchevarriaEpstein Barr Virus (EBV) by PCR2019-10-21 17:11:06





             Test Item    Value        Reference Range Interpretation Comments

 

             Stepan Metz Not-Detected Not-Detected              



             virus, PCR (test              copies/mL                 



             code = 5005-4)                                        

 

             Stepan Metz See link below                           Case Number:



             virus, PCR (test for PDF Lab                            [id number]

1



             code = 1340) Report                                 



Shahzad MethodistBK virus by PCR2019-10-21 15:37:09





             Test Item    Value        Reference Range Interpretation Comments

 

             BK virus PCR Not-Detected Not-Detected              



             (test code =              copies/mL                 



             03263-0)                                            

 

             BK virus PCR See link below                           Case Number:



             (test code = for PDF Lab                            [id number]



             962)         Report                                 



Shahzad MethodistCT Abd/Pelvic External Study2019-10-21 14:01:54This exam was 
not acquired at a Jewish facility and has not been interpreted by a Jewish
Provider.  The exam was imported into our imaging system for comparisons 
purposes.[facility] virus, quantitative PCR2019-10-21 12:39:02





             Test Item    Value        Reference Range Interpretation Comments

 

             RANDY virus result Not-Detected Not-Detected              



             (test code =              copies/mL                 



             29504-9)                                            

 

             RANDY virus qPCR See link below                           Case Number:



             quant (test code for PDF Lab                            [id number]

9



             = 1632)      Report                                 



Shahzad Andrade Renal2019-10-21 09:21:07 Interface, Radiology Results 
Incoming - 10/21/2019  9:24 AM CDTEXAMINATION:  US RENALCLINICAL HISTORY:  h o 
of LEFT pyeloplasty with evidence of residual hydro on recent imagingTECHNIQUE: 
Sonographic imaging over the kidneys was performed. COMPARISON:  
None.FINDINGS:1.The right kidney is normal size measuring 10.4 cm in long axis. 
The kidney has normal echogenicity. Vascular flow is unremarkable. There is no 
evidence of perinephric fluid, solid mass, or calculus. Mild right 
hydronephrosis is present.2.The left kidney is  normal size measuring 10.3 cm in
long axis. The kidney has normal echogenicity. Vascular flow is unremarkable. 
There is no evidence of perinephric fluid, solid mass, or calculus. Moderate 
left hydronephrosis is present.3.The bladder is unremarkable.4.Hepatic steatosis
is incidentally noted.IMPRESSION:Mild right and moderate left 
hydronephrosis.Hepatic steatosis.[id number] MethodistComprehensive 
metabolic panel2019-10-21 05:49:20





             Test Item    Value        Reference Range Interpretation Comments

 

             Sodium (test code = 139          135- 148 mEq/L              



             2951-2)                                             

 

             Potassium (test code = 3.6          3.5- 5.0 mEq/L              



             2823-3)                                             

 

             Chloride (test code = 104          98- 112 mEq/L              



             2075-0)                                             

 

             CO2 (test code = 8-9) 22           24- 31 mEq/L L            

 

             Anion gap (test code = 13@ANIO      7- 15 mEq/L               



             03010-6)                                            

 

             BUN (test code = 3094-0) 5 mg/dL      6-20         L            

 

             Creatinine (test code = 0.49 mg/dL   0.5-0.9      L            



             2160-0)                                             

 

             Glucose (test code = 80 mg/dL     65-99                     



             2345-7)                                             

 

             Calcium (test code = 9.1 mg/dL    8.3-10.2                  



             69764-7)                                            

 

             Protein (test code = 6.7 g/dL     6.3-8.3                   

9994.6-7.0



             2885-2)                                             g/dL1



                                                                 dtee1949.4-7.6



                                                                 g/dL7



                                                                 months-6hhpk452

.1-



                                                                 7.3 g/dL1-2



                                                                 mofhg347.6-7.5



                                                                 g/dL>3



                                                                 ssejj256.0-8.0



                                                                 g/tW36-1254183.

3-8



                                                                 .3 g/dL

 

             Albumin (test code = 3.0 g/dL     3.5-5        L            



             1751-7)                                             

 

             A/G ratio (test code = 0.8          0.7-3.8                   



             1759-0)                                             

 

             Alkaline phosphatase 82 U/L                           



             (test code = 6768-6)                                        

 

             AST (test code = 1920-8) 16 U/L       10-35                     

 

             ALT (test code = 1742-6) 29 U/L       5-50                      

 

             Total bilirubin (test 0.3 mg/dL    0-1.2                     



             code = -)                                        

 

             Lab Interpretation (test Abnormal                               



             code = 35944-9)                                        



[facility] MethodistEstimated GFR2019-10-21 05:49:20





             Test Item    Value        Reference Range Interpretation Comments

 

             Estimated GFR (test >=90         mL/min/1.73 m2              [facility]

ory  Units



             code = 5488)                                        InterpretationG

1



                                                                 >=90     Normal

 or highG2



                                                                       60-89    

Mildly



                                                                 csvhvskskE5e   

     45-59



                                                                  Mildly to mode

rately



                                                                 lsgsvaezyK6n   

     30-44



                                                                  Moderately to 

severely



                                                                 decreasedG4    

     15-29



                                                                  Severely decre

asedG5



                                                                   <15      Kidn

ey



                                                                 failureThe eGFR

 was



                                                                 calculated michelle berrios the



                                                                 Chronic Kidney 

Disease



                                                                 Epidemiology Co

llaboration



                                                                 (CKD-EPI) equat

ion.



                                                                 Interpretation 

is based on



                                                                 recommendations

 of the



                                                                 National Kidney



                                                                 Foundation-Kidn

ey Disease



                                                                 Outcomes Qualit

y Initiative



                                                                 (NKF-KDOQI) pub

lished in



                                                                 2014.



Shahzad MethodistCBC with platelet and differential2019-10-21 05:29:14





             Test Item    Value        Reference Range Interpretation Comments

 

             WBC (test code = 57615-7) 5.10         4.50- 11.00 k/uL            

  

 

             RBC (test code = 05412-0) 3.66 m/uL    4.2-5.5      L            

 

             HGB (test code = 718-7) 10.2 g/dL    12-16        L            

 

             HCT (test code = 4544-3) 32.9 %       37-47        L            

 

             MCV (test code = 787-2) 89.9 fL                          

 

             MCH (test code = 785-6) 27.9 pg      27-34                     

 

             MCHC (test code = 786-4) 31.0 g/dL    31-37                     

 

             RDW - SD (test code = 39.3 fL      37-55                     



             31820-8)                                            

 

             MPV (test code = 98630-2) 9.7 fL       8.8-13.2                  

 

             Platelet count (test code 228          150- 400 k/uL              



             = 20239-3)                                          

 

             Nucleated RBC (test code 0.00         /100 WBC                  



             = 05931-2)                                          

 

             Neutrophils (test code = 46.5 %       39-69                     



             67064-8)                                            

 

             Lymphocytes (test code = 42.5 %       25-45                     



             85447-3)                                            

 

             Monocytes (test code = 9.6 %        0-10                      



             26485-3)                                            

 

             Eosinophils (test code = 0.8 %        0-5                       



             57968-6)                                            

 

             Basophils (test code = 0.2 %        0-1                       



             39078-7)                                            

 

             Immature granulocytes 0.4 %        0-1                       "Immat

ure



             (test code = 77455-4)                                        granul

ocytes"



                                                                 (promyelocytes,



                                                                 myelocytes,



                                                                 metamyelocytes)

 

             Lab Interpretation (test Abnormal                               



             code = 34675-2)                                        



Shahzad MethodistCytomegalovirus by PCR2019-10-20 16:16:03





             Test Item    Value        Reference Range Interpretation Comments

 

             Cytomegalovirus by PCR Not-Detected Not-Detected              



             (test code = 5000-5)              IU/mL                     

 

             Cytomegalovirus by PCR See link below                           Ellis

e Number:



             (test code = 1089) for [facility]                            [id number]

652



                          Report                                 



Shahzad MethodistHerpes simplex virus by PCR2019-10-20 13:52:59





             Test Item    Value        Reference Range Interpretation Comments

 

             Herpes virus, PCR Not-Detected Not-Detected              



             (test code =                                        



             76614-1)                                            

 

             Herpes virus, PCR See link below                           Case Num

martín:



             (test code = 1523) for PDF Lab                            [id number]

651



                          Report                                 



Shahzad MethodistIgG synthesis rate study2019-10-20 11:40:39





             Test Item    Value        Reference Range Interpretation Comments

 

             IgG albumin ratio, CSF (test 0.17         0.00-0.23                

 



             code = 1588)                                        

 

             IgG index, CSF (test code = 0.64         0.01-0.63    H            



             37602-8)                                            

 

             IgG synthetic rate (test 2.12         -9.90 - 3.30 mg-day          

    



             code = 99926-3)                                        

 

             Q-albumin ratio, CSF (test 2.97         2.00-6.00                 



             code = 1756-6)                                        

 

             IgG, CSF (test code = 1.61 mg/dL   1-3                       



             2464-6)                                             

 

             Albumin, CSF (test code = <9.50        10-30        L            



             21791-8)                                            

 

             IgG (test code = 2465-3) 844 mg/dL    700-1600                  

 

             Albumin, S (test code = 3200.0 mg/dL 2483-0164    L            



             94462-9)                                            

 

             Lab Interpretation (test Abnormal                               



             code = 54283-5)                                        



Shahzad MethodistECG 12 lead2019-10-20 10:29:25





             Test Item    Value        Reference Range Interpretation Comments

 

             Ventricular rate (test 86                                     



             code = 253)                                         

 

             Atrial rate (test code = 86                                     



             255)                                                

 

             AK interval (test code = 144                                    



             266)                                                

 

             QRSD interval (test code 106                                    



             = 260)                                              

 

             QT interval (test code = 372                                    



             264)                                                

 

             QTC interval (test code 445                                    



             = 265)                                              

 

             P axis 1 (test code = 11                                     



             267)                                                

 

             QRS axis 1 (test code = 8                                      



             268)                                                

 

             T wave axis (test code = 8                                      



             270)                                                

 

             EKG impression (test Normal sinus                           



             code = 273)  rhythm-Nonspecific T                           



                          wave                                   



                          abnormality-Abnormal                           



                          ECG-No previous ECGs                           



                          available-Electronica                           



                          lly Signed By Chris Mar MD ([id number]) on                           



                          10/20/2019 10:29:22                           



                          AM                                     



Shahzad MethodistHemoglobin A1c2019-10-20 10:25:17





             Test Item    Value        Reference Range Interpretation Comments

 

             Hemoglobin A1C (test 5.7 %        4-5.6        H            HbA1c c

utoffs for



             code = 45370-5)                                        diagnosing



                                                                 diabetes:4.0% -

 5.6%



                                                                 = normal5.7% - 

6.4% =



                                                                 increased risk 

for



                                                                 diabetes



                                                                 (prediabetes)9>

=6.5%



                                                                 = lluncawg2Bass

s for



                                                                 glycemic contro

l (ADA



                                                                 2016)< 7.0%  Ta

rget



                                                                 for nonpregnant



                                                                 adults with ernie

betes.



                                                                 More or less



                                                                 stringent targe

ts may



                                                                 be appropriate 

for



                                                                 individual reyes

ents.



                                                                 <7.5%   Target 

for



                                                                 Children and



                                                                 adolescents wit

h type



                                                                 1 diabetes.

 

             Lab Interpretation (test Abnormal                               



             code = 10552-8)                                        



[facility] MethodistUrine culture2019-10-20 07:44:12





             Test Item    Value        Reference Range Interpretation Comments

 

             Urine culture growth after                           Specimen



             isolate (test 24 hours                               InformationSpe

cimen



             code = 03992-6)                                        Source: Urin

eSpecimen



                                                                 Site: Clean cat





Shahzad MethodistHepatitis acute panel2019-10-20 06:11:19





             Test Item    Value        Reference Range Interpretation Comments

 

             Hepatitis A IgM (test code = Non-reactive Non-reactive             

 



             97785-5)                                            

 

             Hepatitis B core IgM (test code Non-reactive Non-reactive          

    



             = 66655-9)                                          

 

             Hepatitis B surface Ag (test Non-reactive Non-reactive             

 



             code = 5195-3)                                        

 

             Hepatitis C Ab (test code = Non-reactive Non-reactive              



             67951-3)                                            



[facility] UnitC[facility] complement [id number] 05:10:56





             Test Item    Value        Reference Range Interpretation Comments

 

             C4 complement (test code = 4498-2) 20 mg/dL     10-40              

       



[facility] [id number] 05:10:56





             Test Item    Value        Reference Range Interpretation Comments

 

             C3 complement (test code = 4485-9) 165 mg/dL                 

       



[facility]2019-10-20 05:10:55





             Test Item    Value        Reference Range Interpretation Comments

 

             Rheumatoid factor (test code = 53063-7) 13           0- 13 IU/mL   

            



[facility] 23:02:20Respiratory 
pathogen panelNegative for all pathogens tested:Negative for AdenovirusNegative 
for Coronavirus ZOB8Ixgddvla for Coronavirus LF13Xznzfswt for Coronavirus 
229ENegative for Coronavirus VQ17Wasffokx for Human MetapneumovirusNegative for 
Rhinovirus/EnterovirusNegative for Influenza ANegative for Influenza 
A/P7Yyegkaxt for Influenza A/Z0Asvgjbmi for Influenza A/H1-2009Negative for 
Influenza BNegative for Parainfluenza Virus 1Negative for Parainfluenza Virus 
2Negative for Parainfluenza Virus 3Negative for Parainfluenza Virus 4Negative 
for Respiratory Syncytial VirusNegative for Bordetella pertussisNegative for 
Chlamydophila pneumoniaeNegative for Mycoplasma pneumoniaeThis real-time PCR 
assaydetects the presence of nucleic acids (RNA or DNA) for the respiratory 
pathogens listed.  A result of "Not-detected" does not exclude the possibility 
of the presence of one or more pathogens at concentrations less than the 
detectable limits of the assay. Comment: Specimen InformationSpecimen Source: Na
resSpecimen Site: Left [facility] MethodistProtein, CSF
2019-10-19 22:19:30





             Test Item    Value        Reference Range Interpretation Comments

 

             Protein, CSF (test code = 2880-3) 19 mg/dL     15-45               

      



[facility] 22[facility]19:29





             Test Item    Value        Reference Range Interpretation Comments

 

             Glucose, CSF (test code = 2342-4) 63 mg/dL     40-70               

      



[facility] with differential2019-10-19 21:12:14





             Test Item    Value        Reference Range Interpretation Comments

 

             CSF tube number (test code Tube 1                                 



             = 5857292)                                          

 

             Color, CSF (test code = Colorless                              



             48071-6)                                            

 

             Appearance, CSF (test code Clear                                  



             = 86379-3)                                          

 

             CSF supernatant (test code Colorless                              



             = 73917-0)                                          

 

             RBC, CSF (test code = >1           0- 1 /CMM                 



             56346-6)                                            

 

             WBC, CSF (test code = 2            0- 5 /CMM                 



             45846-4)                                            

 

             CSF mononuclear cell (test Footnote                               N

O DIFF REQUIRED



             code = 84362-1)                                        



[place] MethodistInfluenza antigen test, reflex negative to RPP2019-10-19 
20:21:52





             Test Item    Value        Reference Range Interpretation Comments

 

             Influenza    Negative for                           Specimen



             antigen (test Influenza A/B                           Information

ecimen



             code = 90390-9) antigen.                               Source: [facility]



                                                                 Site: Left



[facility] Lumbar Puncture by Radiology2019-10-19 17:46:33Hm Interface,
Radiology Results Incoming - 10/19/2019  5:49 PM CDTEXAMINATION: IR LUMBAR 
PUNCTURECLINICAL HISTORY: fevers headaches concern for meningitisCOMPARISON:  
NoneTECHNIQUE: Informed consent and a timeout was performed. The patient's lower
back was prepped and draped in the usual sterile fashion. Fluoroscopy was used 
for image guidance for lumbar puncture.FINDINGS:1% lidocaine was used for local 
anesthesia. Under direct fluoroscopic guidance, access to the thecal sac was 
made with a 22-gaugespinal needle at the L4-5 level. An elevated opening 
pressure of 25 cm H2O was measured. A total of approximately 20 cc of clear 
colorless CSF was removed without difficulty. A normal closing pressure of 12 cm
H2O was subsequently measured.The patient tolerated the procedure well. There 
were no complications. The sample was sent to the laboratory for analysis as 
requested.Total fluoroscopy time was 1second. No exposure images were 
taken.IMPRESSION:Successful fluoroscopic guided lumbar puncture including 
elevated opening pressure of 25 cm H2O and normal closing pressure of 12 cm 
H2O.[facility]-[id number] MethodistPartial thromboplastin time, activated
2019-10-19 15:47:05





             Test Item    Value        Reference Range Interpretation Comments

 

             PTT (test code = 37.5         23.0- 36.0 sec H            PTT thera

peutic range



             37687-1)                                            for unfractiona

maddy



                                                                 heparin is61.0-

112.0



                                                                 seconds which



                                                                 corresponds to



                                                                 Anti-Xa0.3-0.7 

U/ml.

 

             Lab Interpretation Abnormal                               



             (test code = 09580-6)                                        



[facility] MethodistProthrombin time with INR2019-10-19 15:46:18





             Test Item    Value        Reference Range Interpretation Comments

 

             Prothrombin time (test 15.4         11.5- 14.5 sec H            



             code = 5902-2)                                        

 

             INR (test code = 1.2                                    [facility]

[facility]



             14382-3)                                            Normalized Rati

o (INR)



                                                                 is a therapeuti

c



                                                                 monitoring tool

 for



                                                                 patients who ar

e



                                                                 stable on oral



                                                                 anticoagulant t

herapy.



                                                                 An INR of 2.0-3

.0 is



                                                                 suggested for d

eep



                                                                 vein



                                                                 thrombosis/pulm

onary



                                                                 embolism.

 

             Lab Interpretation Abnormal                               



             (test code = 48681-5)                                        



[facility] MethodistANA2019-10-19 11:45:08





             Test Item    Value        Reference Range Interpretation Comments

 

             KUSH screen (test code = 550) Negative     Negative                 

 



[facility] MethodistXR Chest 1 Vw Portable2019-10-19 10:24:41Hm Interface, 
Radiology Results Incoming - 10/19/2019 10:27 AM CDTEXAMINATION:  XR CHEST 1 VW 
PORTABLECLINICAL HISTORY:  FeversCOMPARISON:  NoneIMPRESSION:Normal single view 
chestThe lungs are hypoexpanded but clear.The heart is not enlarged.The bony 
structures are within normal limits.[id number] MethodistHIV Ag/Ab 
combination2019-10-19 08:20:40





             Test Item    Value        Reference Range Interpretation Comments

 

             HIV Ag/Ab combination (test code Non-reactive Non-reactive         

     



             = 5299)                                             



[facility] MethodistInfectious mononucleosis screen2019-10-19 08:17:05





             Test Item    Value        Reference Range Interpretation Comments

 

             Heterophile Ab screen (test code = Negative     Negative           

       



             6425-3)                                             



Shahzad EchevarriaC-reactive protein2019-10-19 06:19:15





             Test Item    Value        Reference Range Interpretation Comments

 

             CRP (test code = 1988-) 21.35 mg/dL  0-0.5        H            

 

             Lab Interpretation (test code = Abnormal                           

    



             07254-5)                                            



[facility] MethodistSedimentation rate2019-10-19 06:10:08





             Test Item    Value        Reference Range Interpretation Comments

 

             Sedimentation rate (test code = 26           0- 20 mm/hr  H        

    



             70160-0)                                            

 

             Lab Interpretation (test code = Abnormal                           

    



             62336-7)                                            



Shahzad EchevarriaVitamin B12 level2019-10-19 03:47:24





             Test Item    Value        Reference Range Interpretation Comments

 

             Vitamin B12 (test 357 pg/mL    211-946                   Significan

t overlap



             code = 2132-9)                                        exists betwee

n normal



                                                                 and deficiency



                                                                 states.However,

 most



                                                                 patients with



                                                                 deficiencies wi

ll have



                                                                 Serum B12    <2

00 pg/mL.



                                                                 



                                                                 



                                                                 



Shahzad EchevarriaFolate zqupw3130-21-47 03:47:24





             Test Item    Value        Reference Range Interpretation Comments

 

             Folate (test code = 2284-8) 8.3 ng/mL    4.8-24.2                  



[facility] MethodistUrinalysis screen and microscopy, with reflex to culture
2019-10-19 02:20:58





             Test Item    Value        Reference Range Interpretation Comments

 

             Specimen site (test code = Clean catch                            



             8075851)                                            

 

             Color, UA (test code = 5778-6) Straw                               

   

 

             Appearance, UA (test code = Hazy                                   



             5767-9)                                             

 

             Specific gravity, UA (test code = 1.008        1.001-1.035         

      



             5811-5)                                             

 

             pH, UA (test code = 5803-2) 6.0          5.0-8.5                   

 

             Protein, UA (test code = 57707-0) Negative     Negative            

      

 

             Glucose, UA (test code = 81362-1) Negative     Negative            

      

 

             Ketones, UA (test code = 2514-8) 1+           Negative     A       

     

 

             Bilirubin, UA (test code = Negative     Negative                  



             5770-3)                                             

 

             Blood, UA (test code = 5794-3) Small        Negative     A         

   

 

             Nitrite, UA (test code = 5802-4) Negative     Negative             

     

 

             Urobilinogen, UA (test code = <2.0         <2.0                    

  



             11837-2)                                            

 

             Leukocyte esterase, UA (test code Negative     Negative            

      



             = 5799-2)                                           

 

             Epithelial cells, UA (test code = 5            /HPF                

      



             5787-7)                                             

 

             WBC, UA (test code = 5821-4) 6            0- 4 /HPF    H           

 

 

             RBC, UA (test code = 31326-9) 2            0- 5 /HPF               

  

 

             Bacteria, UA (test code = Few          None seen                 



             17744-9)                                            

 

             Yeast, UA (test code = 37307-4) Few                       A        

    

 

             Yeast with pseudohyphae, UA (test None seen                        

      



             code = 61971-7)                                        

 

             Lab Interpretation (test code = Abnormal                           

    



             90680-6)                                            



[facility] MethodistFerritin level2019-10-19 01:26:23





             Test Item    Value        Reference Range Interpretation Comments

 

             Ferritin level (test code = 2276-4) 239 ng/mL           H    

        

 

             Lab Interpretation (test code = Abnormal                           

    



             30932-8)                                            



[place] MethodistThyroid stimulating hormone2019-10-19 01:26:23





             Test Item    Value        Reference Range Interpretation Comments

 

             TSH (test code = 3016-3) 0.84         0.27- 4.20 uIU/mL            

  



[facility] MethodistMagnesium level2019-10-19 01:23:05





             Test Item    Value        Reference Range Interpretation Comments

 

             Magnesium (test code = 51223-0) 1.9 mg/dL    1.6-2.6               

    



[facility] MethodistPhosphorus level2019-10-19 01:23:03





             Test Item    Value        Reference Range Interpretation Comments

 

             Phosphorus (test code = 2777-1) 2.9 mg/dL    2.4-4.5               

    



[facility] MethodistTotal iron binding capacity2019-10-19 01:21:50





             Test Item    Value        Reference Range Interpretation Comments

 

             Iron level (test code = 2498-4) 14 ug/dL            L        

    

 

             Iron binding capacity (test code = 268 ug/dL    200-400            

       



             2500-7)                                             

 

             % Saturation (test code = 2502-3) 5.2 %        15-38        L      

      

 

             Lab Interpretation (test code = Abnormal                           

    



             48432-2)                                            



[facility] MethodistLDH2019-10-19 01:21:49





             Test Item    Value        Reference Range Interpretation Comments

 

             LDH (test code = 03516-4) 155 U/L                          



[facility] MethodistHaptoglobin2019-10-19 01:17:49





             Test Item    Value        Reference Range Interpretation Comments

 

             Haptoglobin (test code = 4542-7) 261 mg/dL           H       

     

 

             Lab Interpretation (test code = Abnormal                           

    



             07645-4)                                            



[facility] MethodistLactic acid level2019-10-19 01:14:29





             Test Item    Value        Reference Range Interpretation Comments

 

             Lactic acid (test code = 21904-4) 1.5 mmol/L   0.5-2.2             

      



[facility] MethodistCalculi analysis with wthfm5864-43-90 08:59:45





             Test Item    Value        Reference Range Interpretation Comments

 

             Calculi mass (test 155 mg                                 



             code = 3154-2)                                        

 

             Calculi number 1                                      



             (test code =                                        



             9800-4)                                             

 

             Calculi size (test 5 to 9       mm                        



             code = 9802-0)                                        

 

             Calculi descrption See Note                               Specimen 

consists of



             (test code =                                        a single,



             14618-3)                                            medium,brown/ta

n/whi



                                                                 te, irregular



                                                                 calculus.

 

             Calculi composition See Note                               Calculi 

composed



             (test code =                                        primarily of:60

%



             9795-6)                                             calcium oxalate



                                                                 monohydrate,10%



                                                                 calcium oxalate



                                                                 dihydrate, and3

0%



                                                                 calcium phospha

te



                                                                 (hydroxy- and



                                                                 carbonate-



                                                                 apatite).INTERP

RETIV



                                                                 E INFORMATION:



                                                                 Calculi (Stone)



                                                                 analysisCalculi

 are



                                                                 the products of



                                                                 physiological



                                                                 processes that 

yield



                                                                 crystalline



                                                                 compounds in a



                                                                 matrix of biolo

gical



                                                                 compounds and b

lood.



                                                                  Matrix compone

nts



                                                                 are not reporte

d.



                                                                 The clinically



                                                                 significant



                                                                 crystalline



                                                                 components



                                                                 identified in



                                                                 calculi specime

ns



                                                                 are reported.  

Gross



                                                                 description may

 not



                                                                 be consistent w

ith



                                                                 composition



                                                                 determined by F

TIR



                                                                 analysis.

 

             EER calculi (stone) See Note                               Access A

RUP Enhanced



             analysis and photo                                        Report us

ing either



             (test code =                                        link below: -Di

rect



             96428-6)                                            access:



                                                                 [email or web address]

[email or web address]



                                                                 [email or web address] -Enter



                                                                 Username, Passw

ord:



                                                                 [email or web address] Username: 

[email or web address]



                                                                 Password:



                                                                 [id number]*Performed 

by



                                                                 [facility]

,



                                                                 



                                                                     [address] [place] [address]



                                                                 [phone]



                                                                 



                                                                 [email or web address]

,



                                                                 Dion Martins MD -



                                                                 Lab. Director

 

             SULAIMAN (test code = LPR-06-92109-Bl                           



             SULAIMAN)         eft renal                              



                          calculi                                



[facility] metabolic yxana4610-16-09 07:56:49





             Test Item    Value        Reference Range Interpretation Comments

 

             Sodium (test code = 2951-2) 138          135- 148 mEq/L            

  

 

             Potassium (test code = 2823-3) 3.7          3.5- 5.0 mEq/L         

     

 

             Chloride (test code = 2075-0) 103          98- 112 mEq/L           

   

 

             CO2 (test code = -9) 22           24- 31 mEq/L L            

 

             Anion gap (test code = 33037-3) 13@ANIO      7- 15 mEq/L           

    

 

             BUN (test code = 3094-0) 3 mg/dL      6-20         L            

 

             Creatinine (test code = 2160-0) 0.59 mg/dL   0.5-0.9               

    

 

             Glucose (test code = 2345-7) 94 mg/dL     65-99                    

 

 

             Calcium (test code = 52492-1) 9.0 mg/dL    8.3-10.2                

  

 

             Lab Interpretation (test code = Abnormal                           

    



             97005-5)                                            



[facility] MethodistHemoglobin &amp; pqvldyumfn0936-82-05 07:19:01





             Test Item    Value        Reference Range Interpretation Comments

 

             HGB (test code = 718-7) 10.6 g/dL    12-16        L            

 

             HCT (test code = 4544-3) 33.6 %       37-47        L            

 

             Lab Interpretation (test code = Abnormal                           

    



             70081-7)                                            



[facility] MethodistSurgical pathology xitmacf2646-30-75 16:59:03





             Test Item    Value        Reference Range Interpretation Comments

 

             Case number (test code = [id number]                           



             [id number])                                            

 

             Surgical pathology See link below for                           



             report (test code = PDF Lab Report                           



             2255)                                               

 

             Result status (test code This is Final Report                      

     



             = [id number])   for [id number]                           



[facility] MethodistXR Abdomen 1 Vw Afztxmdx5135-56-41 12:42:35Hm Interface, 
Radiology Results  - 2019 12:45 PM CDTXR ABDOMEN 1 VW 
PORTABLECLINICAL INDICATION:  assess ureteral stent positionCOMPARISON:  
2019IMPRESSION:A left nephroureteral catheter is present with proximal loop 
overlying the region of the renal pelvis, distal loop overlying theurinary 
bladder noting exchange of previous left nephrostomy tube. A drain is present on
the left abdomen as well. No distinct urinary tract calculi are identified. 
Bowel gas pattern is nonspecific with gas within the stomach as well as normal 
caliber large and small bowel loops. Bones are unremarkable for 
age.*[facility]-[id number] CyithsbflSyipnk2168-51-86 07:46:31Vero Simpson 
   9/3/2019  7:52 AMAirwayDate/Time: 9/3/2019 7:37 AMPerformed by: Vero SimpsonAuthorized by: Jorge Perkins DO  Location:  ORUrgency:  
ElectiveResident/CRNA/AA:  Vero SimpsonPerformed by:    
resident/CRNA/AAPreoxygenated with 100% O2: Yes  Mask Ventilation:  Easy maskFin
al Airway Type:  Endotracheal airwayFinal Endotracheal Airway:  ETTCuffed: Yes  
Technique Used:  Direct laryngoscopyInsertion Site:  OralBlade Type:  
MillerLaryngoscope Blade/Videolaryngoscope Blade Size:  2ETT Size (mm):  7.0Cuff
at minimum occlusion pressure: Yes  Measured from:  TeethETT to Teeth (cm):  
21Placement Verified by: CO2 detection, direct visualization and equal breath 
sounds  Laryngoscopic view:  Grade I - full view of glottisRapid Sequence 
Induction (RSI): No  Modified RSI: No  Number of Attempts at Approach:  1 
Atraumatic intubation, dentition unchangedCromona MethodistType and screen
2019 07:29:00





             Test Item    Value        Reference Range Interpretation Comments

 

             ABO grouping (test code = 883-9) O                                 

     

 

             Rh type (test code = 88313-8) POS                                  

  

 

             Antibody screen (gel) (test code = NEG                             

       



             890-4)                                              



[facility] Renal Stone Lmyckkyb9977-30-53 01:42:41Hm Interface, 
Radiology Results 2019  1:45 AM CDTExamination:  CT RENAL STONE
PROTOCOLClinical History: left flank pain after nephrostomy tube 
placementComparison: None.Findings:CT scans are performed using radiation dose 
reduction techniques.  Technical factors are evaluated and adjusted to ensure 
appropriate moderation of exposure.  Automated dose management technology is 
applied to adjust radiation exposure while achieving a diagnostic quality image.
CT imaging was performed withiterative reconstruction techniques and/or 
automated exposure control to reduce radiation dose.CT scan of the abdomen and 
pelvis was performed without intravenous contrast.The liver, spleen, pancreas, g
allbladder, and adrenal glands are unremarkable.Right kidney is within normal 
limits without hydronephrosis or urinary calculus.Left percutaneous nephrostomy 
tube is noted. No hydronephrosis is seen. The tip is in the renal pelvis. Left 
lower pole intrarenal calculus measures 7 mm. No ureteral calculus is seen.The 
appendix is not visualized. No bowel thickening or fat stranding is seen. 
Scattered colonic diverticuli are noted. No bowel dilatation is seen.No free air
or fluid is seen. Urinary bladderis unremarkable.Visualized lung bases are 
clear.IMPRESSION:1. Left percutaneous nephrostomy tube without hydronephrosis or
perinephric fluid collection.2. Left nonobstructing intrarenal calculus.3. Othe
rwise no acute abnormality identified in abdomen or pelvis.[facility]-[id number]
MethodisthCG qualitative, urine eawczb3795-01-88 23:39:16





             Test Item    Value        Reference Range Interpretation Comments

 

             hCG qualitative, Negative                               Sensitivity

 of HCG test:



             urine (test code =                                        25 mIU/mL



             2106-3)                                             



Shahzad Knox Left Nephrostomy Eval/Exchange2019-08-15 18:21:48Hm 
Interface, Radiology Results  - 08/15/2019  6:24 PM CDTEXAMINATION:  IR 
LEFT NEPHROSTOMY EVAL EXCHANGECLINICAL HISTORY:  N13.30 Unspecified 
hydronephrosis, Left HydronephrosisCOMPARISON:  None.PROCEDURE: Genitourinary 
catheter exchangeProcedural PersonnelAttending physician(s): MARQUIS Dobbsre-procedure diagnosis: Hydronephrosis with possible tube dysfunctionPost-
procedure diagnosis: SameIndication: Catheter obstructionAdditional clinical 
history: NoneComplications: No immediate complications.IMPRESSION:  Successful 
left nephrostomy tube exchange.Plan: Discharge home_____________
__________________________________________________PROCEDURE SUMMARY- Target 
organ: Unilateral nativekidney- Antegrade nephrostogram(s) via the existing 
access- Nephrostomy tube exchange- Additional procedure(s): NonePROCEDURE 
DETAILS:Pre-procedureConsent: Informed consent for the procedure including r
isks, benefits and alternatives was obtained and time-out was performed prior to
the procedure.Preparation: The site was prepared and draped using maximal 
sterile barrier technique including cutaneous a
ntisepsis.Anesthesia/sedationLevel of anesthesia/sedation: Moderate sedation 
(conscious sedation)Anesthesia/sedation administered by: Independent trained 
observer under attending supervision with continuous monitoring of the patient's
level of consciousness and physiologic statusTotal intra-service sedation time 
(minutes): 13Left genitourinary catheter exchangeLocal anesthesia was 
administered. Initial nephrostogram was performed. A wire was placed through the
existing tube and it was removed. The new tube was advanced over the wire and 
position was confirmed with contrast injection.Pre-existing genitourinary 
catheter: Cook 8.5 French nephrostomy catheterGenitourinary catheter(s) placed: 
Cook 8.5 French nephrostomy catheter Findings: Mild left hydronephrosis to the 
UPJ. The ureter is nondilated and patent. Findings suggest UPJ 
obstruction.External catheter securement: Non-absorbable suture Additional 
genitourinary system interventionGenitourinary intervention: NoneLocation of 
intervention: Not applicableDevice used: Not applicableDescription of 
intervention: Not applicablePost-intervention findings: Not 
applicableContrastContrast agent: Visipaque 270Contrast volume (mL): 20Radiation
DoseReference air kerma (mGy): 9 Additional DetailsAdditional description of 
procedure: NoneEquipment details:NoneSpecimens removed: NoneEstimated blood loss
(mL): Less than 10Standardized report: [id number]
[facility] MethodistUrinalysis, automated with [id number] 09:12:00





             Test Item    Value        Reference Range Interpretation Comments

 

             Specific gravity, 1.027        1.005-1.030               



             urine (test code =                                        



             2965-2)                                             

 

             pH, urine (test code 6.0          5.0-7.5                   



             = 5803-2)                                           

 

             Color, UA (test code Yellow       Yellow                    



             = 5778-6)                                           

 

             Appearance (test code Clear        Clear                     



             = 5767-9)                                           

 

             WBC esterase, urine Negative     Negative                  



             (test code = 5799-2)                                        

 

             Protein, UA (test Negative     Negative/Trace              



             code = 03342-1)                                        

 

             Glucose, urine (test Negative     Negative                  



             code = 2349-9)                                        

 

             Ketones, UA (test Negative     Negative                  



             code = 2514-8)                                        

 

             Occult blood, urine Trace        Negative     A            



             (test code = 5794-3)                                        

 

             Bilirubin, UA (test Negative     Negative                  



             code = 5770-3)                                        

 

             Urobilinogen, UA 0.2 mg/dL    0.2-1                     



             (test code = 59336-8)                                        

 

             Nitrite, UA (test Negative     Negative                  



             code = 5802-4)                                        

 

             Microscopic  See below:                             Microscopic was



             examination (test                                        indicated 

and was



             code = 09566-0)                                        performed.

 

             SULAIMAN (test code = SULAIMAN) Performed at:  01                           



                          - [facility]                              



                          [address]  [id number]                           



                          Director: Dalton Rice MD, Phone:                           



                          [phone]                             

 

             Lab Interpretation Abnormal                               



             (test code = 49441-4)                                        



[facility] MethodistMicroscopic [id number] 09:12:00





             Test Item    Value        Reference Range Interpretation Comments

 

             WBC, UA (test code = 0-5          0- 5 /hpf                 



             5821-4)                                             

 

             RBC, UA (test code = 3-10         0- 2 /hpf    A            



             31911-2)                                            

 

             Epithelial cells (non 0-10         0- 10 /hpf                



             renal) (test code =                                        



             5787-7)                                             

 

             Casts (test code = Present      None seen /lpf A            



             75639-8)                                            

 

             Cast type (test code = Hyaline casts N/A                       



             01895-4)                                            

 

             Crystals, urine (test Present      N/A          A            



             code = 5783-6)                                        

 

             Crystal type (test code = Calcium Oxalate N/A                      

 



             5782-8)                                             

 

             Mucus, UA (test code = Present      Not Estab.                



             8247-9)                                             

 

             Bacteria, UA (test code = None seen    None seen/Few              



             5769-5)                                             

 

             SULAIMAN (test code = SULAIMAN) Performed at:  [facility]                          

 



                          [facility]                            



                          [id number]                           



                          Director: Dalton Rice MD, Phone:                             



                          [phone]                             

 

             Lab Interpretation (test Abnormal                               



             code = 99982-0)                                        



[facility] MethodistFL Pyelogram [id number] 16:08:04Hm Interface, 
Radiology Results 2019  4:11 PM CDTIMPRESSION:  C-arm 
Fluoroscopy under 1 hour was provided in the OR for the referring physician.  A 
radiologist was not present during the procedure. Refer to the Operative report 
issued by the performing provider for procedure details.[facility] 
[id number] 21:08:13





             Test Item    Value        Reference Range Interpretation Comments

 

             POC glucose (test code = 107 mg/dL    65-99        H            No 

Action



             46190-6)                                            NeededMeter ID:



                                                                 [id number]

tor:



                                                                 Julia hernandez

 

             Lab Interpretation (test Abnormal                               



             code = 48436-7)                                        



[facility] MethodistIR Nephrostomy Insertion [id number] 17:41:34Hm Interface, 
Radiology Results 2019  5:44 PM CDTPROCEDURE: Left percutaneous
nephrostomy catheter placementProcedural PersonnelAttending physician(s): Castillo Ochoa physician(s): NoneResident physician(s): NoneAdvanrupa practice 
provider(s): NonePre-procedure diagnosis: NephrolithiasisPost-procedure 
diagnosis: SameIndication: Antegrade nephrostogram and access for possible li
thotripsyAdditional clinical history: NoneComplications: No immediate 
complications.IMPRESSION:1.  Percutaneous placement of a 8.5 French drainage 
catheter into interpole of the left kidney.2.  Antegrade nephrostogram from the 
proximal third of the ureter demonstrates expected flow of contrast into the 
urinary bladder with no evidence of ureteral obstruction.Plan: Left nephrostomy 
tube to gravity bagdrainage. Access may be used for possible future lithotripsy 
per urology.____________________________
__________________________________________PROCEDURE SUMMARY:- Left nephrostomy 
catheter placement under ultrasound and fluoroscopic guidance- Additional 
procedure(s): NonePROCEDURE DETAILS:Pre-procedureConsent: Informed consent for 
the procedure including risks, benefits and alternatives was obtained and time-
out was performed prior to the procedure.Preparation: The left flank site was 
prepared and draped using maximal sterile barrier technique including cutaneous 
antisepsis.Anesthesia/sedationLevelof anesthesia/sedation: Moderate sedation 
(conscious sedation)Anesthesia/sedation administered by: Independent trained 
observer under attending supervision with continuous monitoring of the patient's
level of consciousness and physiologic statusTotal intra-service sedation time 
(minutes): 45Drainage catheter placementThe patient was positioned prone. 
Initial imaging was performed. Local anesthesia was administered. Initially, the
22-gauge needle was used to attempt access to the lower pole of the left kidney 
where the echogenic calculus was noted. However after attempts to access the 
inferior pole calyx unsuccessfully, the 22-gauge needle was used to subsequently
accessed the left interpole calyx.After a Nitrex wire was advanced into the left
ureter, the needle was exchanged for a AccuStick catheter the inner dilator was 
removed and the Nitrex wire was exchanged for a short Amplatz wire over which 
the 8.5 French nephrostomy catheter was placed after dilation with an 8 French 
dilator. Position of the nephrostomy catheter within the left renal pelvis was 
confirmed. - Initial imaging findings: Moderate left hydronephrosis is noted. 
Antegrade nephrostogram demonstrated free flow of contrast through the left 
ureter into the urinary bladder. -Left nephrostomy catheter placed: 8.5 French 
multipurpose drainage catheter- External catheter securement: Non-absorbable 
suture- Post-drainage imaging findings: Pigtail within the left renal pelvis. 
There is minimal flow to the proximal left ureter from the renal pelvis when 
final nephrostogram was performed which may be secondary to ureteral peristalsis
or possible stenosis at the ureteropelvic junction.Radiation DoseReference air 
kerma (mGy): 4 Additional DetailsAdditional description of procedure: 
NoneEquipment details: NoneSpecimens removed: Aspirated fluid was not sent for 
analysis.Estimated blood loss (mL): Less than 10Standardized report: Carlos Manuel Gutierrez_v2AttestationSigner name: Castillo Jones M.D.I attest that I was
present for the entire procedure. I reviewed the stored images and agree with 
the report as written.Shahzad EchevarriaNM Renal Scan Wflow Funct Sgl Int W/Mag 3
2019 15:57:49Hm Interface, Radiology Results  - 2019  4:01 
PM CDTPROCEDURE: NM RENAL SCAN WFLOW FUNCT SGL INT W MAG 3INDICATION: N13.30 
Unspecified hydronephrosis, left hydronephrosis - suspect left UPJ 
obstructionCOMPARISON: No prior comparison examinations. TECHNIQUE: The patient 
was injected with 10 mCi of Tc-99m MAG-3, IV. Dynamic images of the abdomen were
acquired for 40 minutes. At 20 minutes, 34 mg of IV lasix was administered.   
FINDINGS:Perfusion to the bilateral kidneys is preserved. Tracer uptake, 
extraction, and excretion is preserved bilaterally. There is some pooling of 
excreted tracer in the renal pelves/collecting systems more pronounced on the 
left. Urinary clearance proceeds without significant impediment following 
diuretic administration.SPLIT FUNCTION:Left kidney = 47%Right kidney = 
53%IMPRESSION:   1.  Bilateral kidneys demonstrate preserved perfusion and 
overall function.There is pooling of excreted tracer in the renal 
pelves/collecting system, more pronounced on the left, that clears with diuretic
indicating the absence of any physiologically significant urinary obstruction.2.
 Split function as quantified above.[facility]-[id number] [facility] 
urinalysis [id number] 09:25:00





             Test Item    Value        Reference Range Interpretation Comments

 

             Color urine, POC (test code = Yellow                               

  



             4619029)                                            

 

             Clarity urine, POC (test code Clear                                

  



             = 3921277)                                          

 

             Glucose urine, POC (test code Negative     Negative                

  



             = 5504657)                                          

 

             Bilirubin urine, POC (test Negative     Negative                  



             code = 8881533)                                        

 

             Ketones urine, POC (test code Negative     Negative                

  



             = 6463648)                                          

 

             Specific gravity urine, POC >/=1.030     1.005-1.030               



             (test code = 7847123)                                        

 

             Blood urine, POC (test code = Large        Negative     A          

  



             5087593)                                            

 

             pH urine, POC (test code = 5.5          5.0, 5.5, 6.0, 6.5,        

      



             2612692)                  7.0, 7.5, 8.0, 8.5              

 

             Protein urine, POC (test code Trace        Negative     A          

  



             = 1512639)                                          

 

             Urobilinogen urine, POC (test <2.0         <2.0                    

  



             code = 6376368)                                        

 

             Nitrite urine, POC (test code Negative     Negative                

  



             = 0091781)                                          

 

             Leukocyte esterase urine, POC Negative     Negative                

  



             (test code = 4415565)                                        

 

             Lab Interpretation (test code Abnormal                             

  



             = 72175-6)                                          



[facility] BLADDER SCAN/[id number] 09:25:00





             Test Item    Value        Reference Range Interpretation Comments

 

             PVR volume (test code = 5766) 0ml                                  

  



[facility]

## 2020-05-31 NOTE — RAD REPORT
EXAM DESCRIPTION:  CT - Stone Protocol - 5/31/2020 6:26 am

 

CLINICAL HISTORY:  FLANK PAIN

 

COMPARISON:  10/18/2019

 

TECHNIQUE:  CT of the abdomen and pelvis without IV contrast. Evaluation of the solid organs and vasc
ulature is suboptimal due to lack of IV contrast.

 

FINDINGS:  Lung Bases: The visualized   lung bases are clear.

Bones: No destructive bone lesions identified.

Abdomen:

Liver: The liver has normal size and density.

Gallbladder: No calcified gallstones.

Spleen, Pancreas, and Adrenal Glands:   The spleen, pancreas, and adrenal glands are unremarkable.

Kidneys: Mild left hydronephrosis without obstructing calculus identified. No right-sided hydronephro
sis. 0.7 cm nonobstructing inferior pole left renal calculus.

Vasculature: The aorta and IVC have normal caliber and position.

Stomach:   The stomach and duodenum have normal course.

Other:   No free intraperitoneal air.   No free fluid or lymphadenopathy.

Pelvis:

Bladder:   Urinary bladder is unremarkable.

Bowel:   Mild wall thickening of loops of small bowel in the upper abdomen.

Appendix:   Normal appendix.

Pelvis: Uterus is not enlarged.

 

IMPRESSION:  1. Mild left hydronephrosis. No obstructing ureteral calculus identified. This could be 
seen with recently passed left ureteral calculus or may be secondary to UPJ stenosis/stricture.

2. Mild wall thickening of the proximal loops of small bowel. This could be seen with nonspecific ent
eritis.

3. Nonobstructing 0.7 cm inferior pole left renal calculus.

This exam was performed according to our departmental dose-optimization program, which includes autom
ated exposure control, adjustment of the mA and/or kV according to patient size and/or use of iterati
ve reconstruction technique.

 

Electronically signed by:   Salomón Ramírez   5/31/2020 3:52 AM CDT Workstation: [id number]

 

 

 

Due to temporary technical issues with the PACS/Fluency reporting system, reports are being signed by
 the in house radiologist without review as a courtesy to ensure prompt reporting. The interpreting r
adiologist is fully responsible for the content of the report.

## 2020-05-31 NOTE — XMS REPORT
Clinical Summary

                             Created on:May 31, 2020



Patient:Selin Cui

Sex:Female

:1995

External Reference #:[id number]





Demographics







                          Address                   [address] [place] [address]

 

                          Home Phone                [phone]

 

                          Mobile Phone              [phone]

 

                          Email Address             [email or web address]

 

                          Preferred Language        English

 

                          Marital Status            Single

 

                          Denominational Affiliation     Unknown

 

                          Race                      White

 

                          Ethnic Group               or 









Author







                          Organization              [employer]

 

                          Address                   [address] [place] [address]









Support







                Name            Relationship    Address         Phone

 

                Rebeca Cui    Parent          Unavailable     [phone]

 

                Geoff Cui    Parent          [address] [facility]    [phone]



                                                [place] [address] 









Care Team Providers







                    Name                Role                Phone

 

                    Asked,  Pcp         Primary Care Provider Unavailable









Allergies







             Active Allergy Reactions    Severity     Noted Date   Comments

 

             Hydrocodone  GI Intolerance              2019   Nausea







Medications







          Medication Sig       Dispensed Refills   Start     End       Status



                                                  Date      Date      

 

          oxybutynin XL Take 1 tablet (5 30 tablet 0           

 Active



          (DITROPAN XL) 5 MG mg total) by                     9         020     

  



          24 hr tablet mouth daily.                                         

 

          acetaminophen-codei TK 2 TS PO Q 6 H           0         12/10/201 07/

16/2   Discontinued



          ne (TYLENOL WITH PRF PAIN                      8         019       (Th

erapy



          CODEINE #3) 300-30 CONTROL.                                          c

ompleted)



          mg per tablet                                                   

 

          ibuprofen TK 1 T PO Q 8 H           0         12/10/201 07/16/2   Disc

ontinued



          (ADVIL,MOTRIN) 800 PRF PAIN                      8         019       (

Therapy



          MG tablet CONTROL.                                          completed)

 

          docusate sodium Take 1 capsule 14 capsule 0          

  



          (COLACE) 100 MG (100 mg total)                     9         019      

 



          capsule   by mouth 2 (two)                                         



                    times a day as                                         



                    needed for                                         



                    constipation for                                         



                    up to 7 days.                                         

 

          ciprofloxacin Take 1 tablet 6 tablet  0            Ex

pired



          (CIPRO) 500 MG (500 mg total)                     9         019       



          tablet    by mouth 2 (two)                                         



                    times a day for                                         



                    3 days.                                           

 

          acetaminophen Take 2 tablets 28 tablet 0            E

xpired



          (TYLENOL) 325 MG (650 mg total)                     9         019     

  



          tablet    by mouth every 6                                         



                    (six) hours as                                         



                    needed for                                         



                    moderate pain                                         



                    for up to 7                                         



                    days.                                             

 

          traMADol (ULTRAM) Take 1 tablet 10 tablet 0          

  



          50 mg tablet (50 mg total) by                     9         019       



                    mouth every 6                                         



                    (six) hours as                                         



                    needed for                                         



                    severe pain for                                         



                    up to 7 days.                                         

 

          cefpodoxime TK 2 TS PO Q 12           0            Di

scontinued



          (VANTIN) 100 MG H FOR 10 DAYS WF                     9         019    

   (Stop Taking at



          tablet                                                      Discharge)

 

          nitrofurantoin, TK ONE C PO BID           0          

  Discontinued



          macrocrystal-monohy                               9         019       

(Stop Taking at



          drate, (MACROBID)                                                   Di

antoniorsushma)



          100 MG capsule                                                   

 

          acetaminophen-codei TK 1 T PO Q 6 H           0            Discontinued



          ne (TYLENOL WITH PRN P                         9         019       (St

op Taking at



          CODEINE #3) 300-30                                                   D

ischarge)



          mg per tablet                                                   

 

          acetaminophen-codei Take 1-2 tablets 30 tablet 0            



          ne (TYLENOL WITH by mouth every 6                     9         019   

    



          CODEINE #3) 300-30 (six) hours as                                     

    



          mg per tablet needed for                                         



                    moderate pain                                         



                    for up to 30                                         



                    doses.                                            

 

          docusate sodium Take 1 capsule 60 capsule 0          

  Discontinued



          (COLACE) 100 MG (100 mg total)                     9         019      

 (Stop Taking at



          capsule   by mouth 2 (two)                                         Dis

charge)



                    times a day for                                         



                    30 days.                                          

 

          cephalexin (KEFLEX) Take 500 mg by           0                      Discontinued



          500 MG capsule mouth 2 (two)                               019       (

Stop Taking at



                    times a day.                                         Dischar

ge)

 

          levoFLOXacin Take 1 tablet 13 tablet 0            Dis

continued



          (LEVAQUIN) 750 MG (750 mg total)                     9         019    

   (Stop Taking at



          tablet    by mouth daily                                         Disch

arge)



                    for 13 days.                                         

 

          docusate sodium Take 1 capsule 60 capsule 0         09/05/201 10/05/2 

  



          (COLACE) 100 MG (100 mg total)                     9         019      

 



          capsule   by mouth 2 (two)                                         



                    times a day for                                         



                    30 days.                                          

 

          tamsulosin (FLOMAX) Take 1 capsule 30 capsule 0         09/05/201 10/0

5/2   



          0.4 mg capsule (0.4 mg total)                     9         019       



                    by mouth daily                                         



                    as needed (stent                                         



                    discomfort) for                                         



                    up to 30 days.                                         

 

          traMADol (ULTRAM) Take 1 tablet 15 tablet 0          

  Discontinued



          50 mg     (50 mg total) by                     9         019       



          tabletIndications: mouth every 6                                      

   



          acute pain (six) hours as                                         



                    needed for                                         



                    severe pain for                                         



                    up to 15 doses                                         



                    .Acute Pain.                                         

 

          traMADol (ULTRAM) Take 1 tablet 30 tablet 0          

  



          50 mg     (50 mg total) by                     9         019       



          tabletIndications: mouth every 6                                      

   



          acute pain (six) hours as                                         



                    needed for                                         



                    severe pain for                                         



                    up to 30 doses                                         



                    .Acute Pain.                                         

 

          phenazopyridine Take 1 tablet 50 tablet 3            





          (PYRIDIUM) 100 MG (100 mg total)                     9         019    

   



          tablet    by mouth 3                                         



                    (three) times a                                         



                    day for 3 days.                                         

 

          acetaminophen Take 2 tablets 30 tablet 0         10/21/201 11/20/2   E

xpired



          (TYLENOL) 325 MG (650 mg total)                     9         019     

  



          tablet    by mouth every 6                                         



                    (six) hours as                                         



                    needed for mild                                         



                    pain for up to                                         



                    30 days.                                          

 

          butalbital-acetamin Take 1 tablet by 30 tablet 0         10/21/201 11/

20/2   



          ophen-caff mouth every 4                     9         019       



          (FIORICET) (four) hours as                                         



          -40 mg per needed for                                         



          tablet    headaches for up                                         



                    to 30 days.                                         

 

          ferrous sulfate 325 Take 1 tablet 60 tablet 0         10/21/201 11/20/

2   



          (65 FE) MG tablet (325 mg total)                     9         019    

   



                    by mouth 2 (two)                                         



                    times a day with                                         



                    meals for 30                                         



                    days.                                             

 

          methocarbamol Take 1 tablet 120 tablet 0         10/21/201 11/20/2   E

xpired



          (ROBAXIN) 500 MG (500 mg total)                     9         019     

  



          tablet    by mouth 4                                         



                    (four) times a                                         



                    day for 30 days.                                         

 

          ondansetron ODT Take 1 tablet (4 30 tablet 0         10/21/201 11/20/2

   



          (ZOFRAN-ODT) 4 MG mg total) by                     9         019      

 



          disintegrating mouth every 8                                         



          tablet    (eight) hours as                                         



                    needed for                                         



                    nausea or                                         



                    vomiting for up                                         



                    to 30 days.                                         







Active Problems







                          Problem                   Noted Date

 

                          Fever                     10/18/2019

 

                          UPJ obstruction, congenital 2019

 

                          Complicated UTI (urinary tract infection) 2019

 

                          Hydronephrosis, left      2019

 

                          Left ureteral stone       2017







Encounters







             Date         Type         Specialty    Care Team    Description

 

             2020   Travel                                 

 

             2019   Office Visit Urology      Amalia Hill, Hydronephros

is of left



                                                    MD           kidney (Primary

 Dx)

 

             10/21/2019   Telephone    Urology      Amina Benito MA           

 

             10/18/2019 - Hospital Encounter General Internal Clewing,     UPJ o

bstruction, 

congenital (Primary Dx);



                10/21/2019                      Medicine        MD Katt



                                        Fever, unspecified fever cause



                                                    Brian Johnson DO           

 

             10/18/2019   Intake       Access                    

 

             10/17/2019   Procedure visit Urology      Amalia Hill Ureterope

lvic junction



                                                    MD           (UPJ) obstructi

on,



                                                                 left (Primary D

x)

 

             10/02/2019   Refill       Urology      Amalia Hill MD           

 

             2019   Telephone    Urology      Amalia Hill MD           

 

             2019   Telephone    Urology      Amina Benito Ureteropelvic

 junction



                                                    MA           (UPJ) obstructi

on,



                                                                 left (Primary D

x)

 

             2019   Office Visit Urology      Amalia Hill Ureteropelvi

c junction



                                                    MD           (UPJ) obstructi

on,



                                                                 left (Primary D

x)

 

             2019   Telephone    Urology      Amalia Hill Ureteropelvi

c junction



                                                    MD           (UPJ) obstructi

on,



                                                                 left (Primary D

x)

 

             2019   Telephone    Urology      Amalia Hill MD           

 

             2019   Surgery      Urology      Amalia Hill, GERMAN ROBOTIC

 ASSISTED



                                                    MD           LAPARSCOPIC



                                                                 PYELOPLASTY, LE

FT



                                                                 PYELOLITHOTOMY,

 LEFT



                                                                 PERCUTANEOUS TU

BE



                                                                 REMOVAL, LEFT S

TENT



                                                                 PLACEMENT

 

             2019   Anesthesia Event Urology      Jorge Perkins DO Cheng, Nancy, MD           

 

             2019 - Hospital Encounter General Internal Amalia Hill, UP

J 

obstruction, congenital;



             2019                Medicine     MD           Renal stone;



                                                                 Ureteral stent 

retained

 

             2019   Documentation General Internal Anuradha Tejada 



                                       Medicine                  

 

             2019   Telephone    UrologAmalia Willson MD           

 

             2019 - Hospital Encounter General Internal Tyler,     Markie

icated UTI 

(urinary tract infection) (Primary Dx);



             2019                Medicine     Rex     Left flank pain



                                                                DO Nuvia Tracy Arusha Amod, MD     

 

             2019   Telephone    Urology      Amalia Hill MD           

 

             08/15/2019   Hospital Encounter Radiology    Toshia Tran, Hydrone

phrMD france           unspecified



                                                                 hydronephrosis 

type

 

             08/15/2019   Transcribe Orders Urology      Annamaria Vázquez Hydronep

ARI shabazz MD       unspecified



                                                                 hydronephrosis 

type



                                                                 (Primary Dx)

 

             08/15/2019   Orders Only  Urology      Segundo Tran, Hydronephrosis

,



                                                    MD           unspecified



                                                                 hydronephrosis 

type



                                                                 (Primary Dx)

 

             08/15/2019   Orders Only  Urology      Amalia Hill, Nephrolithia

sis (Primary Dx);



                                                    MD           Hydronephrosis,

 unspecified hydronephrosis type

 

             2019   Office Visit Urology      Amalia Hill, Nephrolithia

sis (Primary Dx);



                                                    MD           Hydronephrosis,

 unspecified hydronephrosis type;



                                                                 Flank pain;



                                                                 Ureteropelvic j

unction (UPJ) obstruction, left

 

             2019   Surgery      Urology      Amalia Hill, CYSTOSCOPY W

/ LEFT



                                                    MD           ANTEGRADE AND



                                                                 RETROGRADE PYEL

OGRAM

 

             2019   Anesthesia Event Urology      Prema Pittman MD Manning, Gary M., MD       

 

             2019   Hospital Encounter Urology      Amalia Hill MD           

 

             2019   Office Visit Urology      Amalia Hill, Hydronephros

leticia,



                                                    MD           unspecified



                                                                 hydronephrosis 

type



                                                                 (Primary Dx)

 

             2019   Telephone    Urology      Amina Benito MA           

 

             2019   Telephone    Urology      Maria Isabel Ac MA           

 

             2019   Telephone    Urology      Amina Benito MA           

 

             2019   Office Visit Urology      Amalia Hill, Hydronephros

is of left



                                                    MD           kidney (Primary

 Dx)

 

             2019 - Hospital Encounter General Internal Amalia Hill, Hy

dronephrosis,

left



             2019                Medicine     MD           

 

             07/15/2019   Telephone    Urology      Amalia Hill MD           

 

             2019   Hospital Encounter Radiology    Amalia Hill MD           

 

             2019   Telephone    Urology      Maria Isabel Ac MA           

 

             2019   Office Visit UrologAmalia Willson, Hydronephros

is, left (Primary 

Dx);



                                                    MD           Nephrolithiasis

 

             2019   Telephone    UrologAnnamaria Connors MD       

 

             2019   Hospital Encounter Radiology    Annamaria Vázquez Hydrone

phrosis, left



                                                    MD ARI       

 

             2019   Office Visit Urology      Annamaria Vázquez Kidney stones

 (Primary Dx);



                                                    MD ARI       Hydronephrosis,

 left



after 2019



Family History







                Medical History Relation        Name            Comments

 

                Diabetes        Father                          

 

                Seizures        Mother                          









                Relation        Name            Status          Comments

 

                Father                          Alive           

 

                Mother                                  







Social History







             Tobacco Use  Types        Packs/Day    Years Used   Date

 

             Never Smoker                                        









                Smokeless Tobacco: Never Used                                 









                Alcohol Use     Drinks/Week     oz/Week         Comments

 

                Yes                                             8 drinks on week

ends









                    Alcohol Habits      Answer              Date Recorded

 

                    How often do you have a drink containing alcohol? 2-4 times 

a month   10/19/2019

 

                    How many drinks containing alcohol do you have on a Not aske

d           



                    typical day when you are drinking?                     

 

                    How often do you have six or more drinks on one Not asked   

        



                    occasion?                               









                          Sex Assigned at Birth     Date Recorded

 

                          Not on file               









                    Job Start Date      Occupation          Industry

 

                    Not on file         Not on file         Not on file









                    Travel History      Travel Start        Travel End









                                        No recent travel history available.







Last Filed Vital Signs







                Vital Sign      Reading         Time Taken      Comments

 

                Blood Pressure  107/71          10/21/2019 12:13 PM CDT 

 

                Pulse           73              10/21/2019 12:13 PM CDT 

 

                Temperature     36.4 C (97.5 F) 10/21/2019 12:13 PM CDT 

 

                Respiratory Rate 16              10/21/2019 12:13 PM CDT 

 

                Oxygen Saturation 99%             10/21/2019 12:13 PM CDT 

 

                Inhaled Oxygen Concentration -               -               

 

                Weight          64.2 kg (141 lb 8 oz) 2019  6:23 AM CDT 

 

                Height          157.5 cm (5' 2") 2019  6:23 AM CDT 

 

                Body Mass Index 25.88           2019  6:23 AM CDT 







Plan of Treatment







             Date         Type         Specialty    Care Team    Description

 

                2020      Office Visit    Urology         Amalia Hill MD



                                        



                                                                [address] [facility]



                                        



                                                                [address]



                                        



                                                                [place] [address]

[address]



                                        



                                                                [phone] (Fax) 









                Health Maintenance Due Date        Last Done       Comments

 

                CHLAMYDIA SCREENING 12/15/2012                      

 

                CERVICAL CANCER SCREENING 12/15/2017                      

 

                INFLUENZA VACCINE 2020                      







Implants







          Implanted Type      Area       Device    Shelf     Model /



                                                  Identifier Expiration Serial /



                                                            Date      Lot

 

                Clip Ligtng Hem-[employer]-J[employer] End[employer] Aplr Ply Lg - [id number] [employer]

l Clips   N/A:            

WECK CLOSURE                                                [id number] /



                          Implanted: Qty: 1 on 2019 by Amalia Hill MD 

at [facility] for 

Internal     N/A          SYSTEMS                                 /



                    Use                                               

 

                Stent Uretl S-Flx Kwart Retro-Inject 6fr 24cm - [id number] Perip

eral or                   [facility] 

UROLOGICAL                              2020          [id number] /



                          Implanted: Qty: 1 on 2017 by Annamaria Vázquez MD at [facility] Biliary

                                                                  /



                    Stents                                            [id number]

 

                Stent Uretl S-Flx Kwart Retro-Inject 6fr 24cm - [id number] Periph

eral or   N/A:            

[facility] UROLOGICAL                         2020          [id number] /



          Implanted: 05/15/2017 at [facility] (Quantity not on file) Biliary  

 N/A                                      /



                    Stents                                            [id number]

 

                Catheter Uretl 6/10fr 50cm Flx-Tp Dlmn Std Accs - [id number] Surg

ical        N/A:            [place] 

UROLOGICAL                                                  [id number] /



          Implanted: 05/15/2017 at [facility] (Quantity not on file) Implants;

 N/A                                      /



                    Expanders;                                         



                    Extenders;                                         



                    Surgical                                          



                    Wires                                             

 

                    Catheter Uretrl Flxble Tip Open Ended Flexima 5fr 70cm - Log

[id number] Urological          

N/A:            [facility] [place] UROLOGY                     2022      [id number] /



                          Implanted: Qty: 1 on 2019 by Amalia Hill MD 

at [facility] Implants 

or           N/A                                                  /



                    Sets                                              [id number]

 

                    Stent Uretl Polrs Ult 2drmtr 6fr 26cm Hydroplus W/O Gw - Log

[id number] Urological          

N/A:            [facility] [place] UROLOGY                     2022      [id number] /



                          Implanted: Qty: 1 on 2019 by Amalia Hill MD 

at [facility] Implants 

or           N/A                                                  /



                    Sets                                              [id number]

 

                    Catheter Uretrl Flxble Tip Open Ended Flexima 5fr 70cm - Log

[id number] Urological          

N/A:            [facility] [place] [place] UROLOGY                     2022      [id number] /



                          Implanted: Qty: 1 on 2019 by Amalia Hill MD 

at [facility] Implants 

or           N/A                                                  /



                    Sets                                              [id number]







Procedures







             Procedure Name Priority     Date/Time    Associated   Comments



                                                    Diagnosis    

 

             US RENAL     STAT         10/21/2019  9:17              Results for

 this



                                       AM CDT                    procedure are i

n



                                                                 the results



                                                                 section.

 

             HC COMPLETE BLD COUNT Routine      10/21/2019  4:10              Re

sults for this



             W/AUTO DIFF               AM CDT                    procedure are i

n



                                                                 the results



                                                                 section.

 

             ESTIMATED GFR Routine      10/21/2019  4:00              Results fo

r this



                                       AM CDT                    procedure are i

n



                                                                 the results



                                                                 section.

 

             COMPREHENSIVE METABOLIC Routine      10/21/2019  4:00              

Results for this



             PANEL                     AM CDT                    procedure are i

n



                                                                 the results



                                                                 section.

 

             ESTIMATED GFR Routine      10/20/2019  4:00              Results fo

r this



                                       AM CDT                    procedure are i

n



                                                                 the results



                                                                 section.

 

             C3 COMPLEMENT COMPONENT Routine      10/20/2019  4:00              

Results for this



                                       AM CDT                    procedure are i

n



                                                                 the results



                                                                 section.

 

             C4 COMPLEMENT COMPONENT Routine      10/20/2019  4:00              

Results for this



                                       AM CDT                    procedure are i

n



                                                                 the results



                                                                 section.

 

             RHEUMATOID FACTOR Routine      10/20/2019  4:00              Result

s for this



                                       AM CDT                    procedure are i

n



                                                                 the results



                                                                 section.

 

             HEPATITIS ACUTE PANEL Routine      10/20/2019  4:00              Re

sults for this



                                       AM CDT                    procedure are i

n



                                                                 the results



                                                                 section.

 

             HSV TYPE 1/2 COMBINED Routine      10/20/2019  4:00              Re

sults for this



             AB, IGM                   AM CDT                    procedure are i

n



                                                                 the results



                                                                 section.

 

             CYTOMEG IGG/IGM Routine      10/20/2019  4:00              Results 

for this



                                       AM CDT                    procedure are i

n



                                                                 the results



                                                                 section.

 

             RICKETTSIA TYPHI (TYPHUS Routine      10/20/2019  4:00             

 Results for this



             FEVER) ABS IGG/IGM              AM CDT                    procedure

 are in



                                                                 the results



                                                                 section.

 

             RICKETTSIA RICKETTSII Routine      10/20/2019  4:00              Re

sults for this



             (RMSF) ABS IGG/IGM              AM CDT                    procedure

 are in



                                                                 the results



                                                                 section.

 

             COMPREHENSIVE METABOLIC Routine      10/20/2019  4:00              

Results for this



             PANEL                     AM CDT                    procedure are i

n



                                                                 the results



                                                                 section.

 

             HERPES SIMPLEX VIRUS BY Routine      10/20/2019  3:50              

Results for this



             PCR                       AM CDT                    procedure are i

n



                                                                 the results



                                                                 section.

 

             CYTOMEGALOVIRUS BY PCR Routine      10/20/2019  3:50              R

esults for this



                                       AM CDT                    procedure are i

n



                                                                 the results



                                                                 section.

 

             STEPAN BARR VIRUS (EBV) Routine      10/20/2019  3:50             

 Results for this



             BY PCR                    AM CDT                    procedure are i

n



                                                                 the results



                                                                 section.

 

             HEMOGLOBIN A1C Routine      10/20/2019  3:50              Results f

or this



                                       AM CDT                    procedure are i

n



                                                                 the results



                                                                 section.

 

             HC COMPLETE BLD COUNT Routine      10/20/2019  3:50              Re

sults for this



             W/AUTO DIFF               AM CDT                    procedure are i

n



                                                                 the results



                                                                 section.

 

             AFB CULTURE  Routine      10/19/2019  6:07              Results for

 this



                                       PM CDT                    procedure are i

n



                                                                 the results



                                                                 section.

 

             FUNGUS CULTURE Routine      10/19/2019  6:07              Results f

or this



                                       PM CDT                    procedure are i

n



                                                                 the results



                                                                 section.

 

             IR LUMBAR PUNCTURE STAT         10/19/2019  5:12              Resul

ts for this



                                       PM CDT                    procedure are i

n



                                                                 the results



                                                                 section.

 

             TOXOPLASMA GONDII BY PCR Routine      10/19/2019  5:07             

 Results for this



                                       PM CDT                    procedure are i

n



                                                                 the results



                                                                 section.

 

             WEST NILE VIRUS BY PCR, Routine      10/19/2019  5:07              

Results for this



             CSF                       PM CDT                    procedure are i

n



                                                                 the results



                                                                 section.

 

             VARICELLA ZOSTER BY PCR Routine      10/19/2019  5:07              

Results for this



                                       PM CDT                    procedure are i

n



                                                                 the results



                                                                 section.

 

             RANDY VIRUS, QUANTITATIVE Routine      10/19/2019  5:07              R

esults for this



             PCR                       PM CDT                    procedure are i

n



                                                                 the results



                                                                 section.

 

             HERPES SIMPLEX VIRUS BY Routine      10/19/2019  5:07              

Results for this



             PCR                       PM CDT                    procedure are i

n



                                                                 the results



                                                                 section.

 

             STEPAN BARR VIRUS (EBV) Routine      10/19/2019  5:07             

 Results for this



             BY PCR                    PM CDT                    procedure are i

n



                                                                 the results



                                                                 section.

 

             ENTEROVIRUS BY PCR Routine      10/19/2019  5:07              Resul

ts for this



                                       PM CDT                    procedure are i

n



                                                                 the results



                                                                 section.

 

             CYTOMEGALOVIRUS BY PCR Routine      10/19/2019  5:07              R

esults for this



                                       PM CDT                    procedure are i

n



                                                                 the results



                                                                 section.

 

             BK VIRUS BY PCR Routine      10/19/2019  5:07              Results 

for this



                                       PM CDT                    procedure are i

n



                                                                 the results



                                                                 section.

 

             VDRL, CSF SCREEN Routine      10/19/2019  5:07              Results

 for this



                                       PM CDT                    procedure are i

n



                                                                 the results



                                                                 section.

 

             OLIGOCLONAL BANDING, CSF Routine      10/19/2019  5:07             

 Results for this



                                       PM CDT                    procedure are i

n



                                                                 the results



                                                                 section.

 

             WEST NILE VIRUS ANTIBODY Routine      10/19/2019  5:07             

 Results for this



             PANEL, CSF                PM CDT                    procedure are i

n



                                                                 the results



                                                                 section.

 

             IGG SYNTHESIS RATE STUDY Routine      10/19/2019  5:07             

 Results for this



                                       PM CDT                    procedure are i

n



                                                                 the results



                                                                 section.

 

             GLUCOSE LEVEL, CSF Routine      10/19/2019  5:07              Resul

ts for this



                                       PM CDT                    procedure are i

n



                                                                 the results



                                                                 section.

 

             PROTEIN, CSF Routine      10/19/2019  5:07              Results for

 this



                                       PM CDT                    procedure are i

n



                                                                 the results



                                                                 section.

 

             CSF CELL COUNT WITH Routine      10/19/2019  5:07              Resu

lts for this



             DIFFERENTIAL              PM CDT                    procedure are i

n



                                                                 the results



                                                                 section.

 

             GRAM STAIN   Routine      10/19/2019  5:07              Results for

 this



                                       PM CDT                    procedure are i

n



                                                                 the results



                                                                 section.

 

             CRYPTOCOCCAL ANTIGEN Routine      10/19/2019  5:07              Res

ults for this



             SCREEN                    PM CDT                    procedure are i

n



                                                                 the results



                                                                 section.

 

             CSF CULTURE  Routine      10/19/2019  5:07              Results for

 this



                                       PM CDT                    procedure are i

n



                                                                 the results



                                                                 section.

 

             PARTIAL THROMBOPLASTIN STAT         10/19/2019  1:25              R

esults for this



             TIME (PTT)                PM CDT                    procedure are i

n



                                                                 the results



                                                                 section.

 

             PROTHROMBIN TIME WITH STAT         10/19/2019  1:25              Re

sults for this



             INR                       PM CDT                    procedure are i

n



                                                                 the results



                                                                 section.

 

             RESPIRATORY PATHOGEN Routine      10/19/2019  1:20              Res

ults for this



             PANEL                     PM CDT                    procedure are i

n



                                                                 the results



                                                                 section.

 

             INFLUENZA ANTIGEN TEST, Routine      10/19/2019  1:20              

Results for this



             REFLEX NEGATIVE TO RPP              PM CDT                    proce

dure are in



                                                                 the results



                                                                 section.

 

             XR CHEST 1 VW PORTABLE Routine      10/19/2019 10:03              R

esults for this



                                       AM CDT                    procedure are i

n



                                                                 the results



                                                                 section.

 

             INFECTIOUS MONONUCLEOSIS Routine      10/19/2019  4:42             

 Results for this



             SCREEN                    AM CDT                    procedure are i

n



                                                                 the results



                                                                 section.

 

             HIV AG/AB COMBINATION Routine      10/19/2019  4:42              Re

sults for this



                                       AM CDT                    procedure are i

n



                                                                 the results



                                                                 section.

 

             C-REACTIVE PROTEIN Routine      10/19/2019  4:42              Resul

ts for this



                                       AM CDT                    procedure are i

n



                                                                 the results



                                                                 section.

 

             SEDIMENTATION RATE Routine      10/19/2019  4:42              Resul

ts for this



                                       AM CDT                    procedure are i

n



                                                                 the results



                                                                 section.

 

             KUSH          Routine      10/19/2019  4:42              Results for

 this



                                       AM CDT                    procedure are i

n



                                                                 the results



                                                                 section.

 

             GRAM STAIN   Routine      10/19/2019  1:37              Results for

 this



                                       AM CDT                    procedure are i

n



                                                                 the results



                                                                 section.

 

             URINE CULTURE Routine      10/19/2019  1:37              Results fo

r this



                                       AM CDT                    procedure are i

n



                                                                 the results



                                                                 section.

 

             URINALYSIS SCREEN AND Routine      10/19/2019 12:25              Re

sults for this



             MICROSCOPY, WITH REFLEX              AM CDT                    proc

edure are in



             TO CULTURE                                          the results



                                                                 section.

 

             BLOOD CULTURE, AEROBIC & Routine      10/19/2019 12:15             

 Results for this



             ANAEROBIC                 AM CDT                    procedure are i

n



                                                                 the results



                                                                 section.

 

             COMPREHENSIVE METABOLIC Routine      10/19/2019 12:05              

Results for this



             PANEL                     AM CDT                    procedure are i

n



                                                                 the results



                                                                 section.

 

             ESTIMATED GFR Routine      10/19/2019 12:05              Results fo

r this



                                       AM CDT                    procedure are i

n



                                                                 the results



                                                                 section.

 

             PHOSPHORUS LEVEL Routine      10/19/2019 12:05              Results

 for this



                                       AM CDT                    procedure are i

n



                                                                 the results



                                                                 section.

 

             MAGNESIUM LEVEL Routine      10/19/2019 12:05              Results 

for this



                                       AM CDT                    procedure are i

n



                                                                 the results



                                                                 section.

 

             LDH          Routine      10/19/2019 12:05              Results for

 this



                                       AM CDT                    procedure are i

n



                                                                 the results



                                                                 section.

 

             HAPTOGLOBIN  Routine      10/19/2019 12:05              Results for

 this



                                       AM CDT                    procedure are i

n



                                                                 the results



                                                                 section.

 

             FOLATE LEVEL Routine      10/19/2019 12:05              Results for

 this



                                       AM CDT                    procedure are i

n



                                                                 the results



                                                                 section.

 

             THYROID STIMULATING Routine      10/19/2019 12:05              Resu

lts for this



             HORMONE                   AM CDT                    procedure are i

n



                                                                 the results



                                                                 section.

 

             VITAMIN B12 LEVEL Routine      10/19/2019 12:05              Result

s for this



                                       AM CDT                    procedure are i

n



                                                                 the results



                                                                 section.

 

             TOTAL IRON BINDING Routine      10/19/2019 12:05              Resul

ts for this



             CAPACITY                  AM CDT                    procedure are i

n



                                                                 the results



                                                                 section.

 

             FERRITIN LEVEL Routine      10/19/2019 12:05              Results f

or this



                                       AM CDT                    procedure are i

n



                                                                 the results



                                                                 section.

 

             LACTIC ACID LEVEL Routine      10/19/2019 12:05              Result

s for this



                                       AM CDT                    procedure are i

n



                                                                 the results



                                                                 section.

 

             HC COMPLETE BLD COUNT Routine      10/19/2019 12:05              Re

sults for this



             W/AUTO DIFF               AM CDT                    procedure are i

n



                                                                 the results



                                                                 section.

 

             BLOOD CULTURE, AEROBIC & Routine      10/19/2019 12:05             

 Results for this



             ANAEROBIC                 AM CDT                    procedure are i

n



                                                                 the results



                                                                 section.

 

             ECG 12-LEAD  STAT         10/18/2019 11:54              Results for

 this



                                       PM CDT                    procedure are i

n



                                                                 the results



                                                                 section.

 

             CT ABD/PELVIC EXTERNAL Routine      10/18/2019  3:40              R

esults for this



             STUDY                     PM CDT                    procedure are i

n



                                                                 the results



                                                                 section.

 

             HC COMPLETE BLD COUNT STAT         2019  6:56              Re

sults for this



             W/AUTO DIFF               AM CDT                    procedure are i

n



                                                                 the results



                                                                 section.

 

             ESTIMATED GFR STAT         2019  6:56              Results fo

r this



                                       AM CDT                    procedure are i

n



                                                                 the results



                                                                 section.

 

             BASIC METABOLIC PANEL STAT         2019  6:56              Re

sults for this



                                       AM CDT                    procedure are i

n



                                                                 the results



                                                                 section.

 

             HEMOGLOBIN & HEMATOCRIT STAT         2019  6:56              

Results for this



                                       AM CDT                    procedure are i

n



                                                                 the results



                                                                 section.

 

             ESTIMATED GFR Routine      2019  3:22              Results fo

r this



                                       AM CDT                    procedure are i

n



                                                                 the results



                                                                 section.

 

             BASIC METABOLIC PANEL Routine      2019  3:22              Re

sults for this



                                       AM CDT                    procedure are i

n



                                                                 the results



                                                                 section.

 

             HEMOGLOBIN & HEMATOCRIT Routine      2019  3:22              

Results for this



                                       AM CDT                    procedure are i

n



                                                                 the results



                                                                 section.

 

             SURGICAL PATHOLOGY Routine      2019  1:00              Resul

ts for this



             REQUEST                   PM CDT                    procedure are i

n



                                                                 the results



                                                                 section.

 

             XR ABDOMEN 1 VW PORTABLE STAT         2019 12:11             

 Results for this



                                       PM CDT                    procedure are i

n



                                                                 the results



                                                                 section.

 

             ESTIMATED GFR STAT         2019 11:30              Results fo

r this



                                       AM CDT                    procedure are i

n



                                                                 the results



                                                                 section.

 

             BASIC METABOLIC PANEL STAT         2019 11:30              Re

sults for this



                                       AM CDT                    procedure are i

n



                                                                 the results



                                                                 section.

 

             HEMOGLOBIN & HEMATOCRIT STAT         2019 11:30              

Results for this



                                       AM CDT                    procedure are i

n



                                                                 the results



                                                                 section.

 

             CALCULI ANALYSIS WITH Timed        2019  9:53 UPJ obstruction

, Results for this



                PHOTO                           AM CDT          congenital



                                        procedure are in



                                                                Renal stone



                                        the results



                                                    Ureteral stent section.



                                                    retained     

 

             MA AN ELECTIVE Routine      2019  7:46              Results f

or this



             ENDOTRACHEAL AIRWAY              AM CDT                    procedur

e are in



                                                                 the results



                                                                 section.

 

             PYELOPLASTY,              2019  7:27 UPJ obstruction, 



                LAPAROSCOPIC,                   AM CDT          congenital



                                        



                ROBOT-ASSISTED                                  Renal stone



                                        



                                                    Ureteral stent 



                                                    retained     









                                                    Case Notes







                                                    **[facility]**

 

                                                    Special Needs







                                                    **[facility]**









             TYPE AND SCREEN Routine      2019  6:18              Results 

for this



                                       AM CDT                    procedure are i

n



                                                                 the results



                                                                 section.

 

             PROTHROMBIN TIME WITH Routine      2019  5:55              Re

sults for this



             INR                       AM CDT                    procedure are i

n



                                                                 the results



                                                                 section.

 

             ESTIMATED GFR Routine      2019  5:35              Results fo

r this



                                       AM CDT                    procedure are i

n



                                                                 the results



                                                                 section.

 

             PROTHROMBIN TIME WITH Routine      2019  5:35              Re

sults for this



             INR                       AM CDT                    procedure are i

n



                                                                 the results



                                                                 section.

 

             COMPREHENSIVE Routine      2019  5:35              Results fo

r this



             METABOLIC PANEL              AM CDT                    procedure ar

e in



                                                                 the results



                                                                 section.

 

             HC COMPLETE BLD COUNT Routine      2019  5:35              Re

sults for this



             W/AUTO DIFF               AM CDT                    procedure are i

n



                                                                 the results



                                                                 section.

 

             CT RENAL STONE STAT         2019  1:26              Results f

or this



             PROTOCOL                  AM CDT                    procedure are i

n



                                                                 the results



                                                                 section.

 

             GRAM STAIN   Routine      2019 11:29              Results for

 this



                                       PM CDT                    procedure are i

n



                                                                 the results



                                                                 section.

 

             URINE CULTURE Routine      2019 11:29              Results fo

r this



                                       PM CDT                    procedure are i

n



                                                                 the results



                                                                 section.

 

             HCG QUALITATIVE, URINE Routine      2019 10:23              R

esults for this



             SCREEN                    PM CDT                    procedure are i

n



                                                                 the results



                                                                 section.

 

             URINALYSIS SCREEN AND Routine      2019 10:23              Re

sults for this



             MICROSCOPY, WITH              PM CDT                    procedure a

re in



             REFLEX TO CULTURE                                        the result

s



                                                                 section.

 

             ESTIMATED GFR STAT         2019  9:43              Results fo

r this



                                       PM CDT                    procedure are i

n



                                                                 the results



                                                                 section.

 

             LACTIC ACID LEVEL STAT         2019  9:43              Result

s for this



                                       PM CDT                    procedure are i

n



                                                                 the results



                                                                 section.

 

             COMPREHENSIVE STAT         2019  9:43              Results fo

r this



             METABOLIC PANEL              PM CDT                    procedure ar

e in



                                                                 the results



                                                                 section.

 

             PARTIAL THROMBOPLASTIN STAT         2019  9:43              R

esults for this



             TIME (PTT)                PM CDT                    procedure are i

n



                                                                 the results



                                                                 section.

 

             PROTHROMBIN TIME WITH STAT         2019  9:43              Re

sults for this



             INR                       PM CDT                    procedure are i

n



                                                                 the results



                                                                 section.

 

             HC COMPLETE BLD COUNT STAT         2019  9:43              Re

sults for this



             W/AUTO DIFF               PM CDT                    procedure are i

n



                                                                 the results



                                                                 section.

 

             HC NEPHROSTOMY/PYELOS Routine      08/15/2019  4:44 Hydronephrosis,

 Results for this



             TUBE CHANGE               PM CDT       unspecified  procedure are i

n



                                                    hydronephrosis type the resu

lts



                                                                 section.

 

             IR LEFT NEPHROSTOMY Routine      08/15/2019                



             EVAL/EXCHANGE                                        

 

             CONSULT TO   Routine      08/15/2019                



             INTERVENTIONAL                                        



             RADIOLOGY                                           

 

             MICROSCOPIC  Routine      2019 11:19              Results for

 this



             EXAMINATION               AM CDT                    procedure are i

n



                                                                 the results



                                                                 section.

 

                URINALYSIS, AUTOMATED Routine         2019 11:19 Nephrolit

hiasis



                                        Results for this



             WITH MICROSCOPY              AM CDT       Hydronephrosis, procedure

 are in



                                                    unspecified  the results



                                                    hydronephrosis type section.

 

                URINE CULTURE   Routine         2019 11:19 Nephrolithiasis



                                        Results for this



                                       AM CDT       Hydronephrosis, procedure ar

e in



                                                    unspecified  the results



                                                    hydronephrosis type section.

 

                URINE CULTURE   Routine         2019 11:19 Nephrolithiasis



                                        Results for this



                                       AM CDT       Hydronephrosis, procedure ar

e in



                                                    unspecified  the results



                                                    hydronephrosis type section.

 

             FL PYELOGRAM Routine      2019  8:11              Results for

 this



             RETROGRADE                AM CDT                    procedure are i

n



                                                                 the results



                                                                 section.

 

             MA AN ELECTIVE Routine      2019  7:42              



             SUPRAGLOTTIC AIRWAY              AM CDT                    









                                                    Procedure Note - Pati Dailey CRNA - 2019  7:42 AM CDT



Airway



                                                    Date/Time: 2019 7:42 AM



                                                    Performed by: Lauren Dailey CRNA



                                                    Authorized by: Prema Pittman MD



                                                    



                                                    Location:  OR



                                                    Urgency:  Elective



                                                    Difficult Airway: No



                                                    Anesthesiologist:  STEVEN Pittman MD



                                                    Resident/CRNA/AA:  Pati Pollard CRNA



                                                    Preoxygenated with 100% O2: 

Yes



                                                    C-spine Precautions Maintain

ed Throughout: Yes



                                                    Mask Ventilation:  Not attem

pted



                                                    Final Airway Type:  Supraglo

ttic airway



                                                    Final LMA:  Classic



                                                    LMA Size:  4



                                                    Number of Attempts at Approa

ch:  1









                CYSTO WITH URETEROSCOPY                 2019  7:25 AM CDT 

Hydronephrosis, left



                                        



                                                    Ureteral stent retained 









                                                    Special Needs







                                                    EST 1 HR









             PARTIAL THROMBOPLASTIN Routine      2019 10:43 Hydronephrosis

, Results for this



             TIME (PTT)                AM CDT       unspecified  procedure are i

n



                                                    hydronephrosis type the resu

lts



                                                                 section.

 

             PROTHROMBIN TIME WITH Routine      2019 10:43 Hydronephrosis,

 Results for this



             INR                       AM CDT       unspecified  procedure are i

n



                                                    hydronephrosis type the resu

lts



                                                                 section.

 

             MICROSCOPIC  Routine      2019 10:27              Results for

 this



             EXAMINATION               AM CDT                    procedure are i

n



                                                                 the results



                                                                 section.

 

             URINALYSIS, AUTOMATED Routine      2019 10:27 Hydronephrosis,

 Results for this



             WITH MICROSCOPY              AM CDT       unspecified  procedure ar

e in



                                                    hydronephrosis type the resu

lts



                                                                 section.

 

             URINE CULTURE Routine      2019 10:27 Hydronephrosis, Results

 for this



                                       AM CDT       unspecified  procedure are i

n



                                                    hydronephrosis type the resu

lts



                                                                 section.

 

             HC COMPLETE BLD COUNT Routine      2019  4:50              Re

sults for this



             W/AUTO DIFF               AM CDT                    procedure are i

n



                                                                 the results



                                                                 section.

 

             ESTIMATED GFR Routine      2019  4:00              Results fo

r this



                                       AM CDT                    procedure are i

n



                                                                 the results



                                                                 section.

 

             BASIC METABOLIC PANEL Routine      2019  4:00              Re

sults for this



                                       AM CDT                    procedure are i

n



                                                                 the results



                                                                 section.

 

             POC GLUCOSE  Routine      2019  9:07              Results for

 this



                                       PM CDT                    procedure are i

n



                                                                 the results



                                                                 section.

 

             IR LEFT NEPHROSTOMY Routine      2019 11:19 Hydronephrosis, l

eft Results for 

this



             INITIAL PLACEMENT              AM CDT                    procedure 

are in



                                                                 the results



                                                                 section.

 

             URINE CULTURE Routine      2019 10:29 Hydronephrosis, left Re

sults for this



                                       AM CDT                    procedure are i

n



                                                                 the results



                                                                 section.

 

             MICROSCOPIC  Routine      2019 10:28              Results for

 this



             EXAMINATION               AM CDT                    procedure are i

n



                                                                 the results



                                                                 section.

 

             URINALYSIS, AUTOMATED Routine      2019 10:28 Hydronephrosis,

 left Results for 

this



             WITH MICROSCOPY              AM CDT                    procedure ar

e in



                                                                 the results



                                                                 section.

 

             PROTHROMBIN TIME WITH Routine      2019 10:14 Hydronephrosis,

 left Results for 

this



             INR                       AM CDT                    procedure are i

n



                                                                 the results



                                                                 section.

 

             PARTIAL THROMBOPLASTIN Routine      2019 10:14 Hydronephrosis

, left Results for

this



             TIME (PTT)                AM CDT                    procedure are i

n



                                                                 the results



                                                                 section.

 

             COMPREHENSIVE Routine      2019 10:14 Hydronephrosis, left Re

sults for this



             METABOLIC PANEL              AM CDT                    procedure ar

e in



                                                                 the results



                                                                 section.

 

             CBC WITH PLATELET AND Routine      2019 10:14 Hydronephrosis,

 left Results for 

this



             DIFFERENTIAL              AM CDT                    procedure are i

n



                                                                 the results



                                                                 section.

 

             NM RENAL SCAN WFLOW Routine      2019  1:09 Hydronephrosis, l

eft Results for 

this



             FUNCT SGL INT W/MAG 3              PM CDT                    proced

ure are in



                                                                 the results



                                                                 section.

 

             [id number]      Routine      2019  9:25 Kidney stones Results fo

r this



                                       AM CDT                    procedure are i

n



                                                                 the results



                                                                 section.

 

             POC URINALYSIS Routine      2019  9:25 Kidney stones Results 

for this



             DIPSTICK                  AM CDT                    procedure are i

n



                                                                 the results



                                                                 section.



after 2019



Results

US Renal (10/21/2019  9:17 AM CDT)





                                        Specimen

 

                                        









                          Narrative                 Performed At

 

                                        EXAMINATION: US RENAL



                                         RADIANT



                                         



                                        



                          CLINICAL HISTORY: h o of LEFT pyeloplasty with evide

nce of residual 



                                        hydro on recent imaging



                                        



                                         



                                        



                                        TECHNIQUE: Sonographic imaging over the 

kidneys was performed. 



                                        



                                         



                                        



                                        COMPARISON: None.



                                        



                                         



                                        



                                        FINDINGS:



                                        



                                         



                                        



                          1.The right kidney is normal size measuring 10.4 cm in

 long axis. The 



                          kidney has normal echogenicity. Vascular flow is unrem

arkable. There is 



                          no evidence of perinephric fluid, solid mass, or calcu

prasanna. Mild right 



                                        hydronephrosis is present.



                                        



                                         



                                        



                          2.The left kidney is normal size measuring 10.3 cm i

n long axis. The 



                          kidney has normal echogenicity. Vascular flow is unrem

arkable. There is 



                          no evidence of perinephric fluid, solid mass, or calcu

prasanna. Moderate left 



                                        hydronephrosis is present.



                                        



                                         



                                        



                                        3.The bladder is unremarkable.



                                        



                                         



                                        



                                        4.Hepatic steatosis is incidentally note

d.



                                        



                                         



                                        



                                        IMPRESSION:



                                        



                                         



                                        



                                        Mild right and moderate left hydronephro

sis.



                                        



                                         



                                        



                                        Hepatic steatosis.



                                        



                                         



                                        



                                        [facility]-[id number]



                                        



                                                    









                                        Procedure Note

 

                                         Interface, Radiology Results Incoming

 - 10/21/2019  9:24 AM CDT



EXAMINATION:  US RENAL



                                        



                                        CLINICAL HISTORY:  h o of LEFT pyeloplas

ty with evidence of residual hydro on 

recent imaging



                                        



                                        TECHNIQUE: Sonographic imaging over the 

kidneys was performed.



                                        



                                        COMPARISON:  None.



                                        



                                        FINDINGS:



                                        



                                        1.The right kidney is normal size measur

ing 10.4 cm in long axis. The kidney has

normal echogenicity. Vascular flow is unremarkable. There is no evidence of 
perinephric fluid, solid mass, or calculus. Mild right hydronephrosis is 
present.



                                        



                                        2.The left kidney is  normal size measur

ing 10.3 cm in long axis. The kidney has

normal echogenicity. Vascular flow is unremarkable. There is no evidence of 
perinephric fluid, solid mass, or calculus. Moderate left hydronephrosis is 
present.



                                        



                                        3.The bladder is unremarkable.



                                        



                                        4.Hepatic steatosis is incidentally note

d.



                                        



                                        IMPRESSION:



                                        



                                        Mild right and moderate left hydronephro

sis.



                                        



                                        Hepatic steatosis.



                                        



                                        [facility]-[id number]









                Performing Organization Address         City/State/Zipcode Phone

 Number

 

                [facility]      [address] [place] [address] 



CBC with platelet and differential (10/21/2019  4:10 AM CDT)Only the most recent
of8 resultswithin the time period is included.





             Component    Value        Ref Range    Performed At Pathologist



                                                                 Signature

 

             WBC          5.10         4.50 - 11.00 [facility] 



                                       k/uL         HOSPITAL     

 

             RBC          3.66 (L)     4.20 - 5.50  [facility] 



                                       m/uL         [facility]     

 

             HGB          10.2 (L)     12.0 - 16.0  [facility] 



                                       g/dL         [facility]     

 

             HCT          32.9 (L)     37.0 - 47.0 % [facility]     

 

             MCV          89.9         82.0 - 100.0 [facility]     

 

             MCH          27.9         27.0 - 34.0 pg [facility]     

 

             MCHC         31.0         31.0 - 37.0  [facility] 



                                       g/dL         [facility]     

 

             RDW - SD     39.3         37.0 - 55.0 fL [facility]     

 

             MPV          9.7          8.8 - 13.2 fL [facility]     

 

             Platelet count 228          150 - 400 k/uL [facility]     

 

             Nucleated RBC 0.00         /100 WBC     [facility]     

 

             Neutrophils  46.5         39.0 - 69.0 % [facility]     

 

             Lymphocytes  42.5         25.0 - 45.0 % [facility]     

 

             Monocytes    9.6          0.0 - 10.0 % [facility]     

 

             Eosinophils  0.8          0.0 - 5.0 %  [facility]     

 

             Basophils    0.2          0.0 - 1.0 %  [facility]     

 

             Immature granulocytes 0.4Comment:  0.0 - 1.0 %  [facility] 



                          "Immature                 HOSPITAL     



                          granulocytes"                           



                          (promyelocytes                           



                          , myelocytes,                           



                          metamyelocytes                           



                          )                                      









                                        Specimen

 

                                        Blood









                Performing Organization Address         City/State/Zipcode Phone

 Number

 

                [facility] DEPARTMENT OF PATHOLOGY AND [address], [place] [address]

0 



                GENOMIC MEDICINE                                 

 

                [facility] [address] [place] [address] 



Estimated GFR (10/21/2019  4:00 AM CDT)Only the most recent of9 resultswithin 
the time period is included.





             Component    Value        Ref Range    Performed At Pathologist



                                                                 [facility]

 

             Estimated GFR >=90         mL/min/1.73  [facility] 



                          Comment:     m2           HOSPITAL     



                          Catergory Units  Interpretation                 

          



                          G1     >=90   Normal or high              

             



                          G2     60-89  Mildly decreased            

               



                          G3a    45-59  Mildly to moderately decreas

ed                           



                          G3b    30-44  Moderately to severely decre

ased                           



                          G4     15-29  Severely decreased          

                 



                          G5     <15   Kidney failure             

              



                          The eGFR was calculated using the Chronic Kidney Disea

se                           



                          Epidemiology Collaboration (CKD-EPI) equation.        

                   



                          Interpretation is based on recommendations of the     

                      



                          National Kidney Foundation-Kidney Disease Outcomes Josias

lity                           



                          Initiative (NKF-KDOQI) published in 2014.             

              



                                                                 









                                        Specimen

 

                                        Plasma specimen









                Performing Organization Address         City/State/Zipcode Phone

 Number

 

                [facility] DEPARTMENT OF PATHOLOGY AND [address], [place] [address]

0 



                GENOMIC MEDICINE                                 

 

                [facility] [address] [place] [address] 



Comprehensive metabolic panel (10/21/2019  4:00 AM CDT)Only the most recent of6 
resultswithin the time period is included.





             Component    Value        Ref Range    Performed At Pathologist



                                                                 Signature

 

             Sodium       139          135 - 148    [facility] 



                                       mEq/L        [facility]     

 

             Potassium    3.6          3.5 - 5.0    [facility] 



                                       mEq/L        [facility]     

 

             Chloride     104          98 - 112 mEq/L [facility]     

 

             CO2          22 (L)       24 - 31 mEq/L [facility]     

 

             Anion gap    13@ANIO      7 - 15 mEq/L [facility]     

 

             BUN          5 (L)        6 - 20 mg/dL [facility]     

 

             Creatinine   0.49 (L)     0.50 - 0.90  [facility] 



                                       mg/dL        HOSPITAL     

 

             Glucose      80           65 - 99 mg/dL [facility]     

 

             Calcium      9.1          8.3 - 10.2   [facility] 



                                       mg/dL        [facility]     

 

             Protein      6.7          6.3 - 8.3 g/dL [facility] 



                          Comment:                  HOSPITAL     



                          Zxtoaja7125.6-7.0 g/dL                           



                          1 emeu5471.4-7.6 g/dL                           



                          7 months-6wonx554.1-7.3 g/dL                          

 



                          1-2 hurgi272.6-7.5 g/dL                           



                          >3 sdxiw780.0-8.0 g/dL                           



                          18-0556199.3-8.3 g/dL                           



                                                                 

 

             Albumin      3.0 (L)      3.5 - 5.0 g/dL [facility]     

 

             A/G ratio    0.8          0.7 - 3.8    [facility]     

 

             Alkaline phosphatase 82           35 - 104 U/L [facility]     

 

             AST          16           10 - 35 U/L  [facility]     

 

             ALT          29           5 - 50 U/L   [facility]     

 

             Total bilirubin 0.3          0.0 - 1.2    [facility] 



                                       mg/dL        HOSPITAL     









                                        Specimen

 

                                        Plasma specimen









                Performing Organization Address         City/State/Zipcode Phone

 Number

 

                [facility] DEPARTMENT OF PATHOLOGY AND [address], [place] [address]

0 



                GENOMIC MEDICINE                                 

 

                [facility] [address] [place] [address] 



Rickettsia typhi (typhus fever) Abs, IgG/IgM (10/20/2019  4:00 AM CDT)





             Component    Value        Ref Range    Performed At Pathologist Sig

nature

 

             R typhi IgG  <1:64        <1:64         [facility] 



                          Comment:                               



                          INTERPRETIVE INFORMATION: Typhus Fever Antibody, IgG  

                         



                           Less than 1:64 ....... Negative - No significant le

selvin of                           



                                       IgG antibody dete

cted.                           



                           1:64 - 1:128 ......... Equivocal - Questionable pre

sence                           



                                       of IgG antibody d

etected. Repeat                           



                                       testing in 10-14 

days may be                           



                                       helpful.         

                  



                           1:256 or greater ..... Positive - Presence of IgG a

ntibody                           



                                       to detected, sugg

estive of current                           



                                       or past infection

.                           



                          Antibody reactivity to Rickettsia typhi antigen should

 be                           



                          considered group-reactive for the Typhus Fever group, 

which                           



                          includes Rickettsia prowazekii.                       

    



                          Seroconversion between acute and convalescent sera is 

considered                           



                          strong evidence of recent infection. The best evidence

 for                           



                          infection is a significant change (fourfold difference

 in titer)                           



                          on two appropriately timed specimens, where both tests

 are done in                           



                          the same laboratory at the same time. Acute-phase spec

imens are                           



                          collected during the first week of illness and convale

scent -phase                           



                          samples are generally obtained 2-4 weeks after resolut

ion of                           



                          illness. Ideally these samples should be tested simult

aneously at                           



                          the same facility. If the samples submitted was collec

maddy during                           



                          the acute phase of illness, submit a marked convalecse

nt sample                           



                          within 25 days for paired testing.                    

       

 

             R typhi IgM  <1:64        <1:64         [facility] 



                          Comment:                               



                          INTERPRETIVE INFORMATION: Typhus Fever Antibody, IgM  

                         



                           Less than 1:64 ...... Negative-No significant level

 of                           



                                      IgM antibody detec

maddy.                           



                           1:64 or greater ..... Positive-Presence of IgM anti

body                           



                                      detected, which ma

y indicate a                           



                                      current or rectent

 infection;                           



                                      however, low level

s of IgM                           



                                      antibodies may occ

asionally persist                           



                                      for more than 12 m

onths                           



                                      post-infection.   

                        



                          Antibody reactivity to Rickettsia typhi antigen should

 be                           



                          considered group-reactive for the Typhus Fever group, 

which                           



                          includes Rickettsia prowazekii.                       

    



                          Seroconversion between acute and convalescent sera is 

considered                           



                          strong evidence of recent infection. The best evidence

 is a                           



                          significant change (fourfold difference in titer) on t

wo                           



                          appropriately timed specimens, where both tests are do

ne in the                           



                          same laboratory at the same time. Acute-phase specimen

s are                           



                          collected during the first week of illness and convale

scent-phase                           



                          samples are generally obtained 2-4 weeks after resolut

ion of                           



                          illness. Ideally these samples should be tested simult

aneously at                           



                          the same facility. If the sample submitted was collect

ed during                           



                          the actue-phase of illness, submit a marked convalesce

nt sample                           



                          within 25 days for paired testing.                    

       



                          Performed by [facility],                       

    



                          [address] [place] [id number] [phone]            

               



                          [email or web address], Dion Martins MD, Lab. Director     

                      









                                        Specimen

 

                                        Serum









                Performing Organization Address         City/State/Zipcode Phone

 Number

 

                [facility] [address] [place] [phone] 

 

                [facility] [place] [place] [address] 



Rickettsia rickettsii (RMSF) Abs IgG/IgM (10/20/2019  4:00 AM CDT)





             Component    Value        Ref Range    Performed At Pathologist



                                                                 Signature

 

             R rickettsii IgG <1:64        <1:64         [facility] 



                          Comment:                               



                          INTERPRETIVE INFORMATION: Rickettsia rickettsii ([facility]                           



                                       Spotted Fever) 

Ab, IgG                           



                                                           



                           Less than 1:64 ....... Negative - No significant le

selvin of                           



                                       IgG antibody dete

cted.                           



                           1:64 - 1:128 ......... Low Positive - Presence of I

gG                           



                                       Antibody detected

, suggestive of                           



                                       current or past i

nfection.                           



                           1:256 or greater ..... Positive - Presence of IgG  

                         



                                       antibody detected

, suggestive of                           



                                       current or past i

nfection.                           



                          Antibody reactivity to Rickettsia rickettsii antigen s

hould be                           



                          considered Spotted Fever group reactive. Other organis

ms within                           



                          the group include R. akari, R. conorrii, R. australis 

and R.                           



                          sibirica.                              



                          Seroconversion, a fourfold or greater rise in antibody

 titer,                           



                          between acute and convalescent sera is considered stro

ng evidence                           



                          of recent infection. Acute-phase specimens are collect

ed during                           



                          the first week of illness and convalescent-phase sampl

es are                           



                          generally obtained 2-4 weeks after resolution of illne

ss. Ideally                           



                          these samples should be tested simultaneously at the s

andreas                           



                          facility. If the sample submitted was collected during

 the                           



                          acute-phase of illness, submit a marked convalescent s

ample within                           



                          25 days for paired testing.                           

 

             R rickettsii IgM <1:64        <1:64        [facility] REF LAB 



                          Comment:                               



                          INTERPRETIVE INFORMATION: Rickettsia rickettsii ([facility]                           



                                       Spotted Fever) 

Ab, IgM                           



                           Less than 1:64 ....... Negative - No significant le

selvin of                           



                                       IgM antibody dete

cted.                           



                           1:64 or greater ...... Positive - Presence of IgM a

ntibody                           



                                       detected, which m

ay indicate a                           



                                       current or recent

 infection;                           



                                       however, low leve

ls of IgM                           



                                       antibodies may oc

casionally persist                           



                                       for more than 12 

months                           



                                       post-infection.  

                         



                          Antibody reactivity to Rickettsia rickettsii antigen s

hould be                           



                          considered Spotted Fever group reactive. Other organis

ms within                           



                          the group include R. akari, R. conorrii, R. australis 

and R.                           



                          sibirica.                              



                          Seroconversion, a fourfold or greater rise in antibody

 titer,                           



                          between acute and convalescent sera is considered stro

ng evidence                           



                          of recent infection. Acute-phase specimens are collect

ed during                           



                          the first week of illness and convalescent-phase sampl

es are                           



                          generally obtained 2-4 weeks after resolution of illne

ss. Ideally                           



                          these samples should be tested simultaneously at the s

andreas                           



                          facility. If the sample submitted was collected during

 the                           



                          acute-phase of illness, submit a marked convalescent s

ample within                           



                          25 days for paired testing.                           



                          The CDC does not use IgM results for routine diagnosti

c testing of                           



                          [place] Spotted Fever, as the response may not 

be specific                           



                          for the agent (resulting in false positives) and the I

gM response                           



                          may be persistent from past infection.                

           



                          Performed by [facility],                       

    



                          [address], [place] [id number] [phone]            

               



                          [email or web address], Dion Martins MD, Lab. Director     

                      









                                        Specimen

 

                                        Serum









                Performing Organization Address         City/State/Zipcode Phone

 Number

 

                [facility] [address] [place] [address] 

 

                [facility] [place] [address] 



HSV type 1/2 combined Ab, IgM (10/20/2019  4:00 AM CDT)





             Component    Value        Ref Range    Performed At Pathologist



                                                                 Signature

 

             HSV 1/2 combined 0.46         <=0.89 IV     [facility] 



             Ab, IgM      Comment:                               



                          INTERPRETIVE INFORMATION: Herpes Simplex Virus Type 1 

and/or 2                           



                          Antibodies, IgM by LAURA                           



                           0.89 IV or Less .......... Not Detected            

               



                           0.90 - 1.09 IV ........... Indeterminate- Repeat te

sting in                           



                                         10-14 days 

may be helpful.                           



                           1.10 IV or Greater ....... Detected-IgM antibody to

 HSV                           



                                         detected, w

hich may indicate a                           



                                         current or 

recent infection.                           



                                         However, lo

w levels of IgM                           



                                         antibodies 

may occasionally                           



                                         persist for

 more than 12                           



                                         months post

-infection.                           



                          Performed by [facility],                       

    



                          [address] [place] [address] [phone]            

               



                          [email or web address], Dion Martins MD, Lab. Director     

                      



                                                                 









                                        Specimen

 

                                        Serum









                Performing Organization Address         City/[facility]/Zipcode Phone

 Number

 

                [facility] [address] [place] [address] 

 

                 [place] [address] 



Cytomeg IgG/IgM (10/20/2019  4:00 AM CDT)





             Component    Value        Ref Range    Performed At Pathologist



                                                                 [facility] [place]

 

             Cytomegalovirus Ab, IgM Negative     Negative     [facility]     

 

             Cytomegalovirus Ab, IgG Positive (A) Negative     [facility]

 



                          Comment:                  HOSPITAL     



                          Positive; IgG antibody to CMV detected which may indic

ate                           



                          exposure to CMV infection.                           



                                                                 









                                        Specimen

 

                                        Serum









                Performing Organization Address         City/[facility]/Zipcode Phone

 Number

 

                [facility] DEPARTMENT OF PATHOLOGY AND [address] [place] [address]

0 



                [address] [address] 



Hepatitis acute panel (10/20/2019  4:00 AM CDT)





             Component    Value        Ref Range    Performed At Pathologist Sig

nature

 

             Hepatitis A IgM Non-reactive Non-reactive [facility]     

 

             Hepatitis B core Non-reactive Non-reactive [facility]     

 

             Hepatitis B surface Non-reactive Non-reactive [facility]     

 

             Hepatitis C Ab Non-reactive Non-reactive [facility]     









                                        Specimen

 

                                        Serum









                Performing Organization Address         City/State/Zipcode Phone

 Number

 

                [facility] DEPARTMENT OF PATHOLOGY AND [address] [place] [address]

0 



                [address] [address] 



Rheumatoid factor (10/20/2019  4:00 AM CDT)





             Component    Value        Ref Range    Performed At Pathologist Sig

nature

 

             Rheumatoid factor 13           0 - 13 [facility]

[facility] 









                                        Specimen

 

                                        Plasma specimen









                Performing Organization Address         City/[facility] Phone

 Number

 

                [facility] DEPARTMENT OF PATHOLOGY AND [address] [place] [address]

0 



                [address] [address] 



C3 complement component (10/20/2019  4:00 AM CDT)





             Component    Value        Ref Range    Performed At Pathologist Sig

nature

 

             C3 complement 165          90 - 180 mg[facility]dL [facility]

L 









                                        Specimen

 

                                        Plasma specimen









                Performing Organization Address         City/[facility] Phone

 Number

 

                [facility] DEPARTMENT OF PATHOLOGY AND [address] [place] [address] [address] [place] [address] 



C4 complement component (10/20/2019  4:00 AM CDT)





             Component    Value        Ref Range    Performed At Pathologist Sig

nature

 

             C4 complement 20           10 - 40 mg/dL [facility]

 









                                        Specimen

 

                                        Plasma specimen









                Performing Organization Address         City/[facility] Phone

 Number

 

                [facility] DEPARTMENT OF PATHOLOGY AND [address] [place] [address] 



Cytomegalovirus by PCR (10/20/2019  3:50 AM CDT)Only the most recent of2 results
within the time period is included.





             Component    Value        Ref Range    Performed At Pathologist



                                                                 Signature

 

             Cytomegalovirus by PCR Not-Detected Not-Detected [facility] 



                                       IU/mL        [facility]     

 

             Cytomegalovirus by PCR See link                  [facility]              HOSPITAL     



                          Lab                                    



                          ReportComment                           



                          : Case                                 



                          Number:                                



                          [id number]                           









                                        Specimen

 

                                        









                Performing Organization Address         City/[facility] Phone

 Number

 

                [facility] DEPARTMENT OF PATHOLOGY AND [address] [place] [address] 

 

                [facility]                                 



Herpes simplex virus by PCR (10/20/2019  3:50 AM CDT)Only the most recent of2 
resultswithin the time period is included.





             Component    Value        Ref Range    Performed At Pathologist



                                                                 Signature

 

             Herpes virus, PCR Not-Detected Not-Detected [facility]     

 

             Herpes virus, PCR See link below              [facility] 



                          for [facility] Lab               HOSPITAL     



                          ReportComment:                           



                          Case Number:                           



                          [id number]                           









                                        Specimen

 

                                        









                Performing Organization Address         City/[facility] Phone

 Number

 

                [facility] DEPARTMENT OF PATHOLOGY AND [address] [address] 

 

                [facility]                                 



Stepan Barr Virus (EBV) by PCR (10/20/2019  3:50 AM CDT)Only the most recent of
2 resultswithin the time period is included.





             Component    Value        Ref Range    Performed At Pathologist



                                                                 [facility] [place]

 

             Stepan Barr virus, Not-Detected Not-Detected [facility] 



             PCR                       copies/mL    HOSPITAL     

 

             Stepan Barr virus, See link below              [facility] 



             PCR          for PDF Lab               HOSPITAL     



                          ReportComment:                           



                          Case Number:                           



                          [id number]                           









                                        Specimen

 

                                        









                Performing Organization Address         City/State/Zipcode Phone

 Number

 

                [facility] DEPARTMENT OF PATHOLOGY AND [address] [place] [address]                                 



Hemoglobin A1c (10/20/2019  3:50 AM CDT)





             Component    Value        Ref Range    Performed At Pathologist



                                                                 [facility] [place]

 

             Hemoglobin A1C 5.7 (H)      4.0 - 5.6 %  [facility] 



                          Comment:                  HOSPITAL     



                          HbA1c cutoffs for diagnosing diabetes:                

           



                          4.0% - 5.6% = normal                           



                          5.7% - 6.4% = increased risk for diabetes (prediabetes

)9                           



                          >=6.5% = diabetes9                           



                          Goals for glycemic control (ADA 2016)                 

          



                          < 7.0% Target for nonpregnant adults with diabetes. 

                          



                          More or less stringent targets may be appropriate for 

                          



                          individual patients.                           



                          <7.5%  Target for Children and adolescents with type

 1                           



                          diabetes.                              



                                                                 









                                        Specimen

 

                                        Blood









                Performing Organization Address         City/[facility] Phone

 Number

 

                [facility] DEPARTMENT OF PATHOLOGY AND [address] [place] [address] 



AFB culture (10/19/2019  6:07 PM CDT)





             Component    Value        Ref Range    Performed At Pathologist



                                                                 [facility] [place]

 

             AFB culture  No growth after 6 weeks of incubation.              [facility] 



             isolate      Comment:                  HOSPITAL     



                          Specimen Information                           



                          Specimen Source: CSF (Spinal Fluid)                   

        



                          Specimen Site: CSF (spinal fluid)                     

      



                                                                 









                                        Specimen

 

                                        Cerebrospinal fluid - CSF (spinal fluid)









                Performing Organization Address         City/State/Zipcode Phone

 Number

 

                [facility] DEPARTMENT OF PATHOLOGY AND [address] [place] [address] 



Fungus culture (10/19/2019  6:07 PM CDT)





             Component    Value        Ref Range    Performed At Pathologist



                                                                 Signature

 

             Fungus culture No growth after 4 weeks of incubation.              

[facility] 



             isolate      Comment:                  HOSPITAL     



                          Specimen Information                           



                          Specimen Source: CSF (Spinal Fluid)                   

        



                          Specimen Site: CSF (spinal fluid)                     

      



                                                                 









                                        Specimen

 

                                        Cerebrospinal fluid - CSF (spinal fluid)









                Performing Organization Address         City/State/Zipcode Phone

 Number

 

                [facility] DEPARTMENT OF PATHOLOGY AND [address] [place], [place] [address]

0 



                GENOMIC MEDICINE                                 

 

                [facility] [address] [place] [address] 



IR Lumbar Puncture by Radiology (10/19/2019  5:12 PM CDT)





                                        Specimen

 

                                        









                          Narrative                 Performed At

 

                                        EXAMINATION: IR LUMBAR PUNCTURE



                                         RADIANT



                                         



                                        



                                        CLINICAL HISTORY: fevers headaches joycelyn

rn for meningitis



                                        



                                         



                                        



                                        COMPARISON: None



                                        



                                         



                                        



                          TECHNIQUE: Informed consent and a timeout was performe

d. The patient's 



                          lower back was prepped and draped in the usual sterile

 fashion. 



                                        Fluoroscopy was used for image guidance 

for lumbar puncture.



                                        



                                         



                                        



                                         



                                        



                                        FINDINGS:



                                        



                                         



                                        



                          1% lidocaine was used for local anesthesia. Under dire

ct fluoroscopic 



                          guidance, access to the thecal sac was made with a 22-

gauge spinal 



                          needle at the L4-5 level. An elevated opening pressure

 of 25 cm H2O was 



                                        measured. A total of approximately 20 cc

 of 



                                        



                          clear colorless CSF was removed without difficulty. A 

normal closing 



                                        pressure of 12 cm H2O was subsequently m

easured.



                                        



                                         



                                        



                          The patient tolerated the procedure well. There were n

o complications. 



                                        The sample was sent to the laboratory fo

r analysis as requested.



                                        



                                         



                                        



                                        Total fluoroscopy time was 1 second. No 

exposure images were taken.



                                        



                                         



                                        



                                         



                                        



                                        IMPRESSION:



                                        



                                         



                                        



                          Successful fluoroscopic guided lumbar puncture includi

ng elevated 



                                        opening pressure of 25 cm H2O and normal

 closing pressure of 12 cm H2O.



                                        



                                         



                                        



                                         



                                        



                                         



                                        



                                         



                                        



                          [facility]-[id number]            









                                        Procedure Note

 

                                        Hm Interface, Radiology Results Incoming

 - 10/19/2019  5:49 PM CDT



EXAMINATION: IR LUMBAR PUNCTURE



                                        



                                        CLINICAL HISTORY: fevers headaches joycelyn

rn for meningitis



                                        



                                        COMPARISON:  None



                                        



                                        TECHNIQUE: Informed consent and a timeou

t was performed. The patient's lower 

back was prepped and draped in the usual sterile fashion. Fluoroscopy was used 
for image guidance for lumbar puncture.



                                        



                                        



                                        FINDINGS:



                                        



                                        1% lidocaine was used for local anesthes

ia. Under direct fluoroscopic guidance, 

access to the thecal sac was made with a 22-gauge spinal needle at the L4-5 
level. An elevated opening pressure of 25 cm H2O was measured.



                                        A total of approximately 20 cc of



                                        clear colorless CSF was removed without 

difficulty. A normal closing pressure of

12 cm H2O was subsequently measured.



                                        



                                        The patient tolerated the procedure well

. There were no complications. The 

sample was sent to the laboratory for analysis as requested.



                                        



                                        Total fluoroscopy time was 1 second. No 

exposure images were taken.



                                        



                                        



                                        IMPRESSION:



                                        



                                        Successful fluoroscopic guided lumbar pu

ncture including elevated opening 

pressure of 25 cm H2O and normal closing pressure of 12 cm H2O.



                                        



                                        



                                        



                                        



                                        [facility]-[id number]









                Performing Organization Address         City/[facility] Phone

 Number

 

                [facility]      [address] [place] [address] 



IgG synthesis rate study (10/19/2019  5:07 PM CDT)





             Component    Value        Ref Range    Performed At Pathologist



                                                                 Signature

 

             IgG albumin ratio, 0.17         0.00 - 0.23  [facility]     

 

             IgG index, CSF 0.64 (H)     0.01 - 0.63  [facility]     

 

             IgG synthetic rate 2.12         -9.90 - 3.30 [facility] 



                                       mg/day       [facility]     

 

             Q-albumin ratio, 2.97         2.00 - 6.00  [facility]     

 

             IgG, CSF     1.61         1.00 - 3.00  [facility] 



                                       mg/dL        [facility]     

 

             Albumin, CSF <9.50 (L)    10.00 - 30.00 [facility] 



                                       mg/dL        [facility]     

 

             IgG          844          700 - 1,600  [facility] 



                                       mg/dL        [facility]     

 

             Albumin, S   3,200.0 (L)  3,640.0 -    [facility] 



                                       5,304.0 mg/dL HOSPITAL     









                                        Specimen

 

                                        Cerebrospinal fluid









                Performing Organization Address         City/[facility] Phone

 Number

 

                [facility] DEPARTMENT OF PATHOLOGY AND [address] [place] [address]

0 



                [address] [address] 



West Nile virus by PCR, CSF (10/19/2019  5:07 PM CDT)





             Component    Value        Ref Range    Performed At Pathologist



                                                                 Signature

 

             West Nile virus Not-Detected Not-Detected [facility] 



             PCR, CSF                               [facility]     

 

             West Nile virus See link below              [facility] 



             PCR, CSF     for PDF Lab               HOSPITAL     



                          ReportComment:                           



                          Case Number:                           



                          [id number]                           









                                        Specimen

 

                                        









                Performing Organization Address         City/[facility] Phone

 Number

 

                [facility] DEPARTMENT OF PATHOLOGY AND [address] [place] [address]

0 



                [address] [address] 

 

                [facility]                                 



West Nile virus antibody panel, CSF (10/19/2019  5:07 PM CDT)





             Component    Value        Ref Range    Performed At Pathologist



                                                                 Signature

 

             West Nile IgG, 0.03         <=1.29 IV    [facility] REF LAB 



             CSF          Comment:                               



                          INTERPRETIVE INFORMATION: West Nile Virus Ab IgG by MARIELA

BRITTON, CSF                           



                           1.29 IV or less ....... Negative: No significant   

                        



                                       level of West N

ile virus                           



                                       IgG antibody de

tected.                           



                           1.30 - 1.49 IV ........ Equivocal: Questionable    

                       



                                       presence of Sam

t Nile                           



                                       virus IgG antib

kristine detected.                           



                                       Repeat testing 

in 10-14 days                           



                                       may be helpful.

                           



                           1.50 IV or greater .... Positive: Presence of IgG  

                         



                                       antibody to Sam

t Nile virus                           



                                       detected, sugge

stive of                           



                                       current or past

 infection.                           



                          This test is intended to be used as a semi-quantitativ

e means of                           



                          detecting West Nile virus-specific IgG in CSF samples 

in which                           



                          there is a clinical suspicion of West Nile Virus infec

tion. This                           



                          test should not be used solely for quantitative purpos

es, nor                           



                          should the results be used without correlation to clin

ical history                           



                          or other data. Because other members of the Flavivirid

ae family,                           



                          such as [place] encephalitis virus, show extensive  

                         



                          cross-reactivity with West Nile virus, serologic testi

ng specific                           



                          for these species should be considered.               

            



                          The detection of antibodies to West Nile virus in cere

brospinal                           



                          fluid may indicate central nervous system infection. H

owever,                           



                          consideration must be given to possible contamination 

by blood or                           



                          transfer of serum antibodies across the blood-brain ba

rrier.                           



                          Test developed and characteristics determined by [employer]. See Compliance Statement B: [email or web address]

CS                           

 

             West Nile IgM, 0.00         <=0.89 IV    [facility] REF LAB 



             CSF          Comment:                               



                          INTERPRETIVE INFORMATION: West Nile Virus Ab IgM by MARIELA

BRITTON, CSF                           



                          0.89 IV or less ...... Negative - No significant level

                           



                                      of West Nile virus I

gM antibody                           



                                      detected.           

                



                          0.90-1.10 IV ......... Equivocal - Questionable presen

ce                           



                                      of West Nile virus I

gM antibody                           



                                      detected. Repeat barak

ting in                           



                                      10-14 days may be he

lpful.                           



                          1.11 IV or greater ... Positive - Presence of IgM     

                      



                                      antibody to West Nil

e virus                           



                                      detected, suggestive

 of current                           



                                      or recent infection.

                           



                          This test is intended to be used as a semi-quantitativ

e means of                           



                          detecting West Nile virus-specific IgM in CSF samples 

in which                           



                          there is a clinical suspicion of West Nile virus infec

tion. This                           



                          test should not be used solely for quantitative purpos

es, nor                           



                          should the results be used without correlation to clin

ical history                           



                          or other data. Because other members of the Flavivirid

ae family,                           



                          such as [place] encephalitis virus, show extensive  

                         



                          cross-reactivity with West Nile virus, serologic testi

ng specific                           



                          for these species should be considered.               

            



                          The detection of antibodies to West Nile virus in cere

brospinal                           



                          fluid may indicate central nervous system infection. H

owever,                           



                          consideration must be given to possible contamination 

by blood or                           



                          transfer of serum antibodies across the blood-brain ba

rrier.                           



                          Test developed and characteristics determined by [employer]. See Compliance Statement B: [employer]/

CS                           



                          Performed by [facility],                       

    



                          [address] [place] [address] [phone]            

               



                          [email or web address], Dion Martins MD, Lab. Director     

                      









                                        Specimen

 

                                        Cerebrospinal fluid









                Performing Organization Address         City/[facility]/Zipcode Phone

 Number

 

                [facility] [address] [place] [address] 

 

                 [facility] [address] [place] [address] 



RANDY virus, quantitative PCR (10/19/2019  5:07 PM CDT)





             Component    Value        Ref Range    Performed At Pathologist



                                                                 Signature

 

             RANDY virus result Not-Detected Not-Detected [facility] 



                                       copies/mL    HOSPITAL     

 

             RANDY virus qPCR quant See link below              [facility] 



                          for PDF Lab               HOSPITAL     



                          ReportComment:                           



                          Case Number:                           



                          [id number]                           









                                        Specimen

 

                                        









                Performing Organization Address         City/[facility] Phone

 Number

 

                [facility] DEPARTMENT OF PATHOLOGY AND [address] [place] [address]

0 



                [address] [address] 

 

                [facility]                                 



Varicella zoster by PCR (10/19/2019  5:07 PM CDT)





             Component    Value        Ref Range    Performed At Pathologist



                                                                 Signature

 

             VZV result   Not-Detected Not-Detected [facility] 



                                       copies/mL    HOSPITAL     

 

             Varicella zoster, See link below              [facility] 



             pcr          for PDF Lab               HOSPITAL     



                          ReportComment:                           



                          Case Number:                           



                          [id number]                           









                                        Specimen

 

                                        









                Performing Organization Address         City/[facility]/Zipcode Phone

 Number

 

                [facility] DEPARTMENT OF PATHOLOGY AND [address] [place] [address]

0 



                [address] [address] 

 

                [facility]                                 



BK virus by PCR (10/19/2019  5:07 PM CDT)





             Component    Value        Ref Range    Performed At Pathologist Sig

nature

 

             BK virus PCR Not-Detected Not-Detected [facility] 



                                       copies/mL    [facility]     

 

             BK virus PCR See link below              [facility] 



                          for PDF Lab               HOSPITAL     



                          ReportComment:                           



                          Case Number:                           



                          [id number]                           









                                        Specimen

 

                                        









                Performing Organization Address         City/State/Zipcode Phone

 Number

 

                [facility] DEPARTMENT OF PATHOLOGY AND [address] [place] [address]

0 



                [address] [address] 

 

                [facility]                                 



Toxoplasma gondii by PCR (10/19/2019  5:07 PM CDT)





             Component    Value        Ref Range    Performed At Pathologist



                                                                 Signature

 

             Toxoplasma gondii CSF                        [facility] 



             source                                              

 

             Toxoplasma gondii Not Detected              [facility] REF [facility] 



             by PCR       Comment:                               



                          NOT DETECTED - A negative result does not rule out the

                           



                          presence of PCR inhibitors in the patient specimen or 

assay                           



                          specific nucleic acid in concentrations below the leve

l of                           



                          detection by the assay.                           



                          INTERPRETIVE INFORMATION: Toxoplasma gondii by PCR    

                       



                          This test was developed and its performance characteri

stics                           



                          determined by [employer]. The U.S. Food and Bill

g                           



                          Administration has not approved or cleared this test; 

however, FDA                           



                          clearance or approval is not currently required for cl

inical use.                           



                          The results are not intended to be used as the sole me

ans for                           



                          clinical diagnosis or patient management decisions.   

                        



                          Performed by [facility],                       

    



                          [address] [place] [address] [phone]            

               



                          [email or web address], Dion Martins MD, Lab. Director     

                      



                                                                 









                                        Specimen

 

                                        Serum









                Performing Organization Address         City/[facility] Phone

 Number

 

                [facility] [address] [place] [address] 

 

                [facility] [place] [address] 



Cryptococcal antigen, screen (10/19/2019  5:07 PM CDT)





             Component    Value        Ref Range    Performed At Pathologist



                                                                 Signature

 

             Cryptococcal Ag Negative - No Cryptococcus antigen detected.       

       [facility] 



                          Comment:                  HOSPITAL     



                          Specimen Information                           



                          Specimen Source: CSF (Spinal Fluid)                   

        



                          Specimen Site: CSF (spinal fluid)                     

      



                                                                 









                                        Specimen

 

                                        Cerebrospinal fluid - CSF (spinal fluid)









                Performing Organization Address         City/State/Zipcode Phone

 Number

 

                [facility] DEPARTMENT OF PATHOLOGY AND [address] [place] [address]

0 



                [address] [address] 



Oligoclonal banding, CSF (10/19/2019  5:07 PM CDT)





             Component    Value        Ref Range    Performed At Pathologist



                                                                 Signature

 

             Protein, CSF 18           15 - 45      [place]      



                                       mg/dL        [facility]     

 

             Prealbumin, CSF 5.2          3.5 - 11.1 % [facility]     

 

             Albumin, CSF 57.4         40.8 - 66.2  [facility]     

 

             Alpha 1, CSF 3.7          2.3 - 6.4 %  [facility]     

 

             Alpha 2, CSF 8.3          6.1 - 12.6 % [facility]     

 

             Beta, CSF    16.8         11.7 - 24.1  [facility]     

 

             Gamma, CSF   8.6          5.6 - 12.2 % [facility]     

 

             CSF extended See Comment               [facility]      



             interpretation Comment:                  [facility]    



                          A nondiagnostic CSF protein study with an increased Ig

G index. No              HOSPITAL     



                          oligoclonal bands seen.                           



                                                                 

 

             CSF interpretation See Comment               [facility]      



                          Comment:                  [facility]    



                                Geoff Ghosh, PhD; Betty Mar MD; Yuli Lord, PhD

; Ronaldo Dacosta MD,                 

[facility]                                



                          PhD                                    



                                                                 









                                        Specimen

 

                                        Cerebrospinal fluid









                Performing Organization Address         City/[facility] Phone

 Number

 

                [facility] DEPARTMENT OF PATHOLOGY AND [address] [place] [address] [address] [place] [address] 



Enterovirus by PCR (10/19/2019  5:07 PM CDT)





             Component    Value        Ref Range    Performed At Pathologist [facility]

nature

 

             Enterovirus PCR Not-Detected Not-Detected [facility]     

 

             Enterovirus PCR See link below              [facility] 



                          for PDF Lab               HOSPITAL     



                          ReportComment:                           



                          Case Number:                           



                          [id number]                           









                                        Specimen

 

                                        









                Performing Organization Address         City/[facility] Phone

 Number

 

                [facility] DEPARTMENT OF PATHOLOGY AND [address] [place] [address]

0 



                [address] [address] 

 

                [facility]                                 



Gram stain (10/19/2019  5:07 PM CDT)Only the most recent of3 resultswithin the 
time period is included.





             Component    Value        Ref Range    Performed At Pathologist



                                                                 Signature

 

             Gram stain isolate No WBC's or organisms seen.              [facility] 



                          Comment:                  HOSPITAL     



                          Specimen Information                           



                          Specimen Source: CSF (Spinal Fluid)                   

        



                          Specimen Site: CSF (spinal fluid)                     

      



                                                                 









                                        Specimen

 

                                        Cerebrospinal fluid - CSF (spinal fluid)









                Performing Organization Address         City/[facility] Phone

 Number

 

                [facility] [facility] DEPARTMENT OF PATHOLOGY AND [address] [place] [address]

0 



                [address] [address] 



CSF culture (10/19/2019  5:07 PM CDT)





             Component    Value        Ref Range    Performed At Pathologist



                                                                 Signature

 

             CSF culture  growth after 72 hours              [facility] 



             isolate      Comment:                  HOSPITAL     



                          Specimen Information                           



                          Specimen Source: CSF (Spinal Fluid)                   

        



                          Specimen Site: CSF (spinal fluid)                     

      



                                                                 









                                        Specimen

 

                                        Cerebrospinal fluid - CSF (spinal fluid)









                Performing Organization Address         City[facility] Phone

 Number

 

                [facility] DEPARTMENT OF PATHOLOGY AND [address] [place] [address] [address] [place] [address] 



CSF cell count with differential (10/19/2019  5:07 PM CDT)





             Component    Value        Ref Range    Performed At Pathologist



                                                                 Signature

 

             CSF tube number Tube 1                    [facility]     

 

             Color, CSF   Colorless                 [facility]     

 

             Appearance, CSF Clear                     [facility]     

 

             CSF supernatant Colorless                 [facility]     

 

             RBC, CSF     >1           0 - 1 /CMM   [facility]     

 

             WBC, CSF     2            0 - 5 /CMM   [facility]     

 

             CSF mononuclear cell FootnoteComment:              [facility] 



                          NO DIFF REQUIRED              HOSPITAL     









                                        Specimen

 

                                        Cerebrospinal fluid









                Performing Organization Address         City/[facility] Phone

 Number

 

                [facility] DEPARTMENT OF PATHOLOGY AND [address] [place] [address]

0 



                [address] [address] 



VDRL, CSF screen (10/19/2019  5:07 PM CDT)





             Component    Value        Ref Range    Performed At Pathologist Sig

nature

 

             VDRL, CSF screen Non-reactive Non-reactive [facility]     









                                        Specimen

 

                                        Cerebrospinal fluid









                Performing Organization Address         City/[facility] Phone

 Number

 

                [facility] DEPARTMENT OF PATHOLOGY AND [address] [place] [address] 7[address]

[address] [place] [address] 



Protein, CSF (10/19/2019  5:07 PM CDT)





             Component    Value        Ref Range    Performed At Pathologist Sig

nature

 

             Protein, CSF 19           15 - 45 mg/dL [facility] 









                                        Specimen

 

                                        Cerebrospinal fluid









                Performing Organization Address         City[facility] Phone

 Number

 

                [facility] DEPARTMENT OF PATHOLOGY AND [address] [place] [address]

0 



                [address] [address] 



Glucose level, CSF (10/19/2019  5:07 PM CDT)





             Component    Value        Ref Range    Performed At Pathologist Sig

nature

 

             Glucose, CSF 63           40 - 70 mg/dL [facility] 









                                        Specimen

 

                                        Cerebrospinal fluid









                Performing Organization Address         City/[facility] Phone

 Number

 

                [facility] DEPARTMENT OF PATHOLOGY AND [address] [place] [address]

[address] [place], [place] [address]  [place] [address] 



Partial thromboplastin time, activated (10/19/2019  1:25 PM CDT)Only the most 
recent of4 resultswithin the time period is included.





             Component    Value        Ref Range    Performed At Pathologist



                                                                 Signature

 

             PTT          37.5 (H)     23.0 - 36.0  [facility] 



                          Comment:     [facility]     



                          PTT therapeutic range for unfractionated heparin is   

                        



                          61.0-112.0 seconds which corresponds to Anti-Xa       

                    



                          0.3-0.7 U/ml.                           



                                                                 









                                        Specimen

 

                                        Blood









                Performing Organization Address         City/State/Zipcode Phone

 Number

 

                [facility] DEPARTMENT OF PATHOLOGY AND [address] [place] [address]

0 



                [address] [address] 



Prothrombin time with INR (10/19/2019  1:25 PM CDT)Only the most recent of6 
resultswithin the time period is included.





             Component    Value        Ref Range    Performed At Pathologist



                                                                 Signature

 

             Prothrombin time 15.4 (H)     11.5 - 14.5  [facility]     

 

             INR          1.2                       [place]      



                          Comment:                  [facility]    



                          The International Normalized Ratio (INR) is a therapeu

[facility]     



                          monitoring tool for patients who are stable on oral   

                        



                          anticoagulant therapy. An INR of 2.0-3.0 is suggested 

for deep                           



                          vein thrombosis/pulmonary embolism.                   

        



                                                                 









                                        Specimen

 

                                        Blood









                Performing Organization Address         City/[facility]/Zipcode Phone

 Number

 

                [facility] DEPARTMENT OF PATHOLOGY AND [address] [place] [address]

0 



                [address] [address] 



Respiratory pathogen panel (10/19/2019  1:20 PM CDT)





             Component    Value        Ref Range    Performed At Pathologist



                                                                 Signature

 

             Respiratory  Negative for all pathogens tested:              [facility]      



             pathogen panel Negative for Adenovirus              [facility]    



                          Negative for Coronavirus HKU1              [facility]   

  



                          Negative for Coronavirus NL63                         

  



                          Negative for Coronavirus 229E                         

  



                          Negative for Coronavirus OC43                         

  



                          Negative for Human Metapneumovirus                    

       



                          Negative for Rhinovirus/Enterovirus                   

        



                          Negative for Influenza A                           



                          Negative for Influenza A/H1                           



                          Negative for Influenza A/H3                           



                          Negative for Influenza A/H1-2009                      

     



                          Negative for Influenza B                           



                          Negative for Parainfluenza Virus 1                    

       



                          Negative for Parainfluenza Virus 2                    

       



                          Negative for Parainfluenza Virus 3                    

       



                          Negative for Parainfluenza Virus 4                    

       



                          Negative for Respiratory Syncytial Virus              

             



                          Negative for Bordetella pertussis                     

      



                          Negative for Chlamydophila pneumoniae                 

          



                          Negative for Mycoplasma pneumoniae                    

       



                          This real-time PCR assay detects the presence of nucle

ic                           



                          acids (RNA or DNA) for the respiratory pathogens liste

d.                            



                          A result of "Not-detected" does not exclude the possib

ility                           



                          of the presence of one or more pathogens at concentrat

ions                           



                          less than the detectable limits of the assay.         

                  



                                                                 



                          Comment:                               



                          Specimen Information                           



                          Specimen Source: Nares                           



                          Specimen Site: Left                           



                                                                 









                                        Specimen

 

                                        Nares - Left









                Performing Organization Address         City/[facility] Phone

 Number

 

                [facility] DEPARTMENT OF PATHOLOGY AND [address] [place] [address]

0 



                [address] [address] 



Influenza antigen test, reflex negative to RPP (10/19/2019  1:20 PM CDT)





             Component    Value        Ref Range    Performed At Pathologist



                                                                 [facility]

 

             Influenza antigen Negative for Influenza A/B antigen.              

[facility] 



                          Comment:                  HOSPITAL     



                          Specimen Information                           



                          Specimen Source: Nares                           



                          Specimen Site: Left                           



                                                                 









                                        Specimen

 

                                        Nares - Left









                Performing Organization Address         City/[facility] Phone

 Number

 

                [facility] DEPARTMENT OF PATHOLOGY AND [address] [place] [address] [address] [place] [address] 



XR Chest 1 Vw Portable (10/19/2019 10:03 AM CDT)





                                        Specimen

 

                                        









                          Narrative                 Performed At

 

                                        EXAMINATION: XR CHEST 1 VW PORTABLE



                                         [facility]



                                         



                                        



                                        CLINICAL HISTORY: Fevers



                                        



                                         



                                        



                                        COMPARISON: None



                                        



                                         



                                        



                                        IMPRESSION:



                                        



                                         



                                        



                                        Normal single view chest



                                        



                                         



                                        



                                        The lungs are hypoexpanded but clear.



                                        



                                         



                                        



                                        The heart is not enlarged.



                                        



                                         



                                        



                                        The bony structures are within normal li

mits.



                                        



                                         



                                        



                          [id number]           









                                        Procedure Note

 

                                         Interface, Radiology Results Incoming

 - 10/19/2019 10:27 AM CDT



EXAMINATION:  XR CHEST 1 VW PORTABLE



                                        



                                        CLINICAL HISTORY:  Fevers



                                        



                                        COMPARISON:  None



                                        



                                        IMPRESSION:



                                        



                                        Normal single view chest



                                        



                                        The lungs are hypoexpanded but clear.



                                        



                                        The heart is not enlarged.



                                        



                                        The bony structures are within normal li

mits.



                                        



                                        [id number]









                Performing Organization Address         City[facility] Phone

 Number

 

                 [facility]      [address] [place] [address] 



HIV Ag/Ab combination (10/19/2019  4:42 AM CDT)





             Component    Value        Ref Range    Performed At Pathologist



                                                                 Signature

 

             HIV Ag/Ab combination Non-reactive Non-reactive [facility]     









                                        Specimen

 

                                        Blood









                Performing Organization Address         City/[facility] Phone

 Number

 

                [facility] DEPARTMENT OF PATHOLOGY AND [address] [place] [address]

0 



                [address] [address] 



Infectious mononucleosis screen (10/19/2019  4:42 AM CDT)





             Component    Value        Ref Range    Performed At Pathologist Sig

nature

 

             Heterophile Ab screen Negative     Negative     [facility]     









                                        Specimen

 

                                        Blood









                Performing Organization Address         City[facility] Phone

 Number

 

                [facility] DEPARTMENT OF PATHOLOGY AND [address] [place] [address] [address] [place] [address] 



Sedimentation rate (10/19/2019  4:42 AM CDT)





             Component    Value        Ref Range    Performed At Pathologist Sig

nature

 

             Sedimentation rate 26 (H)       0 - 20 mm/hr [facility]     









                                        Specimen

 

                                        Blood









                Performing Organization Address         City[facility] Phone

 Number

 

                [facility] DEPARTMENT OF PATHOLOGY AND [address] [place] [address] 



C-reactive protein (10/19/2019  4:42 AM CDT)





             Component    Value        Ref Range    Performed At Pathologist Sig

nature

 

             CRP          21.35 (H)    0.00 - 0.50 mg/dL [facility]     









                                        Specimen

 

                                        Plasma specimen









                Performing Organization Address         City/[facility] Phone

 Number

 

                [facility] DEPARTMENT OF PATHOLOGY AND [address] [place] [address] [address] [place] [address] 



KUSH (10/19/2019  4:42 AM CDT)





             Component    Value        Ref Range    Performed At Pathologist Sig

nature

 

             KUSH screen   Negative     Negative     [facility] 









                                        Specimen

 

                                        Blood









                Performing Organization Address         City/[facility] Phone

 Number

 

                [facility] DEPARTMENT OF PATHOLOGY AND [address] [place] [address] 



Urine culture (10/19/2019  1:37 AM CDT)Only the most recent of6 resultswithin 
the time period is included.





             Component    Value        Ref Range    Performed At Pathologist



                                                                 Signature

 

             Urine culture growth after 24 hours              [facility]      Comment:                  HOSPITAL     



                          Specimen Information                           



                          Specimen Source: Urine                           



                          Specimen Site: Clean catch                           



                                                                 









                                        Specimen

 

                                        Urine









                Performing Organization Address         City[facility] Phone

 Number

 

                [facility] DEPARTMENT OF PATHOLOGY AND [address] [place] [address] 



Urinalysis screen and microscopy, with reflex to culture (10/19/2019 12:25 AM 
CDT)Only the most recent of2 resultswithin the time period is included.





             Component    Value        Ref Range    Performed At Pathologist



                                                                 Signature

 

             Specimen site Clean catch               [facility]     

 

             Color, UA    Straw                     [facility]     

 

             Appearance, UA Hazy                      [facility]     

 

             Specific gravity, UA 1.008        1.001 - 1.035 [facility]     

 

             pH, UA       6.0          5.0 - 8.5    [facility]     

 

             Protein, UA  Negative     Negative     [facility]     

 

             Glucose, UA  Negative     Negative     [facility]     

 

             Ketones, UA  1+ (A)       Negative     [facility]     

 

             Bilirubin, UA Negative     Negative     [facility]     

 

             Blood, UA    Small (A)    Negative     [facility]     

 

             Nitrite, UA  Negative     Negative     [facility]     

 

             Urobilinogen, UA <2.0         <2.0         [facility]     

 

             Leukocyte esterase, Negative     Negative     [facility]     

 

             Epithelial cells, UA 5            /HPF         [facility]     

 

             WBC, UA      6 (H)        0 - 4 /HPF   [facility]     

 

             RBC, UA      2            0 - 5 /HPF   [facility]     

 

             Bacteria, UA Few          None seen    [facility]     

 

             Yeast, UA    Few (A)                   [facility]     

 

             Yeast with   None seen                 [facility] 



             pseudohyphae,                            HOSPITAL     









                                        Specimen

 

                                        Urine









                Performing Organization Address         City[facility] Phone

 Number

 

                [facility] DEPARTMENT OF PATHOLOGY AND [address] [place] [address]

0 



                [address] [address] 



Blood culture, aerobic &amp; anaerobic (10/19/2019 12:15 AM CDT)Only the most 
recent of2 resultswithin the time period is included.





             Component    Value        Ref Range    Performed At Pathologist



                                                                 Signature

 

             Blood culture No growth after 5 days of incubation.              [facility] 



             isolate      Comment:                  HOSPITAL     



                          Specimen Information                           



                          Specimen Source: Blood                           



                          Specimen Site: Wrist, right                           



                                                                 









                                        Specimen

 

                                        Blood - Wrist, right









                Performing Organization Address         City[facility] Phone

 Number

 

                [facility] DEPARTMENT OF PATHOLOGY AND [address] [place], [place] [address]

0 



                [address] [address] 



Total iron binding capacity (10/19/2019 12:05 AM CDT)





             Component    Value        Ref Range    Performed At Pathologist Sig

nature

 

             Iron level   14 (L)       37 - 145 ug/dL [facility]     

 

             Iron binding capacity 268          200 - 400 ug/dL [facility]     

 

             % Saturation 5.2 (L)      15.0 - 38.0 % [facility]     









                                        Specimen

 

                                        Plasma specimen









                Performing Organization Address         City/[facility] Phone

 Number

 

                [facility] DEPARTMENT OF PATHOLOGY AND [address] [place] [address] [address] [place] [address] 



Thyroid stimulating hormone (10/19/2019 12:05 AM CDT)





             Component    Value        Ref Range    Performed At Pathologist Sig

nature

 

             TSH          0.84         0.27 - 4.20 uIU/mL [facility] 









                                        Specimen

 

                                        Plasma specimen









                Performing Organization Address         City/[facility] Phone

 Number

 

                [facility] DEPARTMENT OF PATHOLOGY AND [address] [place] [address]

[address] [place] [address] 



Phosphorus level (10/19/2019 12:05 AM CDT)





             Component    Value        Ref Range    Performed At Pathologist Sig

nature

 

             Phosphorus   2.9          2.4 - 4.5 mg/dL [facility]

L 









                                        Specimen

 

                                        Blood









                Performing Organization Address         City/State/[facility] Phone

 Number

 

                [facility] DEPARTMENT OF PATHOLOGY AND [address] [place] [address]

0 



                [address] [address] 



Magnesium level (10/19/2019 12:05 AM CDT)





             Component    Value        Ref Range    Performed At Pathologist Sig

nature

 

             Magnesium    1.9          1.6 - 2.6 mg/dL [facility] 









                                        Specimen

 

                                        Blood









                Performing Organization Address         City/State/[facility] Phone

 Number

 

                [facility] DEPARTMENT OF PATHOLOGY AND [address] [place] [address]

0 



                [address] [address] 



LDH (10/19/2019 12:05 AM CDT)





             Component    Value        Ref Range    Performed At Pathologist Sig

nature

 

             LDH          155          87 - 225 U/L [facility] 









                                        Specimen

 

                                        Plasma specimen









                Performing Organization Address         City/[facility] Phone

 Number

 

                [facility] DEPARTMENT OF PATHOLOGY AND [address] [place] [address]

[address] [place] [address] 



Lactic acid level (10/19/2019 12:05 AM CDT)Only the most recent of2 results
within the time period is included.





             Component    Value        Ref Range    Performed At Pathologist Sig

nature

 

             Lactic acid  1.5          0.5 - 2.2 mmol/L [facility]

AL 









                                        Specimen

 

                                        Blood









                Performing Organization Address         City/[facility] Phone

 Number

 

                [facility] DEPARTMENT OF PATHOLOGY AND [address] [place] [address]

0 



                [address] [address] 



Haptoglobin (10/19/2019 12:05 AM CDT)





             Component    Value        Ref Range    Performed At Pathologist Sig

nature

 

             Haptoglobin  261 (H)      30 - 200 mg/dL [facility]

 









                                        Specimen

 

                                        Plasma specimen









                Performing Organization Address         City/[facility] Phone

 Number

 

                [facility] DEPARTMENT OF PATHOLOGY AND [address] [place] [address]

0 



                [address] [address] 



Folate level (10/19/2019 12:05 AM CDT)





             Component    Value        Ref Range    Performed At Pathologist Sig

nature

 

             Folate       8.3          4.8 - 24.2 ng/mL [facility]

AL 









                                        Specimen

 

                                        Serum









                Performing Organization Address         City/[facility] Phone

 Number

 

                [facility] DEPARTMENT OF PATHOLOGY AND [address] [place] [address]

0 



                [address] [address] 



Ferritin level (10/19/2019 12:05 AM CDT)





             Component    Value        Ref Range    Performed At Pathologist Sig

nature

 

             Ferritin level 239 (H)      13 - 150 ng/mL [facility]     









                                        Specimen

 

                                        Plasma specimen









                Performing Organization Address         City/[facility] Phone

 Number

 

                [facility] DEPARTMENT OF PATHOLOGY AND [address] [place] [address]

[address] [place] [address] 



Vitamin B12 level (10/19/2019 12:05 AM CDT)





             Component    Value        Ref Range    Performed At Pathologist



                                                                 [facility]

 

             Vitamin B12  357          211 - 946    [facility] 



                          Comment:     pg/mL        HOSPITAL     



                          Significant overlap exists between normal and deficien

cy states.                           



                          However, most patients with deficiencies will have Ser

um B12                             



                                                    <200 pg/mL.      

                 

                                                    



                                                                 









                                        Specimen

 

                                        Serum









                Performing Organization Address         City/[facility]/Zipcode Phone

 Number

 

                [facility] DEPARTMENT OF PATHOLOGY AND [address] [place] [address]

0 



                [address] [address] 



ECG 12 lead (10/18/2019 11:54 PM CDT)





             Component    Value        Ref Range    Performed At Pathologist Sig

nature

 

             Ventricular rate 86                        [facility]     

 

             Atrial rate  86                        [facility]     

 

             MA interval  144                       [facility]     

 

             QRSD interval 106                       [facility]     

 

             QT interval  372                       [facility]     

 

             QTC interval 445                       [facility]     

 

             P axis 1     11                        [facility]     

 

             QRS axis 1   8                         [facility]     

 

             T wave axis  8                         [facility]     

 

             EKG impression Normal sinus              [facility]     



                          rhythm-Nonspecific T                           



                          wave                                   



                          abnormality-Abnormal                           



                          ECG-No previous ECGs                           



                          available-Electronicall                           



                          y Signed By Chris Mar MD ([id number]) on                           



                          10/20/2019 10:29:22 AM                           









                                        Specimen

 

                                        









                          Narrative                 Performed At

 

                                        This result has an attachment that is no

t available.



                                        









                Performing Organization Address         City/[facility]/Zipcode Phone

 Number

 

                [facility] MUSE        [address] [place] [address] 



CT Abd/Pelvic External Study (10/18/2019  3:40 PM CDT)





                                        Specimen

 

                                        









                          Narrative                 Performed At

 

                                        This exam was not acquired at a [employer]

t facility and has not been 



                                         [facility]



                                        interpreted by a [employer] Provider. T

he exam was imported into our 



                                        



                          imaging system for comparisons purposes. 









                Performing Organization Address         City/[facility]/Zipcode Phone

 Number

 

                 [facility]      [address] [place] [address] 



Hemoglobin &amp; hematocrit (2019  6:56 AM CDT)Only the most recent of3 
resultswithin the time period is included.





             Component    Value        Ref Range    Performed At Pathologist Sig

nature

 

             HGB          10.6 (L)     12.0 - 16.0 g/dL [facility]

AL 

 

             HCT          33.6 (L)     37.0 - 47.0 % [facility] 









                                        Specimen

 

                                        Blood









                Performing Organization Address         City/[facility] Phone

 Number

 

                [facility] DEPARTMENT OF PATHOLOGY AND [address] [place], [place] [address]

0 



                GENOMIC MEDICINE                                 

 

                [place] [id number] [place] [address] 



Basic metabolic panel (2019  6:56 AM CDT)Only the most recent of4 results
within the time period is included.





             Component    Value        Ref Range    Performed At Pathologist Sig

nature

 

             Sodium       138          135 - 148 mEq/L [facility]

L 

 

             Potassium    3.7          3.5 - 5.0 mEq/L [facility]

L 

 

             Chloride     103          98 - 112 mEq/L [facility]

 

 

             CO2          22 (L)       24 - 31 mEq/L [facility] 

 

             Anion gap    13@ANIO      7 - 15 mEq/L [facility] 

 

             BUN          3 (L)        6 - 20 mg/dL [facility] 

 

             Creatinine   0.59         0.50 - 0.90 mg/dL [facility]

[place] 

 

             Glucose      94           65 - 99 mg/dL [facility] 

 

             Calcium      9.0          8.3 - 10.2 mg/dL [facility]

AL 









                                        Specimen

 

                                        Plasma specimen









                Performing Organization Address         City/State/Zipcode Phone

 Number

 

                [facility] DEPARTMENT OF PATHOLOGY AND [address] [place], [place] [address]

0 



                GENOMIC MEDICINE                                 

 

                [facility] [address] [place] [address] 



Surgical pathology request (2019  1:00 PM CDT)





             Component    Value        Ref Range    Performed At Pathologist



                                                                 Signature

 

             Case number  [id number]              [facility] DEPARTMENT OF 



                                                    PATHOLOGY AND 



                                                    GENOMIC MEDICINE 

 

             Surgical pathology See link below              [facility] DEPARTMENT OF 



             report       for PDF Lab               PATHOLOGY AND 



                          Report                    GENOMIC MEDICINE 

 

             Result status This is Final              [facility] DEPARTMENT OF 



                          Report for                PATHOLOGY AND 



                          [id number]              GENOMIC MEDICINE 









                                        Specimen

 

                                        









                Performing Organization Address         City/[facility]/Zipcode Phone

 Number

 

                [facility] DEPARTMENT OF PATHOLOGY AND [address] [place], [place] [address]

0 



                GENOMIC MEDICINE                                 



XR Abdomen 1 Vw Portable (2019 12:11 PM CDT)





                                        Specimen

 

                                        









                          Narrative                 Performed At

 

                                        XR ABDOMEN 1 VW PORTABLE



                                         [facility]



                                        CLINICAL INDICATION: assess ureteral s

tent position



                                        



                                         



                                        



                                        COMPARISON: 2019



                                        



                                         



                                        



                                        IMPRESSION:



                                        



                                         



                                        



                          A left nephroureteral catheter is present with proxima

l loop overlying 



                          the region of the renal pelvis, distal loop overlying 

the urinary 



                          bladder noting exchange of previous left nephrostomy t

ube. A drain is 



                                        present on the left abdomen as well. No 



                                        



                          distinct urinary tract calculi are identified. Bowel g

as pattern is 



                          nonspecific with gas within the stomach as well as nor

mal caliber large 



                                        and small bowel loops. Bones are unremar

kable for age.



                                        



                                         



                                        



                                         



                                        



                          *[facility]-[id number]           









                                        Procedure Note

 

                                        Hm Interface, Radiology Results Incoming

 - 2019 12:45 PM CDT



XR ABDOMEN 1 VW PORTABLE



                                        CLINICAL INDICATION:  assess ureteral st

ent position



                                        



                                        COMPARISON:  2019



                                        



                                        IMPRESSION:



                                        



                                        A left nephroureteral catheter is presen

t with proximal loop overlying the 

region of the renal pelvis, distal loop overlying the urinary bladder noting 
exchange of previous left nephrostomy tube. A drain is present on the left 
abdomen as well. No



                                        distinct urinary tract calculi are ident

ified. Bowel gas pattern is nonspecific 

with gas within the stomach as well as normal caliber large and small bowel 
loops. Bones are unremarkable for age.



                                        



                                        



                                        *[facility]-[id number]









                Performing Organization Address         City/[facility]/Zipcode Phone

 Number

 

                 [facility]      [address] [place] [address] 



Calculi analysis with photo (2019  9:53 AM CDT)





             Component    Value        Ref Range    Performed At Pathologist



                                                                 Signature

 

             Calculi mass 155          mg            [facility] 

 

             Calculi number 1                          [facility] 

 

             Calculi size 5 to 9       mm            [facility] 

 

             Calculi descrption See Note                   [facility] 



                          Comment:                               



                          Specimen consists of a single, medium,                

           



                          brown/tan/white, irregular calculus.                  

         



                                                                 

 

             Calculi composition See Note                   [facility] 



                          Comment:                               



                          Calculi composed primarily of:                        

   



                          60% calcium oxalate monohydrate,                      

     



                          10% calcium oxalate dihydrate, and                    

       



                          30% calcium phosphate (hydroxy- and carbonate- apatite

).                           



                          INTERPRETIVE INFORMATION: Calculi (Stone) analysis    

                       



                          Calculi are the products of physiological processes th

at yield                           



                          crystalline compounds in a matrix of biological compou

nds and                           



                          blood. Matrix components are not reported. The cli

nically                           



                          significant crystalline components identified in calcu

li specimens                           



                          are reported. Gross description may not be consisten

t with                           



                          composition determined by FTIR analysis.              

             



                                                                 

 

             EER calculi (stone) See Note                   [facility] 



             analysis and photo Comment:                               



                          Access [employer] Enhanced Report using either link below:  

                         



                                                                 



                          -Direct access: [email or web address]

[email or web address]                           



                                                                 



                          -Enter Username, Password: [email or web address]   

                        



                           Username: [email or web address]                           



                           Password: [email or web address]                           



                          Performed by [facility],        

                                 



                          [address] [place] [address] [phone]    

                                



                          [email or web address], Dion Martins MD - Lab. Director    

                       



                                                                 









                                        Specimen

 

                                        [facility] - Kidney









                          Narrative                 Performed At

 

                                        [id number]



                                        [facility]



                          left renal calculi        









                Performing Organization Address         City/State/Zipcode Phone

 Number

 

                [facility] [address] [place] [address] 

 

                 [place] [address] 



Airway (2019  7:46 AM CDT)





                          Narrative                 Performed At

 

                                        Vero Simpson   9/3/2019 7:52

 AM



                                        



                                        Airway



                                        



                                        Date/Time: 9/3/2019 7:37 AM



                                        



                                        Performed by: Vero Simpson



                                        



                                        Authorized by: Jorge Perkins DO 



                                        



                                         



                                        



                                        Location: [place]



                                        



                                        Urgency: Elective



                                        



                                        Resident/CRNA/AA: Vero Simpson



                                        



                                        Performed by: 



                                        



                                          resident/CRNA/AA



                                        



                                        Preoxygenated with 100% O2: Yes 



                                        



                                        Mask Ventilation: Easy mask



                                        



                                        Final Airway Type: Endotracheal airway



                                        



                                        Final Endotracheal Airway: ETT



                                        



                                        Cuffed: Yes 



                                        



                                        Technique Used: Direct laryngoscopy



                                        



                                        Insertion Site: Oral



                                        



                                        Blade Type: [place]



                                        



                                        Laryngoscope Blade/Videolaryngoscope Guerrero

de Size: 2



                                        



                                        ETT Size (mm): 7.0



                                        



                                        Cuff at minimum occlusion pressure: Yes 





                                        



                                        Measured from: Teeth



                                        



                                        ETT to Teeth (cm): 21



                                        



                                        Placement Verified by: CO2 detection, di

rect visualization and equal 



                                        



                                        breath sounds 



                                        



                                        Laryngoscopic view: Grade I - full vie

w of glottis



                                        



                                        Rapid Sequence Induction (RSI): No 



                                        



                                        Modified RSI: No 



                                        



                                        Number of Attempts at Approach: 1



                                        



                           Atraumatic intubation, dentition unchanged 



Type and screen (2019  6:18 AM CDT)





             Component    Value        Ref Range    Performed At Pathologist Sig

nature

 

             ABO grouping O                         [facility]     

 

             Rh type      POS                       [facility]     

 

             Antibody screen (gel) NEG                       [facility]     









                                        Specimen

 

                                        Blood









                Performing Organization Address         City/State/Zipcode Phone

 Number

 

                [facility] DEPARTMENT OF PATHOLOGY AND [address] [place], [place] [address]

0 



                GENOMIC MEDICINE                                 

 

                [facility] [address] [place] [address] 



CT Renal Stone Protocol (2019  1:26 AM CDT)





                                        Specimen

 

                                        









                          Narrative                 Performed At

 

                                        Examination: CT RENAL STONE PROTOCOL



                                         [facility]



                                         



                                        



                                        Clinical History: left flank pain after 

nephrostomy tube placement



                                        



                                         



                                        



                                        Comparison: None.



                                        



                                         



                                        



                                        Findings:



                                        



                          CT scans are performed using radiation dose reduction 

techniques. 



                          Technical factors are evaluated and adjusted to ensu

re appropriate 



                          moderation of exposure. Automated dose management te

chnology is 



                                        applied to adjust radiation exposure whi

le achieving a 



                                        



                          diagnostic quality image. CT imaging was performed wit

h iterative 



                          reconstruction techniques and/or automated exposure co

ntrol to reduce 



                                        radiation dose.



                                        



                                         



                                        



                          CT scan of the abdomen and pelvis was performed withou

t intravenous 



                                        contrast.



                                        



                                         



                                        



                          The liver, spleen, pancreas, gallbladder, and adrenal 

glands are 



                                        unremarkable.



                                        



                                         



                                        



                          Right kidney is within normal limits without hydroneph

rosis or urinary 



                                        calculus.



                                        



                                         



                                        



                          Left percutaneous nephrostomy tube is noted. No hydron

ephrosis is seen. 



                          The tip is in the renal pelvis. Left lower pole intrar

enal calculus 



                                        measures 7 mm. No ureteral calculus is s

een.



                                        



                                         



                                        



                          The appendix is not visualized. No bowel thickening or

 fat stranding is 



                          seen. Scattered colonic diverticuli are noted. No pierce

l dilatation is 



                                        seen.



                                        



                                         



                                        



                                        No free air or fluid is seen. Urinary bl

adder is unremarkable.



                                        



                                         



                                        



                                        Visualized lung bases are clear.



                                        



                                         



                                        



                                        IMPRESSION:



                                        



                          1. Left percutaneous nephrostomy tube without hydronep

hrosis or 



                                        perinephric fluid collection.



                                        



                                        2. Left nonobstructing intrarenal calcul

us.



                                        



                                        3. Otherwise no acute abnormality identi

fied in abdomen or pelvis.



                                        



                                         



                                        



                          [facility]-[id number]            









                                        Procedure Note

 

                                        Hm Interface, Radiology Results Incoming

 - 2019  1:45 AM CDT



Examination:  CT RENAL STONE PROTOCOL



                                        



                                        Clinical History: left flank pain after 

nephrostomy tube placement



                                        



                                        Comparison: None.



                                        



                                        Findings:



                                        CT scans are performed using radiation d

ose reduction techniques.  Technical 

factors are evaluated and adjusted to ensure appropriate moderation of exposure.
 Automated dose management technology is applied



                                        to adjust radiation exposure while achie

ving a



                                        diagnostic quality image. CT imaging was

 performed with iterative reconstruction

techniques and/or automated exposure control to reduce radiation dose.



                                        



                                        CT scan of the abdomen and pelvis was pe

rformed without intravenous contrast.



                                        



                                        The liver, spleen, pancreas, gallbladder

, and adrenal glands are unremarkable.



                                        



                                        Right kidney is within normal limits wit

hout hydronephrosis or urinary calculus.



                                        



                                        Left percutaneous nephrostomy tube is no

maddy. No hydronephrosis is seen. The tip 

is in the renal pelvis. Left lower pole intrarenal calculus measures 7 mm. No 
ureteral calculus is seen.



                                        



                                        The appendix is not visualized. No bowel

 thickening or fat stranding is seen. 

Scattered colonic diverticuli are noted. No bowel dilatation is seen.



                                        



                                        No free air or fluid is seen. Urinary bl

adder is unremarkable.



                                        



                                        Visualized lung bases are clear.



                                        



                                        IMPRESSION:



                                        1. Left percutaneous nephrostomy tube wi

thout hydronephrosis or perinephric 

fluid collection.



                                        2. Left nonobstructing intrarenal calcul

us.



                                        3. Otherwise no acute abnormality identi

fied in abdomen or pelvis.



                                        



                                        [facility]-[id number]









                Performing Organization Address         City/[facility]/Zipcode Phone

 Number

 

                 [facility]      [address] [place] [address] 



hCG qualitative, urine screen (2019 10:23 PM CDT)





             Component    Value        Ref Range    Performed At Pathologist



                                                                 Signature

 

             hCG qualitative, NegativeComment:              [facility]        Sensitivity of HCG              HOSPITAL     



                          test: 25 mIU/mL                           









                                        Specimen

 

                                        Urine









                Performing Organization Address         City/[facility]/Zipcode Phone

 Number

 

                [facility] DEPARTMENT OF PATHOLOGY AND [address] [place] [phone]

0 



                GENOMIC MEDICINE                                 

 

                [address] [place] [address] 



IR Left Nephrostomy Eval/Exchange (08/15/2019  4:44 PM CDT)Only the most recent 
of2 resultswithin the time period is included.





                                        Specimen

 

                                        









                          Narrative                 Performed At

 

                                        EXAMINATION: IR LEFT NEPHROSTOMY EVAL 

EXCHANGE



                                        [facility]



                                         



                                        



                          CLINICAL HISTORY: N13.30 Unspecified hydronephrosis,

 Left 



                                        Hydronephrosis



                                        



                                         



                                        



                                        COMPARISON: None.



                                        



                                         



                                        



                                        PROCEDURE: Genitourinary catheter exchan

ge



                                        



                                         



                                        



                                        Procedural Personnel



                                        



                                        Attending physician(s): Darrius osei MD



                                        



                                         



                                        



                                        Pre-procedure diagnosis: Hydronephrosis 

with possible tube dysfunction



                                        



                                        Post-procedure diagnosis: Same



                                        



                                        Indication: Catheter obstruction



                                        



                                        Additional clinical history: None



                                        



                                         



                                        



                                        Complications: No immediate complication

s.



                                        



                                         



                                        



                                        IMPRESSION: Successful left nephrostom

y tube exchange.



                                        



                                         



                                        



                                        Plan: Discharge home



                                        



                                        ________________________________________

_______________________



                                        



                                         



                                        



                                        PROCEDURE SUMMARY



                                        



                                        - Target organ: Unilateral native kidney



                                        



                                        - Antegrade nephrostogram(s) via the exi

sting access



                                        



                                        - Nephrostomy tube exchange



                                        



                                        - Additional procedure(s): None



                                        



                                         



                                        



                                        PROCEDURE DETAILS:



                                        



                                         



                                        



                                        Pre-procedure



                                        



                          Consent: Informed consent for the procedure including 

risks, benefits 



                          and alternatives was obtained and time-out was perform

ed prior to the 



                                        procedure.



                                        



                          Preparation: The site was prepared and draped using mario velazquez sterile 



                                        barrier technique including cutaneous an

tisepsis.



                                        



                                         



                                        



                                        Anesthesia/sedation



                                        



                                        Level of anesthesia/sedation: Moderate s

edation (conscious sedation)



                                        



                          Anesthesia/sedation administered by: Independent train

ed observer under 



                          attending supervision with continuous monitoring of th

e patient's level 



                                        of consciousness and physiologic status



                                        



                                        Total intra-service sedation time (minut

es): 13



                                        



                                         



                                        



                                        Left genitourinary catheter exchange



                                        



                          Local anesthesia was administered. Initial nephrostogr

am was performed. 



                          A wire was placed through the existing tube and it was

 removed. The new 



                          tube was advanced over the wire and position was confi

rmed with contrast 



                                        injection.



                                        



                          Pre-existing genitourinary catheter: Cook 8.5 French n

ephrostomy 



                                        catheter



                                        



                                        Genitourinary catheter(s) placed: Cook 8

.5 French nephrostomy catheter 



                                        



                          Findings: Mild left hydronephrosis to the UPJ. The ure

ter is nondilated 



                                        and patent. Findings suggest UPJ obstruc

tion.



                                        



                                        External catheter securement: Non-absorb

able suture 



                                        



                                         



                                        



                                        Additional genitourinary system interven

tion



                                        



                                        Genitourinary intervention: None



                                        



                                        Location of intervention: Not applicable



                                        



                                        Device used: Not applicable



                                        



                                        Description of intervention: Not applica

ble



                                        



                                        Post-intervention findings: Not applicab

le



                                        



                                         



                                        



                                        Contrast



                                        



                                        Contrast agent: Visipaque 270



                                        



                                        Contrast volume (mL): 20



                                        



                                         



                                        



                                        Radiation Dose



                                        



                                        Reference air kerma (mGy): 9 



                                        



                                         



                                        



                                        Additional Details



                                        



                                        Additional description of procedure: Non

e



                                        



                                        Equipment details: None



                                        



                                        Specimens removed: None



                                        



                                        Estimated blood loss (mL): Less than 10



                                        



                                        Standardized report: Noah

[id number]



                                        



                                         



                                        



                                         



                                        



                          [facility]-[id number]            









                                        Procedure Note

 

                                        Hm Interface, Radiology Results Incoming

 - 08/15/2019  6:24 PM CDT



EXAMINATION:  IR LEFT NEPHROSTOMY EVAL EXCHANGE



                                        



                                        CLINICAL HISTORY:  N13.30 Unspecified hy

dronephrosis, Left Hydronephrosis



                                        



                                        COMPARISON:  None.



                                        



                                        PROCEDURE: Genitourinary catheter exchan

[facility]



                                        



                                        Procedural Personnel



                                        Attending physician(s): Darrius osei MD



                                        



                                        Pre-procedure diagnosis: Hydronephrosis 

with possible tube dysfunction



                                        Post-procedure diagnosis: Same



                                        Indication: Catheter obstruction



                                        Additional clinical history: None



                                        



                                        Complications: No immediate complication

s.



                                        



                                        IMPRESSION:  Successful left nephrostomy

 tube exchange.



                                        



                                        Plan: Discharge home



                                        ________________________________________

_______________________



                                        



                                        PROCEDURE SUMMARY



                                        - Target organ: Unilateral native kidney



                                        - Antegrade nephrostogram(s) via the exi

sting access



                                        - Nephrostomy tube exchange



                                        - Additional procedure(s): None



                                        



                                        PROCEDURE DETAILS:



                                        



                                        Pre-procedure



                                        Consent: Informed consent for the proced

ure including risks, benefits and 

alternatives was obtained and time-out was performed prior to the procedure.



                                        Preparation: The site was prepared and d

raped using maximal sterile barrier 

technique including cutaneous antisepsis.



                                        



                                        Anesthesia/sedation



                                        Level of anesthesia/sedation: Moderate s

edation (conscious sedation)



                                        Anesthesia/sedation administered by: Ind

ependent trained observer under 

attending supervision with continuous monitoring of the patient's level of 
consciousness and physiologic status



                                        Total intra-service sedation time (minut

es): 13



                                        



                                        Left genitourinary catheter exchange



                                        Local anesthesia was administered. Initi

al nephrostogram was performed. A wire 

was placed through the existing tube and it was removed. The new tube was 
advanced over the wire and position was confirmed with contrast injection.



                                        Pre-existing genitourinary catheter: 

k 8.5 French nephrostomy catheter



                                        Genitourinary catheter(s) placed: Cook 8

.5 French nephrostomy catheter



                                        Findings: Mild left hydronephrosis to th

e UPJ. The ureter is nondilated and 

patent. Findings suggest UPJ obstruction.



                                        External catheter securement: Non-absorb

able suture



                                        



                                        Additional genitourinary system interven

tion



                                        Genitourinary intervention: None



                                        Location of intervention: Not applicable



                                        Device used: Not applicable



                                        Description of intervention: Not applica

ble



                                        Post-intervention findings: Not applicab

le



                                        



                                        Contrast



                                        Contrast agent: Visipaque 270



                                        Contrast volume (mL): 20



                                        



                                        Radiation Dose



                                        Reference air kerma (mGy): 9



                                        



                                        Additional Details



                                        Additional description of procedure: Non

e



                                        Equipment details: None



                                        Specimens removed: None



                                        Estimated blood loss (mL): Less than 10



                                        Standardized report: [id number]

[id number]



                                        



                                        



                                        [facility]-[id number]









                Performing Organization Address         City/[facility] Phone

 Number

 

                [facility]      [address] [place] [address] 



Consult To Interventional Radiology (08/15/2019)





                          Narrative                 Performed At

 

                                        This result has an attachment that is no

t available.



                                        



Microscopic Examination (2019 11:19 AM CDT)Only the most recent of3 
resultswithin the time period is included.





             Component    Value        Ref Range    Performed At Pathologist Sig

nature

 

             WBC, UA      0-5          0 - 5 /hpf   [facility]      

 

             RBC, UA      3-10 (A)     0 - 2 /hpf   [facility]      

 

             Epithelial cells 0-10         0 - 10 /hpf  [facility]      



             (non renal)                                         

 

             Casts        Present (A)  None seen /lpf [facility]      

 

             Cast type    Hyaline casts N/A          [facility]      

 

             Crystals, urine Present (A)  N/A          [facility]      

 

             Crystal type Calcium Oxalate N/A          [facility]      

 

             Mucus, UA    Present      Not Estab.   [facility]      

 

             Bacteria, UA None seen    None seen/Few [facility]      









                                        Specimen

 

                                        









                          Narrative                 Performed At

 

                                        Performed at:  [facility]



                                        [facility]



                                        [facility] [place], [place] [address]

143



                                        



                          : Dalton Rice MD, Phone: [phone] 









                Performing Organization Address         [facility] Phone

 Number

 

                [facility]                                         



Urinalysis, automated with microscopy (2019 11:19 AM CDT)Only the most 
recent of3 resultswithin the time period is included.





             Component    Value        Ref Range    Performed At Pathologist



                                                                 Signature

 

             Specific gravity, 1.027        1.005 - 1.030 [facility]      



             urine                                               

 

             pH, urine    6.0          5.0 - 7.5    [facility]      

 

             Color, UA    Yellow       Yellow       [facility]      

 

             Appearance   Clear        Clear        [facility]      

 

             WBC esterase, urine Negative     Negative     [facility]      

 

             Protein, UA  Negative     Negative/Trace [facility]      

 

             Glucose, urine Negative     Negative     [facility]      

 

             Ketones, UA  Negative     Negative     [facility]      

 

             Occult blood, urine Trace (A)    Negative     [facility]      

 

             Bilirubin, UA Negative     Negative     [facility]      

 

             Urobilinogen, UA 0.2          0.2 - 1.0 mg/dL [facility]      

 

             Nitrite, UA  Negative     Negative     [facility]      

 

             Microscopic  See below:Comment:              [facility]      



             examination  Microscopic was                           



                          indicated and was                           



                          performed.                             









                                        Specimen

 

                                        Urine









                          Narrative                 Performed At

 

                                        Performed at: [facility]



                                        [facility]



                                        [address] [place], [place] [address]

143



                                        



                          : Dalton Rice MD, Phone: [phone] 









                Performing Organization Address         [facility] Phone

 Number

 

                [facility]                                         



FL Pyelogram Retrograde (2019  8:11 AM CDT)





                                        Specimen

 

                                        









                          Narrative                 Performed At

 

                          IMPRESSION: C-arm Fluoroscopy under 1 hour was provi

ded in the OR for  

[facility]



                          the referring physician. A radiologist was not prese

nt during the 



                          procedure. Refer to the Operative report issued by the

 performing 



                          provider for procedure details. 









                                        Procedure Note

 

                                         Interface, Radiology Results Incoming

 - 2019  4:11 PM CDT







                                        IMPRESSION:  C-arm Fluoroscopy under 1 h

our was provided in the OR for the 

referring



                                        physician.  A radiologist was not presen

t during the procedure. Refer to the



                                        Operative report issued by the performin

g provider for procedure details.









                Performing Organization Address         City/[facility]/Zipcode Phone

 Number

 

                 [facility]      [address] [place] [address] 



POC glucose (2019  9:07 PM CDT)





             Component    Value        Ref Range    Performed At Pathologist Sig

nature

 

             POC glucose  107 (H)      65 - 99 mg/dL [facility] 



                          Comment:                  [facility]OSPITAL     



                          No Action Needed                           



                          Meter ID: [id number]                           



                          : Julia Merari                           



                                                                 









                                        Specimen

 

                                        









                Performing Organization Address         City[facility] Phone

 Number

 

                [facility] DEPARTMENT OF PATHOLOGY AND [address] [facility]. [place], [place] [address]

0 



                GENOMIC MEDICINE                                 

 

                [facility] [address] [place] [id number] 



IR Nephrostomy Insertion Left (2019 11:19 AM CDT)





                                        Specimen

 

                                        









                          Narrative                 Performed At

 

                                        PROCEDURE: Left percutaneous nephrostomy

 catheter placement



                                        [facility]



                                         



                                        



                                        Procedural Personnel



                                        



                                        Attending physician(s): Castillo Jones



                                        



                                        Fellow physician(s): None



                                        



                                        Resident physician(s): None



                                        



                                        Advanced practice provider(s): None



                                        



                                         



                                        



                                        Pre-procedure diagnosis: Nephrolithiasis



                                        



                                        Post-procedure diagnosis: Same



                                        



                                        Indication: Antegrade nephrostogram and 

access for possible lithotripsy



                                        



                                        Additional clinical history: None



                                        



                                         



                                        



                                        Complications: No immediate complication

s.



                                        



                                         



                                        



                                        IMPRESSION:



                                        



                                         



                                        



                          1. Percutaneous placement of a 8.5 French drainage c

atheter into 



                                        interpole of the left kidney.



                                        



                                         



                                        



                          2. Antegrade nephrostogram from the proximal third o

f the ureter 



                          demonstrates expected flow of contrast into the urinar

y bladder with no 



                                        evidence of ureteral obstruction.



                                        



                                         



                                        



                                        Plan: 



                                        



                                         



                                        



                          Left nephrostomy tube to gravity bag drainage. Access 

may be used for 



                                        possible future lithotripsy per urology.



                                        



                                        ________________________________________

______________________________



                                        



                                         



                                        



                                        PROCEDURE SUMMARY:



                                        



                          - Left nephrostomy catheter placement under ultrasound

 and fluoroscopic 



                                        guidance



                                        



                                        - Additional procedure(s): None



                                        



                                         



                                        



                                        PROCEDURE DETAILS:



                                        



                                         



                                        



                                        Pre-procedure



                                        



                          Consent: Informed consent for the procedure including 

risks, benefits 



                          and alternatives was obtained and time-out was perform

ed prior to the 



                                        procedure.



                                        



                          Preparation: The left flank site was prepared and drap

ed using maximal 



                                        sterile barrier technique including cuta

neous antisepsis.



                                        



                                         



                                        



                                        Anesthesia/sedation



                                        



                                        Level of anesthesia/sedation: Moderate s

edation (conscious sedation)



                                        



                          Anesthesia/sedation administered by: Independent train

ed observer under 



                          attending supervision with continuous monitoring of th

e patient's level 



                                        of consciousness and physiologic status



                                        



                                        Total intra-service sedation time (minut

es): 45



                                        



                                         



                                        



                                        Drainage catheter placement



                                        



                          The patient was positioned prone. Initial imaging was 

performed. Local 



                          anesthesia was administered. Initially, the 22-gauge n

eedle was used to 



                          attempt access to the lower pole of the left kidney wh

ere the echogenic 



                                        calculus was noted. However after 



                                        



                          attempts to access the inferior pole calyx unsuccessfu

lly, the 22-gauge 



                          needle was used to subsequently accessed the left inte

rpole calyx. After 



                          a Nitrex wire was advanced into the left ureter, the n

eedle was 



                                        exchanged for a AccuStick catheter the i

nner 



                                        



                          dilator was removed and the Nitrex wire was exchanged 

for a short 



                          Amplatz wire over which the 8.5 French nephrostomy cat

heter was placed 



                          after dilation with an 8 French dilator. Position of t

he nephrostomy 



                                        catheter within the left renal pelvis wa

s 



                                        



                                        confirmed. 



                                        



                          - Initial imaging findings: Moderate left hydronephros

is is noted. 



                          Antegrade nephrostogram demonstrated free flow of cont

rast through the 



                                        left ureter into the urinary bladder. 



                                        



                          -Left nephrostomy catheter placed: 8.5 French multipur

pose drainage 



                                        catheter



                                        



                                        - External catheter securement: Non-abso

rbable suture



                                        



                          - Post-drainage imaging findings: Pigtail within the l

eft renal pelvis. 



                          There is minimal flow to the proximal left ureter from

 the renal pelvis 



                          when final nephrostogram was performed which may be se

condary to 



                                        ureteral peristalsis or possible stenosi

s at 



                                        



                                        the ureteropelvic junction.



                                        



                                         



                                        



                                        Radiation Dose



                                        



                                        Reference air kerma (mGy): 4 



                                        



                                         



                                        



                                        Additional Details



                                        



                                        Additional description of procedure: Non

e



                                        



                                        Equipment details: None



                                        



                                        Specimens removed: Aspirated fluid was n

ot sent for analysis.



                                        



                                        Estimated blood loss (mL): Less than 10



                                        



                                        Standardized report: SIR_DrainPlacement_

v2



                                        



                                         



                                        



                                        Attestation



                                        



                                        Signer name: Castillo Jones M.D.



                                        



                          I attest that I was present for the entire procedure. 

I reviewed the 



                                        stored images and agree with the report 

as written.



                                        



                                         



                                        



                                         



                                        



                                         



                                        



                                                    









                                        Procedure Note

 

                                        Hm Interface, Radiology Results Incoming

 - 2019  5:44 PM CDT



PROCEDURE: Left percutaneous nephrostomy catheter placement



                                        



                                        Procedural Personnel



                                        Attending physician(s): Castillo Jones



                                        Fellow physician(s): None



                                        Resident physician(s): None



                                        Advanced practice provider(s): None



                                        



                                        Pre-procedure diagnosis: Nephrolithiasis



                                        Post-procedure diagnosis: Same



                                        Indication: Antegrade nephrostogram and 

access for possible lithotripsy



                                        Additional clinical history: None



                                        



                                        Complications: No immediate complication

s.



                                        



                                        IMPRESSION:



                                        



                                        1.  Percutaneous placement of a 8.5 Fren

ch drainage catheter into interpole of 

the left kidney.



                                        



                                        2.  Antegrade nephrostogram from the pro

ximal third of the ureter demonstrates 

expected flow of contrast into the urinary bladder with no evidence of ureteral 
obstruction.



                                        



                                        Plan:



                                        



                                        Left nephrostomy tube to gravity bag dra vazquez. Access may be used for possible 

future lithotripsy per urology.



                                        ________________________________________

______________________________



                                        



                                        PROCEDURE SUMMARY:



                                        - Left nephrostomy catheter placement un

farhat ultrasound and fluoroscopic guidance



                                        - Additional procedure(s): None



                                        



                                        PROCEDURE DETAILS:



                                        



                                        Pre-procedure



                                        Consent: Informed consent for the proced

ure including risks, benefits and 

alternatives was obtained and time-out was performed prior to the procedure.



                                        Preparation: The left flank site was pre

pared and draped using maximal sterile 

barrier technique including cutaneous antisepsis.



                                        



                                        Anesthesia/sedation



                                        Level of anesthesia/sedation: Moderate s

edation (conscious sedation)



                                        Anesthesia/sedation administered by: Ind

ependent trained observer under 

attending supervision with continuous monitoring of the patient's level of 
consciousness and physiologic status



                                        Total intra-service sedation time (minut

es): 45



                                        



                                        Drainage catheter placement



                                        The patient was positioned prone. Initia

l imaging was performed. Local 

anesthesia was administered. Initially, the 22-gauge needle was used to attempt 
access to the lower pole of the left kidney where the echogenic calculus was 
noted. However after



                                        attempts to access the inferior pole jessa

yx unsuccessfully, the 22-gauge needle 

was used to subsequently accessed the left interpole calyx. After a Nitrex wire 
was advanced into the left ureter, the needle was exchanged



                                        for a AccuStick catheter the inner



                                        dilator was removed and the Nitrex wire 

was exchanged for a short Amplatz wire 

over which the 8.5 French nephrostomy catheter was placed after dilation with an
8 French dilator. Position of the nephrostomy catheter within the left renal 
pelvis was



                                        confirmed.



                                        - Initial imaging findings: Moderate lef

t hydronephrosis is noted. Antegrade 

nephrostogram demonstrated free flow of contrast through the left ureter into 
the urinary bladder.



                                        -Left nephrostomy catheter placed: 8.5 F

rench multipurpose drainage catheter



                                        - External catheter securement: Non-abso

rbable suture



                                        - Post-drainage imaging findings: Pigtai

l within the left renal pelvis. There is

minimal flow to the proximal left ureter from the renal pelvis when final 
nephrostogram was performed which may be secondary to ureteral



                                        peristalsis or possible stenosis at



                                        the ureteropelvic junction.



                                        



                                        Radiation Dose



                                        Reference air kerma (mGy): 4



                                        



                                        Additional Details



                                        Additional description of procedure: Non

e



                                        Equipment details: None



                                        Specimens removed: Aspirated fluid was n

ot sent for analysis.



                                        Estimated blood loss (mL): Less than 10



                                        Standardized report: SIR_DrainPlacement_

v2



                                        



                                        Attestation



                                        Signer name: Castillo Jones M.D.



                                        I attest that I was present for the enti

re procedure. I reviewed the stored 

images and agree with the report as written.









                Performing Organization Address         City/State/Zipcode Phone

 Number

 

                 [facility]      [address] [place] [address] 



NM Renal Scan Wflow Funct Sgl Int W/Mag 3 (2019  1:09 PM CDT)





                                        Specimen

 

                                        









                          Narrative                 Performed At

 

                                        PROCEDURE: NM RENAL SCAN WFLOW FUNCT SGL

 INT W MAG 3



                                         RADIANT



                                         



                                        



                          INDICATION: N13.30 Unspecified hydronephrosis, left hy

dronephrosis - 



                                        suspect left UPJ obstruction



                                        



                                         



                                        



                                        COMPARISON: No prior comparison examinat

ions.



                                        



                                         



                                        



                          TECHNIQUE: The patient was injected with 10 mCi of Tc-

99m MAG-3, IV. 



                          Dynamic images of the abdomen were acquired for 40 min

utes. At 20 



                                        minutes, 34 mg of IV lasix was administe

red. 



                                        



                                         



                                        



                                        FINDINGS:



                                        



                                         



                                        



                          Perfusion to the bilateral kidneys is preserved. Trace

r uptake, 



                          extraction, and excretion is preserved bilaterally. Th

ere is some 



                          pooling of excreted tracer in the renal pelves/collect

ing systems more 



                                        pronounced on the left. Urinary clearanc

e proceeds 



                                        



                                        without significant impediment following

 diuretic administration.



                                        



                                         



                                        



                                        SPLIT FUNCTION:



                                        



                                         



                                        



                                        Left kidney = 47%



                                        



                                         



                                        



                                        Right kidney = 53%



                                        



                                         



                                        



                                        IMPRESSION: 



                                        



                                         



                                        



                          1. Bilateral kidneys demonstrate preserved perfusion

 and overall 



                          function. There is pooling of excreted tracer in the r

enal 



                          pelves/collecting system, more pronounced on the left,

 that clears with 



                                        diuretic indicating the absence of any p

hysiologically 



                                        



                                        significant urinary obstruction.



                                        



                                         



                                        



                                        2. Split function as quantified above.



                                        



                                         



                                        



                                         



                                        



                          [facility]-[id number]            









                                        Procedure Note

 

                                         Interface, Radiology Results Incoming

 - 2019  4:01 PM CDT



PROCEDURE: NM RENAL SCAN WFLOW FUNCT SGL INT W MAG 3



                                        



                                        INDICATION: N13.30 Unspecified hydroneph

rosis, left hydronephrosis - suspect 

left UPJ obstruction



                                        



                                        COMPARISON: No prior comparison examinat

ions.



                                        



                                        TECHNIQUE: The patient was injected with

 10 mCi of Tc-99m MAG-3, IV. Dynamic 

images of the abdomen were acquired for 40 minutes. At 20 minutes, 34 mg of IV 
lasix was administered.



                                        



                                        FINDINGS:



                                        



                                        Perfusion to the bilateral kidneys is pr

eserved. Tracer uptake, extraction, and 

excretion is preserved bilaterally. There is some pooling of excreted tracer in 
the renal pelves/collecting systems more pronounced



                                        on the left. Urinary clearance proceeds



                                        without significant impediment following

 diuretic administration.



                                        



                                        SPLIT FUNCTION:



                                        



                                        Left kidney = 47%



                                        



                                        Right kidney = 53%



                                        



                                        IMPRESSION:



                                        



                                        1.  Bilateral kidneys demonstrate preser

alistair perfusion and overall function. 

There is pooling of excreted tracer in the renal pelves/collecting system, more 
pronounced on the left, that clears with



                                        diuretic indicating the absence of any p

hysiologically



                                        significant urinary obstruction.



                                        



                                        2.  Split function as quantified above.



                                        



                                        



                                        [facility]-[id number]









                Performing Organization Address         City/State/Zipcode Phone

 Number

 

                [facility]      [address] [place] [address] 



POC BLADDER SCAN/PVR (2019  9:25 AM CDT)





             Component    Value        Ref Range    Performed At [facility]ologist [facility]

nature

 

             PVR volume   0ml                                    









                                        Specimen

 

                                        Urine



POC urinalysis dipstick (2019  9:25 AM CDT)





             Component    Value        Ref Range    Performed At [facility]

nature

 

             Color urine, POC Yellow                                 

 

             Clarity urine, POC Clear                                  

 

             Glucose urine, POC Negative     Negative                  

 

             Bilirubin urine, POC Negative     Negative                  

 

             Ketones urine, POC Negative     Negative                  

 

             Specific gravity urine, >/=1.030     1.005 - 1.030              



             POC                                                 

 

             Blood urine, POC Large (A)    Negative                  

 

             pH urine, POC 5.5          5.0, 5.5, 6.0,              



                                       6.5, 7.0, 7.5,              



                                       8.0, 8.5                  

 

             Protein urine, POC Trace (A)    Negative                  

 

             Urobilinogen urine, POC <2.0         <2.0                      

 

             Nitrite urine, POC Negative     Negative                  

 

             Leukocyte esterase Negative     Negative                  



             urine, POC                                          









                                        Specimen

 

                                        Urine



after 2019



Insurance







          Payer     Benefit Plan / Group Subscriber ID Effective Dates Phone    

 Address   Type

 

          [employer] xxxxxxxxxxx 2007-Present     

                O









           Guarantor Name Account Type Relation to Date of    Phone      Billing



                                 Patient    Birth                 Address

 

           Selin Cui Personal/Family Self       1995 [phone] 

[address] [facility]                                           ([place])     [place], [place]



                                                                  [address]







Advance Directives

For more information, please contact: [phone]





                Type            Date Recorded   Patient Representative Explanati

on

 

                Advance Directives, Living Will 2017  4:39 PM               

  



                and Medical Power of

## 2020-06-01 NOTE — EDPHYS
Physician Documentation                                                                           

 [facility]                                                                 

Name: Selin Cui                                                                           

Age: 24 yrs                                                                                       

Sex: Female                                                                                       

: 1995                                                                                   

MRN: [id number]                                                                                   

Arrival Date: 2020                                                                          

Time: 01:02                                                                                       

Account#: [id number]                                                                            

Bed 19                                                                                            

Private MD:                                                                                       

ED Physician Nithin Bazzi                                                                      

HPI:                                                                                              

                                                                                             

04:06 This 24 yrs old  Female presents to ER via Ambulatory with complaints of Back   mh7 

      Pain, Epigastric Pain,.                                                                     

04:07 The patient complains of pain in the left flank. The pain radiates to the abdomen.      mh7 

04:08 Onset: The symptoms/episode began/occurred today. Modifying factors: The symptoms are   mh7 

      alleviated by nothing. the symptoms are aggravated by nothing.                              

04:08 Associated signs and symptoms: Pertinent positives: nausea, Pertinent negatives:        mh7 

      diarrhea, dizziness, dysuria, fever, urinary frequency, headache, hematuria, pain           

      radiating to the lower extremities, vomiting. Severity of pain: At its worst the pain       

      was moderate today, in the emergency department the pain has improved moderately. The       

      patient has experienced similar episodes in the past, multiple times.                       

                                                                                                  

Historical:                                                                                       

- Allergies:                                                                                      

02:19 No Known Allergies;                                                                     sg  

- PMHx:                                                                                           

02:19 Kidney stones;                                                                          sg  

- PSHx:                                                                                           

02:19 left ureter surgery; left kidney stent;                                                 sg  

                                                                                                  

- Immunization history:: Adult Immunizations up to date.                                          

- Social history:: Smoking status: Patient denies any tobacco usage or history of.                

                                                                                                  

                                                                                                  

ROS:                                                                                              

04:08 Constitutional: Negative for fever, chills, and weight loss, Eyes: Negative for injury, mh7 

      pain, redness, and discharge, ENT: Negative for injury, pain, and discharge, Neck:          

      Negative for injury, pain, and swelling, Cardiovascular: Negative for chest pain,           

      palpitations, and edema, Respiratory: Negative for shortness of breath, cough,              

      wheezing, and pleuritic chest pain, : Negative for injury, bleeding, discharge, and       

      swelling, MS/Extremity: Negative for injury and deformity, Skin: Negative for injury,       

      rash, and discoloration, Neuro: Negative for headache, weakness, numbness, tingling,        

      and seizure, Psych: Negative for depression, anxiety, suicide ideation, homicidal           

      ideation, and hallucinations, Allergy/Immunology: Negative for hives, rash, and             

      allergies, Endocrine: Negative for neck swelling, polydipsia, polyuria, polyphagia, and     

      marked weight changes, Hematologic/Lymphatic: Negative for swollen nodes, abnormal          

      bleeding, and unusual bruising.                                                             

                                                                                                  

Exam:                                                                                             

04:08 Constitutional:  This is a well developed, well nourished patient who is awake, alert,  mh7 

      and in no acute distress. Head/Face:  Normocephalic, atraumatic. Eyes:  Pupils equal        

      round and reactive to light, extra-ocular motions intact.  Lids and lashes normal.          

      Conjunctiva and sclera are non-icteric and not injected.  Cornea within normal limits.      

      Periorbital areas with no swelling, redness, or edema. Neck:  Trachea midline, no           

      thyromegaly or masses palpated, and no cervical lymphadenopathy.  Supple, full range of     

      motion without nuchal rigidity, or vertebral point tenderness.  No Meningismus.             

      Chest/axilla:  Normal chest wall appearance and motion.  Nontender with no deformity.       

      No lesions are appreciated. Cardiovascular:  Regular rate and rhythm with a normal S1       

      and S2.  No gallops, murmurs, or rubs.  Normal PMI, no JVD.  No pulse deficits.             

04:08 Skin:  Warm, dry with normal turgor.  Normal color with no rashes, no lesions, and no       

      evidence of cellulitis. MS/ Extremity:  Pulses equal, no cyanosis.  Neurovascular           

      intact.  Full, normal range of motion. Neuro:  Awake and alert, GCS 15, oriented to         

      person, place, time, and situation.  Cranial nerves II-XII grossly intact.  Motor           

      strength 5/5 in all extremities.  Sensory grossly intact.  Cerebellar exam normal.          

      Normal gait. Psych:  Awake, alert, with orientation to person, place and time.              

      Behavior, mood, and affect are within normal limits.                                        

04:08 Abdomen/GI: Inspection: abdomen appears normal, Bowel sounds: normal, in all quadrants,     

      Palpation: moderate abdominal tenderness, in the epigastric area, Rectal exam: the exam     

      is deferred, because of patient request, Indicators: McBurney's point is not tender,        

      Roque's sign is negative, Rovsing's sign is negative, Obturator sign is negative,          

      Psoas sign is negative, Liver: no appreciated palpable abnormalities, Hernia: not           

      appreciated.                                                                                

04:08 Abdomen/GI: Palpation: moderate abdominal tenderness, in the epigastric area.               

04:08 Back: pain, is absent, ROM is normal, normal spinal alignment noted, CVA tenderness,        

      that is moderate, is noted on the left, muscle spasm, is not present.                       

04:08 : CVA tenderness, on the left, Bladder: is normal.                                        

                                                                                                  

Vital Signs:                                                                                      

02:34  / 87; Pulse 78; Resp 16; Temp 98.9; Pulse Ox 99% ; Weight 68.04 kg; Height 5 ft. ea  

      2 in. (157.48 cm);                                                                          

03:45  / 77; Pulse 80; Resp 16; Pulse Ox 99% on R/A;                                    ea  

05:00  / 73; Pulse 78; Resp 18; Pulse Ox 98% ;                                          ea  

02:34 Body Mass Index 27.44 (68.04 kg, 157.48 cm)                                             ea  

                                                                                                  

MDM:                                                                                              

03:09 Patient medically screened.                                                             [facility] 

04:51 Differential diagnosis: nephrolithiasis, pyelonephritis, UTI, diverticulitis,           7 

      pancreatitis. Data reviewed: vital signs, nurses notes, lab test result(s), EKG,            

      radiologic studies, CT scan. Data interpreted: Cardiac monitor: rate is 78 beats/min,       

      rhythm is normal sinus rhythm, regular, Interpretation: normal rate, normal rhythm,         

      Pulse oximetry: on room air is 99 %. Interpretation: normal.                                

                                                                                                  

                                                                                             

02:54 Order name: Basic Metabolic Panel; Complete Time: 04:04                                   

                                                                                             

02:54 Order name: CBC with Diff; Complete Time: 04:04                                           

                                                                                             

02:54 Order name: Hepatic Function; Complete Time: 04:04                                        

                                                                                             

02:54 Order name: Lipase; Complete Time: 04:04                                                  

                                                                                             

02:55 Order name: Urine Dipstick--Ancillary (enter results); Complete Time: 04:04             mt  

                                                                                             

02:55 Order name: Urine Pregnancy--Ancillary (enter results)                                  mt  

                                                                                             

02:54 Order name: IV Saline Lock; Complete Time: 02:57                                          

                                                                                             

02:54 Order name: Labs collected and sent; Complete Time: 02:57                                 

                                                                                             

02:54 Order name: Stone Protocol CT                                                             

                                                                                             

02:55 Order name: Urine Dipstick-Ancillary (obtain specimen); Complete Time: 02:58              

                                                                                             

03:10 Order name: EKG - Nurse/Tech; Complete Time: 04:12                                      ea  

                                                                                                  

Administered Medications:                                                                         

03:18 Drug: morphine 4 mg Route: IVP; Site: right antecubital;                                ea  

05:12 Follow up: Response: No adverse reaction; Pain is decreased; RASS: Alert and Calm (0)   ea  

03:18 Drug: Zofran (Ondansetron) 4 mg Route: IVP; Site: right antecubital;                    ea  

04:00 Follow up: Response: No adverse reaction                                                ea  

                                                                                                  

                                                                                                  

Disposition:                                                                                      

20 04:54 Discharged to Home. Impression: Unspecified renal colic, Enteritis.                

- Condition is Stable.                                                                            

- Discharge Instructions: Renal Colic, Easy-to-Read, Viral Gastroenteritis, Adult,                

  Easy-to-Read.                                                                                   

- Prescriptions for Zofran ODT 4 mg Oral tablet,disintegrating - place 1 tablet by                

  TRANSLINGUAL route every 8 hours As needed; 10 tablet. Bentyl 20 mg Oral Tablet -               

  take 1 tablet by ORAL route every 6 hours As needed; 20 tablet. Cipro 500 mg Oral               

  Tablet - take 1 tablet by ORAL route every 12 hours for 5 days; 10 tablet. Pepcid 20            

  mg Oral Tablet - take 1 tablet by ORAL route every 12 hours for 5 days; 10 tablet.              

- Medication Reconciliation Form, Thank You Letter, Antibiotic Education, Prescription            

  Opioid Use form.                                                                                

- Follow up: Private Physician; When: 1 - 2 days; Reason: Worsening of condition,                 

  Re-evaluation by your physician.                                                                

- Problem is an acute exacerbation.                                                               

- Symptoms have improved.                                                                         

                                                                                                  

                                                                                                  

                                                                                                  

Signatures:                                                                                       

Dispatcher [employer]                           EDDavid Serna RN                         Dimple Muhammad RN RN ea Holmes, Maurice, MD MD   mh7                                                  

                                                                                                  

Corrections: (The following items were deleted from the chart)                                    

05:13 04:54 2020 04:54 Discharged to Home. Impression: Unspecified renal colic;         ea  

      Enteritis. Condition is Stable. Forms are Medication Reconciliation Form, Thank You         

      Letter, Antibiotic Education, Prescription Opioid Use. Follow up: Private Physician;        

      When: 1 - 2 days; Reason: Worsening of condition, Re-evaluation by your physician.          

      Problem is an acute exacerbation. Symptoms have improved. mh7                               

                                                                                                  

**************************************************************************************************

## 2020-06-01 NOTE — ER
Nurse's Notes                                                                                     

 [facility]                                                                 

Name: Selin Cui                                                                           

Age: 24 yrs                                                                                       

Sex: Female                                                                                       

: 1995                                                                                   

MRN: [id number]                                                                                   

Arrival Date: 2020                                                                          

Time: 01:02                                                                                       

Account#: [id number]                                                                            

Bed 19                                                                                            

Private MD:                                                                                       

Diagnosis: Unspecified renal colic;Enteritis                                                      

                                                                                                  

Presentation:                                                                                     

                                                                                             

02:34 Chief complaint: Patient states: Reports left back pain that started this afternoon, pt ea  

      reports pain went to her epigastric area and she has been nauseous. Pt reports history      

      of kidney stones. Coronavirus screen: Proceed with normal triage. Ebola Screen: No          

      symptoms or risks identified at this time. Initial Sepsis Screen: Does the patient meet     

      any 2 criteria? No. Patient's initial sepsis screen is negative. Does the patient have      

      a suspected source of infection? No. Patient's initial sepsis screen is negative. Risk      

      Assessment: Do you want to hurt yourself or someone else? Patient reports no desire to      

      harm self or others. Onset of symptoms was May 31, 2020.                                    

02:34 Method Of Arrival: Ambulatory                                                           ea  

02:34 Acuity: AWILDA 3                                                                           ea  

                                                                                                  

Triage Assessment:                                                                                

02:36 General: Appears uncomfortable, Behavior is calm, cooperative, appropriate for age.     ea  

      Pain: Complains of pain in left low back and left mid back. Derm: Skin is pink, warm \T\    

      dry. Musculoskeletal: Circulation, motion, and sensation intact.                            

                                                                                                  

Historical:                                                                                       

- Allergies:                                                                                      

02:19 No Known Allergies;                                                                     sg  

- PMHx:                                                                                           

02:19 Kidney stones;                                                                          sg  

- PSHx:                                                                                           

02:19 left ureter surgery; left kidney stent;                                                 sg  

                                                                                                  

- Immunization history:: Adult Immunizations up to date.                                          

- Social history:: Smoking status: Patient denies any tobacco usage or history of.                

                                                                                                  

                                                                                                  

Screenin:36 Abuse screen: Denies threats or abuse. Nutritional screening: No deficits noted.        ea  

      Tuberculosis screening: No symptoms or risk factors identified. Fall Risk None              

      identified.                                                                                 

                                                                                                  

Assessment:                                                                                       

02:39 General: Appears uncomfortable, Behavior is appropriate for age. Pain: Complains of     ea  

      pain in left mid back and left low back. Neuro: Level of Consciousness is awake, alert,     

      obeys commands, Oriented to person, place, time, situation. Cardiovascular: Patient's       

      skin is warm and dry. Respiratory: Airway is patent Respiratory effort is even,             

      unlabored, Respiratory pattern is regular, symmetrical. Derm: Skin is pink, warm \T\ dry.   

04:00 Reassessment: Patient and/or family updated on plan of care and expected duration. Pain ea  

      level reassessed. Patient is alert, oriented x 3, equal unlabored respirations, skin        

      warm/dry/pink.                                                                              

05:10 Reassessment: Patient and/or family updated on plan of care and expected duration. Pain ea  

      level reassessed. Patient is alert, oriented x 3, equal unlabored respirations, skin        

      warm/dry/pink. Discharge instruction given to patient, verbalized the understanding of      

      instruction. Pt left ED ambulatory accompanied by family. Tolerating well.                  

                                                                                                  

Vital Signs:                                                                                      

02:34  / 87; Pulse 78; Resp 16; Temp 98.9; Pulse Ox 99% ; Weight 68.04 kg; Height 5 ft. ea  

      2 in. (157.48 cm);                                                                          

03:45  / 77; Pulse 80; Resp 16; Pulse Ox 99% on R/A;                                    ea  

05:00  / 73; Pulse 78; Resp 18; Pulse Ox 98% ;                                          ea  

02:34 Body Mass Index 27.44 (68.04 kg, 157.48 cm)                                             ea  

                                                                                                  

ED Course:                                                                                        

01:02 Patient arrived in ED.                                                                  bp1 

02:29 David Johnson, RN is Primary Nurse.                                                       sg  

02:36 Triage completed.                                                                       ea  

02:36 Patient has correct armband on for positive identification. Placed in gown. Bed in low  ea  

      position. Call light in reach.                                                              

02:36 Arm band placed on right wrist. Patient placed in an exam room, on a stretcher, on      ea  

      pulse oximetry.                                                                             

02:41 Nithin Bazzi MD is Attending Physician.                                             7 

03:10 Inserted saline lock: 20 gauge in right antecubital area, using aseptic technique.      ea  

      Blood collected.                                                                            

03:43 Stone Protocol CT In Process Unspecified.                                               EDMS

04:12 EKG done, by ED staff, reviewed by Nithin Bazzi MD.                                   rr5 

05:10 No provider procedures requiring assistance completed. IV discontinued, intact,         ea  

      bleeding controlled, No redness/swelling at site. Pressure dressing applied.                

                                                                                                  

Administered Medications:                                                                         

03:18 Drug: morphine 4 mg Route: IVP; Site: right antecubital;                                ea  

05:12 Follow up: Response: No adverse reaction; Pain is decreased; RASS: Alert and Calm (0)   ea  

03:18 Drug: Zofran (Ondansetron) 4 mg Route: IVP; Site: right antecubital;                    ea  

04:00 Follow up: Response: No adverse reaction                                                ea  

                                                                                                  

                                                                                                  

Outcome:                                                                                          

04:54 Discharge ordered by MD. paris 

05:11 Discharged to home ambulatory, with family.                                             ea  

05:11 Condition: stable                                                                           

05:11 Discharge instructions given to patient, Instructed on discharge instructions, follow       

      up and referral plans. medication usage, Demonstrated understanding of instructions,        

      follow-up care, medications, Prescriptions given X 4.                                       

05:13 Patient left the ED.                                                                    ea  

                                                                                                  

Signatures:                                                                                       

Dispatcher [employer]                           EDMS                                                 

David Johnson RN                         RN   Dimple eVra RN RN ea Roque, Raymond RN                      RN   rr5                                                  

Nivia Velázquez Maurice, MD MD   [id number]                                                  

                                                                                                  

**************************************************************************************************

## 2020-10-06 ENCOUNTER — HOSPITAL ENCOUNTER (EMERGENCY)
Dept: HOSPITAL 97 - ER | Age: 25
End: 2020-10-06
Payer: COMMERCIAL

## 2020-10-06 VITALS — OXYGEN SATURATION: 97 % | DIASTOLIC BLOOD PRESSURE: 78 MMHG | SYSTOLIC BLOOD PRESSURE: 113 MMHG

## 2020-10-06 VITALS — TEMPERATURE: 98.2 F

## 2020-10-06 DIAGNOSIS — Z3A.00: ICD-10-CM

## 2020-10-06 DIAGNOSIS — O99.891: Primary | ICD-10-CM

## 2020-10-06 DIAGNOSIS — N13.2: ICD-10-CM

## 2020-10-06 LAB
ALBUMIN SERPL BCP-MCNC: 3.6 G/DL (ref 3.4–5)
ALP SERPL-CCNC: 62 U/L (ref 45–117)
ALT SERPL W P-5'-P-CCNC: 48 U/L (ref 12–78)
AST SERPL W P-5'-P-CCNC: 16 U/L (ref 15–37)
BUN BLD-MCNC: 8 MG/DL (ref 7–18)
GLUCOSE SERPLBLD-MCNC: 97 MG/DL (ref 74–106)
HCG SERPL-ACNC: (no result) MIU/ML (ref 1–3)
HCT VFR BLD CALC: 35.7 % (ref 36–45)
LIPASE SERPL-CCNC: 98 U/L (ref 73–393)
LYMPHOCYTES # SPEC AUTO: 2.2 K/UL (ref 0.7–4.9)
PMV BLD: 8.8 FL (ref 7.6–11.3)
POTASSIUM SERPL-SCNC: 3.9 MMOL/L (ref 3.5–5.1)
RBC # BLD: 4.26 M/UL (ref 3.86–4.86)

## 2020-10-06 PROCEDURE — 96374 THER/PROPH/DIAG INJ IV PUSH: CPT

## 2020-10-06 PROCEDURE — 99285 EMERGENCY DEPT VISIT HI MDM: CPT

## 2020-10-06 PROCEDURE — 83690 ASSAY OF LIPASE: CPT

## 2020-10-06 PROCEDURE — 96375 TX/PRO/DX INJ NEW DRUG ADDON: CPT

## 2020-10-06 PROCEDURE — 81003 URINALYSIS AUTO W/O SCOPE: CPT

## 2020-10-06 PROCEDURE — 81025 URINE PREGNANCY TEST: CPT

## 2020-10-06 PROCEDURE — 36415 COLL VENOUS BLD VENIPUNCTURE: CPT

## 2020-10-06 PROCEDURE — 84702 CHORIONIC GONADOTROPIN TEST: CPT

## 2020-10-06 PROCEDURE — 76775 US EXAM ABDO BACK WALL LIM: CPT

## 2020-10-06 PROCEDURE — 96361 HYDRATE IV INFUSION ADD-ON: CPT

## 2020-10-06 PROCEDURE — 85025 COMPLETE CBC W/AUTO DIFF WBC: CPT

## 2020-10-06 PROCEDURE — 80048 BASIC METABOLIC PNL TOTAL CA: CPT

## 2020-10-06 PROCEDURE — 80076 HEPATIC FUNCTION PANEL: CPT

## 2020-10-06 NOTE — ER
Nurse's Notes                                                                                     

 [facility]                                                                 

Name: Selin Cui                                                                           

Age: 24 yrs                                                                                       

Sex: Female                                                                                       

: 1995                                                                                   

MRN: [id number]                                                                                   

Arrival Date: 10/06/2020                                                                          

Time: 01:18                                                                                       

Account#: [id number]                                                                            

Bed 6                                                                                             

Private MD:                                                                                       

Diagnosis: Kidney Stones;Severe Hydronephrosis;Intractable Vomiting;Intractable Pain              

                                                                                                  

Presentation:                                                                                     

10/06                                                                                             

01:25 Chief complaint: Patient states: low back pain that goes to lower left quadrant that    wh  

      started 2 hours ago. Pt states Hx of kidney stones. Pt also C/O nausea and vomiting.        

      Coronavirus screen: Client denies travel out of the [place] in the last 14 days. At this       

      time, the client does not indicate any symptoms associated with coronavirus-19. Ebola       

      Screen: Patient negative for fever greater than or equal to 101.5 degrees Fahrenheit,       

      and additional compatible Ebola Virus Disease symptoms Patient denies exposure to           

      infectious person. Initial Sepsis Screen: Does the patient meet any 2 criteria? No.         

      Patient's initial sepsis screen is negative. Does the patient have a suspected source       

      of infection? Yes: Acute abdominal pain. Risk Assessment: Do you want to hurt yourself      

      or someone else? Patient reports no desire to harm self or others. Onset of symptoms        

      was 2020.                                                                       

01:25 Method Of Arrival: Ambulatory                                                             

01:25 Acuity: AWILDA 3                                                                             

                                                                                                  

OB/GYN:                                                                                           

01:56 Pregnancy Verified                                                                        

                                                                                                  

Historical:                                                                                       

- Allergies:                                                                                      

01:55 No Known Allergies;                                                                       

- Home Meds:                                                                                      

01:55 None [Active];                                                                            

- PMHx:                                                                                           

01:55 Kidney stones; hydronephrosis;                                                            

- PSHx:                                                                                           

01:55 Kidney stone removal; Kidney stents;                                                      

                                                                                                  

- Immunization history:: Adult Immunizations up to date.                                          

- Social history:: Smoking status: Patient/guardian denies using.                                 

                                                                                                  

                                                                                                  

Screenin:56 Abuse screen: Denies threats or abuse. Denies injuries from another. Nutritional          

      screening: No deficits noted. Tuberculosis screening: No symptoms or risk factors           

      identified. Fall Risk None identified.                                                      

                                                                                                  

Assessment:                                                                                       

01:55 General: Appears in no apparent distress. uncomfortable, Behavior is calm, cooperative,   

      appropriate for age. Pain: Complains of pain in left flank Pain radiates to left lower      

      quadrant Pain currently is 9 out of 10 on a pain scale. Pain began 2 hours ago. Neuro:      

      Level of Consciousness is awake, alert, obeys commands, Oriented to person, place,          

      time, situation, Appropriate for age. Cardiovascular: Heart tones S1 S2. Respiratory:       

      Airway is patent Respiratory effort is even, unlabored, Respiratory pattern is regular,     

      symmetrical, Breath sounds are clear bilaterally. GI: Abdomen is flat, non-distended,       

      Abd is soft Abdomen is tender to palpation in left lower quadrant. : No signs and/or      

      symptoms were reported regarding the genitourinary system. EENT: No signs and/or            

      symptoms were reported regarding the EENT system. Derm: Skin is intact, is healthy with     

      good turgor, Skin is pink, warm \T\ dry. normal. Musculoskeletal: Circulation, motion,      

      and sensation intact.                                                                       

03:30 Reassessment: Patient appears in no apparent distress at this time. No changes from       

      previously documented assessment. Patient and/or family updated on plan of care and         

      expected duration. Pain level reassessed. Patient is alert, oriented x 3, equal             

      unlabored respirations, skin warm/dry/pink.                                                 

04:30 Reassessment: Patient and/or family updated on plan of care and expected duration. Pain wh  

      level reassessed. Pt sleeping no signs of distress noted.                                   

05:30 Reassessment: Pt crying C/O pain again, notified Provider.                                

06:15 Reassessment: Patient appears in no apparent distress at this time. Patient and/or        

      family updated on plan of care and expected duration. Pain level reassessed. Patient is     

      alert, oriented x 3, equal unlabored respirations, skin warm/dry/pink. Notified Pt was      

      accepted for transfer to [facility].                                                      

06:48 Reassessment: Report given to Laurie Macias RN.                                          

06:58 Reassessment:  EMS at bedside for transfer of pt to [facility].            bb  

                                                                                                  

Vital Signs:                                                                                      

01:25  / 96; Pulse 90; Resp 18; Temp 98.2; Pulse Ox 99% ; Weight 69.4 kg; Height 5 ft.  wh  

      2 in. (157.48 cm); Pain 9/10;                                                               

03:30  / 68; Pulse 85; Resp 18; Pulse Ox 99% on R/A; Pain 8/10;                         wh  

04:30  / 79; Pulse 77; Resp 18; Pulse Ox 100% on R/A; Pain 5/10;                        wh  

05:30  / 94; Pulse 105; Resp 18; Pulse Ox 99% on R/A; Pain 8/10;                        wh  

06:15  / 88; Pulse 96; Resp 18; Pulse Ox 98% on R/A; Pain 4/10;                         wh  

06:59  / 78; Pulse 94; Resp 18 S; Pulse Ox 97% on R/A;                                  bb  

01:25 Body Mass Index 27.98 (69.40 kg, 157.48 cm)                                               

                                                                                                  

Vitals:                                                                                           

03:26 Fetal Heart Tones 166.                                                                  jb4 

                                                                                                  

ED Course:                                                                                        

01:18 Patient arrived in ED.                                                                  cl3 

01:18 Rupali Green is Primary Nurse.                                                         wh  

01:21 Nithin Bazzi MD is Attending Physician.                                             mh7 

01:36 Urine Pregnancy--Ancillary (enter results) Sent.                                        ds4 

01:40 Inserted saline lock: 20 gauge in right antecubital area, using aseptic technique.      wh  

      Blood collected.                                                                            

01:42 Urine Dipstick--Ancillary (enter results) Sent.                                         ds4 

01:42 Urine Pregnancy--Ancillary (enter results) Sent.                                        ds4 

01:54 Triage completed.                                                                       wh  

01:56 Arm band placed on right wrist.                                                         wh  

01:56 Patient has correct armband on for positive identification. Placed in gown. Bed in low  wh  

      position. Call light in reach. Side rails up X 1. Pulse ox on. NIBP on.                     

02:08 Renal Ultrasound-Limited In Process Unspecified.                                        EDMS

05:42 Initiated transfer with [facility] ([facility]) and spoke with Esperanza. She stated she   tt3 

      would page the doctor and call back to do doc to doc.                                       

05:55 Esperanza called back to verify the patients ob physician.                               tt3 

06:10 Esperanza called back with their physician to do doc to doc with Dr. Bazzi regarding    tt3 

      the patients case.                                                                          

06:19 Esperanza Gordon called back with admin approval. The accepting time was 06:16. The      tt3 

      accepting physician is Dr. Samantha Price and was accepted at 06:14. Alyssa Rafaeltommie        

      gave allowance for admin approval at at 06:16 and is the House Supervisor. The patient      

      is to go to the Medical-Surgical Unit and the room assignment will be given upon            

      arrival. Report to be called to [phone]. Face sheet and MOT faxed to                  

      [phone].                                                                              

06:35 [facility] to transfer pt.                                                        tt3 

06:59 No provider procedures requiring assistance completed. Patient admitted, IV remains in  bb  

      place.                                                                                      

                                                                                                  

Administered Medications:                                                                         

01:41 Drug: NS 0.9% 1000 ml Route: IV; Rate: 1000 ml; Site: right antecubital;                  

01:43 Drug: morphine 4 mg {Note: RASS 0.} Route: IVP; Site: right antecubital;                  

05:43 Follow up: Response: No adverse reaction; Pain is unchanged, physician notified; RASS:    

      Alert and Calm (0)                                                                          

01:45 Drug: Zofran (Ondansetron) 4 mg Route: IVP; Site: right antecubital;                      

05:43 Follow up: Response: No adverse reaction                                                  

03:26 Drug: morphine 4 mg {Note: Rass score 0.} Route: IVP; Site: right antecubital;          4 

05:43 Follow up: Response: No adverse reaction; Pain is decreased                               

03:58 Drug: Phenergan 12.5 mg Route: IVP; Site: right antecubital;                              

05:43 Follow up: Response: No adverse reaction; Nausea is decreased                             

04:06 Drug: NS 0.9% 1000 ml Route: IV; Rate: 1 bolus; Site: right antecubital;                  

05:43 Follow up: Response: No adverse reaction; IV Status: Completed infusion                   

06:02 Drug: morphine 4 mg {Note: RASS 0.} Route: IVP; Site: right antecubital;                  

06:19 Follow up: Response: No adverse reaction; Pain is decreased; RASS: Alert and Calm (0)     

07:10 Drug: Phenergan 12.5 mg Route: IVP; Site: right antecubital;                            jb4 

                                                                                                  

                                                                                                  

Outcome:                                                                                          

06:26 ER care complete, transfer ordered by MD.                                               Ronnie 

06:59 Transferred by ground EMS [facility] Transfer form completed. X-rays bb  

      sent w/ patient.                                                                            

06:59 Condition: stable                                                                           

06:59 Instructed on the need for transfer.                                                        

07:00 Patient left the ED.                                                                    bb  

                                                                                                  

Signatures:                                                                                       

Dispatcher MedHoMaria Isabel Delgado RN RN   bb                                                   

Lance Taylor                             ds4                                                  

Darek Armas RN                       RN   jb4                                                  

Rupali Green                                                                                   

Juan Cowan                                cl3                                                  

Nithin Bazzi MD MD   mh7                                                  

Clarence Quintanilla                                  tt3                                                  

                                                                                                  

Corrections: (The following items were deleted from the chart)                                    

06:21 03:30  / 68; Pulse 85bpm; Resp 18bpm; Pulse Ox 99% RA; [facility]  

06:21 04:30  / 79; Pulse 77bpm; Resp 18bpm; Pulse Ox 100% RA; [facility]  

06:21 05:30  / 94; Pulse 105bpm; Resp 18bpm; Pulse Ox 99%; [facility]  

                                                                                                  

**************************************************************************************************

## 2020-10-06 NOTE — RAD REPORT
EXAM DESCRIPTION:  US - Renal Ultrasound-Limited - 10/6/2020 2:05 am

 

CLINICAL HISTORY:  LEFT FLANK PAIN

 

COMPARISON:  Renal Ultrasound-Complete dated 7/29/2020

 

FINDINGS:  Final report was delayed due to technical factors. A preliminary finding was provided at t
 time of the study.

 

In the lower pole of the left kidney a 14 millimeter stone is identified. An additional 12 millimeter
 size stone is present at the left renal pelvis. Moderately severe left-sided hydronephrosis is prese
nt. This has progressed from the July 29 study. Mild dilatation or fullness of the right renal pelvis
 is present similar to the July comparison study.

 

No solid mass of the renal cortical tissue. No perinephric abnormality identified.

 

Limited imaging of the fetus shows normal heart tones.

 

IMPRESSION:  Moderately severe left-sided hydronephrosis progressive from the July 29 imaging.

 

Mild dilatation or fullness of the right renal pelvis stable from July.

 

Left ureteral calcifications are present measuring 14 mm in the lower pole left kidney and 12 mm in t
he left renal pelvis.

## 2020-10-06 NOTE — EDPHYS
Physician Documentation                                                                           

 [facility]                                                                 

Name: Selin Cui                                                                           

Age: 24 yrs                                                                                       

Sex: Female                                                                                       

: 1995                                                                                   

MRN: [id number]                                                                                   

Arrival Date: 10/06/2020                                                                          

Time: 01:18                                                                                       

Account#: [id number]                                                                            

Bed 6                                                                                             

Private MD:                                                                                       

ED Physician Nithin Bazzi                                                                      

HPI:                                                                                              

10/06                                                                                             

01:49 This 24 yrs old  Female presents to ER via [facility] with complaints of Low    mh7 

      Back Pain.                                                                                  

01:49 The patient complains of pain in the left flank. The pain radiates to the left lower    mh7 

      abdomen. Onset: The symptoms/episode began/occurred today. Modifying factors: The           

      symptoms are alleviated by nothing. the symptoms are aggravated by nothing. Associated      

      signs and symptoms: Pertinent positives: nausea, vomiting, Pertinent negatives:             

      diarrhea, dizziness, dysuria, fever, urinary frequency, headache, hematuria, pain           

      radiating to the lower extremities. Severity of pain: At its worst the pain was             

      moderate today, in the emergency department the pain is unchanged. The patient has          

      experienced similar episodes in the past, several times.                                    

                                                                                                  

OB/GYN:                                                                                           

01:56 Pregnancy Verified                                                                        

                                                                                                  

Historical:                                                                                       

- Allergies:                                                                                      

01:55 No Known Allergies;                                                                       

- Home Meds:                                                                                      

01:55 None [Active];                                                                            

- PMHx:                                                                                           

01:55 Kidney stones; hydronephrosis;                                                            

- PSHx:                                                                                           

01:55 Kidney stone removal; Kidney stents;                                                      

                                                                                                  

- Immunization history:: Adult Immunizations up to date.                                          

- Social history:: Smoking status: Patient/guardian denies using.                                 

                                                                                                  

                                                                                                  

ROS:                                                                                              

01:49 Constitutional: Negative for fever, chills, and weight loss, Eyes: Negative for injury, mh7 

      pain, redness, and discharge, ENT: Negative for injury, pain, and discharge, Neck:          

      Negative for injury, pain, and swelling, Cardiovascular: Negative for chest pain,           

      palpitations, and edema, Respiratory: Negative for shortness of breath, cough,              

      wheezing, and pleuritic chest pain, : Negative for injury, bleeding, discharge, and       

      swelling, MS/Extremity: Negative for injury and deformity, Skin: Negative for injury,       

      rash, and discoloration, Neuro: Negative for headache, weakness, numbness, tingling,        

      and seizure, Psych: Negative for depression, anxiety, suicide ideation, homicidal           

      ideation, and hallucinations, Allergy/Immunology: Negative for hives, rash, and             

      allergies, Endocrine: Negative for neck swelling, polydipsia, polyuria, polyphagia, and     

      marked weight changes, Hematologic/Lymphatic: Negative for swollen nodes, abnormal          

      bleeding, and unusual bruising.                                                             

                                                                                                  

Exam:                                                                                             

01:49 Head/Face:  Normocephalic, atraumatic. Eyes:  Pupils equal round and reactive to light, mh7 

      extra-ocular motions intact.  Lids and lashes normal.  Conjunctiva and sclera are           

      non-icteric and not injected.  Cornea within normal limits.  Periorbital areas with no      

      swelling, redness, or edema. Neck:  Trachea midline, no thyromegaly or masses palpated,     

      and no cervical lymphadenopathy.  Supple, full range of motion without nuchal rigidity,     

      or vertebral point tenderness.  No Meningismus. Chest/axilla:  Normal chest wall            

      appearance and motion.  Nontender with no deformity.  No lesions are appreciated.           

      Cardiovascular:  Regular rate and rhythm with a normal S1 and S2.  No gallops, murmurs,     

      or rubs.  Normal PMI, no JVD.  No pulse deficits. Respiratory:  Lungs have equal breath     

      sounds bilaterally, clear to auscultation and percussion.  No rales, rhonchi or wheezes     

      noted.  No increased work of breathing, no retractions or nasal flaring.                    

01:49 Skin:  Warm, dry with normal turgor.  Normal color with no rashes, no lesions, and no       

      evidence of cellulitis. MS/ Extremity:  Pulses equal, no cyanosis.  Neurovascular           

      intact.  Full, normal range of motion. Neuro:  Awake and alert, GCS 15, oriented to         

      person, place, time, and situation.  Cranial nerves II-XII grossly intact.  Motor           

      strength 5/5 in all extremities.  Sensory grossly intact.  Cerebellar exam normal.          

      Normal gait. Psych:  Awake, alert, with orientation to person, place and time.              

      Behavior, mood, and affect are within normal limits.                                        

01:49 Constitutional: The patient appears in no acute distress, alert, awake, uncomfortable.      

01:49 Back: pain, is absent, ROM is normal, normal spinal alignment noted, CVA tenderness,        

      that is moderate, is noted on the left, muscle spasm, is not present.                       

                                                                                                  

Vital Signs:                                                                                      

01:25  / 96; Pulse 90; Resp 18; Temp 98.2; Pulse Ox 99% ; Weight 69.4 kg; Height 5 ft.  wh  

      2 in. (157.48 cm); Pain 9/10;                                                               

03:30  / 68; Pulse 85; Resp 18; Pulse Ox 99% on R/A; Pain 8/10;                         wh  

04:30  / 79; Pulse 77; Resp 18; Pulse Ox 100% on R/A; Pain 5/10;                        wh  

05:30  / 94; Pulse 105; Resp 18; Pulse Ox 99% on R/A; Pain 8/10;                        wh  

06:15  / 88; Pulse 96; Resp 18; Pulse Ox 98% on R/A; Pain 4/10;                         wh  

06:59  / 78; Pulse 94; Resp 18 S; Pulse Ox 97% on R/A;                                  bb  

01:25 Body Mass Index 27.98 (69.40 kg, 157.48 cm)                                             wh  

                                                                                                  

MDM:                                                                                              

01:36 Patient medically screened.                                                             [facility] 

06:24 Differential diagnosis: nephrolithiasis, pyelonephritis, UTI. Data reviewed: vital      [facility] 

      signs, nurses notes, old medical records, lab test result(s), CBC, electrolytes,            

      urinalysis, radiologic studies, ultrasound. Data interpreted: Pulse oximetry: on room       

      air is 98 %. Interpretation: normal. Counseling: I had a detailed discussion with the       

      patient and/or guardian regarding: the historical points, exam findings, and any            

      diagnostic results supporting the discharge/admit diagnosis, lab results, radiology         

      results, the need to transfer to another facility, for higher level of care, [facility] does not immediately have the required specialist. Response to            

      treatment: the patient's symptoms have mildly improved after treatment.                     

                                                                                                  

10/06                                                                                             

01:35 Order name: Urine Pregnancy--Ancillary (enter results); Complete Time: 02:00            4 

10/06                                                                                             

01:36 Order name: Urine Dipstick--Ancillary (enter results); Complete Time: 02:23             ds4 

10/06                                                                                             

01:38 Order name: Basic Metabolic Panel; Complete Time: 03:11                                 [facility] 

10/06                                                                                             

01:38 Order name: CBC with Diff; Complete Time: 02:00                                         7 

10/06                                                                                             

01:38 Order name: Hepatic Function; Complete Time: 03:11                                      7 

10/06                                                                                             

01:38 Order name: Lipase; Complete Time: 03:11                                                7 

10/06                                                                                             

01:38 Order name: HCG-Quantitative; Complete Time: 03:11                                      [facility] 

10/06                                                                                             

01:57 Order name: Renal Ultrasound-Limited                                                    EDMS

10/06                                                                                             

01:38 Order name: IV Saline Lock; Complete Time: 01:42                                        [facility] 

10/06                                                                                             

01:38 Order name: Labs collected and sent; Complete Time: 01:42                               [facility] 

10/06                                                                                             

01:39 Order name: Fetal Heart Tones; Complete Time: 03:25                                     [facility] 

                                                                                                  

Administered Medications:                                                                         

01:41 Drug: NS 0.9% 1000 ml Route: IV; Rate: 1000 ml; Site: right antecubital;                  

01:43 Drug: morphine 4 mg {Note: RASS 0.} Route: IVP; Site: right antecubital;                  

05:43 Follow up: Response: No adverse reaction; Pain is unchanged, physician notified; RASS:    

      Alert and Calm (0)                                                                          

01:45 Drug: Zofran (Ondansetron) 4 mg Route: IVP; Site: right antecubital;                      

05:43 Follow up: Response: No adverse reaction                                                  

03:26 Drug: morphine 4 mg {Note: Rass score 0.} Route: IVP; Site: right antecubital;          [facility] 

05:43 Follow up: Response: No adverse reaction; Pain is decreased                               

03:58 Drug: Phenergan 12.5 mg Route: IVP; Site: right antecubital;                              

05:43 Follow up: Response: No adverse reaction; Nausea is decreased                             

04:06 Drug: NS 0.9% 1000 ml Route: IV; Rate: 1 bolus; Site: right antecubital;                  

05:43 Follow up: Response: No adverse reaction; IV Status: Completed infusion                   

06:02 Drug: morphine 4 mg {Note: RASS 0.} Route: IVP; Site: right antecubital;                  

06:19 Follow up: Response: No adverse reaction; Pain is decreased; RASS: Alert and Calm (0)     

07:10 Drug: Phenergan 12.5 mg Route: IVP; Site: right antecubital;                            jb4 

                                                                                                  

                                                                                                  

Disposition:                                                                                      

10/06/20 06:26 Transfer ordered to [facility]. Diagnosis are Kidney Stones, Severe        

  Hydronephrosis, Intractable Vomiting, Intractable Pain.                                         

- Reason for transfer: Higher level of care.                                                      

- Accepting physician is Dr. Hook.                                                            

- Condition is Stable.                                                                            

- Problem is an acute exacerbation.                                                               

- Symptoms have improved.                                                                         

                                                                                                  

                                                                                                  

                                                                                                  

Signatures:                                                                                       

Dispatcher [employer]                           EDMS                                                 

Luciano Maria Isabel, RN                     RN   bb                                                   

Darek Armas RN                       RN   jb4                                                  

Rupali Green Maurice, MD MD   mh7                                                  

                                                                                                  

Corrections: (The following items were deleted from the chart)                                    

07:00 06:26 10/06/2020 06:26 Transfer ordered to [facility]. Diagnosis is Kidney      bb  

      Stones; Severe Hydronephrosis; Intractable Vomiting; Intractable Pain. Reason for           

      transfer: Higher level of care. Accepting physician is Dr. Hook. Condition is           

      Stable. Problem is an acute exacerbation. Symptoms have improved. mh7                       

                                                                                                  

**************************************************************************************************

## 2020-10-08 NOTE — XMS REPORT
Clinical Summary

                           Created on:2020



Patient:Selin Cui

Sex:Female

:1995

External Reference #:[id number]





Demographics







                          Address                   [address] [place] [address]

 

                          Home Phone                [phone]

 

                          Mobile Phone              [phone]

 

                          Email Address             [email or web address]

 

                          Preferred Language        English

 

                          Marital Status            Single

 

                          Protestant Affiliation     Unknown

 

                          Race                      White

 

                          Ethnic Group               or 









Author







                          Organization              [facility] [employer]

 

                          Address                   [address] [place] [address]









Support







                Name            Relationship    Address         Phone

 

                Rebeca Cui    Parent          Unavailable     [phone]

 

                Geoff Cui    Parent          [address] [facility]    [phone]



                                                [place] [address] 









Care Team Providers







                    Name                Role                Phone

 

                    Asked,  Pcp         Primary Care Provider Unavailable









Allergies







             Active Allergy Reactions    Severity     Noted Date   Comments

 

             Hydrocodone  GI Intolerance              2019   Nausea







Medications







          Medication Sig       Dispensed Refills   Start Date End Date  Status

 

          oxybutynin XL Take 1 tablet (5 30 tablet 0         2019

20 



          (DITROPAN XL) 5 MG 24 mg total) by                                    

     



          hr tablet mouth daily.                                         

 

          acetaminophen Take 2 tablets 30 tablet 0         10/21/2019 2019

 



          (TYLENOL) 325 MG (650 mg total)                                       

  



          tablet    by mouth every 6                                         



                    (six) hours as                                         



                    needed for mild                                         



                    pain for up to                                         



                    30 days.                                          

 

          butalbital-acetaminop Take 1 tablet by 30 tablet 0         10/21/2019 

2019 



          hen-caff (FIORICET) mouth every 4                                     

    



          -40 mg per (four) hours as                                      

   



          tablet    needed for                                         



                    headaches for up                                         



                    to 30 days.                                         

 

          ferrous sulfate 325 Take 1 tablet 60 tablet 0         10/21/2019 11/20

/2019 



          (65 FE) MG tablet (325 mg total)                                      

   



                    by mouth 2 (two)                                         



                    times a day with                                         



                    meals for 30                                         



                    days.                                             

 

          methocarbamol Take 1 tablet 120 tablet 0         10/21/2019 2019

 



          (ROBAXIN) 500 MG (500 mg total)                                       

  



          tablet    by mouth 4                                         



                    (four) times a                                         



                    day for 30 days.                                         

 

          ondansetron ODT Take 1 tablet (4 30 tablet 0         10/21/2019 11/20/

2019 



          (ZOFRAN-ODT) 4 MG mg total) by                                        

 



          disintegrating tablet mouth every 8                                   

      



                    (eight) hours as                                         



                    needed for                                         



                    nausea or                                         



                    vomiting for up                                         



                    to 30 days.                                         







Active Problems







                          Problem                   Noted Date

 

                          Fever                     10/18/2019

 

                          UPJ obstruction, congenital 2019

 

                          Complicated UTI (urinary tract infection) 2019

 

                          Hydronephrosis, left      2019

 

                          Left ureteral stone       2017







Encounters







             Date         Type         Specialty    Care Team    Description

 

             2020   Office Visit Urology      Sergio      Hydronephrosis 

of left



                                                    MD Amalia   kidney (Primary

 Dx)

 

             2020   Travel                                 

 

             2020   Telephone    Urology      Amalia Hill MD   

 

             2020   Travel                                 

 

             2019   Office Visit Urology      Sergio      Hydronephrosis 

of left



                                                    MD Amalia   kidney (Primary

 Dx)

 

             10/21/2019   Telephone    Urology      Amina Benito MA    

 

             10/18/2019 - Hospital Encounter General Internal Clewing,     UPJ o

bstruction, 

congenital (Primary Dx);



                10/21/2019                      Medicine        MD Katt



                                        Fever, unspecified fever cause



                                                    Brian Johnson, 



                                                    DO           

 

             10/17/2019   Procedure visit Urology      Sergio      Ureteropelvi

c junction



                                                    MD Amalia   (UPJ) obstructi

on, left



                                                                 (Primary Dx)



after 10/07/2019



Surgical History







                Surgery         Date            Site/Laterality Comments

 

                CYSTO STENT INSERTION 2017        Left            Procedure:

 CYSTOSCOPY, LEFT



                                                                STENT INSERTION,

 LEFT



                                                                RETROGRADE PYELO

GRAM;



                                                                Surgeon: Annamaria Vázquez MD;  Lo

cation: [facility] OR;  Servic

e: Urology;



                                                                Laterality: Left

;







                                                                Medical devices 

from this



                                                                surgery are in [facility] Implants



                                                                section.

 

                ESWL, KIDNEY, WITH CYSTOSCOPY 05/15/2017      Left            

 

                CYSTO STENT INSERTION 2017      Left            

 

                CYSTOSCOPY, WITH URETERAL 5/15/2017       N/A             Proced

ure: CYSTOSCOPY, LEFT



                STENT REPLACEMENT                                 URETEROSCOPY  

AND LEFT STENT



                                                                EXCHANGE, LEFT R

GP;  Surgeon:



                                                                Annamaria Quitnero MD;



                                                                Location: [facility]

IN OR;



                                                                Service: Urology

;



                                                                Laterality: N/A;







                                                                Medical devices 

from this



                                                                surgery are in t

 Implants



                                                                section.

 

                NEPHROSTOMY     2019      Left            

 

                CYSTO WITH URETEROSCOPY 2019        Left            Procedur

e: CYSTOSCOPY W/ LEFT



                                                                ANTEGRADE AND RE

TROGRADE



                                                                PYELOGRAM;  Surg

[place]: Amalia Hill MD;  Loc

ation: [facility]



                                                                MAIN OR;  Servic

e: Urology;



                                                                Laterality: Left

;







                                                                Medical devices 

from this



                                                                surgery are in t

he Implants



                                                                section.

 

                PYELOPLASTY, LAPAROSCOPIC, 9/3/2019        Left            Proce

dure: LEFT ROBOTIC



                ROBOT-ASSISTED                                  ASSISTED LAPARSC

OPIC



                                                                PYELOPLASTY, LEF

T



                                                                PYELOLITHOTOMY, 

LEFT



                                                                PERCUTANEOUS TUB

E REMOVAL,



                                                                LEFT STENT PLACE

MENT;



                                                                Surgeon: Amalia Hill MD;



                                                                Location: [facility]

IN OR;



                                                                Service: Urology

;



                                                                Laterality: Left

;







                                                                Medical devices 

from this



                                                                surgery are in t

he Implants



                                                                section.

 

                URETERAL STENT PLACEMENT 2019                      Removed

 10/18







Medical History







                    Medical History     Date                Comments

 

                    Kidney stones                           

 

                    Congenital obstruction of ureteropelvic junction (UPJ)      

               







Family History







                Medical History Relation        Name            Comments

 

                Diabetes        Father                          

 

                Seizures        Mother                          









                Relation        Name            Status          Comments

 

                Father                          Alive           

 

                Mother                                  







Social History







             Tobacco Use  Types        Packs/Day    Years Used   Date

 

             Never Smoker                                        









                Smokeless Tobacco: Never Used                                 









                Alcohol Use     Drinks/Week     oz/Week         Comments

 

                Yes                                             8 drinks on week

ends









                    Alcohol Habits      Answer              Date Recorded

 

                    How often do you have a drink containing alcohol? 2-4 times 

a month   10/19/2019

 

                    How many drinks containing alcohol do you have on a Not aske

d           



                    typical day when you are drinking?                     

 

                    How often do you have six or more drinks on one Not asked   

        



                    occasion?                               









                          Sex Assigned at Birth     Date Recorded

 

                          Not on file               







Last Filed Vital Signs







                Vital Sign      Reading         Time Taken      Comments

 

                Blood Pressure  107/71          10/21/2019 12:13 PM CDT 

 

                Pulse           73              10/21/2019 12:13 PM CDT 

 

                Temperature     36.4 C (97.5 F) 10/21/2019 12:13 PM CDT 

 

                Respiratory Rate 16              10/21/2019 12:13 PM CDT 

 

                Oxygen Saturation 99%             10/21/2019 12:13 PM CDT 

 

                Inhaled Oxygen Concentration -               -               

 

                Weight          -               -               

 

                Height          -               -               

 

                Body Mass Index -               -               







Plan of Treatment







             Date         Type         Specialty    Care Team    Description

 

                10/12/2020      Office Visit    Urology         Amalia Hill MD



                                        



                                                                [address] [facility]



                                        



                                                                [address]



                                        



                                                                [place], [place] [address]

[address]



                                        



                                                                [phone] (Fax) 









                Health Maintenance Due Date        Last Done       Comments

 

                CHLAMYDIA SCREENING 12/15/2012                      

 

                CERVICAL CANCER SCREENING 12/15/2017                      

 

                INFLUENZA VACCINE 2020                      







Implants







          Implanted Type      Area       Device    Shelf     Model /



                                                  Identifier Expiration Serial /



                                                            Date      Lot

 

                Clip Ligtng Hem-O-Jorge Endoscpc Aplr Plymr Lg - [id number] [employer]

l Clips   N/A:            

WECK CLOSURE                                                [id number] /



                          Implanted: Qty: 1 on 2019 by Amalia Hill MD 

at [facility] for 

Internal     N/A          SYSTEMS                                 /



                    Use                                               

 

                Stent Uretl S-Flx Kwart Retro-Inject 6fr 24cm - [id number] Perip

eral or                   [facility] 

[facility]                              2020          [id number] /



                          Implanted: Qty: 1 on 2017 by Annamaria Vázquez MD at [facility] Biliary

                                                                  /



                    Stents                                            [id number]

 

                Stent Uretl S-Flx Kwart Retro-Inject 6fr 24cm - [id number] Perip

eral or   N/A:            

[facility]                         2020          [id number] /



          Implanted: 05/15/2017 at [facility] (Quantity not on file) Biliary  

 N/A                                      /



                    Stents                                            [id number]

 

                Catheter Uretl 6/10fr 50cm Flx-Tp Dlmn Std Accs - [id number] Surg

ical        N/A:            [facility] 

UROLOGICAL                                                  [id number] /



          Implanted: 05/15/2017 at [facility] (Quantity not on file) Implants;

 N/A                                      /



                    Expanders;                                         



                    Extenders;                                         



                    Surgical                                          



                    Wires                                             

 

                    Catheter Uretrl Flxble Tip Open Ended Flexima 5fr 70cm - Log

[id number] Urological          

N/A:            [facility]                     2022      [id number] /



                          Implanted: Qty: 1 on 2019 by Amalia Hill MD 

at [facility] Implants 

or           N/A                                                  /



                    Sets                                              [id number]

 

                    Stent Uretl Polrs Ult 2drmtr 6fr 26cm Hydroplus W/O Gw - Log

[id number] Urological          

N/A:            [facility]                     2022      [id number] /



                          Implanted: Qty: 1 on 2019 by Amalia Hill MD 

at [facility] Implants 

or           N/A                                                  /



                    Sets                                              [id number]

 

                    Catheter Uretrl Flxble Tip Open Ended Flexima 5fr 70cm - Log

[id number] Urological          

N/A:            [facility]                     2022      [id number] /



                          Implanted: Qty: 1 on 2019 by Amalia Hill MD 

at [facility] Implants 

or           N/A                                                  /



                    Sets                                              [id number]







Procedures







             Procedure Name Priority     Date/Time    Associated Diagnosis Comme

nts

 

             URINE CULTURE Routine      2020  9:13 Ureteropelvic Results f

or this



                                       AM CDT       junction (UPJ) procedure are

 in



                                                    obstruction, left the result

s



                                                                 section.

 

             US RENAL     STAT         10/21/2019  9:17              Results for

 this



                                       AM CDT                    procedure are i

n



                                                                 the results



                                                                 section.

 

             HC COMPLETE BLD COUNT Routine      10/21/2019  4:10              Re

sults for this



             W/AUTO DIFF               AM CDT                    procedure are i

n



                                                                 the results



                                                                 section.

 

             ESTIMATED GFR Routine      10/21/2019  4:00              Results fo

r this



                                       AM CDT                    procedure are i

n



                                                                 the results



                                                                 section.

 

             COMPREHENSIVE METABOLIC Routine      10/21/2019  4:00              

Results for this



             PANEL                     AM CDT                    procedure are i

n



                                                                 the results



                                                                 section.

 

             ESTIMATED GFR Routine      10/20/2019  4:00              Results fo

r this



                                       AM CDT                    procedure are i

n



                                                                 the results



                                                                 section.

 

             C3 COMPLEMENT COMPONENT Routine      10/20/2019  4:00              

Results for this



                                       AM CDT                    procedure are i

n



                                                                 the results



                                                                 section.

 

             C4 COMPLEMENT COMPONENT Routine      10/20/2019  4:00              

Results for this



                                       AM CDT                    procedure are i

n



                                                                 the results



                                                                 section.

 

             RHEUMATOID FACTOR Routine      10/20/2019  4:00              Result

s for this



                                       AM CDT                    procedure are i

n



                                                                 the results



                                                                 section.

 

             HEPATITIS ACUTE PANEL Routine      10/20/2019  4:00              Re

sults for this



                                       AM CDT                    procedure are i

n



                                                                 the results



                                                                 section.

 

             HSV TYPE 1/2 COMBINED Routine      10/20/2019  4:00              Re

sults for this



             AB, IGM                   AM CDT                    procedure are i

n



                                                                 the results



                                                                 section.

 

             CYTOMEG IGG/IGM Routine      10/20/2019  4:00              Results 

for this



                                       AM CDT                    procedure are i

n



                                                                 the results



                                                                 section.

 

             RICKETTSIA TYPHI Routine      10/20/2019  4:00              Results

 for this



             (TYPHUS FEVER) ABS              AM CDT                    procedure

 are in



             IGG/IGM                                             the results



                                                                 section.

 

             RICKETTSIA RICKETTSII Routine      10/20/2019  4:00              Re

sults for this



             (RMSF) ABS IGG/IGM              AM CDT                    procedure

 are in



                                                                 the results



                                                                 section.

 

             COMPREHENSIVE METABOLIC Routine      10/20/2019  4:00              

Results for this



             PANEL                     AM CDT                    procedure are i

n



                                                                 the results



                                                                 section.

 

             HERPES SIMPLEX VIRUS BY Routine      10/20/2019  3:50              

Results for this



             PCR                       AM CDT                    procedure are i

n



                                                                 the results



                                                                 section.

 

             CYTOMEGALOVIRUS BY PCR Routine      10/20/2019  3:50              R

esults for this



                                       AM CDT                    procedure are i

n



                                                                 the results



                                                                 section.

 

             STEPAN BASHIR VIRUS Routine      10/20/2019  3:50              Resul

ts for this



             (EBV) BY PCR              AM CDT                    procedure are i

n



                                                                 the results



                                                                 section.

 

             HEMOGLOBIN A1C Routine      10/20/2019  3:50              Results f

or this



                                       AM CDT                    procedure are i

n



                                                                 the results



                                                                 section.

 

             HC COMPLETE BLD COUNT Routine      10/20/2019  3:50              Re

sults for this



             W/AUTO DIFF               AM CDT                    procedure are i

n



                                                                 the results



                                                                 section.

 

             AFB CULTURE  Routine      10/19/2019  6:07              Results for

 this



                                       PM CDT                    procedure are i

n



                                                                 the results



                                                                 section.

 

             FUNGUS CULTURE Routine      10/19/2019  6:07              Results f

or this



                                       PM CDT                    procedure are i

n



                                                                 the results



                                                                 section.

 

             IR LUMBAR PUNCTURE STAT         10/19/2019  5:12              Resul

ts for this



                                       PM CDT                    procedure are i

n



                                                                 the results



                                                                 section.

 

             TOXOPLASMA GONDII BY Routine      10/19/2019  5:07              Res

ults for this



             PCR                       PM CDT                    procedure are i

n



                                                                 the results



                                                                 section.

 

             WEST NILE VIRUS BY PCR, Routine      10/19/2019  5:07              

Results for this



             CSF                       PM CDT                    procedure are i

n



                                                                 the results



                                                                 section.

 

             VARICELLA ZOSTER BY PCR Routine      10/19/2019  5:07              

Results for this



                                       PM CDT                    procedure are i

n



                                                                 the results



                                                                 section.

 

             RANDY VIRUS, QUANTITATIVE Routine      10/19/2019  5:07              R

esults for this



             PCR                       PM CDT                    procedure are i

n



                                                                 the results



                                                                 section.

 

             HERPES SIMPLEX VIRUS BY Routine      10/19/2019  5:07              

Results for this



             PCR                       PM CDT                    procedure are i

n



                                                                 the results



                                                                 section.

 

             STEPAN BASHIR VIRUS Routine      10/19/2019  5:07              Resul

ts for this



             (EBV) BY PCR              PM CDT                    procedure are i

n



                                                                 the results



                                                                 section.

 

             ENTEROVIRUS BY PCR Routine      10/19/2019  5:07              Resul

ts for this



                                       PM CDT                    procedure are i

n



                                                                 the results



                                                                 section.

 

             CYTOMEGALOVIRUS BY PCR Routine      10/19/2019  5:07              R

esults for this



                                       PM CDT                    procedure are i

n



                                                                 the results



                                                                 section.

 

             BK VIRUS BY PCR Routine      10/19/2019  5:07              Results 

for this



                                       PM CDT                    procedure are i

n



                                                                 the results



                                                                 section.

 

             VDRL, CSF SCREEN Routine      10/19/2019  5:07              Results

 for this



                                       PM CDT                    procedure are i

n



                                                                 the results



                                                                 section.

 

             OLIGOCLONAL BANDING, Routine      10/19/2019  5:07              Res

ults for this



             CSF                       PM CDT                    procedure are i

n



                                                                 the results



                                                                 section.

 

             WEST NILE VIRUS Routine      10/19/2019  5:07              Results 

for this



             ANTIBODY PANEL, CSF              PM CDT                    procedur

e are in



                                                                 the results



                                                                 section.

 

             IGG SYNTHESIS RATE Routine      10/19/2019  5:07              Resul

ts for this



             STUDY                     PM CDT                    procedure are i

n



                                                                 the results



                                                                 section.

 

             GLUCOSE LEVEL, CSF Routine      10/19/2019  5:07              Resul

ts for this



                                       PM CDT                    procedure are i

n



                                                                 the results



                                                                 section.

 

             PROTEIN, CSF Routine      10/19/2019  5:07              Results for

 this



                                       PM CDT                    procedure are i

n



                                                                 the results



                                                                 section.

 

             CSF CELL COUNT WITH Routine      10/19/2019  5:07              Resu

lts for this



             DIFFERENTIAL              PM CDT                    procedure are i

n



                                                                 the results



                                                                 section.

 

             GRAM STAIN   Routine      10/19/2019  5:07              Results for

 this



                                       PM CDT                    procedure are i

n



                                                                 the results



                                                                 section.

 

             CRYPTOCOCCAL ANTIGEN Routine      10/19/2019  5:07              Res

ults for this



             SCREEN                    PM CDT                    procedure are i

n



                                                                 the results



                                                                 section.

 

             CSF CULTURE  Routine      10/19/2019  5:07              Results for

 this



                                       PM CDT                    procedure are i

n



                                                                 the results



                                                                 section.

 

             PARTIAL THROMBOPLASTIN STAT         10/19/2019  1:25              R

esults for this



             TIME (PTT)                PM CDT                    procedure are i

n



                                                                 the results



                                                                 section.

 

             PROTHROMBIN TIME WITH STAT         10/19/2019  1:25              Re

sults for this



             INR                       PM CDT                    procedure are i

n



                                                                 the results



                                                                 section.

 

             RESPIRATORY PATHOGEN Routine      10/19/2019  1:20              Res

ults for this



             PANEL                     PM CDT                    procedure are i

n



                                                                 the results



                                                                 section.

 

             INFLUENZA ANTIGEN TEST, Routine      10/19/2019  1:20              

Results for this



             REFLEX NEGATIVE TO RPP              PM CDT                    proce

dure are in



                                                                 the results



                                                                 section.

 

             XR CHEST 1 VW PORTABLE Routine      10/19/2019 10:03              R

esults for this



                                       AM CDT                    procedure are i

n



                                                                 the results



                                                                 section.

 

             INFECTIOUS   Routine      10/19/2019  4:42              Results for

 this



             MONONUCLEOSIS SCREEN              AM CDT                    procedu

re are in



                                                                 the results



                                                                 section.

 

             HIV AG/AB COMBINATION Routine      10/19/2019  4:42              Re

sults for this



                                       AM CDT                    procedure are i

n



                                                                 the results



                                                                 section.

 

             C-REACTIVE PROTEIN Routine      10/19/2019  4:42              Resul

ts for this



                                       AM CDT                    procedure are i

n



                                                                 the results



                                                                 section.

 

             SEDIMENTATION RATE Routine      10/19/2019  4:42              Resul

ts for this



                                       AM CDT                    procedure are i

n



                                                                 the results



                                                                 section.

 

             KUSH          Routine      10/19/2019  4:42              Results for

 this



                                       AM CDT                    procedure are i

n



                                                                 the results



                                                                 section.

 

             GRAM STAIN   Routine      10/19/2019  1:37              Results for

 this



                                       AM CDT                    procedure are i

n



                                                                 the results



                                                                 section.

 

             URINE CULTURE Routine      10/19/2019  1:37              Results fo

r this



                                       AM CDT                    procedure are i

n



                                                                 the results



                                                                 section.

 

             URINALYSIS SCREEN AND Routine      10/19/2019 12:25              Re

sults for this



             MICROSCOPY, WITH REFLEX              AM CDT                    proc

edure are in



             TO CULTURE                                          the results



                                                                 section.

 

             BLOOD CULTURE, AEROBIC Routine      10/19/2019 12:15              R

esults for this



             & ANAEROBIC               AM CDT                    procedure are i

n



                                                                 the results



                                                                 section.

 

             COMPREHENSIVE METABOLIC Routine      10/19/2019 12:05              

Results for this



             PANEL                     AM CDT                    procedure are i

n



                                                                 the results



                                                                 section.

 

             ESTIMATED GFR Routine      10/19/2019 12:05              Results fo

r this



                                       AM CDT                    procedure are i

n



                                                                 the results



                                                                 section.

 

             PHOSPHORUS LEVEL Routine      10/19/2019 12:05              Results

 for this



                                       AM CDT                    procedure are i

n



                                                                 the results



                                                                 section.

 

             MAGNESIUM LEVEL Routine      10/19/2019 12:05              Results 

for this



                                       AM CDT                    procedure are i

n



                                                                 the results



                                                                 section.

 

             LDH          Routine      10/19/2019 12:05              Results for

 this



                                       AM CDT                    procedure are i

n



                                                                 the results



                                                                 section.

 

             HAPTOGLOBIN  Routine      10/19/2019 12:05              Results for

 this



                                       AM CDT                    procedure are i

n



                                                                 the results



                                                                 section.

 

             FOLATE LEVEL Routine      10/19/2019 12:05              Results for

 this



                                       AM CDT                    procedure are i

n



                                                                 the results



                                                                 section.

 

             THYROID STIMULATING Routine      10/19/2019 12:05              Resu

lts for this



             HORMONE                   AM CDT                    procedure are i

n



                                                                 the results



                                                                 section.

 

             VITAMIN B12 LEVEL Routine      10/19/2019 12:05              Result

s for this



                                       AM CDT                    procedure are i

n



                                                                 the results



                                                                 section.

 

             TOTAL IRON BINDING Routine      10/19/2019 12:05              Resul

ts for this



             CAPACITY                  AM CDT                    procedure are i

n



                                                                 the results



                                                                 section.

 

             FERRITIN LEVEL Routine      10/19/2019 12:05              Results f

or this



                                       AM CDT                    procedure are i

n



                                                                 the results



                                                                 section.

 

             LACTIC ACID LEVEL Routine      10/19/2019 12:05              Result

s for this



                                       AM CDT                    procedure are i

n



                                                                 the results



                                                                 section.

 

             HC COMPLETE BLD COUNT Routine      10/19/2019 12:05              Re

sults for this



             W/AUTO DIFF               AM CDT                    procedure are i

n



                                                                 the results



                                                                 section.

 

             BLOOD CULTURE, AEROBIC Routine      10/19/2019 12:05              R

esults for this



             & ANAEROBIC               AM CDT                    procedure are i

n



                                                                 the results



                                                                 section.

 

             ECG 12-LEAD  STAT         10/18/2019 11:54              Results for

 this



                                       PM CDT                    procedure are i

n



                                                                 the results



                                                                 section.

 

             CT ABD/PELVIC EXTERNAL Routine      10/18/2019  3:40              R

esults for this



             STUDY                     PM CDT                    procedure are i

n



                                                                 the results



                                                                 section.



after 10/07/2019



Results

Urine culture (2020  9:13 AM CDT)Only the most recent of2 resultswithin 
the time period is included.





             Component    Value        Ref Range    Performed At Pathologist Sig

nature

 

             Urine culture Mixed urogenital seda              [facility]      



                          Greater than 100,000 colony forming units per mL      

                     



                                                                 









                                        Specimen

 

                                        Urine









                          Narrative                 Performed At

 

                                        Performed at: 01 - [facility]



                                        [facility]



                                        [address], [place] [address]

143



                                        



                          : Dalton Rice MD, Phone: [phone] 









                Performing Organization Address         City/State/ZIP Code Phon

e Number

 

                [facility]                                         



US Renal (10/21/2019  9:17 AM CDT)





                                        Specimen

 

                                        









                          Narrative                 Performed At

 

                                        EXAMINATION: US RENAL



                                         RADIANT



                                         



                                        



                          CLINICAL HISTORY: h o of LEFT pyeloplasty with evide

nce of residual 



                                        hydro on recent imaging



                                        



                                         



                                        



                                        TECHNIQUE: Sonographic imaging over the 

kidneys was performed. 



                                        



                                         



                                        



                                        COMPARISON: None.



                                        



                                         



                                        



                                        FINDINGS:



                                        



                                         



                                        



                          1.The right kidney is normal size measuring 10.4 cm in

 long axis. The 



                          kidney has normal echogenicity. Vascular flow is unrem

arkable. There is 



                          no evidence of perinephric fluid, solid mass, or calcu

prasanna. Mild right 



                                        hydronephrosis is present.



                                        



                                         



                                        



                          2.The left kidney is normal size measuring 10.3 cm i

n long axis. The 



                          kidney has normal echogenicity. Vascular flow is unrem

arkable. There is 



                          no evidence of perinephric fluid, solid mass, or calcu

prasanna. Moderate left 



                                        hydronephrosis is present.



                                        



                                         



                                        



                                        3.The bladder is unremarkable.



                                        



                                         



                                        



                                        4.Hepatic steatosis is incidentally note

d.



                                        



                                         



                                        



                                        IMPRESSION:



                                        



                                         



                                        



                                        Mild right and moderate left hydronephro

sis.



                                        



                                         



                                        



                                        Hepatic steatosis.



                                        



                                         



                                        



                                        [facility]-[id number]



                                        



                                                    









                                        Procedure Note

 

                                        Hm Interface, Radiology Results Incoming

 - 10/21/2019  9:24 AM CDT



EXAMINATION:  US RENAL



                                        



                                        CLINICAL HISTORY:  h o of LEFT pyeloplas

ty with evidence of residual hydro on 

recent imaging



                                        



                                        TECHNIQUE: Sonographic imaging over the 

kidneys was performed.



                                        



                                        COMPARISON:  None.



                                        



                                        FINDINGS:



                                        



                                        1.The right kidney is normal size measur

ing 10.4 cm in long axis. The kidney has

normal echogenicity. Vascular flow is unremarkable. There is no evidence of 
perinephric fluid, solid mass, or calculus. Mild right hydronephrosis is 
present.



                                        



                                        2.The left kidney is  normal size measur

ing 10.3 cm in long axis. The kidney has

normal echogenicity. Vascular flow is unremarkable. There is no evidence of 
perinephric fluid, solid mass, or calculus. Moderate left hydronephrosis is 
present.



                                        



                                        3.The bladder is unremarkable.



                                        



                                        4.Hepatic steatosis is incidentally note

d.



                                        



                                        IMPRESSION:



                                        



                                        Mild right and moderate left hydronephro

sis.



                                        



                                        Hepatic steatosis.



                                        



                                        [facility]-[id number]









                Performing Organization Address         City/State/ZIP Code Phon

e Number

 

                [facility]      [address] [place] [address] 



CBC with platelet and differential (10/21/2019  4:10 AM CDT)Only the most recent
of3 resultswithin the time period is included.





             Component    Value        Ref Range    Performed At Pathologist



                                                                 Signature

 

             WBC          5.10         4.50 - 11.00 [facility] 



                                       k/uL         HOSPITAL     

 

             RBC          3.66 (L)     4.20 - 5.50  [facility] 



                                       m/uL         \[facility]\""     

 

             HGB          10.2 (L)     12.0 - 16.0  [facility]         \[facility]""     

 

             HCT          32.9 (L)     37.0 - 47.0 % [facility]     

 

             MCV          89.9         82.0 - 100.0 [facility]     

 

             MCH          27.9         27.0 - 34.0 pg [facility]     

 

             MCHC         31.0         31.0 - 37.0  [facility] 



                                       g/dL         \[facility]\""     

 

             RDW - SD     39.3         37.0 - 55.0 fL [facility]     

 

             MPV          9.7          8.8 - 13.2 fL [facility]     

 

             Platelet count 228          150 - 400 k/uL [facility]     

 

             Nucleated RBC 0.00         /100 WBC     [facility]     

 

             Neutrophils  46.5         39.0 - 69.0 % [facility]     

 

             Lymphocytes  42.5         25.0 - 45.0 % [facility]     

 

             Monocytes    9.6          0.0 - 10.0 % [facility]     

 

             Eosinophils  0.8          0.0 - 5.0 %  [facility]     

 

             Basophils    0.2          0.0 - 1.0 %  [facility]     

 

             Immature granulocytes 0.4Comment:  0.0 - 1.0 %  [facility] 



                          "Immature                 HOSPITAL     



                          granulocytes"                           



                          (promyelocytes                           



                          , myelocytes,                           



                          metamyelocytes                           



                          )                                      









                                        Specimen

 

                                        Blood









                Performing Organization Address         City/[facility]/ZIP Code Phon

e Number

 

                [facility] DEPARTMENT OF PATHOLOGY AND [address] [id number]

0 



                [address] [address] 



Estimated GFR (10/21/2019  4:00 AM CDT)Only the most recent of3 resultswithin 
the time period is included.





             Component    Value        Ref Range    Performed At Pathologist



                                                                 [facility]

 

             Estimated GFR >=90         mL/min/1.73  [facility] 



                          Comment:     m2           HOSPITAL     



                          Catergory Units  Interpretation                 

          



                          G1     >=90   Normal or high              

             



                          G2     60-89  Mildly decreased            

               



                          G3a    45-59  Mildly to moderately decreas

ed                           



                          G3b    30-44  Moderately to severely decre

ased                           



                          G4     15-29  Severely decreased          

                 



                          G5     <15   Kidney failure             

              



                          The eGFR was calculated using the Chronic Kidney Disea

se                           



                          Epidemiology Collaboration (CKD-EPI) equation.        

                   



                          Interpretation is based on recommendations of the     

                      



                          National Kidney Foundation-Kidney Disease Outcomes Josias

lity                           



                          Initiative (NKF-KDOQI) published in 2014.             

              



                                                                 









                                        Specimen

 

                                        Plasma specimen









                Performing Organization Address         City/[facility]/ZIP Code Phon

e Number

 

                [facility] DEPARTMENT OF PATHOLOGY AND [address] [place] [address]

0 



                [address] [address] 



Comprehensive metabolic panel (10/21/2019  4:00 AM CDT)Only the most recent of3 
resultswithin the time period is included.





             Component    Value        Ref Range    Performed At Pathologist



                                                                 Signature

 

             Sodium       139          135 - 148    [facility] 



                                       mEq/L        \[facility]\""     

 

             Potassium    3.6          3.5 - 5.0    [facility] 



                                       mEq/L        \[facility]\""     

 

             Chloride     104          98 - 112 mEq/L [facility]     

 

             CO2          22 (L)       24 - 31 mEq/L [facility]     

 

             Anion gap    13@ANIO      7 - 15 mEq/L [facility]     

 

             BUN          5 (L)        6 - 20 mg/dL [facility]     

 

             Creatinine   0.49 (L)     0.50 - 0.90  [facility] 



                                       mg/dL        HOSPITAL     

 

             Glucose      80           65 - 99 mg/dL [facility]     

 

             Calcium      9.1          8.3 - 10.2   [facility] 



                                       mg/dL        \[facility]\""     

 

             Protein      6.7          6.3 - 8.3 g/dL [facility] 



                          Comment:                  HOSPITAL     



                          Xbyoefj1296.6-7.0 g/dL                           



                          1 appz9242.4-7.6 g/dL                           



                          7 months-9psva948.1-7.3 g/dL                          

 



                          1-2 fuwpj600.6-7.5 g/dL                           



                          >3 xdcdo539.0-8.0 g/dL                           



                          18-4538360.3-8.3 g/dL                           



                                                                 

 

             Albumin      3.0 (L)      3.5 - 5.0 g/dL [facility]     

 

             A/G ratio    0.8          0.7 - 3.8    [facility]     

 

             Alkaline phosphatase 82           35 - 104 U/L [facility]     

 

             AST          16           10 - 35 U/L  [facility]     

 

             ALT          29           5 - 50 U/L   [facility]     

 

             Total bilirubin 0.3          0.0 - 1.2    [facility] 



                                       mg/dL        HOSPITAL     









                                        Specimen

 

                                        Plasma specimen









                Performing Organization Address         City/State/ZIP Code Phon

e Number

 

                [facility] DEPARTMENT OF PATHOLOGY AND [address], [place] [address]

0 



                GENOMIC MEDICINE                                 

 

                [facility] [address] [place] [address] 



Rickettsia typhi (typhus fever) Abs, IgG/IgM (10/20/2019  4:00 AM CDT)





             Component    Value        Ref Range    Performed At Pathologist Sig

nature

 

             R typhi IgG  <1:64        <1:64         [facility] 



                          Comment:                               



                          INTERPRETIVE INFORMATION: Typhus Fever Antibody, IgG  

                         



                           Less than 1:64 ....... Negative - No significant le

selvin of                           



                                       IgG antibody dete

cted.                           



                           1:64 - 1:128 ......... Equivocal - Questionable pre

sence                           



                                       of IgG antibody d

etected. Repeat                           



                                       testing in 10-14 

days may be                           



                                       helpful.         

                  



                           1:256 or greater ..... Positive - Presence of IgG a

ntibody                           



                                       to detected, sugg

estive of current                           



                                       or past infection

.                           



                          Antibody reactivity to Rickettsia typhi antigen should

 be                           



                          considered group-reactive for the Typhus Fever group, 

which                           



                          includes Rickettsia prowazekii.                       

    



                          Seroconversion between acute and convalescent sera is 

considered                           



                          strong evidence of recent infection. The best evidence

 for                           



                          infection is a significant change (fourfold difference

 in titer)                           



                          on two appropriately timed specimens, where both tests

 are done in                           



                          the same laboratory at the same time. Acute-phase spec

imens are                           



                          collected during the first week of illness and convale

scent -phase                           



                          samples are generally obtained 2-4 weeks after resolut

ion of                           



                          illness. Ideally these samples should be tested simult

aneously at                           



                          the same facility. If the samples submitted was collec

maddy during                           



                          the acute phase of illness, submit a marked convalecse

nt sample                           



                          within 25 days for paired testing.                    

       

 

             R typhi IgM  <1:64        <1:64         [facility] 



                          Comment:                               



                          INTERPRETIVE INFORMATION: Typhus Fever Antibody, IgM  

                         



                           Less than 1:64 ...... Negative-No significant level

 of                           



                                      IgM antibody detec

maddy.                           



                           1:64 or greater ..... Positive-Presence of IgM anti

body                           



                                      detected, which ma

y indicate a                           



                                      current or rectent

 infection;                           



                                      however, low level

s of IgM                           



                                      antibodies may occ

asionally persist                           



                                      for more than 12 m

onths                           



                                      post-infection.   

                        



                          Antibody reactivity to Rickettsia typhi antigen should

 be                           



                          considered group-reactive for the Typhus Fever group, 

which                           



                          includes Rickettsia prowazekii.                       

    



                          Seroconversion between acute and convalescent sera is 

considered                           



                          strong evidence of recent infection. The best evidence

 is a                           



                          significant change (fourfold difference in titer) on t

wo                           



                          appropriately timed specimens, where both tests are do

ne in the                           



                          same laboratory at the same time. Acute-phase specimen

s are                           



                          collected during the first week of illness and convale

scent-phase                           



                          samples are generally obtained 2-4 weeks after resolut

ion of                           



                          illness. Ideally these samples should be tested simult

aneously at                           



                          the same facility. If the sample submitted was collect

ed during                           



                          the actue-phase of illness, submit a marked convalesce

nt sample                           



                          within 25 days for paired testing.                    

       



                          Performed by [facility],                       

    



                          [address] [place] [address] [phone]            

               



                          [email or web address], Dion Martins MD, Lab. Director     

                      









                                        Specimen

 

                                        Serum









                Performing Organization Address         City/State/ZIP Code Phon

e Number

 

                [facility] [address] [place] [address] 

 

                [facility] [address] [place] [address] 



Rickettsia rickettsii (RMSF) Abs IgG/IgM (10/20/2019  4:00 AM CDT)





             Component    Value        Ref Range    Performed At Pathologist



                                                                 Signature

 

             R rickettsii IgG <1:64        <1:64         [facility] 



                          Comment:                               



                          INTERPRETIVE INFORMATION: Rickettsia rickettsii ([place]

 [facility]                           



                                       Spotted Fever) 

Ab, IgG                           



                                                           



                           Less than 1:64 ....... Negative - No significant le

selvin of                           



                                       IgG antibody dete

cted.                           



                           1:64 - 1:128 ......... Low Positive - Presence of I

gG                           



                                       Antibody detected

, suggestive of                           



                                       current or past i

nfection.                           



                           1:256 or greater ..... Positive - Presence of IgG  

                         



                                       antibody detected

, suggestive of                           



                                       current or past i

nfection.                           



                          Antibody reactivity to Rickettsia rickettsii antigen s

hould be                           



                          considered Spotted Fever group reactive. Other organis

ms within                           



                          the group include R. akari, R. conorrii, R. australis 

and R.                           



                          sibirica.                              



                          Seroconversion, a fourfold or greater rise in antibody

 titer,                           



                          between acute and convalescent sera is considered stro

ng evidence                           



                          of recent infection. Acute-phase specimens are collect

ed during                           



                          the first week of illness and convalescent-phase sampl

es are                           



                          generally obtained 2-4 weeks after resolution of illne

ss. Ideally                           



                          these samples should be tested simultaneously at the s

andreas                           



                          facility. If the sample submitted was collected during

 the                           



                          acute-phase of illness, submit a marked convalescent s

ample within                           



                          25 days for paired testing.                           

 

             R rickettsii IgM <1:64        <1:64         [facility] 



                          Comment:                               



                          INTERPRETIVE INFORMATION: Rickettsia rickettsii [facility]                           



                                       Spotted Fever) 

Ab, IgM                           



                           Less than 1:64 ....... Negative - No significant le

selvin of                           



                                       IgM antibody dete

cted.                           



                           1:64 or greater ...... Positive - Presence of IgM a

ntibody                           



                                       detected, which m

ay indicate a                           



                                       current or recent

 infection;                           



                                       however, low leve

ls of IgM                           



                                       antibodies may oc

casionally persist                           



                                       for more than 12 

months                           



                                       post-infection.  

                         



                          Antibody reactivity to Rickettsia rickettsii antigen s

hould be                           



                          considered Spotted Fever group reactive. Other organis

ms within                           



                          the group include R. akari, R. conorrii, R. australis 

and R.                           



                          sibirica.                              



                          Seroconversion, a fourfold or greater rise in antibody

 titer,                           



                          between acute and convalescent sera is considered stro

ng evidence                           



                          of recent infection. Acute-phase specimens are collect

ed during                           



                          the first week of illness and convalescent-phase sampl

es are                           



                          generally obtained 2-4 weeks after resolution of illne

ss. Ideally                           



                          these samples should be tested simultaneously at the s

andreas                           



                          facility. If the sample submitted was collected during

 the                           



                          acute-phase of illness, submit a marked convalescent s

ample within                           



                          25 days for paired testing.                           



                          [facility] does not use IgM results for routine diagnosti

c testing of                           



                          [place] Spotted Fever, as the response may not 

be specific                           



                          for the agent (resulting in false positives) and the I

gM response                           



                          may be persistent from past infection.                

           



                          Performed by [facility],                       

    



                          [address] [place] [address] [phone]            

               



                          [email or web address], Dion Martins MD, Lab. Director     

                      









                                        Specimen

 

                                        Serum









                Performing Organization Address         City/[facility]/ZIP Code Phon

e Number

 

                [facility] [address] [place] [address] 

 

                 [place] [address] 



HSV type 1/2 combined Ab, IgM (10/20/2019  4:00 AM CDT)





             Component    Value        Ref Range    Performed At Pathologist



                                                                 Signature

 

             HSV 1/2 combined 0.46         <=0.89 IV    [facility] 



             Ab, IgM      Comment:                               



                          INTERPRETIVE INFORMATION: Herpes Simplex Virus Type 1 

and/or 2                           



                          Antibodies, IgM by LAURA                           



                           0.89 IV or Less .......... Not Detected            

               



                           0.90 - 1.09 IV ........... Indeterminate- Repeat te

sting in                           



                                         10-14 days 

may be helpful.                           



                           1.10 IV or Greater ....... Detected-IgM antibody to

 HSV                           



                                         detected, w

hich may indicate a                           



                                         current or 

recent infection.                           



                                         However, lo

w levels of IgM                           



                                         antibodies 

may occasionally                           



                                         persist for

 more than 12                           



                                         months post

-infection.                           



                          Performed by [facility],                       

    



                          [address] [place] [address] [phone]            

               



                          [email or web address], Dion Martins MD, Lab. Director     

                      



                                                                 









                                        Specimen

 

                                        Serum









                Performing Organization Address         City/[facility]/ZIP Code Phon

e Number

 

                [facility] LABORATORY [address] [place] [address] 

 

                [facility] [place] [address] 



Cytomeg IgG/IgM (10/20/2019  4:00 AM CDT)





             Component    Value        Ref Range    Performed At Pathologist



                                                                 Signature

 

             Cytomegalovirus Ab, IgM Negative     Negative     [facility]     

 

             Cytomegalovirus Ab, IgG Positive (A) Negative     [facility]

 



                          Comment:                  HOSPITAL     



                          Positive; IgG antibody to CMV detected which may indic

ate                           



                          exposure to CMV infection.                           



                                                                 









                                        Specimen

 

                                        Serum









                Performing Organization Address         City/[facility]/ZIP Code Phon

e Number

 

                [facility] DEPARTMENT OF PATHOLOGY AND [address] [place] [address]

0 



                GENOMIC MEDICINE                                 

 

                [address] [address] 



Hepatitis acute panel (10/20/2019  4:00 AM CDT)





             Component    Value        Ref Range    Performed At Pathologist Sig

nature

 

             Hepatitis A IgM Non-reactive Non-reactive [facility]     

 

             Hepatitis B core Non-reactive Non-reactive [facility]     

 

             Hepatitis B surface Non-reactive Non-reactive [facility]     

 

             Hepatitis C Ab Non-reactive Non-reactive [facility]     









                                        Specimen

 

                                        Serum









                Performing Organization Address         City/[facility]/ZIP Code Phon

e Number

 

                [facility] DEPARTMENT OF PATHOLOGY AND [address] [place] [address] [address] [place] [id number] 



Rheumatoid factor (10/20/2019  4:00 AM CDT)





             Component    Value        Ref Range    Performed At Pathologist Sig

[facility]

 

             Rheumatoid factor 13           0 - 13 IU/mL [facility] 









                                        Specimen

 

                                        Plasma specimen









                Performing Organization Address         City/[facility]/ZIP Code Phon

e Number

 

                [facility] DEPARTMENT OF PATHOLOGY AND [address] [place] [address] 



C3 complement component (10/20/2019  4:00 AM CDT)





             Component    Value        Ref Range    Performed At Pathologist Sig

[facility]

 

             C3 complement 165          90 - 180 mg/dL [facility]

L 









                                        Specimen

 

                                        Plasma specimen









                Performing Organization Address         City/[facility]/ZIP Code Phon

e Number

 

                [facility] DEPARTMENT OF PATHOLOGY AND [address] [place] [address]

[address] [place] [address] 



C4 complement component (10/20/2019  4:00 AM CDT)





             Component    Value        Ref Range    Performed At Pathologist Sig

[facility]

 

             C4 complement 20           10 - 40 mg/dL [facility]

 









                                        Specimen

 

                                        Plasma specimen









                Performing Organization Address         City/[facility]/ZIP Code Phon

e Number

 

                [facility] DEPARTMENT OF PATHOLOGY AND [address] [place] [address] 



Cytomegalovirus by PCR (10/20/2019  3:50 AM CDT)Only the most recent of2 results
within the time period is included.





             Component    Value        Ref Range    Performed At Pathologist



                                                                 Signature

 

             Cytomegalovirus by PCR Not-Detected Not-Detected [facility] 



                                       IU/mL        \[facility]\""     

 

             Cytomegalovirus by PCR See link                  [facility] 



                          below for PDF              HOSPITAL     



                          Lab                                    



                          ReportComment                           



                          : Case                                 



                          Number:                                



                          [id number]                           









                                        Specimen

 

                                        









                Performing Organization Address         City/[facility]/ZIP Code Phon

e Number

 

                [facility] DEPARTMENT OF PATHOLOGY AND [address] [place] [address] 

 

                [facility]                                 



Herpes simplex virus by PCR (10/20/2019  3:50 AM CDT)Only the most recent of2 
resultswithin the time period is included.





             Component    Value        Ref Range    Performed At Pathologist



                                                                 [facility]

 

             Herpes virus, PCR Not-Detected Not-Detected [facility]     

 

             Herpes virus, PCR See link below              [facility] 



                          for PDF Lab               HOSPITAL     



                          ReportComment:                           



                          Case Number:                           



                          [id number]                           









                                        Specimen

 

                                        









                Performing Organization Address         City/[facility]/ZIP Code Phon

e Number

 

                [facility] DEPARTMENT OF PATHOLOGY AND [address] [place] [address]                                 



Stepan Barr Virus (EBV) by PCR (10/20/2019  3:50 AM CDT)Only the most recent of
2 resultswithin the time period is included.





             Component    Value        Ref Range    Performed At Pathologist



                                                                 [facility]

 

             Stepan Barr virus, Not-Detected Not-Detected [facility] 



             PCR                       copies/mL    HOSPITAL     

 

             Stepan Barr virus, See link below              [facility] 



             PCR          for PDF Lab               HOSPITAL     



                          ReportComment:                           



                          Case Number:                           



                          [id number]                           









                                        Specimen

 

                                        









                Performing Organization Address         City/[facility]/ZIP Code Phon

e Number

 

                [facility] DEPARTMENT OF PATHOLOGY AND [address] [place] [address]                                 



Hemoglobin A1c (10/20/2019  3:50 AM CDT)





             Component    Value        Ref Range    Performed At Pathologist



                                                                 Signature

 

             Hemoglobin A1C 5.7 (H)      4.0 - 5.6 %  [facility] 



                          Comment:                  HOSPITAL     



                          HbA1c cutoffs for diagnosing diabetes:                

           



                          4.0% - 5.6% = normal                           



                          5.7% - 6.4% = increased risk for diabetes (prediabetes

)9                           



                          >=6.5% = diabetes9                           



                          Goals for glycemic control (ADA 2016)                 

          



                          < 7.0% Target for nonpregnant adults with diabetes. 

                          



                          More or less stringent targets may be appropriate for 

                          



                          individual patients.                           



                          <7.5%  Target for Children and adolescents with type

 1                           



                          diabetes.                              



                                                                 









                                        Specimen

 

                                        Blood









                Performing Organization Address         City/[facility]/ZIP Code Phon

e Number

 

                [facility] DEPARTMENT OF PATHOLOGY AND [address] [place] [address]

0 



                [place] [address] 



AFB culture (10/19/2019  6:07 PM CDT)





             Component    Value        Ref Range    Performed At Pathologist



                                                                 Signature

 

             AFB culture  No growth after 6 weeks of incubation.              [facility]

[facility] 



             isolate      Comment:                  HOSPITAL     



                          Specimen Information                           



                          Specimen Source: CSF (Spinal Fluid)                   

        



                          Specimen Site: CSF (spinal fluid)                     

      



                                                                 









                                        Specimen

 

                                        Cerebrospinal fluid - CSF (spinal fluid)









                Performing Organization Address         City/[facility]/ZIP Code Phon

e Number

 

                [facility] DEPARTMENT OF PATHOLOGY AND [address] [place] [address] 



Fungus culture (10/19/2019  6:07 PM CDT)





             Component    Value        Ref Range    Performed At Pathologist



                                                                 Signature

 

             Fungus culture No growth after 4 weeks of incubation.              

[facility] 



             isolate      Comment:                  HOSPITAL     



                          Specimen Information                           



                          Specimen Source: CSF (Spinal Fluid)                   

        



                          Specimen Site: CSF (spinal fluid)                     

      



                                                                 









                                        Specimen

 

                                        Cerebrospinal fluid - CSF (spinal fluid)









                Performing Organization Address         City/[facility]/ZIP Code Phon

e Number

 

                [facility] DEPARTMENT OF PATHOLOGY AND [address] [place] [address] 



IR Lumbar Puncture by Radiology (10/19/2019  5:12 PM CDT)





                                        Specimen

 

                                        









                          Narrative                 Performed At

 

                                        EXAMINATION: IR LUMBAR PUNCTURE



                                         RADIANT



                                         



                                        



                                        CLINICAL HISTORY: fevers headaches joycelyn

rn for meningitis



                                        



                                         



                                        



                                        COMPARISON: None



                                        



                                         



                                        



                          TECHNIQUE: Informed consent and a timeout was performe

d. The patient's 



                          lower back was prepped and draped in the usual sterile

 fashion. 



                                        Fluoroscopy was used for image guidance 

for lumbar puncture.



                                        



                                         



                                        



                                         



                                        



                                        FINDINGS:



                                        



                                         



                                        



                          1% lidocaine was used for local anesthesia. Under dire

ct fluoroscopic 



                          guidance, access to the thecal sac was made with a 22-

gauge spinal 



                          needle at the L4-5 level. An elevated opening pressure

 of 25 cm H2O was 



                                        measured. A total of approximately 20 cc

 of 



                                        



                          clear colorless CSF was removed without difficulty. A 

normal closing 



                                        pressure of 12 cm H2O was subsequently m

easured.



                                        



                                         



                                        



                          The patient tolerated the procedure well. There were n

o complications. 



                                        The sample was sent to the laboratory fo

r analysis as requested.



                                        



                                         



                                        



                                        Total fluoroscopy time was 1 second. No 

exposure images were taken.



                                        



                                         



                                        



                                         



                                        



                                        IMPRESSION:



                                        



                                         



                                        



                          Successful fluoroscopic guided lumbar puncture includi

ng elevated 



                                        opening pressure of 25 cm H2O and normal

 closing pressure of 12 cm H2O.



                                        



                                         



                                        



                                         



                                        



                                         



                                        



                                         



                                        



                          [facility]-[id number]            









                                        Procedure Note

 

                                        Hm Interface, Radiology Results Incoming

 - 10/19/2019  5:49 PM CDT



EXAMINATION: IR LUMBAR PUNCTURE



                                        



                                        CLINICAL HISTORY: fevers headaches joycelyn

rn for meningitis



                                        



                                        COMPARISON:  None



                                        



                                        TECHNIQUE: Informed consent and a timeou

t was performed. The patient's lower 

back was prepped and draped in the usual sterile fashion. Fluoroscopy was used 
for image guidance for lumbar puncture.



                                        



                                        



                                        FINDINGS:



                                        



                                        1% lidocaine was used for local anesthes

ia. Under direct fluoroscopic guidance, 

access to the thecal sac was made with a 22-gauge spinal needle at the L4-5 
level. An elevated opening pressure of 25 cm H2O was measured.



                                        A total of approximately 20 cc of



                                        clear colorless CSF was removed without 

difficulty. A normal closing pressure of

12 cm H2O was subsequently measured.



                                        



                                        The patient tolerated the procedure well

. There were no complications. The 

sample was sent to the laboratory for analysis as requested.



                                        



                                        Total fluoroscopy time was 1 second. No 

exposure images were taken.



                                        



                                        



                                        IMPRESSION:



                                        



                                        Successful fluoroscopic guided lumbar pu

ncture including elevated opening 

pressure of 25 cm H2O and normal closing pressure of 12 cm H2O.



                                        



                                        



                                        



                                        



                                        [facility]-[id number]









                Performing Organization Address         City/[facility]/ZIP Code Phon

e Number

 

                [facility]      [address] [place] [id number] 



IgG synthesis rate study (10/19/2019  5:07 PM CDT)





             Component    Value        Ref Range    Performed At Pathologist



                                                                 [facility]

 

             IgG albumin ratio, 0.17         0.00 - 0.23  [facility]     

 

             IgG index, CSF 0.64 (H)     0.01 - 0.63  [facility]     

 

             IgG synthetic rate 2.12         -9.90 - 3.30 [facility] 



                                       mg/day       \[facility]\""     

 

             Q-albumin ratio, 2.97         2.00 - 6.00  [facility]     

 

             IgG, CSF     1.61         1.00 - 3.00  [facility] 



                                       mg/dL        \[facility]\""     

 

             Albumin, CSF <9.50 (L)    10.00 - 30.00 [facility] 



                                       mg/dL        \[facility]\""     

 

             IgG          844          700 - 1,600  [facility] 



                                       mg/dL        \[facility]\""     

 

             Albumin, S   3,200.0 (L)  3,640.0 -    [facility] 



                                       5,304.0 mg/dL HOSPITAL     









                                        Specimen

 

                                        Cerebrospinal fluid









                Performing Organization Address         City/[facility]/ZIP Code Phon

e Number

 

                [facility] DEPARTMENT OF PATHOLOGY AND [address] [place] [address]

0 



                GENOMIC MEDICINE                                 

 

                [address] [address] 



West Nile virus by PCR, CSF (10/19/2019  5:07 PM CDT)





             Component    Value        Ref Range    Performed At Pathologist



                                                                 [facility]

 

             West Nile virus Not-Detected Not-Detected [facility] 



             PCR, CSF                               \[facility]\""     

 

             West Nile virus See link below              [facility] 



             PCR, CSF     for PDF Lab               HOSPITAL     



                          ReportComment:                           



                          Case Number:                           



                          [id number]                           









                                        Specimen

 

                                        









                Performing Organization Address         City/State/ZIP Code Phon

e Number

 

                [facility] DEPARTMENT OF PATHOLOGY AND [address] [place], [place] [address]

0 



                GENOMIC MEDICINE                                 

 

                [facility] [address] [place] [address] 

 

                [facility]                                 



West Nile virus antibody panel, CSF (10/19/2019  5:07 PM CDT)





             Component    Value        Ref Range    Performed At Pathologist



                                                                 Signature

 

             West Nile IgG, 0.03         <=1.29 IV     [facility] 



             CSF          Comment:                               



                          INTERPRETIVE INFORMATION: West Nile Virus Ab IgG by EL

[place], CSF                           



                           1.29 IV or less ....... Negative: No significant   

                        



                                       level of West N

ile virus                           



                                       IgG antibody de

tected.                           



                           1.30 - 1.49 IV ........ Equivocal: Questionable    

                       



                                       presence of Sam

t Nile                           



                                       virus IgG antib

kristine detected.                           



                                       Repeat testing 

in 10-14 days                           



                                       may be helpful.

                           



                           1.50 IV or greater .... Positive: Presence of IgG  

                         



                                       antibody to Sam

t Nile virus                           



                                       detected, sugge

stive of                           



                                       current or past

 infection.                           



                          This test is intended to be used as a semi-quantitativ

e means of                           



                          detecting West Nile virus-specific IgG in CSF samples 

in which                           



                          there is a clinical suspicion of West Nile Virus infec

tion. This                           



                          test should not be used solely for quantitative purpos

es, nor                           



                          should the results be used without correlation to clin

ical history                           



                          or other data. Because other members of the Flavivirid

ae family,                           



                          such as Rand[place] encephalitis virus, show extensive  

                         



                          cross-reactivity with West Nile virus, serologic testi

ng specific                           



                          for these species should be considered.               

            



                          The detection of antibodies to West Nile virus in cere

brospinal                           



                          fluid may indicate central nervous system infection. H

owever,                           



                          consideration must be given to possible contamination 

by blood or                           



                          transfer of serum antibodies across the blood-brain ba

rrier.                           



                          Test developed and characteristics determined by [facility]. See Compliance Statement B: [employer]140 Proof/

AfterSteps                           

 

             West Nile IgM, 0.00         <=0.89 IV    [facility] REF LAB 



             CSF          Comment:                               



                          INTERPRETIVE INFORMATION: West Nile Virus Ab IgM by EL

BRITTON, CSF                           



                          0.89 IV or less ...... Negative - No significant level

                           



                                      of West Nile virus I

gM antibody                           



                                      detected.           

                



                          0.90-1.10 IV ......... Equivocal - Questionable presen

ce                           



                                      of West Nile virus I

gM antibody                           



                                      detected. Repeat barak

ting in                           



                                      10-14 days may be he

lpful.                           



                          1.11 IV or greater ... Positive - Presence of IgM     

                      



                                      antibody to West Nil

e virus                           



                                      detected, suggestive

 of current                           



                                      or recent infection.

                           



                          This test is intended to be used as a semi-quantitativ

e means of                           



                          detecting West Nile virus-specific IgM in CSF samples 

in which                           



                          there is a clinical suspicion of West Nile virus infec

tion. This                           



                          test should not be used solely for quantitative purpos

es, nor                           



                          should the results be used without correlation to clin

ical history                           



                          or other data. Because other members of the Flavivirid

ae family,                           



                          such as Randleman encephalitis virus, show extensive  

                         



                          cross-reactivity with West Nile virus, serologic testi

ng specific                           



                          for these species should be considered.               

            



                          The detection of antibodies to West Nile virus in cere

brospinal                           



                          fluid may indicate central nervous system infection. H

owever,                           



                          consideration must be given to possible contamination 

by blood or                           



                          transfer of serum antibodies across the blood-brain ba

rrier.                           



                          Test developed and characteristics determined by [facility]. See Compliance Statement B: [employer]/

CS                           



                          Performed by [facility],                       

    



                          [address] [place] [id number] [phone]            

               



                          [email or web address], Dion Martins MD, Lab. Director     

                      









                                        Specimen

 

                                        Cerebrospinal fluid









                Performing Organization Address         City/[facility]/ZIP Code Phon

e Number

 

                [facility] LABORATORY [address] [place] [address] 

 

                [facility] REF LAB [address] [place] [address] 



RANDY virus, quantitative PCR (10/19/2019  5:07 PM CDT)





             Component    Value        Ref Range    Performed At Pathologist



                                                                 Signature

 

             RANDY virus result Not-Detected Not-Detected [facility] 



                                       copies/mL    \[facility]\""     

 

             RANDY virus qPCR quant See link below              [facility] 



                          for PDF Lab               HOSPITAL     



                          ReportComment:                           



                          Case Number:                           



                          [id number]                           









                                        Specimen

 

                                        









                Performing Organization Address         City/[facility]/ZIP Code Phon

e Number

 

                [facility] DEPARTMENT OF PATHOLOGY AND [address] [place], [place] [address]

0 



                GENOMIC MEDICINE                                 

 

                [address] [address] 

 

                [facility]                                 



Varicella zoster by PCR (10/19/2019  5:07 PM CDT)





             Component    Value        Ref Range    Performed At Pathologist



                                                                 Signature

 

             VZV result   Not-Detected Not-Detected [facility] 



                                       copies/mL    \[facility]\""     

 

             Varicella zoster, See link below              [facility] 



             pcr          for PDF Lab               HOSPITAL     



                          ReportComment:                           



                          Case Number:                           



                          [id number]                           









                                        Specimen

 

                                        









                Performing Organization Address         City/[facility] Code Phon

e Number

 

                [facility] DEPARTMENT OF PATHOLOGY AND [address] [place] [address]

0 



                [address] [address] 

 

                [facility]                                 



BK virus by PCR (10/19/2019  5:07 PM CDT)





             Component    Value        Ref Range    Performed At Pathologist Sig

nature

 

             BK virus PCR Not-Detected Not-Detected [facility] 



                                       copies/mL    HOSPITAL     

 

             BK virus PCR See link below              [facility] 



                          for [facility] Lab               HOSPITAL     



                          ReportComment:                           



                          Case Number:                           



                          [id number]                           









                                        Specimen

 

                                        









                Performing Organization Address         City/[facility]/ZIP Code Phon

e Number

 

                [facility] DEPARTMENT OF PATHOLOGY AND [address] [place] [address]

0 



                [id number] [place] [address]                                 



Toxoplasma gondii by PCR (10/19/2019  5:07 PM CDT)





             Component    Value        Ref Range    Performed At Pathologist



                                                                 Signature

 

             Toxoplasma gondii CSF                        [facility] 



             source                                              

 

             Toxoplasma gondii Not Detected              [facility] 



             by PCR       Comment:                               



                          NOT DETECTED - A negative result does not rule out the

                           



                          presence of PCR inhibitors in the patient specimen or 

assay                           



                          specific nucleic acid in concentrations below the leve

l of                           



                          detection by the assay.                           



                          INTERPRETIVE INFORMATION: Toxoplasma gondii by PCR    

                       



                          This test was developed and its performance characteri

stics                           



                          determined by [facility]. The U.S. Food and Bill

g                           



                          Administration has not approved or cleared this test; 

however, FDA                           



                          clearance or approval is not currently required for cl

inical use.                           



                          The results are not intended to be used as the sole me

ans for                           



                          clinical diagnosis or patient management decisions.   

                        



                          Performed by [facility],                       

    



                          [address] [place] [address] [phone]            

               



                          [email or web address], Dion Martins MD, Lab. Director     

                      



                                                                 









                                        Specimen

 

                                        Serum









                Performing Organization Address         City/[facility]/ZIP Code Phon

e Number

 

                [facility] [address] [place] [address] 

 

                [facility] [place] [address] 



Cryptococcal antigen, screen (10/19/2019  5:07 PM CDT)





             Component    Value        Ref Range    Performed At Pathologist



                                                                 Signature

 

             Cryptococcal Ag Negative - No Cryptococcus antigen detected.       

       [facility] 



                          Comment:                  HOSPITAL     



                          Specimen Information                           



                          Specimen Source: CSF (Spinal Fluid)                   

        



                          Specimen Site: CSF (spinal fluid)                     

      



                                                                 









                                        Specimen

 

                                        Cerebrospinal fluid - CSF (spinal fluid)









                Performing Organization Address         City/[facility]/ZIP Code Phon

e Number

 

                [facility] DEPARTMENT OF PATHOLOGY AND [address] [place] [address] 



Oligoclonal banding, CSF (10/19/2019  5:07 PM CDT)





             Component    Value        Ref Range    Performed At Pathologist



                                                                 [facility]

 

             Protein, CSF 18           15 - 45      [facility]      



                                       mg/dL        [facility]     

 

             Prealbumin, CSF 5.2          3.5 - 11.1 % [facility]     

 

             Albumin, CSF 57.4         40.8 - 66.2  [facility]      



                                       %            [facility]     

 

             Alpha 1, CSF 3.7          2.3 - 6.4 %  [facility]     

 

             Alpha 2, CSF 8.3          6.1 - 12.6 % [facility]     

 

             Beta, CSF    16.8         11.7 - 24.1  [facility]     

 

             Gamma, CSF   8.6          5.6 - 12.2 % [facility]     

 

             CSF extended See Comment               [facility]      



             interpretation Comment:                  [facility]    



                          A nondiagnostic CSF protein study with an increased Ig

G index. No              HOSPITAL     



                          oligoclonal bands seen.                           



                                                                 

 

             CSF interpretation See Comment               [facility]      



                          Comment:                  [facility]    



                                Geoff Ghosh, PhD; Betty Mar MD; Yuli Lord, PhD

; Ronaldo Dacosta MD,                 

\[facility]\""                                



                          PhD                                    



                                                                 









                                        Specimen

 

                                        Cerebrospinal fluid









                Performing Organization Address         City/[facility]/ZIP Code Phon

e Number

 

                [facility] DEPARTMENT OF PATHOLOGY AND [address] [place] [address]

0 



                [address] [address] 



Enterovirus by PCR (10/19/2019  5:07 PM CDT)





             Component    Value        Ref Range    Performed At Pathologist Sig

nature

 

             Enterovirus PCR Not-Detected Not-Detected [facility]     

 

             Enterovirus PCR See link below              [facility] 



                          for PDF Lab               HOSPITAL     



                          ReportComment:                           



                          Case Number:                           



                          [id number]                           









                                        Specimen

 

                                        









                Performing Organization Address         City/[facility]/ZIP Code Phon

e Number

 

                [facility] DEPARTMENT OF PATHOLOGY AND [address] [place] [id number] [place] [address]                                 



Gram stain (10/19/2019  5:07 PM CDT)Only the most recent of2 resultswithin the 
time period is included.





             Component    Value        Ref Range    Performed At Pathologist



                                                                 Signature

 

             Gram stain isolate No WBC's or organisms seen.              [facility] 



                          Comment:                  HOSPITAL     



                          Specimen Information                           



                          Specimen Source: CSF (Spinal Fluid)                   

        



                          Specimen Site: CSF (spinal fluid)                     

      



                                                                 









                                        Specimen

 

                                        Cerebrospinal fluid - CSF (spinal fluid)









                Performing Organization Address         City/[facility]/ZIP Code Phon

e Number

 

                [facility] DEPARTMENT OF PATHOLOGY AND [address] [place] [address]

0 



                [address] [address] 



CSF culture (10/19/2019  5:07 PM CDT)





             Component    Value        Ref Range    Performed At Pathologist



                                                                 Signature

 

             CSF culture  growth after 72 hours              [facility] 



             isolate      Comment:                  HOSPITAL     



                          Specimen Information                           



                          Specimen Source: CSF (Spinal Fluid)                   

        



                          Specimen Site: CSF (spinal fluid)                     

      



                                                                 









                                        Specimen

 

                                        Cerebrospinal fluid - CSF (spinal fluid)









                Performing Organization Address         City/State/ZIP Code Phon

e Number

 

                [facility] DEPARTMENT OF PATHOLOGY AND [address] [place] [address]

0 



                [address] [address] 



CSF cell count with differential (10/19/2019  5:07 PM CDT)





             Component    Value        Ref Range    Performed At Pathologist



                                                                 Signature

 

             CSF tube number Tube 1                    [facility]     

 

             Color, CSF   Colorless                 [facility]     

 

             Appearance, CSF Clear                     [facility]     

 

             CSF supernatant Colorless                 [facility]     

 

             RBC, CSF     >1           0 - 1 /CMM   [facility]     

 

             WBC, CSF     2            0 - 5 /CMM   [facility]     

 

             CSF mononuclear cell FootnoteComment:              [facility] 



                          NO DIFF REQUIRED              HOSPITAL     









                                        Specimen

 

                                        Cerebrospinal fluid









                Performing Organization Address         City/State/ZIP Code Phon

e Number

 

                [facility] DEPARTMENT OF PATHOLOGY AND [address] [place] [address]

0 



                [address] [address] 



VDRL, CSF screen (10/19/2019  5:07 PM CDT)





             Component    Value        Ref Range    Performed At Pathologist Sig

nature

 

             VDRL, CSF screen Non-reactive Non-reactive [facility]     









                                        Specimen

 

                                        Cerebrospinal fluid









                Performing Organization Address         City/[facility]/ZIP Code Phon

e Number

 

                [facility] DEPARTMENT OF PATHOLOGY AND [address] [place] [address]

0 



                [address] [address] 



Protein, CSF (10/19/2019  5:07 PM CDT)





             Component    Value        Ref Range    Performed At Pathologist Sig

nature

 

             Protein, CSF 19           15 - 45 mg/dL [facility] 









                                        Specimen

 

                                        Cerebrospinal fluid









                Performing Organization Address         City/[facility]/ZIP Code Phon

e Number

 

                [facility] DEPARTMENT OF PATHOLOGY AND [address] [place] [address]

0 



                [address] [address] 



Glucose level, CSF (10/19/2019  5:07 PM CDT)





             Component    Value        Ref Range    Performed At Pathologist Sig

nature

 

             Glucose, CSF 63           40 - 70 mg/dL [facility] 









                                        Specimen

 

                                        Cerebrospinal fluid









                Performing Organization Address         City/[facility]/ZIP Code Phon

e Number

 

                [facility] DEPARTMENT OF PATHOLOGY AND [address] [place] [address]

0 



                [address] [address] 



Partial thromboplastin time, activated (10/19/2019  1:25 PM CDT)





             Component    Value        Ref Range    Performed At Pathologist



                                                                 [facility]

 

             PTT          37.5 (H)     23.0 - 36.0  [facility]ARMA 



                          Comment:     [facility]     



                          PTT therapeutic range for unfractionated heparin is   

                        



                          61.0-112.0 seconds which corresponds to Anti-Xa       

                    



                          0.3-0.7 U/ml.                           



                                                                 









                                        Specimen

 

                                        Blood









                Performing Organization Address         City/[facility]/ZIP Code Phon

e Number

 

                [facility] DEPARTMENT OF PATHOLOGY AND [address] [place] [address]

0 



                [address] [address] 



Prothrombin time with INR (10/19/2019  1:25 PM CDT)





             Component    Value        Ref Range    Performed At Pathologist



                                                                 Signature

 

             Prothrombin time 15.4 (H)     11.5 - 14.5  [facility]     

 

             INR          1.2                       [facility]      



                          Comment:                  [facility]    



                          The International Normalized Ratio (INR) is a therapeu

[facility]              HOSPITAL     



                          monitoring tool for patients who are stable on oral   

                        



                          anticoagulant therapy. An INR of 2.0-3.0 is suggested 

for deep                           



                          vein thrombosis/pulmonary embolism.                   

        



                                                                 









                                        Specimen

 

                                        Blood









                Performing Organization Address         City/State/ZIP Code Phon

e Number

 

                [facility] DEPARTMENT OF PATHOLOGY AND [address] [place] [address]

0 



                [address] [address] 



Respiratory pathogen panel (10/19/2019  1:20 PM CDT)





             Component    Value        Ref Range    Performed At Pathologist



                                                                 [facility]

 

             Respiratory  Negative for all pathogens tested:              [facility]      



             pathogen panel Negative for Adenovirus              [facility]    



                          Negative for Coronavirus HKU1              \[facility]\""   

  



                          Negative for Coronavirus NL63                         

  



                          Negative for Coronavirus 229E                         

  



                          Negative for Coronavirus OC43                         

  



                          Negative for Human Metapneumovirus                    

       



                          Negative for Rhinovirus/Enterovirus                   

        



                          Negative for Influenza A                           



                          Negative for Influenza A/H1                           



                          Negative for Influenza A/H3                           



                          Negative for Influenza A/H1-2009                      

     



                          Negative for Influenza B                           



                          Negative for Parainfluenza Virus 1                    

       



                          Negative for Parainfluenza Virus 2                    

       



                          Negative for Parainfluenza Virus 3                    

       



                          Negative for Parainfluenza Virus 4                    

       



                          Negative for Respiratory Syncytial Virus              

             



                          Negative for Bordetella pertussis                     

      



                          Negative for Chlamydophila pneumoniae                 

          



                          Negative for Mycoplasma pneumoniae                    

       



                          This real-time PCR assay detects the presence of nucle

ic                           



                          acids (RNA or DNA) for the respiratory pathogens liste

d.                            



                          A result of "Not-detected" does not exclude the possib

ility                           



                          of the presence of one or more pathogens at concentrat

ions                           



                          less than the detectable limits of the assay.         

                  



                                                                 



                          Comment:                               



                          Specimen Information                           



                          Specimen Source: Nares                           



                          Specimen Site: Left                           



                                                                 









                                        Specimen

 

                                        Nares - Left









                Performing Organization Address         City/[facility]/ZIP Code Phon

e Number

 

                [facility] DEPARTMENT OF PATHOLOGY AND [address] [place] [address]

0 



                [address] [address] 



Influenza antigen test, reflex negative to RPP (10/19/2019  1:20 PM CDT)





             Component    Value        Ref Range    Performed At Pathologist



                                                                 Signature

 

             Influenza antigen Negative for Influenza A/B antigen.              

[facility] 



                          Comment:                  HOSPITAL     



                          Specimen Information                           



                          Specimen Source: Nares                           



                          Specimen Site: Left                           



                                                                 









                                        Specimen

 

                                        Nares - Left









                Performing Organization Address         City/[facility]/ZIP Code Phon

e Number

 

                [facility] DEPARTMENT OF PATHOLOGY AND [address] [place] [address]

0 



                [address] [address] 



XR Chest 1 Vw Portable (10/19/2019 10:03 AM CDT)





                                        Specimen

 

                                        









                          Narrative                 Performed At

 

                                        EXAMINATION: XR CHEST 1 VW PORTABLE



                                         [facility]



                                         



                                        



                                        CLINICAL HISTORY: Fevers



                                        



                                         



                                        



                                        COMPARISON: None



                                        



                                         



                                        



                                        IMPRESSION:



                                        



                                         



                                        



                                        Normal single view chest



                                        



                                         



                                        



                                        The lungs are hypoexpanded but clear.



                                        



                                         



                                        



                                        The heart is not enlarged.



                                        



                                         



                                        



                                        The bony structures are within normal li

mits.



                                        



                                         



                                        



                          [id number]           









                                        Procedure Note

 

                                        Hm Interface, Radiology Results Incoming

 - 10/19/2019 10:27 AM CDT



EXAMINATION:  XR CHEST 1 VW PORTABLE



                                        



                                        CLINICAL HISTORY:  Fevers



                                        



                                        COMPARISON:  None



                                        



                                        IMPRESSION:



                                        



                                        Normal single view chest



                                        



                                        The lungs are hypoexpanded but clear.



                                        



                                        The heart is not enlarged.



                                        



                                        The bony structures are within normal li

mits.



                                        



                                        [id number]









                Performing Organization Address         City/[facility]/ZIP Code Phon

e Number

 

                 [facility]      [address] [place] [address] 



HIV Ag/Ab combination (10/19/2019  4:42 AM CDT)





             Component    Value        Ref Range    Performed At Pathologist



                                                                 Signature

 

             HIV Ag/Ab combination Non-reactive Non-reactive [facility]     









                                        Specimen

 

                                        Blood









                Performing Organization Address         City/[facility]/ZIP Code Phon

e Number

 

                [facility] DEPARTMENT OF PATHOLOGY AND [address] [place] [address]

0 



                [address] [address] 



Infectious mononucleosis screen (10/19/2019  4:42 AM CDT)





             Component    Value        Ref Range    Performed At Pathologist Sig

nature

 

             Heterophile Ab screen Negative     Negative     [facility]     









                                        Specimen

 

                                        Blood









                Performing Organization Address         City/[facility]/ZIP Code Phon

e Number

 

                [facility] DEPARTMENT OF PATHOLOGY AND [address] [place] [address]

0 



                [address] [address] 



Sedimentation rate (10/19/2019  4:42 AM CDT)





             Component    Value        Ref Range    Performed At Pathologist Sig

nature

 

             Sedimentation rate 26 (H)       0 - 20 mm/hr [facility]     









                                        Specimen

 

                                        Blood









                Performing Organization Address         City/[facility]/ZIP Code Phon

e Number

 

                [facility] DEPARTMENT OF PATHOLOGY AND [address] [place] [address]

0 



                [address] [address] 



C-reactive protein (10/19/2019  4:42 AM CDT)





             Component    Value        Ref Range    Performed At Pathologist Sig

nature

 

             CRP          21.35 (H)    0.00 - 0.50 mg/dL [facility]     









                                        Specimen

 

                                        Plasma specimen









                Performing Organization Address         City/[facility]/ZIP Code Phon

e Number

 

                [facility] DEPARTMENT OF PATHOLOGY AND [address] [place] [address]

0 



                [address] [address] 



KUSH (10/19/2019  4:42 AM CDT)





             Component    Value        Ref Range    Performed At Pathologist Sig

nature

 

             KUSH screen   Negative     Negative     [facility] 









                                        Specimen

 

                                        Blood









                Performing Organization Address         City/[facility]/ZIP Code Phon

e Number

 

                [facility] DEPARTMENT OF PATHOLOGY AND [address] [place], [place] [address]

0 



                [address] [address] 



Urinalysis screen and microscopy, with reflex to culture (10/19/2019 12:25 AM 
CDT)





             Component    Value        Ref Range    Performed At Pathologist



                                                                 Signature

 

             Specimen site Clean catch               [facility]     

 

             Color, UA    Straw                     [facility]     

 

             Appearance, UA Hazy                      [facility]     

 

             Specific gravity, UA 1.008        1.001 - 1.035 [facility]     

 

             pH, UA       6.0          5.0 - 8.5    [facility]     

 

             Protein, UA  Negative     Negative     [facility]     

 

             Glucose, UA  Negative     Negative     [facility]     

 

             Ketones, UA  1+ (A)       Negative     [facility]     

 

             Bilirubin, UA Negative     Negative     [facility]     

 

             Blood, UA    Small (A)    Negative     [facility]     

 

             Nitrite, UA  Negative     Negative     [facility]     

 

             Urobilinogen, UA <2.0         <2.0         [facility]     

 

             Leukocyte esterase, Negative     Negative     [facility]     

 

             Epithelial cells, UA 5            /HPF         [facility]     

 

             WBC, UA      6 (H)        0 - 4 /HPF   [facility]     

 

             RBC, UA      2            0 - 5 /HPF   [facility]     

 

             Bacteria, UA Few          None seen    [facility]     

 

             Yeast, UA    Few (A)                   [facility]     

 

             Yeast with   None seen                 [facility] 



             pseudohyphae,                            HOSPITAL     









                                        Specimen

 

                                        Urine









                Performing Organization Address         City/[facility]/ZIP Code Phon

e Number

 

                [facility] DEPARTMENT OF PATHOLOGY AND [address] [place] [address]

0 



                [address] [address] 



Blood culture, aerobic &amp; anaerobic (10/19/2019 12:15 AM CDT)Only the most 
recent of2 resultswithin the time period is included.





             Component    Value        Ref Range    Performed At Pathologist



                                                                 Signature

 

             Blood culture No growth after 5 days of incubation.              [facility] 



             isolate      Comment:                  HOSPITAL     



                          Specimen Information                           



                          Specimen Source: Blood                           



                          Specimen Site: Wrist, right                           



                                                                 









                                        Specimen

 

                                        Blood - Wrist, right









                Performing Organization Address         City/[facility]/ZIP Code Phon

e Number

 

                [facility] DEPARTMENT OF PATHOLOGY AND [address] [place] [address]

0 



                [address] [address] 



Total iron binding capacity (10/19/2019 12:05 AM CDT)





             Component    Value        Ref Range    Performed At Pathologist Sig

nature

 

             Iron level   14 (L)       37 - 145 ug/dL [facility]     

 

             Iron binding capacity 268          200 - 400 ug/dL [facility]     

 

             % Saturation 5.2 (L)      15.0 - 38.0 % [facility]     









                                        Specimen

 

                                        Plasma specimen









                Performing Organization Address         City/[facility]/ZIP Code Phon

e Number

 

                [facility] DEPARTMENT OF PATHOLOGY AND [address] [place] [address]

0 



                [address] [address] 



Thyroid stimulating hormone (10/19/2019 12:05 AM CDT)





             Component    Value        Ref Range    Performed At Pathologist Sig

nature

 

             TSH          0.84         0.27 - 4.20 uIU/mL [facility] 









                                        Specimen

 

                                        Plasma specimen









                Performing Organization Address         City/[facility]/ZIP Code Phon

e Number

 

                [facility] DEPARTMENT OF PATHOLOGY AND [address] [place] [address]

0 



                [address] [address] 



Phosphorus level (10/19/2019 12:05 AM CDT)





             Component    Value        Ref Range    Performed At Pathologist Sig

nature

 

             Phosphorus   2.9          2.4 - 4.5 mg/dL [facility]

L 









                                        Specimen

 

                                        Blood









                Performing Organization Address         City/[facility]/ZIP Code Phon

e Number

 

                [facility] DEPARTMENT OF PATHOLOGY AND [address] [place] [address]

0 



                [address] [address] 



Magnesium level (10/19/2019 12:05 AM CDT)





             Component    Value        Ref Range    Performed At Pathologist Sig

nature

 

             Magnesium    1.9          1.6 - 2.6 mg/dL [facility]

L 









                                        Specimen

 

                                        Blood









                Performing Organization Address         City/[facility]/ZIP Code Phon

e Number

 

                [facility] DEPARTMENT OF PATHOLOGY AND [address] [place] [address]

0 



                [address] [address] 



LDH (10/19/2019 12:05 AM CDT)





             Component    Value        Ref Range    Performed At Pathologist Sig

nature

 

             LDH          155          87 - 225 U/L [facility] 









                                        Specimen

 

                                        Plasma specimen









                Performing Organization Address         City/[facility]/ZIP Code Phon

e Number

 

                [facility] DEPARTMENT OF PATHOLOGY AND [address] [place] [address]

0 



                [address] [address] 



Lactic acid level (10/19/2019 12:05 AM CDT)





             Component    Value        Ref Range    Performed At Pathologist Sig

nature

 

             Lactic acid  1.5          0.5 - 2.2 mmol/L [facility]

AL 









                                        Specimen

 

                                        Blood









                Performing Organization Address         City/[facility]/ZIP Code Phon

e Number

 

                [facility] DEPARTMENT OF PATHOLOGY AND [address] [place] [address]

0 



                [address] [address] 



Haptoglobin (10/19/2019 12:05 AM CDT)





             Component    Value        Ref Range    Performed At Pathologist Sig

nature

 

             Haptoglobin  261 (H)      30 - 200 mg/dL [facility]

 









                                        Specimen

 

                                        Plasma specimen









                Performing Organization Address         City/[facility]/ZIP Code Phon

e Number

 

                [facility] DEPARTMENT OF PATHOLOGY AND [address] [place] [address]

0 



                [address] [address] 



Folate level (10/19/2019 12:05 AM CDT)





             Component    Value        Ref Range    Performed At Pathologist Sig

nature

 

             Folate       8.3          4.8 - 24.2 ng/mL [facility]

AL 









                                        Specimen

 

                                        Serum









                Performing Organization Address         City/[facility]/ZIP Code Phon

e Number

 

                [facility] DEPARTMENT OF PATHOLOGY AND [address] [place] [address]

0 



                [address] [address] 



Ferritin level (10/19/2019 12:05 AM CDT)





             Component    Value        Ref Range    Performed At Pathologist Sig

nature

 

             Ferritin level 239 (H)      13 - 150 ng/mL [facility]     









                                        Specimen

 

                                        Plasma specimen









                Performing Organization Address         City/[facility]/ZIP Code Phon

e Number

 

                [facility] DEPARTMENT OF PATHOLOGY AND [address] [place] [address]

[address] [place] [address] 



Vitamin B12 level (10/19/2019 12:05 AM CDT)





             Component    Value        Ref Range    Performed At Pathologist



                                                                 [facility]

 

             Vitamin B12  357          211 - 946    [facility] 



                          Comment:     pg/mL        HOSPITAL     



                          Significant overlap exists between normal and deficien

cy states.                           



                          However, most patients with deficiencies will have Ser

um B12                             



                                                    <200 pg/mL.      

                 

                                                    



                                                                 









                                        Specimen

 

                                        Serum









                Performing Organization Address         City/[facility]/ZIP Code Phon

e Number

 

                [facility] DEPARTMENT OF PATHOLOGY AND [address] [place], [place] [address]

[address] [place] [address] 



ECG 12 lead (10/18/2019 11:54 PM CDT)





             Component    Value        Ref Range    Performed At Pathologist Sig

nature

 

             Ventricular rate 86                        [facility]     

 

             Atrial rate  86                        [facility]     

 

             OK interval  144                       [facility]     

 

             QRSD interval 106                       [facility]     

 

             QT interval  372                       [facility]     

 

             QTC interval 445                       [facility]     

 

             P axis 1     11                        [facility]     

 

             QRS axis 1   8                         [facility]     

 

             T wave axis  8                         [facility]     

 

             EKG impression Normal sinus              [facility]     



                          rhythm-Nonspecific T                           



                          wave                                   



                          abnormality-Abnormal                           



                          ECG-No previous ECGs                           



                          available-Electronicall                           



                          y Signed By Chris Mar MD ([id number]) on                           



                          10/20/2019 10:29:22 AM                           









                                        Specimen

 

                                        









                          Narrative                 Performed At

 

                                        This result has an attachment that is no

t available.



                                        









                Performing Organization Address         City/State/ZIP Code Phon

e Number

 

                [facility]        [address] [place] [address] 



CT Abd/Pelvic External Study (10/18/2019  3:40 PM CDT)





                                        Specimen

 

                                        









                          Narrative                 Performed At

 

                                        This exam was not acquired at a [facility]

t facility and has not been 



                                        [facility]



                                        interpreted by a Jehovah's witness Provider. T

he exam was imported into our 



                                        



                          imaging system for comparisons purposes. 









                Performing Organization Address         City/State/ZIP Code Phon

e Number

 

                [facility]      [address] [place] [address] 



after 10/07/2019



Insurance







          Payer     Benefit Plan / Group Subscriber ID Effective Dates Phone    

 Address   Type

 

          [employer]     [employer] [id number] 2007-Present     

                HMO









           Guarantor Name Account Type Relation to Date of    Phone      Billing



                                 Patient    Birth                 Address

 

           Selin Cui Personal/Family Self       1995 [phone] 

[address] [facility]                                           ([place])     [place], [place]



                                                                  [address]







Advance Directives

For more information, please contact: [phone]





                Type            Date Recorded   Patient Representative Explanati

on

 

                Advance Directives, Living Will 2017  4:39 PM               

  



                and Medical Power of

## 2020-10-08 NOTE — XMS REPORT
Continuity of Care Document

                           Created on:2020



Patient:ABEBA REYES

Sex:Female

:1995

External Reference #:[id number]





Demographics







                          Address                   [address] 



                                                    [place] [address]

 

                          Home Phone                [phone]

 

                          Work Phone                [phone]

 

                          Mobile Phone              [phone]

 

                          Email Address             [email or web address]

 

                          Preferred Language        English

 

                          Marital Status            Unknown

 

                          Tenriism Affiliation     Unknown

 

                          Race                      Unknown

 

                          Additional Race(s)        Unavailable



                                                    Unavailable



                                                    White

 

                          Ethnic Group               or 









Author







                          Organization              [facility]

t

 

                          Address                   [address]



                                                    [place] [address]

 

                          Phone                     [phone]









Support







                Name            Relationship    Address         Phone

 

                TIERA GALVAN Unavailable     [address]       [phone]



                                                [place] [address] 

 

                NONE, OTHER     Unavailable     [address]       [phone]



                                                [place] [address] 

 

                Rebeca Reyes   Parent          Unavailable     [phone]

 

                Geoff Reyes   Parent          [address]    [phone]



                                                [place] [address] 









Care Team Providers







                    Name                Role                Phone

 

                    Asked, No Pcp       Primary Care Physician Unavailable

 

                    Sergio LOPEZ           Attending Clinician [phone]

 

                    Maura LOPEZ          Attending Clinician [phone]

 

                    Alex MCKAY           Attending Clinician [phone]

 

                    Thong LEOS           Attending Clinician Unavailable









Payers







           Payer Name Policy Type Policy     Effective Date Expiration Date Sour

ce



                                 Number                           

 

           [employer] OPEN            [id number] 2007            [place]



           ACCESS/NETWORKxx                       00:00:00              [facility]

t



           [id number]                                             



           07-PresentHMO                                             







Problems







       Condition Condition Condition Status Onset  Resolution Last   Treating Co

mments 

Source



       Name   Details Category        Date   Date   Treatment Clinician        



                                                 Date                 

 

       Fever  Fever  Disease Active 2019                             [place]



                                   0-18                               [facility]



                                   00:00:                             st



                                   00                                 

 

       UPJ    UPJ    Disease Active                              [place]



       obstructio obstructio               9-03                               Me

thodi



       n,     n,                   00:00:                             st



       congenital congenital               00                                 

 

       Complicate Complicate Disease Active                              [facility]

[place]



       d UTI  d UTI                8-20                               [facility]



       (urinary (urinary               00:00:                             st



       tract  tract                00                                 



       infection) infection)                                                  

 

       Hydronephr Hydronephr Disease Active                              [facility]

[place]



       osis, left osis, left               7-19                               Me

thodi



                                   00:00:                             st



                                   00                                 

 

       Left   Left   Disease Active                              [place]



       ureteral ureteral               5-04                               Method

i



       stone  stone                00:00:                             st



                                   00                                 

 

       [facility] Active                                    [facility]

 St



       discharge discharge                                                  [facility]

s -



       follow-up follow-up                                                  [facility]

anup



                                                                      [facility]



                                                                      [facility]



                                                                      [facility]



                                                                      [facility]

 

       Fatigue, Fatigue, Diagnosis Active                                    [facility]

 St



       unspecifie unspecifie                                                  [facility]

kes -



       d type d type                                                  [facility]



                                                                      l



                                                                      [facility]



                                                                      [facility]



                                                                      Clinics







Allergies, Adverse Reactions, Alerts







       Allergy Allergy Status Severity Reaction(s) Onset  Inactive Treating Comm

ents 

Source



       Name   Type                        Date   Date   Clinician        

 

       No Known DA     Active U             2020                      [facility]



       Allergie                             0-06                        [facility]



       [facility]                                  00:00:                      [facility]



                                          00                          l of



                                                                      [place]

 

       Hydrocod Propensi Active        GI                    Nausea [place]

n



       one    ty to                Intolerance 7-16                        [facility]

di



              adverse                      00:00:                      st



              reaction                      00                          



              s to                                                    



              drug                                                    







Family History







           Family Member Diagnosis  Comments   Start Date Stop Date  Source

 

           Natural father Diabetes                                    [place] [facility]

thodist

 

           Natural mother Seizures                                    [facility]

thodist







Social History







           Social Habit Start Date Stop Date  Quantity   Comments   Source

 

           History [facility]

odist



           Alcohol Std Drinks                                             

 

           History [facility]

odist



           Alcohol Binge                                             

 

           Sex Assigned At                                             [facility]

ethodist



           Birth                                                  

 

           Tobacco use and 2019-10-19 2019-10-19 Never used            [facility]

ethodist



           exposure   00:00:00   00:00:00                         

 

           Alcohol intake 2019-10-19 2019-10-19 Current drinker            [facility]

on [facility]



                      00:00:00   00:00:00   of alcohol            



                                            (finding)             

 

           History SDOH 2019-10-19 2019-10-19 3                     [place] [facility]

odist



           Alcohol Frequency 00:00:00   00:00:00                         

 

           Alcohol Comment 2019-10-19 2019-10-19 8 drinks on            [place] 

[facility]



                      00:00:00   00:00:00   weekends              









                Smoking Status  Start Date      Stop Date       Source

 

                Never smoker                                    [place] [facility]

t







Medications







       Ordered Filled Start  Stop   Current Ordering Indication Dosage Frequency

 Signature

                    Comments            Components          Source



     Medication Medication Date Date Medication? Clinician                (SIG) 

          



     Name Name                                                   

 

     acetaminoph      2019 2019- No             650mg Q6H  Take 2           [facility]

cecy



     en        0-21 11-20                          tablets           [facility]



     (TYLENOL)      00:00: 23:59                          (650 mg           st



     325 MG      00   :00                           total) by           



     tablet                                         mouth           



                                                  every 6           



                                                  (six)           



                                                  hours as           



                                                  needed for           



                                                  mild pain           



                                                  for up to           



                                                  30 days.           

 

     butalbital-      2019- No             1{tbl} Q4H  Take 1           [facility]

[place]



     acetaminoph      0-21 11-20                          tablet by           Me

thodi



     en-caff      00:00: 23:59                          mouth           st



     (FIORICET)      00   :00                           every 4           



     -40                                         (four)           



     mg per                                         hours as           



     tablet                                         needed for           



                                                  headaches           



                                                  for up to           



                                                  30 days.           

 

     ferrous      2019- No             325mg Q.5D Take 1           [place]

n



     sulfate 325      0-21 11-20                          tablet           [facility]

di



     (65 FE) MG      00:00: 23:59                          (325 mg           st



     tablet      00   :00                           total) by           



                                                  mouth 2           



                                                  (two)           



                                                  times a           



                                                  day with           



                                                  meals for           



                                                  30 days.           

 

     methocarbam      2019- No             500mg Q.25D Take 1           [facility]

[place]



     ol        0-21 11-20                          tablet           [facility]



     (ROBAXIN)      00:00: 23:59                          (500 mg           st



     500 MG      00   :00                           total) by           



     tablet                                         mouth 4           



                                                  (four)           



                                                  times a           



                                                  day for 30           



                                                  days.           

 

     ondansetron      2019- No             4mg  Q8H  Take 1           [facility]

ston



     ODT       0-21 11-20                          tablet (4           [facility]



     (ZOFRAN-ODT      00:00: 23:59                          mg total)           

st



     ) 4 MG      00   :00                           by mouth           



     disintegrat                                         every 8           



     ing tablet                                         (eight)           



                                                  hours as           



                                                  needed for           



                                                  nausea or           



                                                  vomiting           



                                                  for up to           



                                                  30 days.           

 

     oxybutynin      2020- No             5mg  QD   Take 1           [facility]

ton



     XL        - 09-25                          tablet (5           [facility]



     (DITROPAN      00:00: 23:59                          mg total)           st



     XL) 5 MG 24      00   :00                           by mouth           



     hr tablet                                         daily.           

 

     Ferrous Ferrous           Yes  Rita                as             [facility]



     Sulfate Sulfate                Sanders                directed           [facility]

s -



                                                                 [facility]



                                                                 l



                                                                 Outpati



                                                                 ent



                                                                 Clinics

 

     Methocarbam Methocarbam           Yes  Rita                1.5            

[facility]



     ol   ol                  Sanders                tablets           [facility]



                                                                 [facility]



                                                                 l



                                                                 Outpati



                                                                 ent



                                                                 Clinics

 

     Butalbital- Butalbital-           Yes  Rita                1 tablet       

    [facility]



     Acetaminoph Acetaminoph                Sanders                as needed      

     [facility]



     en   en                                                     [facility]



                                                                 l



                                                                 Outpati



                                                                 ent



                                                                 Clinics

 

     Acetaminoph Acetaminoph           Yes  Rita                2 tablet       

    [facility]



     en   en                  Sanders                as needed           [facility]



                                                                 [facility]



                                                                 l



                                                                 Outpati



                                                                 ent



                                                                 Clinics

 

     Ondansetron Ondansetron           Yes  Rita                1 tablet       

    [facility]



                              Sanders                on the           [facility]



                                                  tongue and           [facility]



                                                  allow to           l



                                                  dissolve           Outpati



                                                                 ent



                                                                 Clinics

 

     Oxybutynin Oxybutynin           Yes  Irta                as             [facility]

I St



     Chloride Chloride                Sanders                directed           [facility]

kes -



                                                                 [facility]



                                                                 l



                                                                 Outpati



                                                                 ent



                                                                 Clinics







Vital Signs







             Vital Name   Observation Time Observation Value Comments     Source

 

             Systolic blood 2019-10-21 12:13:48 107 mm[Hg]                [place]

n [facility]



             pressure                                            

 

             Diastolic blood 2019-10-21 12:13:48 71 mm[Hg]                 [place]

on [facility]



             pressure                                            

 

             Heart rate   2019-10-21 12:13:48 73 /min                   Shahzad Echevarria

 

             Body temperature 2019-10-21 12:13:48 36.39 Giselle                 [facility]

[facility]

 

             Respiratory rate 2019-10-21 12:13:48 16 /min                   [facility]

ton [facility]

 

             Oxygen saturation in 2019-10-21 12:13:48 99 /min                   

Shahzad Echevarria



             Arterial blood by                                        



             Pulse oximetry                                        







Procedures







                Procedure       Date / Time     Performing Clinician Source



                                Performed                       

 

                URINE CULTURE   2020 09:13:00 Amalia Hill [facility]

odist

 

                US RENAL        2019-10-21 09:17:12 Salomón Still



                                                T               

 

                HC COMPLETE BLD COUNT 2019-10-21 04:10:00 Angel Sanchez [facility]



                W/AUTO DIFF                                     

 

                COMPREHENSIVE METABOLIC 2019-10-21 04:00:00 Angel Sanchez [facility]



                [facility]AN[facility]                                           

 

                ESTIMATED GFR   2019-10-21 04:00:00 Katt Lorenzo

hodist

 

                COMPREHENSIVE METABOLIC 2019-10-20 04:00:00 Angel Sanchez [facility]



                PANEL                                           

 

                RICKETTSIA TYPHI (TYPHUS 2019-10-20 04:00:00 Arash Grewal

ston [facility]



                FEVER) ABS IGG/IGM                 [facility]   

 

                CYTOMEG IGG/IGM 2019-10-20 04:00:00 Arash Grewal [facility]

odist



                                                [facility]   

 

                HSV TYPE 1/2 COMBINED AB, 2019-10-20 04:00:00 Arash Grewal



                IGM                             [facility]   

 

                HEPATITIS ACUTE PANEL 2019-10-20 04:00:00 Arash Grewal [facility]



                                                [facility]   

 

                RHEUMATOID FACTOR 2019-10-20 04:00:00 Arash Grewal Me

thodist



                                                [facility]   

 

                C3 COMPLEMENT COMPONENT 2019-10-20 04:00:00 Arash Grewal [facility]



                                                [facility]   

 

                ESTIMATED GFR   2019-10-20 04:00:00 Katt Lorenzo Met

hodist

 

                HC COMPLETE BLD COUNT 2019-10-20 03:50:00 Angel Sanchez [facility]ai



                W/AUTO DIFF                                     

 

                HEMOGLOBIN A1C  2019-10-20 03:50:00 Angel Sanchez Me

thodist

 

                STEPAN BARR VIRUS (EBV) 2019-10-20 03:50:00 Arash Grewal [facility]



                BY PCR                          [facility]   

 

                CYTOMEGALOVIRUS BY PCR 2019-10-20 03:50:00 Arash Grewal

on Bahai



                                                [facility]   

 

                HERPES SIMPLEX VIRUS BY 2019-10-20 03:50:00 Arash Grewal Bahai



                PCR                             [facility]   

 

                FUNGUS CULTURE  2019-10-19 18:07:00 Arash Grewal Meth

odist



                                                [facility]   

 

                AFB CULTURE     2019-10-19 18:07:00 Arash Grewal Meth

odist



                                                [facility]   

 

                IR LUMBAR PUNCTURE 2019-10-19 17:12:00 Arash Grewal M

ethodist



                                                [facility]   

 

                CSF CULTURE     2019-10-19 17:07:00 Arash Grewal Meth

odist



                                                [facility]   

 

                CRYPTOCOCCAL ANTIGEN 2019-10-19 17:07:00 Arash Grewal

 Bahai



                SCREEN                          [facility]   

 

                GRAM STAIN      2019-10-19 17:07:00 Katt Lorenzo Met

hodist

 

                CSF CELL COUNT WITH 2019-10-19 17:07:00 Arash Grewal



                DIFFERENTIAL                    [facility]   

 

                GLUCOSE LEVEL, CSF 2019-10-19 17:07:00 Arash Grewal M

ethodist



                                                [facility]   

 

                IGG SYNTHESIS RATE STUDY 2019-10-19 17:07:00 Arash Grewal Bahai



                                                [facility]   

 

                [place] VIRUS ANTIBODY 2019-10-19 17:07:00 Arash Grewal Bahai



                PANEL, CSF                      [facility]   

 

                OLIGOCLONAL BANDING, CSF 2019-10-19 17:07:00 Arash Grewal Bahai



                                                [facility]   

 

                VDRL, CSF SCREEN 2019-10-19 17:07:00 Arash Grewal

hodist



                                                [facility]   

 

                CYTOMEGALOVIRUS BY PCR 2019-10-19 17:07:00 Katt Lorenzo

 

                ENTEROVIRUS BY PCR 2019-10-19 17:07:00 Katt Lorenzo

 

                HERPES SIMPLEX VIRUS BY 2019-10-19 17:07:00 Katt Lorenzo



                PCR                                             

 

                VARICELLA ZOSTER BY PCR 2019-10-19 17:07:00 Katt Lorenzo

 

                TOXOPLASMA GONDII BY PCR 2019-10-19 17:07:00 Katt Lorenzo

 

                PROTHROMBIN TIME WITH INR 2019-10-19 13:25:00 Arash Grewal Bahai



                                                [facility]   

 

                PARTIAL THROMBOPLASTIN 2019-10-19 13:25:00 Arash Grewal

on Bahai



                TIME (PTT)                      [facility]   

 

                INFLUENZA ANTIGEN TEST, 2019-10-19 13:20:00 Gemma Brody Bahai



                REFLEX NEGATIVE TO RPP                                 

 

                RESPIRATORY PATHOGEN PANEL 2019-10-19 13:20:00 Katt Lorenzo

 

                XR CHEST 1 VW PORTABLE 2019-10-19 10:03:44 Gemma Brody

on Bahai

 

                KUSH             2019-10-19 04:42:00 Gemma Brody Meth

odist

 

                SEDIMENTATION RATE 2019-10-19 04:42:00 Gemma Brody M

ethodist

 

                C-REACTIVE PROTEIN 2019-10-19 04:42:00 Gemma Brody M

ethodist

 

                HIV AG/AB COMBINATION 2019-10-19 04:42:00 Gemma Brody

n Bahai

 

                INFECTIOUS MONONUCLEOSIS 2019-10-19 04:42:00 Gemma Brody Bahai



                SCREEN                                          

 

                URINE CULTURE   2019-10-19 01:37:00 Katt Lorenzo Met

[facility]

 

                GRAM STAIN      2019-10-19 01:37:00 Katt Lorenzo Met

[facility]

 

                URINALYSIS SCREEN AND 2019-10-19 00:25:00 Daniel Dadabecky Echevarria



                MICROSCOPY, WITH REFLEX TO                                 



                CULTURE                                         

 

                BLOOD CULTURE, AEROBIC & 2019-10-19 00:15:00 Daniel Angel Timmy Echevarria



                ANAEROBIC                                       

 

                BLOOD CULTURE, AEROBIC & 2019-10-19 00:05:00 Daniel Dadabecky Echevarria



                ANAEROBIC                                       

 

                HC COMPLETE BLD COUNT 2019-10-19 00:05:00 Daniel Dadabecky Echevarria



                W/AUTO DIFF                                     

 

                LACTIC ACID LEVEL 2019-10-19 00:05:00 Juvencionoe Angel Warner Shahzad Echevarria

 

                FERRITIN LEVEL  2019-10-19 00:05:00 Angel Sanchez Me

[facility]

 

                TOTAL IRON BINDING 2019-10-19 00:05:00 Angel Sanchez Shahzad Echevarria



                CAPACITY                                        

 

                VITAMIN B12 LEVEL 2019-10-19 00:05:00 DanielAngel Shahzad Echevarria

 

                THYROID STIMULATING 2019-10-19 00:05:00 DanielAngel Frank Echevarria



                HORMONE                                         

 

                FOLATE LEVEL    2019-10-19 00:05:00 Angel Sanchez Me

[facility]

 

                HAPTOGLOBIN     2019-10-19 00:05:00 Angel Sanchez Me

[facility]

 

                LDH             2019-10-19 00:05:00 Angel Sanchez Me

[facility]

 

                MAGNESIUM LEVEL 2019-10-19 00:05:00 Katt Lorenzo Met

[facility]ist

 

                PHOSPHORUS LEVEL 2019-10-19 00:05:00 Katt Lorenzo Me

[facility]

 

                ESTIMATED GFR   2019-10-19 00:05:00 Katt Lorenzo Met

[facility]

 

                COMPREHENSIVE METABOLIC 2019-10-19 00:05:00 Katt Lorenzo



                PANEL                                           

 

                ECG 12-LEAD     2019-10-18 23:54:02 JuvencioAngel liu Me

[facility]

 

                CT ABD/PELVIC EXTERNAL 2019-10-18 15:40:00 Katt Lorenzo



                STUDY                                           







Plan of Care







             Planned Activity Planned Date Details      Comments     Source

 

             Future Scheduled 2020   INFLUENZA VACCINE              Frank Echevarria



             Test         00:00:00     [code = INFLUENZA              



                                       VACCINE]                  

 

             Future Scheduled 2017-12-15   Screening for              [facility]

[facility]



             Test         00:00:00     malignant neoplasm              



                                       of cervix                 



                                       (procedure) [code =              



                                       795020083]                

 

             Future Scheduled 2012-12-15   CHLAMYDIA SCREENING              [facility]

ton Bahai



             Test         00:00:00     [code = CHLAMYDIA              



                                       SCREENING]                







Encounters







        Start   End     Encounter Admission Attending Care    Care    Encounter 

Source



        Date/Time Date/Time Type    Type    Clinicians Facility Department ID   

   

 

        2020 Outpatient         SERGIO [facility]     [id number]

[address]



        00:00:00 00:00:00                 AMALIA                  [address]

i



                                                                        st

 

        2019-10-30 2019-10-30 Outpatient                 Jessicajana Laurent 28

[id number] [facility] [address]



        11:00:00 11:00:00                         [facility]



                                                                        ent



                                                                        [facility]

 

        2019-10-24 2019-10-24 Outpatient                 Lauren Aguilart 28

[id number] [address]



        10:00:00 10:00:00                         [facility]







Results







           Test Description Test Time  Test Comments Results    Result Comments 

Source









                    CHEMISTRY 7 PROFILE 2020-10-08 06:04:00 









                      Test Item  Value      Reference Range Interpretation Comme

nts









             SODIUM (test code = NA) 137 mEq/L    135-145      N            

 

             POTASSIUM (test code = K) 2.9 mEq/L    3.5-5.0      LL           RE

SULTS CALLED TO



                                                                 AASHISH/[facility].READ B

ACK &



                                                                 CONFIRMED? Y.BY

 FCharlyLAB.GF



                                                                 10/08/20 0603.R

esults verified



                                                                 by repeat lianne

sis

 

             CHLORIDE (test code = CL) 103 mEq/L    100-115      N            

 

             CARBON DIOXIDE (test code = CO2) 24 mEq/L     22-31        N       

     

 

             ANION GAP (test code = GAP) 12.60        10-20        N            

 

             GLUCOSE (test code = GLU) 94 mg/dL            N            

 

             BLOOD UREA NITROGEN (test code = 4 mg/dL      7-18         L       

     



             BUN)                                                

 

             GLOMERULAR FILTRATION RATE (test 124 ml/min   >60          N       

     



             code = GFR)                                         

 

             CREATININE (test code = CREAT) 0.6 mg/dL    0.5-1.0      N         

   

 

             CALCIUM (test code = CA) 8.1 mg/dL    8.4-10.2     L            



CBC W/AUTO DIFF2020-10-08 05:43:00





             Test Item    Value        Reference Range Interpretation Comments

 

             WHITE BLOOD CELL (test code = WBC) 7.5 K/mm3    6.6-12.1           

       

 

             RED BLOOD CELL (test code = RBC) 3.28 M/mm3   3.45-5.01    L       

     

 

             HEMOGLOBIN (test code = HGB) 9.5 g/dL     10.7-13.9    L           

 

 

             HEMATOCRIT (test code = HCT) 29.2 %       32.1-42.1    L           

 

 

             MEAN CELL VOLUME (test code = MCV) 89 fL        84.1-94.8    N     

       

 

             MEAN CELL HGB (test code = MCH) 29.0 pg      27-35        N        

    

 

             MEAN CELL HGB CONCETRATION (test 32.5 gm/dL   32.2-34.1    N       

     



             code = MCHC)                                        

 

             RED CELL DISTRIBUTION WIDTH (test 14.0 %       12.4-16.5    N      

      



             code = RDW)                                         

 

             PLATELET COUNT (test code = PLT) 140 K/mm3    133-385      N       

     

 

             MEAN PLATELET VOLUME (test code = 10.6 fl      9.1-12.7     N      

      



             MPV)                                                

 

             NEUTROPHIL % (test code = NT%) 83.5 %       56.5-79.4    H         

   

 

             LYMPHOCYTE % (test code = LY%) 7.8 %        14.3-34.3    L         

   

 

             MONOCYTE % (test code = MO%) 7.4 %        5.1-10.4     N           

 

 

             EOSINOPHIL % (test code = EO%) 0.1 %        0.1-3.0      N         

   

 

             BASOPHIL % (test code = BA%) 0.1 %        0.1-1.0      N           

 

 

             NEUTROPHIL # (test code = NT#) 6.2 K/mm3                           

   

 

             LYMPHOCYTE # (test code = LY#) 0.6 K/mm3                           

   

 

             MONOCYTE # (test code = MO#) 0.6 K/mm3                             

 

 

             EOSINOPHIL # (test code = EO#) 0.01 K/mm3                          

   

 

             BASOPHIL # (test code = BA#) 0.0 K/mm3                             

 

 

             RBC MORPHOLOGY REQUIRED (test code NORMAL       NORMAL             

       



             = RBCM)                                             

 

             PLATELET MORPHOLOGY REQUIRED (test NORMAL       NORMAL             

       



             code = PLTMR)                                        



CBC W/AUTO DIFF2020-10-07 02:30:00





             Test Item    Value        Reference Range Interpretation Comments

 

             WHITE BLOOD CELL (test code = WBC) 13.4 K/mm3   6.6-12.1     H     

       

 

             RED BLOOD CELL (test code = RBC) 4.00 M/mm3   3.45-5.01    N       

     

 

             HEMOGLOBIN (test code = HGB) 11.3 g/dL    10.7-13.9    N           

 

 

             HEMATOCRIT (test code = HCT) 35.4 %       32.1-42.1    N           

 

 

             MEAN CELL VOLUME (test code = MCV) 89 fL        84.1-94.8    N     

       

 

             MEAN CELL HGB (test code = MCH) 28.3 pg      27-35        N        

    

 

             MEAN CELL HGB CONCETRATION (test 31.9 gm/dL   32.2-34.1    L       

     



             code = MCHC)                                        

 

             RED CELL DISTRIBUTION WIDTH (test 13.7 %       12.4-16.5    N      

      



             code = RDW)                                         

 

             PLATELET COUNT (test code = PLT) 189 K/mm3    133-385      N       

     

 

             MEAN PLATELET VOLUME (test code = 10.9 fl      9.1-12.7     N      

      



             MPV)                                                

 

             NEUTROPHIL % (test code = NT%) 90.7 %       56.5-79.4    H         

   

 

             LYMPHOCYTE % (test code = LY%) 3.5 %        14.3-34.3    L         

   

 

             MONOCYTE % (test code = MO%) 5.3 %        5.1-10.4     N           

 

 

             EOSINOPHIL % (test code = EO%) 0.0 %        0.1-3.0      L         

   

 

             BASOPHIL % (test code = BA%) 0.1 %        0.1-1.0      N           

 

 

             NEUTROPHIL # (test code = NT#) 12.1 K/mm3                          

   

 

             LYMPHOCYTE # (test code = LY#) 0.5 K/mm3                           

   

 

             MONOCYTE # (test code = MO#) 0.7 K/mm3                             

 

 

             EOSINOPHIL # (test code = EO#) 0 K/mm3                             

   

 

             BASOPHIL # (test code = BA#) 0.0 K/mm3                             

 

 

             RBC MORPHOLOGY REQUIRED (test code NORMAL       NORMAL             

       



             = RBCM)                                             

 

             PLATELET MORPHOLOGY REQUIRED (test NORMAL       NORMAL             

       



             code = PLTMR)                                        



CHEMISTRY 7 PROFILE2020-10-07 02:27:00





             Test Item    Value        Reference Range Interpretation Comments

 

             SODIUM (test code = NA) 134 mEq/L    135-145      L            

 

             POTASSIUM (test code = K) 3.6 mEq/L    3.5-5.0      N            

 

             CHLORIDE (test code = CL) 100 mEq/L    100-115      N            

 

             CARBON DIOXIDE (test code 24 mEq/L     22-31        N            



             = CO2)                                              

 

             ANION GAP (test code = TEST NOT PERFORMED 10-20                    

 



             GAP)                                                

 

             GLUCOSE (test code = GLU) 116 mg/dL           H            

 

             BLOOD UREA NITROGEN (test 5 mg/dL      7-18         L            



             code = BUN)                                         

 

             GLOMERULAR FILTRATION RATE 78 ml/min    >60          N            



             (test code = GFR)                                        

 

             CREATININE (test code = 0.9 mg/dL    0.5-1.0      N            



             CREAT)                                              

 

             CALCIUM (test code = CA) 8.6 mg/dL    8.4-10.2     N            



LACTIC ACID2020-10-07 02:23:00





             Test Item    Value        Reference Range Interpretation Comments

 

             LACTIC ACID (test code = LACT) 1.6 MMOL/L   0.5-2.2      N         

   



URINALYSIS COMPLETE2020-10-06 14:46:00





             Test Item    Value        Reference Range Interpretation Comments

 

             UA COLOR (test code = COLU) YELLOW       YELLOW                    

 

             UA APPEARANCE (test code = CLEAR        CLEAR                     



             APPU)                                               

 

             UA GLUCOSE DIPSTICK (test code NEGATIVE     NEG                    

   



             = DGLUU)                                            

 

             UA BILIRUBIN DIPSTICK (test NEGATIVE     NEG                       



             code = BILU)                                        

 

             UA KETONE DIPSTICK (test code NEGATIVE     NEG                     

  



             = KETU)                                             

 

             UA SPECIFIC GRAVITY (test code 1.012        1.001-1.035  N         

   



             = SGU)                                              

 

             UA BLOOD DIPSTICK (test code = 1+           NEG          A         

   



             [place])                                                

 

             UA PH DIPSTICK (test code = 8.0          5-9                       



             CHARLENE)                                                

 

             UA PROTEIN DIPSTICK (test code NEGATIVE     NEG                    

   



             = PROU)                                             

 

             UA UROBILINIOGEN DIPSTICK NEGATIVE mg/dL NEG                       



             (test code = URO)                                        

 

             UA NITRITE DIPSTICK (test code NEG          NEG                    

   



             = MIKE)                                             

 

             UA LEUKOCYTE ESTERASE DIPSTICK NEG          NEG                    

   



             (test code = LEUU)                                        

 

             UA WBC (test code = WBCU) 11-15 #/hpf  NONE SEEN    A            

 

             UA RBC (test code = RBCU) 3-5 #/hpf    NONE SEEN    A            

 

             UA EPITHELIAL CELLS (test code RARE #/HPF   RARE-FEW               

   



             = EPIU)                                             

 

             UA MUCUS (test code = MUCU) RARE         NONE SEEN                 



COVID 19 Asymptomatic [facility]2020-10-06 14:14:00





             Test Item    Value        Reference Range Interpretation Comments

 

             COVID 19     NEGATIVE     NEGATIVE                  This test has b

een



             Asymptomatic [facility]                                        authorize

d only for the



             (test code =                                        detection ofpro

teins from



             COVNONPUIAG)                                        SARS-CoV-2, not

 for any



                                                                 other viruses



                                                                 orpathogens.  N

egative



                                                                 results should 

be treated



                                                                 as presumptive



                                                                 andconfirmed wi

th a



                                                                 molecular assay

, if



                                                                 necessary for



                                                                 patientmanageme

nt.



                                                                 Negative result

s do not



                                                                 rule out COVID-

19



                                                                 andshould not b

e used as



                                                                 the sole basis 

for



                                                                 treatment orpat

ient



                                                                 management deci

sions,



                                                                 including infec

tion



                                                                 controldecision

s.



                                                                 Negative result

s should



                                                                 be considered i

n



                                                                 thecontext of a

 patient's



                                                                 recent exposure

s, history



                                                                 and thepresence

 of



                                                                 clinical signs 

and



                                                                 symptoms consis

tent



                                                                 withCOVID-19. T

his test



                                                                 has not been FD

A cleared



                                                                 or approved; th

e test



                                                                 hasbeen authorjeana jones by FDA



                                                                 under an [employer]

ncy Use



                                                                 Authorization(E

UA) for



                                                                 use by laborato

afsaneh



                                                                 certified under

 the CLIA



                                                                 thatmeet the re

quirements



                                                                 to perform mode

rate, high



                                                                 or waivedcomple

xity



                                                                 tests. This barak

t is



                                                                 authorized for 

use at



                                                                 thePoint of Car

e (POC),



                                                                 i.e., in patien

t care



                                                                 settingsoperati

ng under a



                                                                 CLIA Certificat

e of



                                                                 Waiver, Certifi

nereyda



                                                                 ofCompliance, o

r



                                                                 Certificate of



                                                                 Accreditation. 

 This test



                                                                 is only authori

karen for



                                                                 the duration of



                                                                 thedeclaration 

that



                                                                 circumstances e

xist



                                                                 justifying



                                                                 theauthorizatio

n of



                                                                 emergency use o

f in vitro



                                                                 diagnostic test

sfor



                                                                 detection and/o

r



                                                                 diagnosis of CO

VID-19



                                                                 under Hbhthba09

4(b)(1) of



                                                                 the Act, 21 U.S

.C.



                                                                 360bbb-3(b)(1),

 unless



                                                                 theauthorizatio

n is



                                                                 terminated or r

evoked



                                                                 sooner.



CBC W/AUTO DIFF2020-10-06 13:35:00





             Test Item    Value        Reference Range Interpretation Comments

 

             WHITE BLOOD CELL (test code = WBC) 14.2 K/mm3   6.6-12.1     H     

       

 

             RED BLOOD CELL (test code = RBC) 3.65 M/mm3   3.45-5.01    N       

     

 

             HEMOGLOBIN (test code = HGB) 10.7 g/dL    10.7-13.9    N           

 

 

             HEMATOCRIT (test code = HCT) 32.1 %       32.1-42.1    N           

 

 

             MEAN CELL VOLUME (test code = MCV) 88 fL        84.1-94.8    N     

       

 

             MEAN CELL HGB (test code = MCH) 29.3 pg      27-35        N        

    

 

             MEAN CELL HGB CONCETRATION (test 33.3 gm/dL   32.2-34.1    N       

     



             code = MCHC)                                        

 

             RED CELL DISTRIBUTION WIDTH (test 13.6 %       12.4-16.5    N      

      



             code = RDW)                                         

 

             PLATELET COUNT (test code = PLT) 177 K/mm3    133-385      N       

     

 

             MEAN PLATELET VOLUME (test code = 10.5 fl      9.1-12.7     N      

      



             MPV)                                                

 

             NEUTROPHIL % (test code = NT%) 85.6 %       56.5-79.4    H         

   

 

             LYMPHOCYTE % (test code = LY%) 6.7 %        14.3-34.3    L         

   

 

             MONOCYTE % (test code = MO%) 7.0 %        5.1-10.4     N           

 

 

             EOSINOPHIL % (test code = EO%) 0.0 %        0.1-3.0      L         

   

 

             BASOPHIL % (test code = BA%) 0.1 %        0.1-1.0      N           

 

 

             NEUTROPHIL # (test code = NT#) 12.2 K/mm3                          

   

 

             LYMPHOCYTE # (test code = LY#) 1.0 K/mm3                           

   

 

             MONOCYTE # (test code = MO#) 1.0 K/mm3                             

 

 

             EOSINOPHIL # (test code = EO#) 0 K/mm3                             

   

 

             BASOPHIL # (test code = BA#) 0.0 K/mm3                             

 

 

             RBC MORPHOLOGY REQUIRED (test code NORMAL       NORMAL             

       



             = RBCM)                                             

 

             PLATELET MORPHOLOGY REQUIRED (test NORMAL       NORMAL             

       



             code = PLTMR)                                        



AFB [id number] 00:13:53





             Test Item    Value        Reference Range Interpretation Comments

 

             AFB culture  No growth                              Specimen



             isolate (test after 6 weeks                           [facility]

ecimen



             code = 543-9) of                                     Source: CSF (S

faisal



                          incubation.                            Fluid)Specimen 

Site: CSF



                                                                 (spinal fluid)



[place] [id number] [facility]:15:15





             Test Item    Value        Reference Range Interpretation Comments

 

             Fungus culture No growth                              Specimen



             isolate (test after 4 weeks                           [facility]

ecimen



             code = 1441) of                                     Source: CSF (Sp

inal



                          incubation.                            Fluid)Specimen 

Site: CSF



                                                                 (spinal fluid)



[place] [facility]2019-10-27 13:46:38





             Test Item    Value        Reference Range Interpretation Comments

 

             Toxoplasma gondii CSF                                    



             source (test code                                        



             = 10170-5)                                          

 

             Toxoplasma gondii Not Detected                           NOT DETECT

ED - A



             by PCR (test code                                        negative r

esult does



             = 23571-5)                                          not rule out



                                                                 thepresence of 

PCR



                                                                 inhibitors in t

he



                                                                 patient specime

n or



                                                                 assayspecific n

ucleic



                                                                 acid in concent

rations



                                                                 below the level



                                                                 ofdetection by 

the



                                                                 assay.INTERPRET

JULIO



                                                                 INFORMATION: To

xoplasma



                                                                 gondii by PCRTh

is test



                                                                 was developed a

nd its



                                                                 performance



                                                                 characteristics



                                                                 determined by A

[facility]



                                                                 [facility]. T

he [place]



                                                                 Food and Drug



                                                                 Administration 

has not



                                                                 approved or chintan

ared



                                                                 this test; garza

cesar, FDA



                                                                 clearance or ap

proval



                                                                 is not currentl

y



                                                                 required for cl

inical



                                                                 use. The result

s are



                                                                 not intended to

 be used



                                                                 as the sole samir

ns for



                                                                 clinical diagno

sis or



                                                                 patient managem

ent



                                                                 decisions.Perfo

rmed by



                                                                 [facility]

es,[address]



                                                                 [address], [place]



                                                                 [id number]



                                                                 [phone]

.[employer], Dion Lu MD, Lab. Direct

or



[facility]ettsia typhi (typhus fever) Abs, IgG/IgM2019-10-24 16:23:37





             Test Item    Value        Reference Range Interpretation Comments

 

             R typhi IgG (test <1:64        <1:64                     INTERPRETI

VE INFORMATION:



             code = 5324-9)                                        Typhus Fever 

Antibody, IgG



                                                                 Less than 1:64 

.......



                                                                 Negative - No s

ignificant



                                                                 level of



                                                                     IgG antibod

y detected.



                                                                 1:64 - 1:128 ..

.......



                                                                 Equivocal - Que

stionable



                                                                 presence



                                                                     of IgG anti

body



                                                                 detected. Repea

t



                                                                              te

sting in



                                                                 10-14 days may 

be



                                                                               h

elpful.



                                                                 1:256 or greate

r .....



                                                                 Positive - Pres

ence of IgG



                                                                 antibody



                                                                     to detected

, suggestive



                                                                 of current



                                                                        or past



                                                                 infection.Antib

kristine



                                                                 reactivity to R

ickettsia



                                                                 typhi antigen s

hould be



                                                                 considered grou

p-reactive



                                                                 for the Typhus 

Fever group,



                                                                 which includes 

Rickettsia



                                                                 prowazekii. Ser

oconversion



                                                                 between acute a

nd



                                                                 convalescent se

ra is



                                                                 considered stro

ng evidence



                                                                 of recent infec

tion. The



                                                                 best evidence f

or infection



                                                                 is a significan

t change



                                                                 (fourfold diffe

rence in



                                                                 titer) on two a

ppropriately



                                                                 timed specimens

, where both



                                                                 tests are done 

in the same



                                                                 laboratory at t

he same time.



                                                                 Acute-phase spe

cimens are



                                                                 collected durin

g the first



                                                                 week of illness

 and



                                                                 convalescent -p

hase samples



                                                                 are generally o

btained 2-4



                                                                 weeks after res

olution of



                                                                 illness. Ideall

y these



                                                                 samples should 

be tested



                                                                 simultaneously 

at the same



                                                                 facility. If th

e samples



                                                                 submitted was c

ollected



                                                                 during the acut

e phase of



                                                                 illness, submit

 a marked



                                                                 convalecsent sa

mple within



                                                                 25 days for lissette

red testing.

 

             R typhi IgM (test <1:64        <1:64                     INTERPRETI

VE INFORMATION:



             code = 5325-6)                                        Typhus Fever 

Antibody, IgM



                                                                 Less than 1:64 

......



                                                                 Negative-No sig

nificant



                                                                 level of



                                                                    IgM antibody

 detected.



                                                                 1:64 or greater

 .....



                                                                 Positive-Presen

ce of IgM



                                                                 antibody



                                                                    detected, wh

ich may



                                                                 indicate a



                                                                      current or

 rectent



                                                                 infection;



                                                                      however, l

ow levels of



                                                                 IgM



                                                                 antibodies may 

occasionally



                                                                 persist



                                                                   for more than

 12 months



                                                                 



                                                                 post-infection.

Antibody



                                                                 reactivity to R

ickettsia



                                                                 typhi antigen s

hould be



                                                                 considered grou

p-reactive



                                                                 for the Typhus 

Fever group,



                                                                 which includes 

Rickettsia



                                                                 prowazekii. Ser

oconversion



                                                                 between acute a

nd



                                                                 convalescent se

ra is



                                                                 considered stro

ng evidence



                                                                 of recent infec

tion. The



                                                                 best evidence i

s a



                                                                 significant rox

nge (fourfold



                                                                 difference in t

iter) on two



                                                                 appropriately t

imed



                                                                 specimens, wher

e both tests



                                                                 are done in the

 same



                                                                 laboratory at t

he same time.



                                                                 Acute-phase spe

cimens are



                                                                 collected durin

g the first



                                                                 week of illness

 and



                                                                 convalescent-ph

ase samples



                                                                 are generally o

btained 2-4



                                                                 weeks after res

olution of



                                                                 illness. Ideall

y these



                                                                 samples should 

be tested



                                                                 simultaneously 

at the same



                                                                 facility. If th

e sample



                                                                 submitted was c

ollected



                                                                 during the actu

e-phase of



                                                                 illness, submit

 a marked



                                                                 convalescent sa

mple within



                                                                 25 days for lissette

red



                                                                 testing.Perform

ed by [address]

[address] [place] [address]

08



                                                                 [phone]

.[employer],



                                                                 Dion Martins MD, Lab.



                                                                 Director



[place] [facility]ettsia rickettsii (RMSF) Abs IgG/IgM2019-10-24 16:23:30





             Test Item    Value        Reference Range Interpretation Comments

 

             R rickettsii IgG (test <1:64        <1:64                     INTER

PRETIVE INFORMATION:



             code = 5307-4)                                        Rickettsia ri

ckettsii



                                                                 ([facility]



                                                                            Spot

maddy Fever)



                                                                 Ab, IgG        

 Less than



                                                                 1:64 ....... Ne

gative - No



                                                                 significant lev

el of



                                                                                

   IgG



                                                                 antibody detect

ed.  1:64 -



                                                                 1:128 .........

 Low



                                                                 Positive - Pres

ence of IgG



                                                                 



                                                                 Antibody detect

ed,



                                                                 suggestive of



                                                                             cur

rent or



                                                                 past infection.

  1:256 or



                                                                 greater ..... P

ositive -



                                                                 Presence of IgG



                                                                               a

ntibody



                                                                 detected, sugge

stive of



                                                                 



                                                                 current or past



                                                                 infection.Antib

kristine



                                                                 reactivity to R

ickettsia



                                                                 rickettsii anti

gen should



                                                                 be considered S

potted



                                                                 Fever group kenneth

ctive.



                                                                 Other organisms

 within the



                                                                 group include R

. akari, R.



                                                                 conorrii, R. au

stralis and



                                                                 R.



                                                                 sibirica.Seroco

nversion, a



                                                                 fourfold or gre

ater rise



                                                                 in antibody tit

er, between



                                                                 acute and conva

lescent



                                                                 sera is conside

red strong



                                                                 evidence of rec

ent



                                                                 infection. Acut

e-phase



                                                                 specimens are c

ollected



                                                                 during the firs

t week of



                                                                 illness and



                                                                 convalescent-ph

ase samples



                                                                 are generally o

btained 2-4



                                                                 weeks after res

olution of



                                                                 illness. Ideall

y these



                                                                 samples should 

be tested



                                                                 simultaneously 

at the same



                                                                 facility. If th

e sample



                                                                 submitted was c

ollected



                                                                 during the acut

e-phase of



                                                                 illness, submit

 a marked



                                                                 convalescent sa

mple within



                                                                 25 days for lissette

red



                                                                 testing.

 

             R rickettsii IgM (test <1:64        <1:64                     INTER

PRETIVE INFORMATION:



             code = 5308-2)                                        Rickettsia ri

ckettsii



                                                                 ([facility] [address]



                                                                            Spot

maddy Fever)



                                                                 Ab, IgM  Less t

shepherd 1:64



                                                                 ....... Negativ

e - No



                                                                 significant lev

el of



                                                                                

   IgM



                                                                 antibody detect

ed.  1:64



                                                                 or greater ....

.. Positive



                                                                 - Presence of I

gM antibody



                                                                 



                                                                 detected, which

 may



                                                                 indicate a



                                                                          curren

t or recent



                                                                 infection;



                                                                          howeve

r, low



                                                                 levels of IgM



                                                                             ant

ibodies may



                                                                 occasionally pe

rsist



                                                                                

   for more



                                                                 than 12 months



                                                                 



                                                                 post-infection.

Antibody



                                                                 reactivity to R

ickettsia



                                                                 rickettsii anti

gen should



                                                                 be considered S

potted



                                                                 Fever group kenneth

ctive.



                                                                 Other organisms

 within the



                                                                 group include R

. akari, R.



                                                                 conorrii, R. au

stralis and



                                                                 R.



                                                                 sibirica.Seroco

nversion, a



                                                                 fourfold or gre

ater rise



                                                                 in antibody tit

er, between



                                                                 acute and conva

lescent



                                                                 sera is conside

red strong



                                                                 evidence of rec

ent



                                                                 infection. Acut

e-phase



                                                                 specimens are c

ollected



                                                                 during the firs

t week of



                                                                 illness and



                                                                 convalescent-ph

ase samples



                                                                 are generally o

btained 2-4



                                                                 weeks after res

olution of



                                                                 illness. Ideall

y these



                                                                 samples should 

be tested



                                                                 simultaneously 

at the same



                                                                 facility. If th

e sample



                                                                 submitted was c

ollected



                                                                 during the acut

e-phase of



                                                                 illness, submit

 a marked



                                                                 convalescent sa

mple within



                                                                 25 days for lissette

red



                                                                 testing.[facility]

 does not



                                                                 use IgM results

 for



                                                                 routine diagnos

tic testing



                                                                 of [facility]

in [place]



                                                                 Fever, as the r

esponse may



                                                                 not be specific

 for the



                                                                 agent (resultin

g in false



                                                                 positives) and 

the IgM



                                                                 response may be

 persistent



                                                                 from past



                                                                 infection.Perfo

rmed by



                                                                 [facility]

[address]



                                                                 [address], [place]

[place] [address]



                                                                 [phone]

.dahlialab.Dion arriola MD, Lab.



                                                                 Director



[facility] MethoddontaBlood culture, aerobic &amp; anaerobic2019-10-24 05:33:11





             Test Item    Value        Reference Range Interpretation Comments

 

             Blood culture No growth                              Specimen



             isolate (test after 5 days                           InformationSpe

cimen



             code = 600-7) of                                     Source: BloodS

pecimen



                          incubation.                            Site: [facility], [facility]



[facility] MethodistWest Nile virus by PCR, CSF2019-10-23 14:39:02





             Test Item    Value        Reference Range Interpretation Comments

 

             West Nile virus Not-Detected Not-Detected              



             PCR, CSF (test                                        



             code = 01407-8)                                        

 

             West Nile virus See link below for                           Case N

umber:



             PCR, CSF (test PDF Lab Report                           [id number]

[id number]



             code = 2510)                                        



[facility] MethodistCSF culture2019-10-23 05:27:57





             Test Item    Value        Reference Range Interpretation Comments

 

             CSF culture  growth after                           Specimen



             isolate (test 72 hours                               InformationSpe

cimen



             code = 606-4)                                        Source: CSF (S

faisal



                                                                 Fluid)Specimen 

Site: CSF



                                                                 (spinal fluid)



[facility] MethodistGram [id number] 05:27:57





             Test Item    Value        Reference Range Interpretation Comments

 

             Gram stain   No WBC's or                            Specimen



             isolate (test organisms seen.                           Information

Specimen



             code = 1469)                                        Source: CSF (Sp

inal



                                                                 Fluid)Specimen 

Site: CSF



                                                                 (spinal fluid)



[facility] MethodistCryptococcal antigen, screen2019-10-23 05:27:57





             Test Item    Value        Reference    Interpretation Comments



                                       Range                     

 

             Cryptococcal Ag Negative - No                           Specimen



             (test code = Cryptococcus                           InformationSpec

imen



             9820-2)      antigen detected.                           Source: CS

F (Spinal



                                                                 Fluid)Specimen 

Site:



                                                                 CSF (spinal flu

id)



Shahzad EchevarriaHSV type 1/2 combined Ab, IgM2019-10-23 04:01:49





             Test Item    Value        Reference Range Interpretation Comments

 

             HSV 1/2 combined Ab, 0.46         <=0.89 IV                 INTERPR

ETIVE INFORMATION:



             IgM (test code =                                        Herpes Simp

magdalena Virus Type



             02081-5)                                            1 and/or 2 Anti

bodies, IgM



                                                                 by LAURA  0.89 

IV or Less



                                                                 .......... Not 

Detected



                                                                 0.90 - 1.09 IV 

...........



                                                                 Indeterminate- 

Repeat



                                                                 testing in



                                                                             10-

14 days may



                                                                 be helpful.  1.

10 IV or



                                                                 Greater .......



                                                                 Detected-IgM an

tibody to



                                                                 HSV



                                                                      detected, 

which may



                                                                 indicate a



                                                                             cur

rent or



                                                                 recent infectio

n.



                                                                 



                                                                 However, low le

vels of IgM



                                                                 



                                                                  antibodies may



                                                                 occasionally



                                                                               p

ersist for



                                                                 more than 12



                                                                               m

onths



                                                                 post-infection.

Performed



                                                                 by [facility],[address]



                                                                 [facility] [place], 

[place] [address]



                                                                 [phone]

.[email or web address].carol ann lee, Dion bobo MD, Lab.



                                                                 Director



[facility] Nile virus antibody panel, [facility]2019-10-22 17:23:16





             Test Item    Value        Reference Range Interpretation Comments

 

             West Nile IgG, CSF 0.03         <=1.29 IV                 INTERPRET

JULIO INFORMATION:



             (test code =                                        West Nile Virus

 Ab IgG by



             34208-4)                                            LAURA, CSF  1.2

9 IV or less



                                                                 ....... Negativ

e: No



                                                                 significant



                                                                        level of

 West Nile



                                                                 virus



                                                                  IgG antibody d

etected.  1.30



                                                                 - 1.49 IV .....

... Equivocal:



                                                                 Questionable



                                                                         presenc

e of West Nile



                                                                 



                                                                 virus IgG antib

kristine detected.



                                                                 



                                                                 Repeat testing 

in 10-14 days



                                                                                

         may



                                                                 be helpful.  1.

50 IV or



                                                                 greater .... Po

sitive:



                                                                 Presence of IgG



                                                                            anti

body to West



                                                                 Nile virus



                                                                       detected,

 suggestive of



                                                                 



                                                                 current or past



                                                                 infection.This 

test is



                                                                 intended to be 

used as a



                                                                 semi-quantitati

ve means of



                                                                 detecting West 

Nile



                                                                 virus-specific 

IgG in CSF



                                                                 samples in [facility]

h there is a



                                                                 clinical suspic

ion of West



                                                                 Nile Virus infe

ction. This



                                                                 test should not

 be used



                                                                 solely for wilder

titative



                                                                 purposes, nor s

hould the



                                                                 results be used

 without



                                                                 correlation to 

clinical



                                                                 history or othe

r data.



                                                                 Because other m

embers of the



                                                                 Flaviviridae fa

zoya, such as



                                                                 Everly encep

halitis virus,



                                                                 show extensive



                                                                 cross-reactivit

y with West



                                                                 Nile virus, ser

ologic testing



                                                                 specific for th

andrea species



                                                                 should be consi

dered.The



                                                                 detection of an

tibodies to



                                                                 West Nile virus

 in



                                                                 cerebrospinal f

luid may



                                                                 indicate centra

l nervous



                                                                 system infectio

n. However,



                                                                 consideration m

ust be given



                                                                 to possible con

tamination by



                                                                 blood or transf

er of serum



                                                                 antibodies acro

ss the



                                                                 blood-brain bar

rier.Test



                                                                 developed and c

haracteristics



                                                                 determined by A

[facility]



                                                                 [facility]. S

ee Compliance



                                                                 Statement B: [employer]/

 

             West Nile IgM, CSF 0.00         <=0.89 IV                 INTERPRET

JULIO INFORMATION:



             (test code =                                        West Nile Virus

 Ab IgM by



             36666-2)                                            LAURA, CSF0.89 

IV or less



                                                                 ...... Negative

 - No



                                                                 significant lev

el



                                                                            of W

est Nile virus



                                                                 IgM antibody



                                                                       detected.

0.90-1.10 IV



                                                                 ......... Equiv

ocal -



                                                                 Questionable pr

esence



                                                                                

of West Nile



                                                                 virus IgM antib

kristine



                                                                             det

ected. Repeat



                                                                 testing in



                                                                     10-14 days 

may be



                                                                 helpful.1.11 IV

 or greater



                                                                 ... Positive - 

Presence of



                                                                 IgM



                                                                 antibody to Sam

t Nile virus



                                                                                

     detected,



                                                                 suggestive of c

urrent



                                                                               o

r recent



                                                                 infection.This 

test is



                                                                 intended to be 

used as a



                                                                 semi-quantitati

ve means of



                                                                 detecting West 

Nile



                                                                 virus-specific 

IgM in CSF



                                                                 samples in ic

h there is a



                                                                 clinical suspic

ion of West



                                                                 Nile virus infe

ction. This



                                                                 test should not

 be used



                                                                 solely for wilder

titative



                                                                 purposes, nor s

hould the



                                                                 results be used

 without



                                                                 correlation to 

clinical



                                                                 history or othe

r data.



                                                                 Because other m

embers of the



                                                                 Flaviviridae fa

zoya, such as



                                                                 Everly encep

halitis virus,



                                                                 show extensive



                                                                 cross-reactivit

y with West



                                                                 Nile virus, ser

ologic testing



                                                                 specific for th

andrea species



                                                                 should be consi

dered.The



                                                                 detection of an

tibodies to



                                                                 West Nile virus

 in



                                                                 cerebrospinal f

luid may



                                                                 indicate centra

l nervous



                                                                 system infectio

n. However,



                                                                 consideration m

ust be given



                                                                 to possible con

tamination by



                                                                 blood or transf

er of serum



                                                                 antibodies acro

ss the



                                                                 blood-brain bar

rier.Test



                                                                 developed and c

haracteristics



                                                                 determined by A

[facility]



                                                                 [facility]atories. S

ee Compliance



                                                                 Statement B:



                                                                 [employer]

erformed by



                                                                 [employer] Christina nichols,Ayala [address] [place], [facility] [place],

08



                                                                 [phone]

.[employer],



                                                                 iDon Martins MD, Lab.



                                                                 Director



[facility]2019-10-[facility] 22:40:30





             Test Item    Value        Reference Range Interpretation Comments

 

             Cytomegalovirus Ab, Negative     Negative                  



             IgM (test code =                                        



             2968962)                                            

 

             Cytomegalovirus Ab, Positive     Negative     A            Positive

; IgG antibody



             IgG (test code =                                        to CMV dete

cted which



             15611-3)                                            may indicateexp

osure to



                                                                 CMV infection.

 

             Lab Interpretation Abnormal                               



             (test code = 63952-2)                                        



[facility] MethodistVDRL, CSF screen2019-10-21 22:11:51





             Test Item    Value        Reference Range Interpretation Comments

 

             VDRL, CSF screen (test code = Non-reactive Non-reactive            

  



             3046)                                               



Shahzad EchevarriaVaricella zoster by PCR2019-10-21 21:58:01





             Test Item    Value        Reference Range Interpretation Comments

 

             VZV result (test Not-Detected Not-Detected              



             code = 88201-6)              copies/mL                 

 

             Varicella zoster, See link below                           Case Num

martín:



             pcr (test code = for PDF Lab                            [id number]

9



             124)         Report                                 



[facility] MethodistEnterovirus by PCR2019-10-21 21:54:15





             Test Item    Value        Reference Range Interpretation Comments

 

             Enterovirus PCR (test See link below Not-Detected              Case

 Number:



             code = 13662-6) for PDF Lab                            [id number]



                          Report                                 



Sh[facility]ad MethodistOligoclonal banding, CSF2019-10-21 17:52:58





             Test Item    Value        Reference Range Interpretation Comments

 

             Protein, CSF (test 18 mg/dL     15-45                     



             code = 2880-3)                                        

 

             Prealbumin, CSF (test 5.2 %        3.5-11.1                  



             code = 13973-3)                                        

 

             Albumin, CSF (test 57.4 %       40.8-66.2                 



             code = 50000-4)                                        

 

             Alpha 1, CSF (test 3.7 %        2.3-6.4                   



             code = 55098-0)                                        

 

             Alpha 2, CSF (test 8.3 %        6.1-12.6                  



             code = 51242-7)                                        

 

             Beta, CSF (test code = 16.8 %       11.7-24.1                 



             08320-1)                                            

 

             Gamma, CSF (test code 8.6 %        5.6-12.2                  



             = 39512-6)                                          

 

             CSF extended See Comment                            A nondiagnostic

 CSF



             interpretation (test                                        protein

 study with



             code = 04264-1)                                        an increased

 IgG



                                                                 index.



                                                                 Nooligoclonal b

ands



                                                                 seen.

 

             CSF interpretation See Comment                            Geoff Ghosh, PhD;



             (test code = 1163)                                        Betty Mar MD;



                                                                 Yuli Lord, PhD; Igor Dacosta MD

,PhD



Shahzad EchevarriaEpstein Barr Virus (EBV) by PCR2019-10-21 17:11:06





             Test Item    Value        Reference Range Interpretation Comments

 

             Stepan Metz Not-Detected Not-Detected              



             virus, PCR (test              copies/mL                 



             code = 5005-4)                                        

 

             Stepan Metz See link below                           Case Number:



             virus, PCR (test for PDF Lab                            [id number]

1



             code = 1340) Report                                 



Shahzad EchevarriaBK virus by PCR2019-10-21 15:37:09





             Test Item    Value        Reference Range Interpretation Comments

 

             BK virus PCR Not-Detected Not-Detected              



             (test code =              copies/mL                 



             10983-1)                                            

 

             BK virus PCR See link below                           Case Number:



             (test code = for PDF Lab                            [id number]



             962)         Report                                 



Shahzad EchevarriaCT Abd/Pelvic External Study2019-10-21 14:01:54This exam was 
not acquired at a [facility] facility and has not been interpreted by a [employer]
Provider.  The exam was imported into our imaging system for comparisons 
purposes.Shahzad EchevarriaJC virus, quantitative PCR2019-10-21 12:39:02





             Test Item    Value        Reference Range Interpretation Comments

 

             RANDY virus result Not-Detected Not-Detected              



             (test code =              copies/mL                 



             41582-3)                                            

 

             RANDY virus qPCR See link below                           Case Number:



             quant (test code for PDF Lab                            [id number]

9



             = 1632)      Report                                 



Shahzad Andrade Renal2019-10-21 09:21:07Hm Interface, Radiology Results 
 - 10/21/2019  9:24 AM CDTEXAMINATION:  US RENALCLINICAL HISTORY:  h o 
of LEFT pyeloplasty with evidence of residual hydro on recent imagingTECHNIQUE: 
Sonographic imaging over the kidneys was performed. COMPARISON:  
None.FINDINGS:1.The right kidney is normal size measuring 10.4 cm in long axis. 
The kidney has normal echogenicity. Vascular flow is unremarkable. There is no 
evidence of perinephric fluid, solid mass, or calculus. Mild right 
hydronephrosis is present.2.The left kidney is  normal size measuring 10.3 cm in
long axis. The kidney has normal echogenicity. Vascular flow is unremarkable. 
There is no evidence of perinephric fluid, solid mass, or calculus. Moderate 
left hydronephrosis is present.3.The bladder is unremarkable.4.Hepatic steatosis
is incidentally noted.IMPRESSION:Mild right and moderate left 
hydronephrosis.Hepatic steatosis.[id number] MethodistComprehensive 
metabolic panel2019-10-21 05:49:20





             Test Item    Value        Reference Range Interpretation Comments

 

             Sodium (test code = 139          135- 148 mEq/L              



             2951-2)                                             

 

             Potassium (test code = 3.6          3.5- 5.0 mEq/L              



             2823-3)                                             

 

             Chloride (test code = 104          98- 112 mEq/L              



             2075-0)                                             

 

             CO2 (test code = -9) 22           24- 31 mEq/L L            

 

             Anion gap (test code = 13@ANIO      7- 15 mEq/L               



             25424-4)                                            

 

             BUN (test code = 3094-0) 5 mg/dL      6-20         L            

 

             Creatinine (test code = 0.49 mg/dL   0.5-0.9      L            



             2160-0)                                             

 

             Glucose (test code = 80 mg/dL     65-99                     



             2345-7)                                             

 

             Calcium (test code = 9.1 mg/dL    8.3-10.2                  



             67002-8)                                            

 

             Protein (test code = 6.7 g/dL     6.3-8.3                   

9994.6-7.0



             2885-2)                                             g/dL1



                                                                 kkld4757.4-7.6



                                                                 g/dL7



                                                                 months-6vooz541

.1-



                                                                 7.3 g/dL1-2



                                                                 axndg993.6-7.5



                                                                 g/dL>3



                                                                 jgxhx837.0-8.0



                                                                 g/nI72-2236802.

3-8



                                                                 .3 g/dL

 

             Albumin (test code = 3.0 g/dL     3.5-5        L            



             1751-7)                                             

 

             A/G ratio (test code = 0.8          0.7-3.8                   



             1759-0)                                             

 

             Alkaline phosphatase 82 U/L                           



             (test code = 6768-6)                                        

 

             AST (test code = 1920-8) 16 U/L       10-35                     

 

             ALT (test code = 1742-6) 29 U/L       5-50                      

 

             Total bilirubin (test 0.3 mg/dL    0-1.2                     



             code = -2)                                        

 

             Lab Interpretation (test Abnormal                               



             code = 57144-1)                                        



[facility] MethodistEstimated GFR2019-10-21 05:49:20





             Test Item    Value        Reference Range Interpretation Comments

 

             Estimated GFR (test >=90         mL/min/1.73 m2              Caterg

ory  Units



             code = 5488)                                        InterpretationG

1



                                                                 >=90     Normal

 or highG2



                                                                       60-89    

Mildly



                                                                 gpczqnoolQ4u   

     45-59



                                                                  Mildly to mode

rately



                                                                 nukfooolxT4t   

     30-44



                                                                  Moderately to 

severely



                                                                 decreasedG4    

     15-29



                                                                  Severely decre

asedG5



                                                                   <15      Kidn

ey



                                                                 failureThe eGFR

 was



                                                                 calculated michelle berrios the



                                                                 Chronic Kidney 

Disease



                                                                 Epidemiology Co

llaboration



                                                                 (CKD-EPI) equat

ion.



                                                                 Interpretation 

is based on



                                                                 recommendations

 of the



                                                                 National Kidney



                                                                 Foundation-Kidn

ey Disease



                                                                 Outcomes Qualit

y Initiative



                                                                 (NKF-KDOQI) pub

lished in



                                                                 2014.



[facility] MethodistCBC with platelet and differential2019-10-21 05:29:14





             Test Item    Value        Reference Range Interpretation Comments

 

             WBC (test code = 15260-6) 5.10         4.50- 11.00 k/uL            

  

 

             RBC (test code = 50977-9) 3.66 m/uL    4.2-5.5      L            

 

             HGB (test code = 718-7) 10.2 g/dL    12-16        L            

 

             HCT (test code = 4544-3) 32.9 %       37-47        L            

 

             MCV (test code = 787-2) 89.9 fL                          

 

             MCH (test code = 785-6) 27.9 pg      27-34                     

 

             MCHC (test code = 786-4) 31.0 g/dL    31-37                     

 

             RDW - SD (test code = 39.3 fL      37-55                     



             13561-0)                                            

 

             MPV (test code = 13102-4) 9.7 fL       8.8-13.2                  

 

             Platelet count (test code 228          150- 400 k/uL              



             = 07668-2)                                          

 

             Nucleated RBC (test code 0.00         /100 WBC                  



             = 17836-2)                                          

 

             Neutrophils (test code = 46.5 %       39-69                     



             42376-5)                                            

 

             Lymphocytes (test code = 42.5 %       25-45                     



             40175-2)                                            

 

             Monocytes (test code = 9.6 %        0-10                      



             26485-3)                                            

 

             Eosinophils (test code = 0.8 %        0-5                       



             53710-8)                                            

 

             Basophils (test code = 0.2 %        0-1                       



             04004-0)                                            

 

             Immature granulocytes 0.4 %        0-1                       "Immat

ure



             (test code = 48688-7)                                        granul

ocytes"



                                                                 (promyelocytes,



                                                                 myelocytes,



                                                                 metamyelocytes)

 

             Lab Interpretation (test Abnormal                               



             code = 61853-5)                                        



[facility] MethodistCytomegalovirus by PCR2019-10-20 16:16:03





             Test Item    Value        Reference Range Interpretation Comments

 

             Cytomegalovirus by PCR Not-Detected Not-Detected              



             (test code = 5000-5)              IU/mL                     

 

             Cytomegalovirus by PCR See link below                           Ellis

e Number:



             (test code = 1089) for PDF Lab                            [id number]

652



                          Report                                 



Shahzad EchevarriaHerpes simplex virus by PCR2019-10-20 13:52:59





             Test Item    Value        Reference Range Interpretation Comments

 

             Herpes virus, PCR Not-Detected Not-Detected              



             (test code =                                        



             35350-2)                                            

 

             Herpes virus, PCR See link below                           Case Num

martín:



             (test code = 1523) for PDF Lab                            [id number]

651



                          Report                                 



[facility]ad MethodistIgG synthesis rate study2019-10-20 11:40:39





             Test Item    Value        Reference Range Interpretation Comments

 

             IgG albumin ratio, CSF (test 0.17         0.00-0.23                

 



             code = 1588)                                        

 

             IgG index, CSF (test code = 0.64         0.01-0.63    H            



             16222-0)                                            

 

             IgG synthetic rate (test 2.12         -9.90 - 3.30 mg-day          

    



             code = 06099-0)                                        

 

             Q-albumin ratio, CSF (test 2.97         2.00-6.00                 



             code = 1756-6)                                        

 

             IgG, CSF (test code = 1.61 mg/dL   1-3                       



             2464-6)                                             

 

             Albumin, CSF (test code = <9.50        10-30        L            



             24778-2)                                            

 

             IgG (test code = 2465-3) 844 mg/dL    700-1600                  

 

             Albumin, S (test code = 3200.0 mg/dL 5232-5525    L            



             80405-9)                                            

 

             Lab Interpretation (test Abnormal                               



             code = 87974-8)                                        



[facility] MethodistECG 12 lead2019-10-20 10:29:25





             Test Item    Value        Reference Range Interpretation Comments

 

             Ventricular rate (test 86                                     



             code = 253)                                         

 

             Atrial rate (test code = 86                                     



             255)                                                

 

             [place] interval (test code = 144                                    



             266)                                                

 

             QRSD interval (test code 106                                    



             = 260)                                              

 

             QT interval (test code = 372                                    



             264)                                                

 

             QTC interval (test code 445                                    



             = 265)                                              

 

             P axis 1 (test code = 11                                     



             267)                                                

 

             QRS axis 1 (test code = 8                                      



             268)                                                

 

             T wave axis (test code = 8                                      



             270)                                                

 

             EKG impression (test Normal sinus                           



             code = 273)  rhythm-Nonspecific T                           



                          wave                                   



                          abnormality-Abnormal                           



                          ECG-No previous ECGs                           



                          available-Electronica                           



                          lly Signed By Chris Mar MD ([id number]) on                           



                          10/20/2019 10:29:22                           



                          AM                                     



[place] MethodistHemoglobin A1c2019-10-20 10:25:17





             Test Item    Value        Reference Range Interpretation Comments

 

             Hemoglobin A1C (test 5.7 %        4-5.6        H            HbA1c c

utoffs for



             code = 71896-7)                                        diagnosing



                                                                 diabetes:4.0% -

 5.6%



                                                                 = normal5.7% - 

6.4% =



                                                                 increased risk 

for



                                                                 diabetes



                                                                 (prediabetes)9>

=6.5%



                                                                 = vkgklqhi7Udcz

s for



                                                                 glycemic contro

l (ADA



                                                                 2016)< 7.0%  Ta

rget



                                                                 for nonpregnant



                                                                 adults with ernie

betes.



                                                                 More or less



                                                                 stringent targe

ts may



                                                                 be appropriate 

for



                                                                 individual reyes

ents.



                                                                 <7.5%   Target 

for



                                                                 Children and



                                                                 adolescents wit

h type



                                                                 1 diabetes.

 

             Lab Interpretation (test Abnormal                               



             code = 51354-5)                                        



[place] MethodistHepatitis acute panel2019-10-20 06:11:19





             Test Item    Value        Reference Range Interpretation Comments

 

             Hepatitis A IgM (test code = Non-reactive Non-reactive             

 



             73049-1)                                            

 

             Hepatitis B core IgM (test code Non-reactive Non-reactive          

    



             = 97643-3)                                          

 

             Hepatitis B surface Ag (test Non-reactive Non-reactive             

 



             code = 5195-3)                                        

 

             Hepatitis C Ab (test code = Non-reactive Non-reactive              



             61959-7)                                            



[place] MethodistC4 complement [id number] 05:10:56





             Test Item    Value        Reference Range Interpretation Comments

 

             C4 complement (test code = 4498-2) 20 mg/dL     10-40              

       



[place] MethodistC3 complement nddknyovd5129-54-47 05:10:56





             Test Item    Value        Reference Range Interpretation Comments

 

             C3 complement (test code = 4485-9) 165 mg/dL                 

       



[place] MethodistRheumatoid factor2019-10-20 05:10:55





             Test Item    Value        Reference Range Interpretation Comments

 

             Rheumatoid factor (test code = 67402-3) 13           0- 13 IU/mL   

            



[place] MethodistRespiratory pathogen panel2019-10-19 23:02:20Respiratory 
pathogen panelNegative for all pathogens tested:Negative for AdenovirusNegative 
for Coronavirus XNZ1Ompryqnx for Coronavirus KG79Xganlvdz for Coronavirus 
229ENegative for Coronavirus QY97Daidcaur for Human MetapneumovirusNegative for 
Rhinovirus/EnterovirusNegative for Influenza ANegative for Influenza 
A/H5Tmlhfzjp for Influenza A/O7Rrqdrntz for Influenza A/H1-2009Negative for 
Influenza BNegative for Parainfluenza Virus 1Negative for Parainfluenza Virus 
2Negative for Parainfluenza Virus 3Negative for Parainfluenza Virus 4Negative 
for Respiratory Syncytial VirusNegative for Bordetella pertussisNegative for 
Chlamydophila pneumoniaeNegative for Mycoplasma pneumoniaeThis real-time PCR 
assaydetects the presence of nucleic acids (RNA or DNA) for the respiratory 
pathogens listed.  A result of "Not-detected" does not exclude the possibility 
of the presence of one or more pathogens at concentrations less than the 
detectable limits of the assay. Comment: Specimen InformationSpecimen Source: Na
resSpecimen Site: [facility] MethodistProtein, CSF
2019-10-19 22:19:30





             Test Item    Value        Reference Range Interpretation Comments

 

             Protein, CSF (test code = 2880-3) 19 mg/dL     15-45               

      



[facility] 22:19:29





             Test Item    Value        Reference Range Interpretation Comments

 

             Glucose, CSF (test code = 2342-4) 63 mg/dL     40-70               

      



[facility]2019-10[facility]19 21:12:14





             Test Item    Value        Reference Range Interpretation Comments

 

             CSF tube number (test code Tube 1                                 



             = [id number])                                          

 

             Color, CSF (test code = Colorless                              



             36169-8)                                            

 

             Appearance, CSF (test code Clear                                  



             = 51110-9)                                          

 

             CSF supernatant (test code Colorless                              



             = 91932-7)                                          

 

             RBC, CSF (test code = >1           0- 1 /CMM                 



             45834-0)                                            

 

             WBC, CSF (test code = 2            0- 5 /CMM                 



             24103-3)                                            

 

             CSF mononuclear cell (test Footnote                               N

O DIFF REQUIRED



             code = 66805-3)                                        



[place] MethodistInfluenza antigen test, reflex negative to RPP2019-10-19 
20:21:52





             Test Item    Value        Reference Range Interpretation Comments

 

             Influenza    Negative for                           Specimen



             antigen (test Influenza A/B                           InformationSp

ecimen



             code = 12087-3) antigen.                               Source: Nare

sSpecimen



                                                                 Site: [facility] Lumbar Puncture by Radiology2019-10-19 17:46:33Hm Interface,
Radiology Results Incoming - 10/19/2019  5:49 PM CDTEXAMINATION: IR LUMBAR 
PUNCTURECLINICAL HISTORY: fevers headaches concern for meningitisCOMPARISON:  
NoneTECHNIQUE: Informed consent and a timeout was performed. The patient's lower
back was prepped and draped in the usual sterile fashion. Fluoroscopy was used 
for image guidance for lumbar puncture.FINDINGS:1% lidocaine was used for local 
anesthesia. Under direct fluoroscopic guidance, access to the thecal sac was 
made with a 22-gaugespinal needle at the L4-5 level. An elevated opening 
pressure of 25 cm H2O was measured. A total of approximately 20 cc of clear 
colorless CSF was removed without difficulty. A normal closing pressure of 12 cm
H2O was subsequently measured.The patient tolerated the procedure well. There 
were no complications. The sample was sent to the laboratory for analysis as 
requested.Total fluoroscopy time was 1second. No exposure images were 
taken.IMPRESSION:Successful fluoroscopic guided lumbar puncture including 
elevated opening pressure of 25 cm H2O and normal closing pressure of 12 cm 
H2O.[facility]-[id number] MethodistPartial thromboplastin time, activated
2019-10-19 15:47:05





             Test Item    Value        Reference Range Interpretation Comments

 

             PTT (test code = 37.5         23.0- 36.0 sec H            PTT thera

peutic range



             82044-8)                                            for unfractiona

maddy



                                                                 heparin is61.0-

112.0



                                                                 seconds which



                                                                 corresponds to



                                                                 Anti-Xa0.3-0.7 

U/ml.

 

             Lab Interpretation Abnormal                               



             (test code = 75745-4)                                        



[facility] MethodistProthrombin time with INR2019-10-19 15:46:18





             Test Item    Value        Reference Range Interpretation Comments

 

             Prothrombin time (test 15.4         11.5- 14.5 sec H            



             code = 5902-2)                                        

 

             INR (test code = 1.2                                    [facility]



             89799-8)                                            Normalized Rati

o (INR)



                                                                 is a therapeuti

c



                                                                 monitoring tool

 for



                                                                 patients who ar

e



                                                                 stable on oral



                                                                 anticoagulant t

herapy.



                                                                 An INR of 2.0-3

.0 is



                                                                 suggested for d

eep



                                                                 vein



                                                                 thrombosis/pulm

onary



                                                                 embolism.

 

             Lab Interpretation Abnormal                               



             (test code = 32494-2)                                        



[facility]2019-10-[facility] 11:45:08





             Test Item    Value        Reference Range Interpretation Comments

 

             KUSH screen (test code = 550) Negative     Negative                 

 



[facility] Chest 1 Vw Portable2019-10-19 10:24:41Hm Interface, 
Radiology Results Incoming - 10/19/2019 10:27 AM CDTEXAMINATION:  XR CHEST 1 VW 
PORTABLECLINICAL HISTORY:  FeversCOMPARISON:  NoneIMPRESSION:Normal single view 
chestThe lungs are hypoexpanded but clear.The heart is not enlarged.The bony 
structures are within normal limits.[id number] MethodistHIV Ag/Ab 
combination2019-10-19 08:20:40





             Test Item    Value        Reference Range Interpretation Comments

 

             HIV Ag/Ab combination (test code Non-reactive Non-reactive         

     



             = 5299)                                             



[facility] MethodistInfectious mononucleosis screen2019-10-19 08:17:05





             Test Item    Value        Reference Range Interpretation Comments

 

             Heterophile Ab screen (test code = Negative     Negative           

       



             6425-3)                                             



[facility] BahaiC-reactive protein2019-10-19 06:19:15





             Test Item    Value        Reference Range Interpretation Comments

 

             CRP (test code = 1988-5) 21.35 mg/dL  0-0.5        H            

 

             Lab Interpretation (test code = Abnormal                           

    



             44387-9)                                            



[place] MethodistSedimentation rate2019-10-19 06:10:08





             Test Item    Value        Reference Range Interpretation Comments

 

             Sedimentation rate (test code = 26           0- 20 mm/hr  H        

    



             04596-8)                                            

 

             Lab Interpretation (test code = Abnormal                           

    



             15116-0)                                            



[place] BahaiVitamin B12 level2019-10-19 03:47:24





             Test Item    Value        Reference Range Interpretation Comments

 

             Vitamin B12 (test 357 pg/mL    211-946                   Significan

t overlap



             code = 2132-9)                                        exists betwee

n normal



                                                                 and deficiency



                                                                 states.However,

 most



                                                                 patients with



                                                                 deficiencies wi

ll have



                                                                 Serum B12    <2

00 pg/mL.



                                                                 



                                                                 



                                                                 



[place] BahaiFolate [id number] 03:47:24





             Test Item    Value        Reference Range Interpretation Comments

 

             Folate (test code = 2284-8) 8.3 ng/mL    4.8-24.2                  



[facility] BahaiUrinalysis screen and microscopy, with reflex to culture
2019-10-19 02:20:58





             Test Item    Value        Reference Range Interpretation Comments

 

             Specimen site (test code = Clean catch                            



             7445037)                                            

 

             Color, UA (test code = 5778-6) Straw                               

   

 

             Appearance, UA (test code = Hazy                                   



             5767-9)                                             

 

             Specific gravity, UA (test code = 1.008        1.001-1.035         

      



             5811-5)                                             

 

             pH, UA (test code = 5803-2) 6.0          5.0-8.5                   

 

             Protein, UA (test code = 81631-5) Negative     Negative            

      

 

             Glucose, UA (test code = 63892-3) Negative     Negative            

      

 

             Ketones, UA (test code = 2514-8) 1+           Negative     A       

     

 

             Bilirubin, UA (test code = Negative     Negative                  



             5770-3)                                             

 

             Blood, UA (test code = 5794-3) Small        Negative     A         

   

 

             Nitrite, UA (test code = 5802-4) Negative     Negative             

     

 

             Urobilinogen, UA (test code = <2.0         <2.0                    

  



             02637-9)                                            

 

             Leukocyte esterase, UA (test code Negative     Negative            

      



             = 5799-2)                                           

 

             Epithelial cells, UA (test code = 5            /HPF                

      



             5787-7)                                             

 

             WBC, UA (test code = 5821-4) 6            0- 4 /HPF    H           

 

 

             RBC, UA (test code = 40688-2) 2            0- 5 /HPF               

  

 

             Bacteria, UA (test code = Few          None seen                 



             25391-8)                                            

 

             Yeast, UA (test code = 46914-5) Few                       A        

    

 

             Yeast with pseudohyphae, UA (test None seen                        

      



             code = 32219-9)                                        

 

             Lab Interpretation (test code = Abnormal                           

    



             66350-4)                                            



[place] MethodistFerritin level2019-10-19 01:26:23





             Test Item    Value        Reference Range Interpretation Comments

 

             Ferritin level (test code = 2276-4) 239 ng/mL           H    

        

 

             Lab Interpretation (test code = Abnormal                           

    



             58252-9)                                            



[place] MethodistThyroid stimulating hormone2019-10-19 01:26:23





             Test Item    Value        Reference Range Interpretation Comments

 

             TSH (test code = 3016-3) 0.84         0.27- 4.20 uIU/mL            

  



[place] MethodistMagnesium level2019-10-19 01:23:05





             Test Item    Value        Reference Range Interpretation Comments

 

             Magnesium (test code = 17761-8) 1.9 mg/dL    1.6-2.6               

    



[place] MethodistPhosphorus level2019-10-19 01:23:03





             Test Item    Value        Reference Range Interpretation Comments

 

             Phosphorus (test code = 2777-1) 2.9 mg/dL    2.4-4.5               

    



[place] MethodistTotal iron binding capacity2019-10-19 01:21:50





             Test Item    Value        Reference Range Interpretation Comments

 

             Iron level (test code = 2498-4) 14 ug/dL            L        

    

 

             Iron binding capacity (test code = 268 ug/dL    200-400            

       



             2500-7)                                             

 

             % Saturation (test code = 2502-3) 5.2 %        15-38        L      

      

 

             Lab Interpretation (test code = Abnormal                           

    



             98810-9)                                            



[facility] EchevarriaLDH2019-10-19 01:21:49





             Test Item    Value        Reference Range Interpretation Comments

 

             LDH (test code = 59281-0) 155 U/L                          



[facility] EchevarriaHaptoglobin2019-10-19 01:17:49





             Test Item    Value        Reference Range Interpretation Comments

 

             Haptoglobin (test code = 4542-7) 261 mg/dL           H       

     

 

             Lab Interpretation (test code = Abnormal                           

    



             51494-8)                                            



[facility] EchevarriaLactic acid level2019-10-19 01:14:29





             Test Item    Value        Reference Range Interpretation Comments

 

             Lactic acid (test code = 00173-8) 1.5 mmol/L   0.5-2.2             

      



Shahzad Echevarria

## 2023-07-27 ENCOUNTER — HOSPITAL ENCOUNTER (EMERGENCY)
Dept: HOSPITAL 97 - ER | Age: 28
Discharge: HOME | End: 2023-07-27
Payer: SELF-PAY

## 2023-07-27 VITALS — DIASTOLIC BLOOD PRESSURE: 77 MMHG | SYSTOLIC BLOOD PRESSURE: 124 MMHG | OXYGEN SATURATION: 98 %

## 2023-07-27 VITALS — TEMPERATURE: 97.5 F

## 2023-07-27 DIAGNOSIS — Z87.442: ICD-10-CM

## 2023-07-27 DIAGNOSIS — N20.0: Primary | ICD-10-CM

## 2023-07-27 DIAGNOSIS — Z88.5: ICD-10-CM

## 2023-07-27 DIAGNOSIS — Z88.0: ICD-10-CM

## 2023-07-27 DIAGNOSIS — N13.30: ICD-10-CM

## 2023-07-27 LAB
ALBUMIN SERPL BCP-MCNC: 4.3 G/DL (ref 3.4–5)
ALP SERPL-CCNC: 74 U/L (ref 45–117)
ALT SERPL W P-5'-P-CCNC: 183 U/L (ref 13–56)
AST SERPL W P-5'-P-CCNC: 63 U/L (ref 15–37)
BUN BLD-MCNC: 10 MG/DL (ref 7–18)
GLUCOSE SERPLBLD-MCNC: 132 MG/DL (ref 74–106)
HCT VFR BLD CALC: 41.4 % (ref 36–45)
LIPASE SERPL-CCNC: 91 U/L (ref 13–75)
LYMPHOCYTES # SPEC AUTO: 1.7 K/UL (ref 0.7–4.9)
MCV RBC: 87 FL (ref 80–100)
PMV BLD: 7.8 FL (ref 7.6–11.3)
POTASSIUM SERPL-SCNC: 3.8 MEQ/L (ref 3.5–5.1)
RBC # BLD: 4.76 M/UL (ref 3.86–4.86)

## 2023-07-27 PROCEDURE — 99284 EMERGENCY DEPT VISIT MOD MDM: CPT

## 2023-07-27 PROCEDURE — 81001 URINALYSIS AUTO W/SCOPE: CPT

## 2023-07-27 PROCEDURE — 96375 TX/PRO/DX INJ NEW DRUG ADDON: CPT

## 2023-07-27 PROCEDURE — 81025 URINE PREGNANCY TEST: CPT

## 2023-07-27 PROCEDURE — 83690 ASSAY OF LIPASE: CPT

## 2023-07-27 PROCEDURE — 85025 COMPLETE CBC W/AUTO DIFF WBC: CPT

## 2023-07-27 PROCEDURE — 36415 COLL VENOUS BLD VENIPUNCTURE: CPT

## 2023-07-27 PROCEDURE — 80053 COMPREHEN METABOLIC PANEL: CPT

## 2023-07-27 PROCEDURE — 74176 CT ABD & PELVIS W/O CONTRAST: CPT

## 2023-07-27 PROCEDURE — 96374 THER/PROPH/DIAG INJ IV PUSH: CPT

## 2023-07-27 PROCEDURE — 96361 HYDRATE IV INFUSION ADD-ON: CPT

## 2023-07-27 NOTE — RAD REPORT
EXAM DESCRIPTION:  CTAbdomen   Pelvis Wo Contrast - 7/27/2023 7:26 pm

 

CLINICAL HISTORY:

FLANK PAIN

 

COMPARISON:  [facility] Protocol dated 5/31/2020; [facility] dated 10/18/2019; Abdomen   Pelvis Wo Con
trast dated 8/17/2019; [facility] dated 7/30/2019

 

TECHNIQUE:  CT of the abdomen and pelvis was performed.

 

All CT scans are performed using dose optimization technique as appropriate and may include automated
 exposure control or mA/KV adjustment according to patient size.

 

FINDINGS:  Lower chest: Unremarkable

Liver: Hepatic steatosis.

Biliary: Cholelithiasis. No CT evidence of acute cholecystitis.

Stomach: No significant focal abnormality.

Duodenum: No significant focal abnormality.

Pancreas: No significant abnormality.

Spleen: No significant abnormality.

Adrenal: No suspicious lesions.

Kidney/ureter: Staghorn calculus in left kidney. . Mild left-sided hydronephrosis.

Retroperitoneum: No retroperitoneal adenopathy.

Vascular: No aneurysm.

Bowel: No significant focal abnormality. Normal appendix

Peritoneum: No ascites or free air.

Bladder: Grossly unremarkable.

Reproductive: No adnexal masses.

Bones: No acute fracture.

Other: n/a

 

IMPRESSION:  Staghorn calculi in the left kidney with increased stone burden compared with 05/31/2020
. Mild left-sided hydronephrosis is present. This could be secondary to either a patulous collecting 
system or some component of a UPJ obstruction. No ureteral calculi identified.

## 2023-07-27 NOTE — EDPHYS
Physician Documentation                                                                           

 [facility]                                                                 

Name: Selin Cui                                                                           

Age: 27 yrs                                                                                       

Sex: Female                                                                                       

: 1995                                                                                   

MRN: [id number]                                                                                   

Arrival Date: 2023                                                                          

Time: 17:18                                                                                       

Account#: [id number]                                                                            

Bed 12                                                                                            

Private MD:                                                                                       

ED Physician Pantera Munoz                                                                         

HPI:                                                                                              

                                                                                             

17:46 This 27 yrs old  Female presents to ER via Ambulatory with complaints of Back   sp3 

      Pain, Nausea.                                                                               

17:46 27-year-old female with a history of kidney stones presents to the ED with left-sided   sp3 

      flank pain for greater than 24 hours. Patient states it feels like her classic kidney       

      stone pattern with cramping pain radiating around to the front side. She denies any         

      other symptoms including fever, headache, chest pain, shortness of breath,                  

      intra-abdominal pain, vomiting or diarrhea, rash, known sick contacts, travel history,      

      or any other signs or symptoms at this time. She also denies dysuria, urinary frequency     

      or any other UTI related symptoms. No mario hematuria reported. ROS otherwise negative..    

                                                                                                  

OB/GYN:                                                                                           

17:29 LMP 7/10/2023                                                                           aa5 

                                                                                                  

Historical:                                                                                       

- Allergies:                                                                                      

17:29 PENICILLINS;                                                                            aa5 

17:29 Hydrocodone-Acetaminophen (Upset stomach);                                              aa5 

- Home Meds:                                                                                      

17:29 None [Active];                                                                          aa5 

- PMHx:                                                                                           

17:29 HYDRONEPHROSIS; Kidney stones;                                                          aa5 

- PSHx:                                                                                           

17:29 kidney stones removed;                                                                  aa5 

                                                                                                  

- Immunization history:: Adult Immunizations unknown.                                             

- Social history:: Smoking status: Patient denies any tobacco usage or history of.                

                                                                                                  

                                                                                                  

ROS:                                                                                              

17:47 Constitutional: Negative for fever, chills, and weight loss, Eyes: Negative for injury, sp3 

      pain, redness, and discharge, ENT: Negative for injury, pain, and discharge, Neck:          

      Negative for injury, pain, and swelling, Cardiovascular: Negative for chest pain,           

      palpitations, and edema, Respiratory: Negative for shortness of breath, cough,              

      wheezing, and pleuritic chest pain, MS/Extremity: Negative for injury and deformity,        

      Skin: Negative for injury, rash, and discoloration, Neuro: Negative for headache,           

      weakness, numbness, tingling, and seizure, Psych: Negative for depression, anxiety,         

      suicide ideation, homicidal ideation, and hallucinations, Allergy/Immunology: Negative      

      for hives, rash, and allergies, Endocrine: Negative for neck swelling, polydipsia,          

      polyuria, polyphagia, and marked weight changes.                                            

17:47 All other systems are negative.                                                             

                                                                                                  

Exam:                                                                                             

17:47 Constitutional:  This is a well developed, well nourished patient who is awake, alert,  sp3 

      and in no acute distress. Head/Face:  Normocephalic, atraumatic. Eyes:  Pupils equal        

      round and reactive to light, extra-ocular motions intact.  Lids and lashes normal.          

      Conjunctiva and sclera are non-icteric and not injected.  Cornea within normal limits.      

      Periorbital areas with no swelling, redness, or edema. ENT:  Nares patent. No nasal         

      discharge, no septal abnormalities noted.  External auditory canals are clear.              

      Oropharynx with no redness, swelling, or masses, exudates, or evidence of obstruction,      

      uvula midline.  Mucous membranes moist. Neck:  Trachea midline, no thyromegaly or           

      masses palpated, and no cervical lymphadenopathy.  Supple, full range of motion without     

      nuchal rigidity, or vertebral point tenderness.  No Meningismus. Chest/axilla:  Normal      

      chest wall appearance and motion.  Nontender with no deformity.  No lesions are             

      appreciated. Cardiovascular:  Regular rate and rhythm with a normal S1 and S2.  No          

      gallops, murmurs, or rubs.  Normal PMI, no JVD.  No pulse deficits. Respiratory:  Lungs     

      have equal breath sounds bilaterally, clear to auscultation and percussion.  No rales,      

      rhonchi or wheezes noted.  No increased work of breathing, no retractions or nasal          

      flaring. Skin:  Warm, dry with normal turgor.  Normal color with no rashes, no lesions,     

      and no evidence of cellulitis. MS/ Extremity:  Pulses equal, no cyanosis.                   

      Neurovascular intact.  Full, normal range of motion. Neuro:  Awake and alert, GCS 15,       

      oriented to person, place, time, and situation.  Cranial nerves II-XII grossly intact.      

      Motor strength 5/5 in all extremities.  Sensory grossly intact.  Cerebellar exam            

      normal.  Normal gait. Psych:  Awake, alert, with orientation to person, place and time.     

       Behavior, mood, and affect are within normal limits.                                       

17:47 Abdomen/GI: Left-sided CVA tenderness noted.  Mild pain on the left abdomen without         

      peritoneal signs, rebound or guarding..                                                     

                                                                                                  

Vital Signs:                                                                                      

17:27  / 100; Pulse 77; Resp 20 S; Temp 97.5(TE); Pulse Ox 99% on R/A; Weight 77.11 kg  aa5 

      (R); Height 5 ft. 2 in. (R);                                                                

19:02  / 87; Pulse 76; Resp 18; Pulse Ox 99% on R/A;                                    aa5 

19:52  / 77; Pulse 80; Resp 16; Pulse Ox 98% on R/A;                                    eh3 

17:27 Body Mass Index 31.09 (77.11 kg, 157.48 cm)                                             aa5 

                                                                                                  

MDM:                                                                                              

17:31 Patient medically screened.                                                             sp3 

17:48 Data reviewed: vital signs, nurses notes, old medical records, lab test result(s),      sp3 

      radiologic studies. ED course: To consider kidney stone versus UTI versus                   

      pyelonephritis. I am not highly suspicious for intestinal/GI pathology, GYN pathology,      

      sepsis, shock, vascular pathology including aortic aneurysm or dissection, or any other     

      critical pathology at this time. Will obtain CT scan of the abdomen pelvis kidney stone     

      protocol, laboratory values, urine analysis and administer normal saline, ketorolac IV,     

      ondansetron IV, and general observation. Disposition pending work-up and patient            

      course..                                                                                    

20:00 ED course: Patient CT scan demonstrates staghorn calculus with no ureteral stones.      sp3 

      There is mild hydronephrosis. Urine analysis demonstrates positive red cells and            

      oxalate crystals. I believe patient's kidney stone is passed and she feels much better      

      at this point and believes she felt to pass as well. There is no signs of infection. We     

      will safely discharge her home on p.o. diclofenac and follow-up with her urologist..        

                                                                                                  

                                                                                             

17:32 Order name: CBC with Diff; Complete Time: 18:29                                         sp3 

                                                                                             

17:32 Order name: CMP; Complete Time: 19:36                                                   sp3 

                                                                                             

17:32 Order name: Lipase; Complete Time: 19:36                                                sp3 

                                                                                             

17:32 Order name: Pregnancy Test, Urine; Complete Time: 19:36                                 sp3 

                                                                                             

17:32 Order name: Urinalysis w/ reflexes; Complete Time: 19:36                                sp3 

                                                                                             

17:32 Order name: CT Abd/Pelvis - Without Contrast; Complete Time: 19:45                      sp3 

                                                                                             

17:32 Order name: IV Saline Lock; Complete Time: 18:08                                        sp3 

                                                                                             

17:32 Order name: Labs collected and sent; Complete Time: 18:08                               sp3 

                                                                                                  

Administered Medications:                                                                         

18:03 Drug: NS 0.9% IV 1000 ml Route: IV; Rate: 1 bolus; Site: right antecubital;             ph  

19:02 Follow up: Response: No adverse reaction; IV Status: Completed infusion; IV Intake:     aa5 

      1000ml                                                                                      

18:03 Drug: TORadol - Ketorolac IVP 15 mg Route: IVP; Site: right antecubital;                ph  

19:01 Follow up: Response: No adverse reaction                                                aa5 

18:03 Drug: Ondansetron IVP 4 mg Route: IVP; Site: right antecubital;                         ph  

19:01 Follow up: Response: No adverse reaction                                                aa5 

18:40 Drug: morphine IVP or IV 4 mg Route: IVP; Infused Over: 4 mins; Site: right antecubital;aa5 

19:40 Follow up: Response: No adverse reaction; Pain is unchanged, physician notified         eh3 

19:53 Drug: HYDROmorphone IVP 1 mg Route: IVP; Site: right antecubital;                       eh3 

20:17 Follow up: Response: No adverse reaction; Pain is decreased                             eh3 

                                                                                                  

                                                                                                  

Disposition Summary:                                                                              

23 20:01                                                                                    

Discharge Ordered                                                                                 

      Location: Home                                                                          sp3 

      Condition: Stable                                                                       sp3 

      Diagnosis                                                                                   

        - Kidney stone, passed ureteral stone, hydronephrosis, flank pain                     sp3 

      Followup:                                                                               sp3 

        - With: Private Physician                                                                  

        - When: Upon discharge from the Emergency Department                                       

        - Reason: Recheck today's complaints, Continuance of care                                  

      Discharge Instructions:                                                                     

        - Discharge Summary Sheet                                                             sp3 

        - Kidney Stones                                                                       sp3 

      Forms:                                                                                      

        - Medication Reconciliation Form                                                      sp3 

        - Thank You Letter                                                                    sp3 

        - Antibiotic Education                                                                sp3 

        - Prescription Opioid Use                                                             sp3 

        - Patient Portal Instructions                                                         sp3 

      Prescriptions:                                                                              

        - Diclofenac Sodium 75 mg Oral Tablet Sustained Release                                    

            - take 1 tablet by ORAL route 2 times per day; 30 tablet; Refills: 0, Product     sp3 

      Selection Permitted                                                                         

Signatures:                                                                                       

Dispatcher [employer]                           Ann Marie Salcido RN                     RN   aa5                                                  

Annie Benites RN RN ph Patel, Setul, MD MD   sp3                                                  

Dayan Benites RN                          RN   3                                                  

                                                                                                  

**************************************************************************************************

## 2023-07-27 NOTE — XMS REPORT
Continuity of Care Document

                            Created on:2023



Patient:ABEBA REYES

Sex:Female

:1995

External Reference #:[id number]





Demographics







                          Address                   [address] 



                                                    [place] [address]

 

                          Home Phone                [phone]

 

                          Work Phone                [phone]

 

                          Mobile Phone              [phone]

 

                          Email Address             [email or web address]

 

                          Preferred Language        English

 

                          Marital Status            Unknown

 

                          Shinto Affiliation     Unknown

 

                          Race                      Unknown

 

                          Additional Race(s)        Unavailable



                                                    White



                                                    Unavailable

 

                          Ethnic Group              Unknown









Author







                          Organization              [facility]

t

 

                          Address                   [address]



                                                    [place] [address]

 

                          Phone                     [phone]









Support







                Name            Relationship    Address         Phone

 

                Gudelia Galvan Grandparent     [address]   [phone]



                                                [place] [address] 

 

                PATIENT, NO ONE ELSE PER Other           [phone]

[phone]



                                                [place] [address] 

 

                one else per patient, No Unavailable     Unavailable     Unavail

able

 

                HUMBERTO GALVANRIA Unavailable     [phone]       [phone]



                                                [place] [address] 

 

                NONE, OTHER     Unavailable     [phone]



                                                [place] [address] 

 

                SIERRA SPENCE    Unavailable     [phone]       [phone]



                                                [place] [address] 

 

                SIERRA SPENCE    LP              [address]   [phone]



                                                [place] [address] 

 

                Rebeca Reyes   Parent          Unavailable     [phone]

 

                Geoff Reyes   Parent          [address]    [phone]



                                                [place] [address] 









Care Team Providers







                    Name                Role                Phone

 

                    Pcp, Patient Does Not Have A Primary Care Physician [phone]



 

                    Fabiola Camarena     Attending Clinician Unavailable

 

                    Leyda Ramirez     Attending Clinician Unavailable

 

                    Amalia Arechiga MD   Attending Clinician [phone]

 

                    Paul Erwin Attending Clinician [phone]

 

                    Unknown, Attending  Attending Clinician Unavailable

 

                    PAUL AHMADI   Attending Clinician Unavailable

 

                    Doctor Unassigned, No Name Attending Clinician Unavailable

 

                    Lanie Johnson  Attending Clinician Unavailable

 

                    MD AMALIA ARECHIGA   Attending Clinician Unavailable

 

                    SHAI DAY      Attending Clinician Unavailable

 

                    DO STEFANO HAMEED Attending Clinician Unavailable

 

                    Fabiola Camarena     Admitting Clinician Unavailable

 

                    Leyda Ramirez     Admitting Clinician Unavailable

 

                    Physician, No Primary or Family Admitting Clinician UnavailAMALIA Mccann      Admitting Clinician Unavailable

 

                    MD AMALIA ARECHIGA   Admitting Clinician Unavailable

 

                    FABIAN CROWLEY     Admitting Clinician Unavailable

 

                    DO STEFANO HAMEED Admitting Clinician Unavailable









Payers







           Payer Name Policy Type Policy Number Effective Date Expiration Date S

ource







Problems







       Condition Condition Condition Status Onset  Resolution Last   Treating Co

mments 

Source



       Name   Details Category        Date   Date   Treatment Clinician        



                                                 Date                 

 

       Pyelonephr Pyelonephr Disease Active 2021                             M

ethodi



       itis   itis                 1-                               st



       affecting affecting               00:00:                             Hosp

halle



       pregnancy pregnancy               00                                 l



       in first in first                                                  



       trimester trimester                                                  

 

       Calculus Calculus Disease Active 2020                             [facility]

di



       of kidney of kidney               030                               st



                                   00:00:                             Hospita



                                   00                                 l

 

       Fever  Fever  Disease Active 2019                             [facility]



                                   018                               st



                                   00:00:                             Hospita



                                   00                                 l

 

       UPJ    UPJ    Disease Active                              [facility]



       obstructio obstructio               903                               st



       n,     n,                   00:00:                             Hospita



       congenital congenital               00                                 l

 

       Complicate Complicate Disease Active                              M

ethodi



       d UTI  d UTI                820                               st



       (urinary (urinary               00:00:                             Hospit

a



       tract  tract                00                                 l



       infection) infection)                                                  

 

       Hydronephr Hydronephr Disease Active                              M

ethodi



       osis, left osis, left               7-19                               st



                                   00:00:                             Hospita



                                   00                                 l

 

       Acute pain Acute pain Disease Active                              M

ethodi



       of left of left               914                               st



       shoulder shoulder               00:00:                             Hospit

a



                                   00                                 l

 

       Left   Left   Disease Active                              [facility]



       ureteral ureteral               504                               st



       stone  stone                00:00:                             Hospita



                                   00                                 l

 

       Hospital Hospital Diagnosis Active                                    Com

mon



       discharge discharge                                                  Spir

it



       follow-up follow-up                                                  - [facility]

I



                                                                      [facility]

 

       Fatigue, Fatigue, Diagnosis Active                                    Com

mon



       unspecifie unspecifie                                                  Sp

cari



       d type d type                                                  - [facility]



                                                                      [facility]

 

       No known No known Disease                                           Unive

rs



       active active                                                  ity of



       problems problems                                                  [facility]







Allergies, Adverse Reactions, Alerts







       Allergy Allergy Status Severity Reaction(s) Onset  Inactive Treating Comm

ents 

Source



       Name   Type                        Date   Date   Clinician        

 

       P[place]ill DA     Active MI     hot/                         [facility]



       ins                         vomiting 5-02                        [facility]



                                          [facility]                      [facility]



                                          00                          [facility] of



                                                                      [place]

 

       [facility]ill DA     Active MI     hot/   -                      [facility]



       ins                         vomiting 5-01                        [facility]



                                          [facility]                      [facility]



                                          00                          l of



                                                                      [place]

 

       P[facility]ill DA     Active MI     hot/                         [facility]



       ins                         vomiting 3-24                        [facility]



                                          [facility]                      [facility]



                                          00                          l of



                                                                      [place]

 

       Penicill Propensi Active        Hives  2021               Nausea Method

i



       ins    ty to                       0-31                        st



              adverse                      00:00:                      Hospita



              reaction                      00                          l



              s to                                                    



              drug                                                    

 

       No Known DA     Active U             -                      [facility]



       Allergie                             2-21                        [facility]



       [facility]                                  [facility]:                      [facility]



                                          00                          l of



                                                                      [place]

 

       No Known DA     Active U                                   [facility]



       Allergie                             2-21                        [facility]



       [facility]                                  [facility]:                      [facility]



                                          00                          l of



                                                                      [place]

 

       No Known DA     Active U                                   [facility]



       Allergie                             1-25                        [facility]



       [facility]                                  [facility]:                      [facility]



                                          00                          l of



                                                                      [place]

 

       No Known DA     Active U                                   [facility]



       Allergie                             1-25                        [facility]



       [facility]                                  [facility]:                      [facility]



                                          00                          l of



                                                                      [place]

 

       No Known DA     Active U             -                      [facility]



       Allergie                             0-06                        [facility]



       [facility]                                  00[facility]:                      [facility]



                                          00                          l of



                                                                      [place]

 

       No Known DA     Active U             2020                      [facility]



       Allergie                             0-06                        [facility]



       [facility]                                  00[facility]00:                      [facility]



                                          00                          l of



                                                                      [place]

 

       Hydrocod Propensi Active        GI     -               Nausea Method

i



       one    ty to                Intolerance 7-16                        st



              adverse                      00:00:                      Hospita



              reaction                      00                          l



              s to                                                    



              drug                                                    

 

       NO KNOWN Drug   Active                                           [facility]



       ALLERGIE Class                                                   [facility]



       S                                                              [facility]







Family History







           Family Member Diagnosis  Comments   Start Date Stop Date  Source

 

           Natural father Diabetes                                    [facility]

 

           Natural mother Seizures                                    [facility]







Social History







           Social Habit Start Date Stop Date  Quantity   Comments   Source

 

           ASSERTION  2020            Pregnancy             [facility]



                      00:00:00                                    [facility]

 

           Gender identity                                             [facility]

 

           Sexual orientation                                             [facility]

ist



                                                                  [facility]ospital

 

           History of Social 2023                       [facility]

st



           function   00:00:00   00:00:00                         Hospital

 

           Exposure to 2022-10-26 2022 Not sure              [facility]



           SARS-CoV-2 (event) 00:00:00   16:40:00                         [facility]

 

           Alcohol intake 2021-11-15 2021-11-15 0 /d                  [facility]



                      00:00:00   00:00:00                         Hospital

 

           Tobacco use and 2021 Smokeless             [facility]



           exposure   00:00:00   00:00:00   tobacco non-user            Hospital

 

           Alcohol Comment 2019-10-19 2019-10-19 8 drinks on            [facility]

t



                      00:00:00   00:00:00   weekends              Hospital

 

           Sex Assigned At [birth year]                       [facility]



           Birth      00:00:00   00:00:00                         Hospital









                Smoking Status  Start Date      Stop Date       Source

 

                Tobacco smoking consumption                                 [facility]



                unknown                                         [facility]

 

                Never smoked tobacco                                 [facility]

ospital







Medications







       Ordered Filled Start  Stop   Current Ordering Indication Dosage Frequency

 Signature

                    Comments            Components          Source



     Medication Medication Date Date Medication? Clinician                (SIG) 

          



     Name Name                                                   

 

     fexofenadin      2022      Yes       [id number] 1{tbl}      Take 1         

  [facility]



     e-pseudoeph      1-05                               tablet by           ity

 of



     edrine      00:00:                               mouth in           [place]



     (ALLEGRA)      00                                 the            Medical



      mg                                         morning           [facility]



     per tablet                                         and 1           



                                                  tablet in           



                                                  the            



                                                  evening.           

 

     fluticasone      2022      Yes       [id number] 2{spray      Use 2         

  [facility]



     propionate      1-05                     }         Sprays in           ity 

of



     50        00:00:                               each           [place]



     mcg/actuati      00                                 nostril in           Me

dical



     on nasal                                         the            [facility]



     spray                                         morning.           

 

     fexofenadin      2022      Yes       [id number] 1{tbl}      Take 1         

  [facility]



     e-pseudoeph      1-05                               tablet by           ity

 of



     edrine      00:00:                               mouth in           [place]



     (ALLEGR)      00                                 the            Medical



      mg                                         morning           [facility]



     per tablet                                         and 1           



                                                  tablet in           



                                                  the            



                                                  evening.           

 

     fluticasone      2022      Yes       [id number] 2{spray      Use 2         

  [facility]



     propionate      1-05                     }         Sprays in           ity 

of



     50        00:00:                               each           [place]



     mcg/actuati      00                                 nostril in           Me

dical



     on nasal                                         the            [facility]



     spray                                         morning.           

 

     amoxicillin      2022- No        [id number] 1{tbl}      Take 1        

   [facility]



     -clavulanat      1-05 11-16                          tablet by           it

y of



     e         00:00: 05:59                          mouth in           [place]



     (AUGMENTIN)      00   :00                           the            Medical



     875-125 mg                                         morning           [facility]



     per tablet                                         and 1           



                                                  tablet in           



                                                  the            



                                                  evening.           



                                                  Do all           



                                                  this for           



                                                  10 days.           

 

     prenatal      2021      Yes            1{tbl} QD   Take 1           [facility]

di



     vit,calc76-      1-10                               tablet by           st



     iron-folic      13:06:                               mouth           Hospit

a



     29 mg iron-      31                                 daily.           l



     1 mg tablet                                                        



     per tablet                                                        

 

     naproxen            Yes       [id number] 250mg      Take 1           U

nivers



     (NAPROSYN)      9-22                               tablet by           ity 

of



     250 mg      00:00:                               mouth 2           [place]



     tablet      00                                 (two)           Medical



                                                  times           [facility]



                                                  daily with           



                                                  meals.           

 

     medroxyPROG            Yes       [id number]           Take 1 tab       

    [facility]



     ESTERone      9-22                               po TID x 7           ity o

f



     (PROVERA)      00:00:                               days           [place]



     10 mg      00                                                Medical



     tablet                                                        [facility]

 

     naproxen            Yes       [id number] 250mg      Take 1           U

nivers



     (NAPROSYN)      9-22                               tablet by           ity 

of



     250 mg      00:00:                               mouth 2           [place]



     tablet      00                                 (two)           Medical



                                                  times           [facility]



                                                  daily with           



                                                  meals.           

 

     medroxyPROG            Yes       [id number]           Take 1 tab       

    [facility]



     ESTERone      9-22                               po TID x 7           ity o

f



     (PROVERA)      00:00:                               days           [place]



     10 mg      00                                                Medical



     tablet                                                        [facility]

 

     naproxen            Yes       [id number] 250mg      Take 1           U

nivers



     (NAPROSYN)      9-22                               tablet by           ity 

of



     250 mg      00:00:                               mouth 2           [place]



     tablet      00                                 (two)           Medical



                                                  times           [facility]



                                                  daily with           



                                                  meals.           

 

     medroxyPROG            Yes       [id number]           Take 1 tab       

    [facility]



     ESTERone      9-22                               po TID x 7           ity o

f



     (PROVERA)      00:00:                               days           [place]



     10 mg      00                                                Medical



     tablet                                                        [facility]

 

     Norethindro      2015      Yes       [id number] 1{tbl}      Take 1 Tab     

      [facility]



     ne        1-12                               by mouth           ity of



     Acet-Ethiny      00:00:                               daily.           [place]

[facility]



     l Est      00                                                [facility]



     (LOESTRIN                                                        [facility]



     ,) 1.5-30                                                        



     mg-mcg per                                                        



     tablet                                                        

 

     Norethindro      2015      Yes       [id number] 1{tbl}      Take 1 Tab     

      [facility]



     ne        1-12                               by mouth           ity of



     Acet-Ethiny      00:00:                               daily.           [facility]

[facility]



     l Est      00                                                Medical



     (LOESTRIN                                                        [facility]



     ,) 1.5-30                                                        



     mg-mcg per                                                        



     tablet                                                        

 

     Norethindro      2015      Yes       [id number] 1{tbl}      Take 1 Tab     

      [facility]



     ne        1-12                               by mouth           ity of



     Acet-Ethiny      00:00:                               daily.           [place]

s



     l Est      00                                                Medical



     (LOESTRIN                                                        Branch



     1.5/30,                                                        



     21,) 1.5-30                                                        



     mg-mcg per                                                        



     tablet                                                        

 

     Ondansetron Ondansetron           Yes  Rita                1 tablet       

    Common



                              [place]                on the           Spirit



                                                  tongue and           - [facility]



                                                  allow to           [facility]

 

     Oxybutynin Oxybutynin           Yes  Rita                as             Co

mmon



     Chloride Chloride                [place]                directed           Sp

cari



                                                                 - [facility]

 

     Ferrous Ferrous           Yes  Rita                as             Common



     Sulfate Sulfate                [place]                directed           Spir

it



                                                                 - [facility]

 

     Methocarbam Methocarbam           Yes  Rita                1.5            

Common



     ol   ol                  [place]                tablets           Spirit



                                                                 [facility]

 

     Butalbital- Butalbital-           Yes  Rita                1 tablet       

    Common



     Acetaminoph Acetaminoph                [place]                as needed      

     Spirit



     en   en                                                     [facility]

 

     Acetaminoph Acetaminoph           Yes  Rita                2 tablet       

    Common



     en   en                  [place]                as needed           [facility]







Vital Signs







             Vital Name   Observation Time Observation Value Comments     Source

 

             Systolic blood 2022 21:42:00 113 mm[Hg]                [facility]

 

             Diastolic blood 2022 21:42:00 83 mm[Hg]                 [facility]

 

             Heart rate   2022 21:42:00 93 /min                   [facility]

 

             Body temperature 2022 21:42:00 36.94 Giselle                 [facility]

 

             Respiratory rate 2022 21:42:00 17 /min                   [facility]

 

             Body height  2022 21:42:00 157.5 cm                  [facility]

 

             Body weight  2022 21:42:00 73.936 kg                 [facility]

 

             BMI          2022 21:42:00 29.81 kg/m2               [facility]

 

             Oxygen saturation in 2022 21:42:00 97 /min                   

[facility] blood by                                        [place] [facility]

jessa



             Pulse oximetry                                        Branch







Procedures







                Procedure       Date / Time Performed Performing Clinician Sourdoug

e

 

                POCT MOLECULAR STREP 2022 22:34:00 Unknown, Attending [facility]

 

                ASSIGNMENT OF BENEFITS 2022 21:31:11 Doctor Unassigned, No

 [facility]



                                                Name            [facility]

 

                [id number]         2022 00:00:00 MURCH.05        [facility]

 

                [id number]         2022 00:00:00 MURCH.05        [facility]

 

                [id number]         2022 00:00:00 MURCH.05        [facility]

 

                [id number]         2021 00:00:00 DRYDA           [facility]

 

                [id number]         2021 00:00:00 DRYDA           [facility]

 

                [id number]         2021 00:00:00 DRYDA           [facility]

 

                [id number]         2020-10-07 00:00:00 CHEJE.02        [facility]

 

                [id number]         2020-10-07 00:00:00 CHEJE.02        [facility]







Plan of Care







             Planned Activity Planned Date Details      Comments     Source

 

             Future Scheduled 2023-10-01   IMM Influenza              [facility]



             Test         00:00:00     Seasonal (>/= 19              



                                       yrs) [code = IMM              



                                       Influenza Seasonal              



                                       (>/= 19 yrs)]              

 

             Future Scheduled 2023   COVID-19 VACCINE              [facility]



             Test         17:02:31     (#1) [code =              



                                       COVID-19 VACCINE              



                                       (#1)]                     

 

             Future Scheduled 2023   Screening for              [facility]



             Test         17:02:31     malignant neoplasm              



                                       of cervix                 



                                       (procedure) [code =              



                                       479095075]                

 

             Future Scheduled 2023   INFLUENZA VACCINE              Method

[facility]



             Test         17:02:31     [code = INFLUENZA              



                                       VACCINE]                  

 

             Future Scheduled 2017-12-15   Screening for              [facility]



             Test         00:00:00     malignant neoplasm              



                                       of cervix                 



                                       (procedure) [code =              



                                       852952094]                

 

             Future Scheduled 1997-06-15   COVID-19 Vaccine              [facility]



             Test         00:00:00     (#1) [code =              



                                       COVID-19 Vaccine              



                                       (#1)]                     







Encounters







        Start   End     Encounter Admission Attending Care    Care    Encounter 

Source



        Date/Time Date/Time Type    Type    Clinicians Facility Department ID   

   

 

        2022         Inpatient BEKAH ChangBRYANT   OB[facility]    [id number] 

[facility]



        06:24:00                         Fabiola                   [id number]  [facility]



                                                                        [facility]

 

        2022         Inpatient BEKAH ChangBRYANT   OBOP    [id number] 

[facility]



        00:06:00                         Fabiola                   37      [facility]



                                                                        [facility]



                                                                        l of



                                                                        [place]

 

        2021         Inpatient         Migue, [facility]   DARI    [id number] 

[facility]



        10:01:00                         Fabiola                   99      [facility]



                                                                        [facility]



                                                                        l of



                                                                        [place]

 

        2021         Inpatient         Camarena, [facility]   DARI    [id number] 

[facility]



        23:46:00                         Fabiola                   30      [facility]



                                                                        [facility]



                                                                        l of



                                                                        [place]

 

        2021         Inpatient         Camarena, [facility]   DARI    [id number] 

[facility]



        20:01:00                         Fabiola                   54      [facility]



                                                                        [facility]



                                                                        l of



                                                                        [place]

 

        2020         Inpatient         Migue, [facility]   DARI    [id number] 

[facility]



        15:26:00                         Fabiola                   93      [facility]



                                                                        [facility]



                                                                        l of



                                                                        [place]

 

        2020-10-26         Inpatient MARIELA Ramirez, [facility]   RADI    [id number] 

[facility]



        14:00:00                         Leyda                 17      [facility]



                                                                        [facility]osp[facility]



                                                                        l of



                                                                        [place]

 

        2020-10-07         Inpatient MARIELA Camarena, [facility]   MEDI.01 [id number] 

[facility]



        14:22:00                         Fabiola                   47      [facility]



                                                                        [facility]ospita



                                                                        l of



                                                                        [place]

 

        2023 Telephone         Sergio 1.2.840.1 [id number]

[id number] Method



        00:00:00 00:00:00                 Amalia  [id number]         [address]



                                                3.430.2.7                 Hospit

a



                                                .[id number]                 l



                                                .8                      

 

        2022 Urgent          Paul [facility]    1.2.840.

[id number] 

Univers



        16:40:00 17:00:00 Care            Unknown, [facility]  [id number]

         Collin 4.2.7.2.686         [place]

as



                                                PRANAY?BLEA [phone]         [address]



                                                MEDICAL                 



                                                OFFICE                  



                                                BUILDING                 

 

        2022 Outpatient R       DAIANA [facility]    [id number]

[id number] Univers



        16:40:00 16:40:00                 PAUL munoz



                                                                        [facility]

 

        2022 Letter          [facility]    [id number] [id number]

100 Univers



        00:00:00 00:00:00 (Out)           [facility]  350.1.13.10         jeana Julio 4.2.7.2.686         Dago

as



                                                PRANAY?BLEA [phone]         Me

dical



                                                [address]



                                                MEDICAL                 



                                                OFFICE                  



                                                BUILDING                 

 

        2022 Orders          Doctor  BONI    [id number] [id number]

64 [facility]



        00:00:00 00:00:00 Only            Unassigned, BARRETT   350.1.13.10       

  ity of



                                        [place] 4.2.7.2.686         Dago

as



                                                        [phone]         [address]

 

        2022 Inpatient EM      Migue [facility]   OBPP    [id number]

64 [facility]



        06:24:00 12:00:00                 Fabiola                   96      Woman'

s



                                                                        Hospita



                                                                        l of



                                                                        [place]

 

        2022 Emergency EM      [facility]   DARI    

[id number] [facility]



        06:31:00 12:15:00                 Lanie                   48      Woman'

s



                                                                        Hospita



                                                                        l of



                                                                        [place]

 

        2022 Outpatient EM      Migue [facility]   OBOP    [id number]

275 [facility]



        17:25:00 17:25:00                 Fabiola                   00      Woman'

s



                                                                        Hospita



                                                                        l of



                                                                        [place]

 

        2022 Emergency EM      Migue [facility]   DARI    [id number]

42 [facility]



        14:54:00 17:35:00                 Fabiola                   91      Woman'

s



                                                                        Hospita



                                                                        l of



                                                                        [place]

 

        2022 Outpatient EL      Migue [facility]   RADI    [id number]

343 [facility]



        10:08:00 10:08:00                 Fabiola                   24      Woman'

s



                                                                        Hospita



                                                                        l of



                                                                        [place]

 

        2021-11-15 2021-11-15 Outpatient         SERGIO [facility]     [id number]

[address] [place]



        00:00:00 00:00:00                 AMALIA                  579     Method

i



                                                                        

 

        2021-11-10 2021[facility]11-10 [facility]     [id number]

[address] [place]



        00:00:00 00:00:00                 AMALIA                  533     Method

i



                                                                        

 

        2021 Outpatient         SERGIO [facility]     [id number]

[address] [place]



        00:00:00 00:00:00                 AMALIA                  810     Method

i



                                                                        

 

        2021 Outpatient         SERGIOCounts include 234 beds at the [facility]     [id number]

[address] [place]



        00:00:00 00:00:00                 AMALIA                  792     Method

i



                                                                        st

 

        2021 Outpatient                 [facility]     [id number]

[address] [place]



        00:00:00 00:00:00                                         843     Method

i



                                                                        st

 

        2021-10-31 2021 Inpatient         BARB, [facility]     064     [id number]

[address] [place]



        00:00:00 00:00:00                 SHAI                   282     Method

i



                                                                        st

 

        2021 Outpatient         SERGIO, [facility]     [id number]

[address] [place]



        00:00:00 00:00:00                 AMALIA                  445     Method

i



                                                                        st

 

        2020 Outpatient         SERGIO, [facility]     [id number]

[address] [place]



        00:00:00 00:00:00                 AMALIA                  785     Method

i



                                                                        st

 

        2020-10-30 2020 Inpatient         SERGIO, [facility]     033     [id number]

[address] [place]



        00:00:00 00:00:00                 AMALIA                  104     Method

i



                                                                        st

 

        2020-10-28 2020-10-28 Outpatient         SERGIO, [facility]     [id number]

[address] [place]



        00:00:00 00:00:00                 AMALIA                  064     Method

i



                                                                        st

 

        2020-10-28 2020-10-28 Outpatient         SERGIO, [facility]     [id number]

[address] [place]



        00:00:00 00:00:00                 AMALIA                  123     Method

i



                                                                        st

 

        2020-10-19 2020-10-19 Outpatient         SERGIO, [facility]     [id number]

[address] [place]



        00:00:00 00:00:00                 AMALIA                  278     Method

i



                                                                        st

 

        2020-10-13 2020-10-13 Outpatient         SERGIO, [facility]     [id number]

[address] [place]



        00:00:00 00:00:00                 AMALIA                  559     Method

i



                                                                        st

 

        2020-10-12 2020-10-12 Outpatient         SERGIO, [facility]     [id number]

[address] [place]



        00:00:00 00:00:00                 AMALIA                  878     Method

i



                                                                        st

 

        2020 Outpatient         SERGIO, [facility]     [id number]

[address] [place]



        00:00:00 00:00:00                 AMALIA                  221     Method

i



                                                                        st

 

        2019-10-30 2019-10-30 Outpatient                 [facility]

[id number] [facility]



        11:00:00 11:00:00                         [facility]

 

        2019-10-24 2019-10-24 Outpatient                 [facility]

[id number] [facility]



        10:00:00 10:00:00                         t [address]         Spir

it



                                                Road    [facility]



                                                Family  [facility]

 

        2017 Emergency                 [facility]     [id number]

0 [place]



        20:56:34 20:56:34                                                 Health

 

        2017 Emergency                 [facility]     MED     [id number]

5 [place]



        20:04:39 20:04:39                                                 Health







Results







           Test Description Test Time  Test Comments Results    Result Comments 

Source









                    POCT MOLECULAR STREP 2022 22:42:10 









                      Test Item  Value      Reference Range Interpretation Comme

nts









             POCT Molecular Strep (test code = 47271-6) Negative     Negative   

               

 

             Lab Interpretation (test code = 84063-7) Normal                    

             



[id number]





             Test Item    Value        Reference Range Interpretation Comments

 

             HEMOGLOBIN (test code = HGB) 9.5 g/dL     10.1-13.8    L           

 

 

             HEMATOCRIT (test code = HCT) 30.0 %       32.5-41.8    L           

 



AG HEPATITIS B [id number] 07:49:00





             Test Item    Value        Reference Range Interpretation Comments

 

             AG HEPATITIS B SURFACE (test code NONREACTIVE  NONREACTIVE         

      



             = HBSAG)                                            



AB HEPATITIS C [id number] 07:49:00





             Test Item    Value        Reference Range Interpretation Comments

 

             AB HEPATITIS C (test code = NONREACTIVE  NONREACTIVE               



             HCVAB)                                              

 

             SIGNAL TO CUTOFF (test code = 0.05         <0.80        N          

  



             CUTOFF)                                             



AB [id number] 07:49:00





             Test Item    Value        Reference Range Interpretation Comments

 

             AB TREPONEMA (test code = TREPAB) NONREACTIVE  NONREACTIVE         

      



AB HIV 1  07:49:00





             Test Item    Value        Reference Range Interpretation Comments

 

             AB HIV 1 2 (test NONREACTIVE  NONREACTIVE               [facility]



             code = [id number])                                        4th Gen HIV 

Ag/Ab Combo



                                                                 Screen



COVID 19 Asymptomatic IH [id number] 07:15:00





             Test Item    Value        Reference Range Interpretation Comments

 

             COVID 19     NEGATIVE     NEGATIVE                  This test has b

een



             Asymptomatic IH AG                                        authorize

d only for the



             (test code =                                        detection ofpro

teins from



             COVNONPUIAG)                                        SARS-CoV-2, not

 for any



                                                                 other viruses



                                                                 orpathogens. Ne

gative



                                                                 results should 

be treated



                                                                 as presumptive



                                                                 andconfirmed wi

th a



                                                                 molecular assay

, if



                                                                 necessary for



                                                                 patientmanageme

nt.



                                                                 Negative result

s do not



                                                                 rule out COVID-

19



                                                                 andshould not b

e used as



                                                                 the sole basis 

for



                                                                 treatment orpat

ient



                                                                 management deci

sions,



                                                                 including infec

tion



                                                                 controldecision

s.



                                                                 Negative result

s should



                                                                 be considered i

n



                                                                 thecontext of a

 patient's



                                                                 recent exposure

s, history



                                                                 and thepresence

 of



                                                                 clinical signs 

and



                                                                 symptoms consis

tent



                                                                 withCOVID-19. T

his test



                                                                 has not been FD

A cleared



                                                                 or approved; th

e test



                                                                 hasbeen authori

karen by FDA



                                                                 under an Emerge

ncy Use



                                                                 Authorization(E

UA) for



                                                                 use by laborato

afsaneh



                                                                 certified under

 the CLIA



                                                                 thatmeet the re

quirements



                                                                 to perform mode

rate, high



                                                                 or waivedcomple

xity



                                                                 tests. This barak

t is



                                                                 authorized for 

use at



                                                                 thePoint of Car

e (POC),



                                                                 i.e., in patien

t care



                                                                 settingsoperati

ng under a



                                                                 CLIA Certificat

e of



                                                                 Waiver, Certifi

nereyda



                                                                 ofCompliance, o

r



                                                                 Certificate of



                                                                 Accreditation. 

This test



                                                                 is only authori

karen for



                                                                 the duration of



                                                                 thedeclaration 

that



                                                                 circumstances e

xist



                                                                 justifying



                                                                 theauthorizatio

n of



                                                                 emergency use o

f in vitro



                                                                 diagnostic test

sfor



                                                                 detection and/o

r



                                                                 diagnosis of CO

VID-19



                                                                 under [id number]

4(b)(1) of



                                                                 the Act, 21 U.S

.C.



                                                                 360bbb-3(b)(1),

 unless



                                                                 theauthorizatio

n is



                                                                 terminated or r

evoked



                                                                 sooner.



CBC W/AUTO [id number] 06:48:00





             Test Item    Value        Reference Range Interpretation Comments

 

             WHITE BLOOD CELL (test code = WBC) 11.2 K/mm3   6.5-12.3     N     

       

 

             RED BLOOD CELL (test code = RBC) 4.39 M/mm3   3.51-4.69    N       

     

 

             HEMOGLOBIN (test code = HGB) 11.7 g/dL    10.1-13.8    N           

 

 

             HEMATOCRIT (test code = HCT) 35.9 %       32.5-41.8    N           

 

 

             MEAN CELL VOLUME (test code = MCV) 81.8 fL      84.6-96.6    L     

       

 

             MEAN CELL HGB (test code = MCH) 26.7 pg      27.3-33.9    L        

    

 

             MEAN CELL HGB CONCETRATION (test 32.6 gm/dL   32.0-34.2    N       

     



             code = MCHC)                                        

 

             RED CELL DISTRIBUTION WIDTH (test 14.0 %       12.2-16.3    N      

      



             code = RDW)                                         

 

             PLATELET COUNT (test code = PLT) 184 K/mm3    134-363      N       

     

 

             MEAN PLATELET VOLUME (test code = 10.9 fL      9.2-12.7     N      

      



             MPV)                                                

 

             NEUTROPHIL % (test code = NT%) 77.3 %       57.9-77.3    N         

   

 

             LYMPHOCYTE % (test code = LY%) 14.8 %       14.5-29.7    N         

   

 

             MONOCYTE % (test code = MO%) 6.8 %        3.6-10.2     N           

 

 

             EOSINOPHIL % (test code = EO%) 0.1 %        0.0-3.0      N         

   

 

             BASOPHIL % (test code = BA%) 0.2 %        0.1-0.9      N           

 

 

             NEUTROPHIL # (test code = NT#) 8.7 K/mm3                           

   

 

             LYMPHOCYTE # (test code = LY#) 1.7 K/mm3                           

   

 

             MONOCYTE # (test code = MO#) 0.8 K/mm3                             

 

 

             EOSINOPHIL # (test code = EO#) 0.01 K/mm3                          

   

 

             BASOPHIL # (test code = BA#) 0.0 K/mm3                             

 

 

             RBC MORPHOLOGY REQUIRED (test code NORMAL       NORMAL             

       



             = RBCM)                                             

 

             PLATELET MORPHOLOGY REQUIRED (test NORMAL       NORMAL             

       



             code = PLTMR)                                        



UA RFLX MICR CULT IF [id number] 07:38:00





             Test Item    Value        Reference Range Interpretation Comments

 

             UA COLOR (test code = COLU) STRAW        YELLOW                    

 

             UA APPEARANCE (test code = CLEAR        CLEAR                     



             APPU)                                               

 

             UA GLUCOSE DIPSTICK (test NEGATIVE     NEG                       



             code = DGLUU)                                        

 

             UA BILIRUBIN DIPSTICK (test NEGATIVE     NEG                       



             code = BILU)                                        

 

             UA KETONE DIPSTICK (test code NEGATIVE     NEG                     

  



             = KETU)                                             

 

             UA SPECIFIC GRAVITY (test 1.006        1.001-1.035  N            



             code = SGU)                                         

 

             UA BLOOD DIPSTICK (test code 3+           NEG          A           

 



             = [place]STON)                                              

 

             UA PH DIPSTICK (test code = 7.0          5-9                       



             CHARLENE)                                                

 

             UA PROTEIN DIPSTICK (test NEGATIVE     NEG                       



             code = PROU)                                        

 

             UA UROBILINIOGEN DIPSTICK NEGATIVE mg/dL NEG                       



             (test code = URO)                                        

 

             UA NITRITE DIPSTICK (test NEG          NEG                       



             code = MIKE)                                        

 

             UA LEUKOCYTE ESTERASE NEG          NEG                       



             DIPSTICK (test code = LEUU)                                        

 

             UA WBC (test code = WBCU) NONE SEEN #/hpf NONE SEEN                

 

 

             UA RBC (test code = RBCU) 3-5 #/hpf    NONE SEEN    A            

 

             UA EPITHELIAL CELLS (test RARE #/HPF   RARE-FEW                  



             code = EPIU)                                        

 

             UA BACTERIA (test code = RARE /HPF    RARE-FEW                  



             BACU)                                               

 

             UA MUCUS (test code = MUCU) RARE         NONE SEEN                 



Indication for culture: Suprapubic PainSpecimen Description: CLEAN CATCHD-DIMER 
[id number] 16:57:00





             Test Item    Value        Reference Range Interpretation Comments

 

             D-DIMER QUANT 506 ng/mLDDU <255         H              Reference Ra

nge in



             (test code =                                        Pregnancy: <570



             DDIMER)                                             ng/ml**********

********



                                                                 ***************

********



                                                                 ***************

****A



                                                                 positive test d

oes not



                                                                 provide a defin

itive



                                                                 diagnosis ofDVT

 and



                                                                 indicates the n

eed for



                                                                 follow up clini

jessa



                                                                 studies. The pr

edictive



                                                                 value of a nega

tive



                                                                 test is 98% for



                                                                 rulingout DVT.



B-TYPE NATRIURETIC [id number] 16:53:00





             Test Item    Value        Reference Range Interpretation Comments

 

             B-TYPE NATRIURETIC PEPTIDE (test 4.95 pg/mL   0-100        N       

     



             code = BNP)                                         



FETAL [id number] 16:50:00





             Test Item    Value        Reference Range Interpretation Comments

 

             FETAL FIBRONECTIN NEGATIVE                               Among symp

tomatic



             (test code = FFN)                                        women, coby

vated levels



                                                                 (>0.05 ug/mL) o

ffFN



                                                                 between 24 week

s and 34



                                                                 weeks, 6 days i

ndicate



                                                                 increasedrisk o

f



                                                                 delivery in <= 

7 or <=



                                                                 14 days from



                                                                 samplecollectio

n.



                                                                 Similarly, dagmar

g



                                                                 asymptomatic wo

men,



                                                                 elevated levels

 of



                                                                 fFNbetween 22 w

eeks and



                                                                 30 weeks, 6 day

s



                                                                 indicate increa

sedrisk



                                                                 of delivery in 

<= 34



                                                                 weeks, 6 days o

f



                                                                 gestation.



PROTHROMBIN [id number] 16:37:00





             Test Item    Value        Reference Range Interpretation Comments

 

             PROTHROMBIN TIME PATIENT (test code 11.5 secs    10.1-12.3    N    

        



             = PTP)                                              



THROMBOPLASTIN TIME [id number] 16:37:00





             Test Item    Value        Reference Range Interpretation Comments

 

             THROMBOPLASTIN TIME PARTIAL (test 29.7 secs    22-38        N      

      



             code = PTT)                                         



[id number] 16:37:00





             Test Item    Value        Reference Range Interpretation Comments

 

             FIBRINOGEN (test code = 423 mg/dL    297-524      N            Plea

se note new



             FIB)                                                normal range 2022



COMPREHENSIVE METABOLIC [id number] 16:33:00





             Test Item    Value        Reference Range Interpretation Comments

 

             SODIUM (test code = NA) 137 mEq/L    135-145      N            

 

             POTASSIUM (test code = K) 3.5 mEq/L    3.5-5.0      N            

 

             CHLORIDE (test code = CL) 103 mEq/L    100-115      N            

 

             CARBON DIOXIDE (test code = CO2) 25 mEq/L     22-31        N       

     

 

             ANION GAP (test code = GAP) 12.70        10-20        N            

 

             GLUCOSE (test code = GLU) 127 mg/dL           H            

 

             BLOOD UREA NITROGEN (test code = 6 mg/dL      7-18         L       

     



             BUN)                                                

 

             GLOMERULAR FILTRATION RATE (test 150 ml/min   >60          N       

     



             code = GFR)                                         

 

             CREATININE (test code = CREAT) 0.5 mg/dL    0.5-1.0      N         

   

 

             TOTAL PROTEIN (test code = PROT) 6.7 gm/dL    6.3-8.2      N       

     

 

             ALBUMIN (test code = ALB) 2.9 gm/dL    3.4-4.8      L            

 

             CALCIUM (test code = CA) 8.4 mg/dL    8.4-10.2     N            

 

             BILIRUBIN TOTAL (test code = BILT) 0.2 mg/dL    0.2-1.0      N     

       

 

             SGOT/AST (test code = AST) 15 units/L   15-37        N            

 

             SGPT/ALT (test code = ALT) 25 units/L   12-78        N            

 

             ALKALINE PHOSPHATASE TOTAL (test 79 units/L          N       

     



             code = ALKP)                                        



CBC W/AUTO [id number] 16:20:00





             Test Item    Value        Reference Range Interpretation Comments

 

             WHITE BLOOD CELL (test code = WBC) 8.4 K/mm3    6.5-12.3     N     

       

 

             RED BLOOD CELL (test code = RBC) 4.10 M/mm3   3.51-4.69    N       

     

 

             HEMOGLOBIN (test code = HGB) 11.5 g/dL    10.1-13.8    N           

 

 

             HEMATOCRIT (test code = HCT) 35.3 %       32.5-41.8    N           

 

 

             MEAN CELL VOLUME (test code = MCV) 86.1 fL      84.6-96.6    N     

       

 

             MEAN CELL HGB (test code = MCH) 28.0 pg      27.3-33.9    N        

    

 

             MEAN CELL HGB CONCETRATION (test 32.6 gm/dL   32.0-34.2    N       

     



             code = MCHC)                                        

 

             RED CELL DISTRIBUTION WIDTH (test 13.3 %       12.2-16.3    N      

      



             code = RDW)                                         

 

             PLATELET COUNT (test code = PLT) 193 K/mm3    134-363      N       

     

 

             MEAN PLATELET VOLUME (test code = 10.6 fL      9.2-12.7     N      

      



             MPV)                                                

 

             NEUTROPHIL % (test code = NT%) 75.8 %       57.9-77.3    N         

   

 

             LYMPHOCYTE % (test code = LY%) 16.4 %       14.5-29.7    N         

   

 

             MONOCYTE % (test code = MO%) 6.2 %        3.6-10.2     N           

 

 

             EOSINOPHIL % (test code = EO%) 0.5 %        0.0-3.0      N         

   

 

             BASOPHIL % (test code = BA%) 0.1 %        0.1-0.9      N           

 

 

             NEUTROPHIL # (test code = NT#) 6.4 K/mm3                           

   

 

             LYMPHOCYTE # (test code = LY#) 1.4 K/mm3                           

   

 

             MONOCYTE # (test code = MO#) 0.5 K/mm3                             

 

 

             EOSINOPHIL # (test code = EO#) 0.04 K/mm3                          

   

 

             BASOPHIL # (test code = BA#) 0.0 K/mm3                             

 

 

             RBC MORPHOLOGY REQUIRED (test code NORMAL       NORMAL             

       



             = RBCM)                                             

 

             PLATELET MORPHOLOGY REQUIRED (test NORMAL       NORMAL             

       



             code = PLTMR)                                        



URINALYSIS [id number] 16:01:00





             Test Item    Value        Reference Range Interpretation Comments

 

             UA COLOR (test code = YELLOW       YELLOW                    



             COLU)                                               

 

             UA APPEARANCE (test code Slightly-Cloudy CLEAR                     



             = APPU)                                             

 

             UA GLUCOSE DIPSTICK (test NEGATIVE     NEG                       



             code = DGLUU)                                        

 

             UA BILIRUBIN DIPSTICK NEGATIVE     NEG                       



             (test code = BILU)                                        

 

             UA KETONE DIPSTICK (test NEGATIVE     NEG                       



             code = KETU)                                        

 

             UA SPECIFIC GRAVITY (test 1.018        1.001-1.035  N            



             code = SGU)                                         

 

             UA BLOOD DIPSTICK (test 2+           NEG          A            



             code = [place])                                         

 

             UA PH DIPSTICK (test code 6.0          5-9                       



             = CHARLENE)                                              

 

             UA PROTEIN DIPSTICK (test NEGATIVE     NEG                       



             code = PROU)                                        

 

             UA UROBILINIOGEN DIPSTICK NEGATIVE mg/dL NEG                       



             (test code = URO)                                        

 

             UA NITRITE DIPSTICK (test NEG          NEG                       



             code = MIKE)                                        

 

             UA LEUKOCYTE ESTERASE NEG          NEG                       



             DIPSTICK (test code =                                        



             LEUU)                                               

 

             UA WBC (test code = WBCU) 11-15 #/hpf  NONE SEEN    A            

 

             UA RBC (test code = RBCU) TOO NUMEROUS TO CNT NONE SEEN    A       

     



                          #/hpf                                  

 

             UA EPITHELIAL CELLS (test FEW #/HPF    RARE-FEW                  



             code = EPIU)                                        

 

             UA MUCUS (test code = 2+           NONE SEEN                 



             MUCU)                                               



URINE SAMPLE: CLEAN CATCHComment On arrival if delivery is not imminent- XR 
CHEST 1 -99-24 00:00:00
[facility] 1995 Sex: 
F************************************************************ Patient Name: 
ABEBA REYES Unit No: [id number] EXAMS: CPT CODE: [id number] XR CHEST 1 V 
15855 PROCEDURE INFORMATION: Exam: XR Chest Exam date and time: 3/24/2022 3:49 
PM Age: 26 years old Clinical indication: Other: Hypoxia + tachycardia 
TECHNIQUE: Imaging protocol: XR of the chest. Views: 1 view.COMPARISON: No 
relevant prior studies available. FINDINGS: Lungs: Mild decreased lung volumes. 
No consolidation. Pleural spaces: No pleural effusion. No pneumothorax. 
Heart/Mediastinum: Heart size is within normal limits. Vasculature is 
unremarkable. Bones/joints: No acute osseous abnormalities. IMPRESSION: No acute
cardiopulmonary findings. ** Electronically Signed by Bryn Pitts MD on 
2022 at 1605 ** Reported and signed by: Bryn Pitts MD CC: Kim Castillo MD Technologist: Finn Medley,RT,MR,CT Trnscrbd D/T: 2022 (4832) 
GCD.CPS Orig Print D/T: S: 2022 (5565) [facility] NAME:
ABEBA REYES  Radiology Department PHYS: PETCA. - Kim Castillo [address] : 1995 AGE: 26 SEX: F [place], [place] [address] ACCT NO: 
[id number] LOC: TASNEEMDARI PHONE#: [phone] EXAM DATE: 2022 STATUS: REG 
ER FAX #: [phone] RAD NO: Page 1 Signed Report- US PREG AFTER  TRI
2022 00:00:00
********************************************************[facility] : 1995 Sex: 
F************************************************************ Patient Name: 
ABEBA REYES Unit No: [id number] EXAMS: CPT CODE: [id number] US PREG AFTER 
 [id number] PROCEDURE INFORMATION: Exam: US Pregnancy After First Trimester, 
Transabdominal Exam date and time: 2022 10:47 AM Age: 26 years old Clinical
indication: Screening exam; Routine US, pregnant uterus; Additional info: 
Anatomy LABS AND CLINICAL REPORTS: Gestational age (Established): 20 weeks, 2 
days Estimated due date (Established): 2022 TECHNIQUE: Imaging protocol: 
Real-time transabdominal obstetrical ultrasound of the maternal pelvis and a 
second or third trimester pregnancy with image documentation. COMPARISON: OT US 
PREG AFTER 1ST TRI 2021 10:31 PM FINDINGS: Gestation: Singlelive 
intrauterine gestation. Fetal heart rate: Fetal heart rate of 146 bpm. Fetal 
presentation: Breech presentation. Placenta: Anterior grade 1 placenta without 
placenta previa. Amniotic fluid: Amniotic fluid is normal for gestational age. 
FETAL ANATOMY: Fetal midline falx: Normal  Fetal cerebellum: Normal Fetal 
lateral ventricles: Normal Fetal cisterna magna: Normal Fetal choroid plexus: 
Normal Fetal upper lip and nose: Normal Fetal heart four-chamber view, heart 
size and position: Normal situs, four chamber view, RVOT/LVOT Fetal kidneys: 
Normal Fetal stomach: Normal Fetal urinary bladder: Normal Fetal spine: Normal 
Umbilical cord insertion site into the fetal abdomen: Normal Umbilical cord 
vessel number: 3 vessel cord Fetal arms and hands: Visualized portions are 
normal Fetal legs and feet: Visualized portions are normal  Fetal external 
genitalia: No visualized abnormalities BIOMETRY: Estimated due date (AUA): 
2022 Gestational age (AUA): 21 weeks, 3 days Estimated fetal weight: 426 g
(15 oz) Estimated fetal weight percentile: Not calculated at this gestational 
age Biparietal diameter (BPD): 5.3 cm (22 weeks, 1 day) Head circumference (HC):
19.2 cm (21 weeks, 3 days) [facility] NAME: ABEBA REYES
Radiology Department PHYS: Fabiola Echols MD [address] : 
1995 AGE: 26 SEX: F [place], [place] [address] ACCT NO: [id number] LOC: F.RAD 
PHONE #: [phone] EXAM DATE: 2022 STATUS: REG CLI FAX #: [phone] 
RAD NO: Page 1 Signed Report (CONTINUED) Patient Name: ABEBA REYES Unit 
No: [id number] EXAMS:  CPT CODE: [id number] US PREG AFTER 1ST TRI [id number] 
(Continued) Abdominal circumference (AC): 16.9 cm (21 weeks, 6 days) Humerus 
length (HL): 3.2 cm (20 weeks, 5 days) Femur length (FL): 3.5 cm (20 weeks, 6 
days) Cephalic Index (CI): 79.6 FL/AC: 20.6 FL/BPD: 65 HC/AC: 1.14 MATERNAL: 
Uterus: No significant abnormality. Cervix: The cervix measures 3.1 cm. Right 
ovary/adnexa: The right ovary was not visualized. No adnexal mass. Left 
ovary/adnexa: The left ovary was not visualized. No adnexal mass. 
Intraperitoneal space: No intraperitoneal free fluid. IMPRESSION: 1. Single 
intrauterine pregnancy with estimated gestational age of 21 weeks, 3days by 
today's exam. 2. Echogenic intracardiac focus in the left ventricle. 3. 
Otherwise, no visualized fetal anomalies. ** Electronically Signed by Sergio Devlin MD on 2022 at 1156 ** Reported and signed by: Sergio Devlin MD CC:  Technologist: Malreni Jung RDMS Probe: TrnscrbdD/T: 
2022 (1156) GCD.CPS  Orig Print D/T: S: 2022 (1157) [facility] NAME: REYESTEXABEBA Radiology Department [facility] MD [address] : 1995 AGE: 26 SEX: F [place] [address] ACCT NO: [id number] LOC: .RAD PHONE #: [phone] EXAMDATE: 
2022 STATUS: REG CLI FAX #: [phone] RAD NO: Page 2 Signed Report 
Patient Name: ABEBA REYES Unit No: [id number] EXAMS: CPT CODE: [id number] 
US PREG AFTER 1ST TRI 03352 (Continued) [facility]  [facility]: [facility]és MD [address] : 1995 AGE: 26 SEX: F [place] [address] ACCT NO: [id number]
LOC: ALEXSANDER.RAD  PHONE #: [phone] EXAM DATE: 2022 STATUS: REG CLI FAX #: 
[phone] RAD NO: Page 3Signed PwuowoMAAI-QhP-9 (COVID-19) RNA [Presence] in 
Respiratory specimen by SUREKHA with probe [id number] 19:12:49





             Test Item    Value        Reference Range Interpretation Comments

 

             SARS-CoV-2 (COVID-19) RNA Not detected Not-Detected              



             [Presence] in Respiratory                                        



             specimen by SUREKHA with probe                                        



             detection (test code = 89276-5)                                    

    

 

             Whether patient is employed in a                                   

     



             healthcare setting (test code =                                    

    



             83590-9)                                            

 

             Whether the patient has symptoms                                   

     



             related to condition of interest                                   

     



             (test code = 91083-9)                                        

 

             Patient was hospitalized because                                   

     



             of this condition (test code =                                     

   



             50944-4)                                            

 

             Whether the patient was admitted                                   

     



             to intensive care unit (ICU) for                                   

     



             condition of interest (test code                                   

     



             = 17967-6)                                          

 

             Whether patient resides in a                                       

 



             congregate care setting (test                                      

  



             code = 30374-2)                                        



FISCHER Yarsani BNXGKBMR-FbF-3 (COVID-19) RNA [Presence] in Respiratory 
specimen by SUREKHA with probe [id number] 00:41:15





             Test Item    Value        Reference Range Interpretation Comments

 

             SARS-CoV-2 (COVID-19) RNA Not detected Not-Detected              



             [Presence] in Respiratory                                        



             specimen by SUREKHA with probe                                        



             detection (test code = 48192-6)                                    

    

 

             Whether patient is employed in a                                   

     



             healthcare setting (test code =                                    

    



             31889-3)                                            

 

             Whether the patient has symptoms                                   

     



             related to condition of interest                                   

     



             (test code = 04279-2)                                        

 

             Patient was hospitalized because                                   

     



             of this condition (test code =                                     

   



             16615-2)                                            

 

             Whether the patient was admitted                                   

     



             to intensive care unit (ICU) for                                   

     



             condition of interest (test code                                   

     



             = 44170-9)                                          

 

             Whether patient resides in a                                       

 



             congregate care setting (test                                      

  



             code = 91998-4)                                        



FISCHER Yarsani WESTAG HEPATITIS B [id number] 15:13:00





             Test Item    Value        Reference Range Interpretation Comments

 

             AG HEPATITIS B SURFACE (test code NONREACTIVE  NONREACTIVE         

      



             = HBSAG)                                            



IS CONSENT FORM SIGNED FOR HIV TESTING? NAB HEPATITIS C [id number] 
15:13:00





             Test Item    Value        Reference Range Interpretation Comments

 

             AB HEPATITIS C (test code = NONREACTIVE  NONREACTIVE               



             HCVAB)                                              

 

             SIGNAL TO CUTOFF (test code = <0.02        <0.80        N          

  



             CUTOFF)                                             



IS CONSENT FORM SIGNED FOR HIV TESTING? NAB [id number] 15:13:00





             Test Item    Value        Reference Range Interpretation Comments

 

             AB TREPONEMA (test code = TREPAB) NONREACTIVE  NONREACTIVE         

      



IS CONSENT FORM SIGNED FOR HIV TESTING? NAB HIV 1  15:13:00





             Test Item    Value        Reference Range Interpretation Comments

 

             AB HIV 1 2 (test NONREACTIVE  NONREACTIVE               Done by Sie

[facility]



             code = ESU80EY)                                        4th Gen HIV 

Ag/Ab Combo



                                                                 Screen



IS CONSENT FORM SIGNED FOR HIV TESTING? NCBC W/AUTO [id number] 14:07:00





             Test Item    Value        Reference Range Interpretation Comments

 

             WHITE BLOOD CELL (test code = WBC) 9.6 K/mm3    6.5-12.3     N     

       

 

             RED BLOOD CELL (test code = RBC) 4.25 M/mm3   3.51-4.69    N       

     

 

             HEMOGLOBIN (test code = HGB) 11.5 g/dL    10.1-13.8    N           

 

 

             HEMATOCRIT (test code = HCT) 35.5 %       32.5-41.8    N           

 

 

             MEAN CELL VOLUME (test code = MCV) 83.5 fL      84.6-96.6    L     

       

 

             MEAN CELL HGB (test code = MCH) 27.1 pg      27.3-33.9    L        

    

 

             MEAN CELL HGB CONCETRATION (test 32.4 gm/dL   32.0-34.2    N       

     



             code = MCHC)                                        

 

             RED CELL DISTRIBUTION WIDTH (test 13.7 %       12.2-16.3    N      

      



             code = RDW)                                         

 

             PLATELET COUNT (test code = PLT) 167 K/mm3    134-363      N       

     

 

             MEAN PLATELET VOLUME (test code = 10.9 fL      9.2-12.7     N      

      



             MPV)                                                

 

             NEUTROPHIL % (test code = NT%) 76.4 %       57.9-77.3    N         

   

 

             LYMPHOCYTE % (test code = LY%) 16.8 %       14.5-29.7    N         

   

 

             MONOCYTE % (test code = MO%) 5.5 %        3.6-10.2     N           

 

 

             EOSINOPHIL % (test code = EO%) 0.2 %        0.0-3.0      N         

   

 

             BASOPHIL % (test code = BA%) 0.2 %        0.1-0.9      N           

 

 

             NEUTROPHIL # (test code = NT#) 7.4 K/mm3                           

   

 

             LYMPHOCYTE # (test code = LY#) 1.6 K/mm3                           

   

 

             MONOCYTE # (test code = MO#) 0.5 K/mm3                             

 

 

             EOSINOPHIL # (test code = EO#) 0.02 K/mm3                          

   

 

             BASOPHIL # (test code = BA#) 0.0 K/mm3                             

 

 

             PLATELET MORPHOLOGY REQUIRED (test NORMAL       NORMAL             

       



             code = PLTMR)                                        



COVID 19 Asymptomatic IH [id number] 13:40:00





             Test Item    Value        Reference Range Interpretation Comments

 

             COVID 19     NEGATIVE     NEGATIVE                  This test has b

een



             Asymptomatic IH AG                                        authorize

d only for the



             (test code =                                        detection ofpro

teins from



             COVNONPUIAG)                                        SARS-CoV-2, not

 for any



                                                                 other viruses



                                                                 orpathogens. Ne

gative



                                                                 results should 

be treated



                                                                 as presumptive



                                                                 andconfirmed wi

th a



                                                                 molecular assay

, if



                                                                 necessary for



                                                                 patientmanageme

nt.



                                                                 Negative result

s do not



                                                                 rule out COVID-

19



                                                                 andshould not b

e used as



                                                                 the sole basis 

for



                                                                 treatment orpat

ient



                                                                 management deci

sions,



                                                                 including infec

tion



                                                                 controldecision

s.



                                                                 Negative result

s should



                                                                 be considered i

n



                                                                 thecontext of a

 patient's



                                                                 recent exposure

s, history



                                                                 and thepresence

 of



                                                                 clinical signs 

and



                                                                 symptoms consis

tent



                                                                 withCOVID-19. T

his test



                                                                 has not been FD

A cleared



                                                                 or approved; th

e test



                                                                 hasbeen authori

karen by FDA



                                                                 under an Emerge

ncy Use



                                                                 Authorization(E

UA) for



                                                                 use by laborato

afsaneh



                                                                 certified under

 the CLIA



                                                                 thatmeet the re

quirements



                                                                 to perform mode

rate, high



                                                                 or waivedcomple

xity



                                                                 tests. This barak

t is



                                                                 authorized for 

use at



                                                                 thePoint of Car

e (POC),



                                                                 i.e., in patien

t care



                                                                 settingsoperati

ng under a



                                                                 CLIA Certificat

e of



                                                                 Waiver, Certifi

nereyda



                                                                 ofCompliance, o

r



                                                                 Certificate of



                                                                 Accreditation. 

This test



                                                                 is only authori

zed for



                                                                 the duration of



                                                                 thedeclaration 

that



                                                                 circumstances e

xist



                                                                 justifying



                                                                 theauthorizatio

n of



                                                                 emergency use o

f in vitro



                                                                 diagnostic test

sfor



                                                                 detection and/o

r



                                                                 diagnosis of CO

VID-19



                                                                 under Chsaywl86

4(b)(1) of



                                                                 the Act, 21 U.S

.C.



                                                                 360bbb-3(b)(1),

 unless



                                                                 theauthorizatio

n is



                                                                 terminated or r

evoked



                                                                 sooner.



Comments to Phlebotomist: RAPID COVID TESTURINALYSIS [id number] 01:47:00





             Test Item    Value        Reference Range Interpretation Comments

 

             UA COLOR (test code = YELLOW       YELLOW                    



             COLU)                                               

 

             UA APPEARANCE (test code CLOUDY       CLEAR        A            



             = APPU)                                             

 

             UA GLUCOSE DIPSTICK (test NEGATIVE     NEG                       



             code = DGLUU)                                        

 

             UA BILIRUBIN DIPSTICK NEGATIVE     NEG                       



             (test code = BILU)                                        

 

             UA KETONE DIPSTICK (test NEGATIVE     NEG                       



             code = KETU)                                        

 

             UA SPECIFIC GRAVITY (test 1.020        1.001-1.035  N            



             code = SGU)                                         

 

             UA BLOOD DIPSTICK (test 2+           NEG          A            



             code = [place])                                         

 

             UA PH DIPSTICK (test code 6.0          5-9                       



             = CHARLENE)                                              

 

             UA PROTEIN DIPSTICK (test NEGATIVE     NEG                       



             code = PROU)                                        

 

             UA UROBILINIOGEN DIPSTICK NEGATIVE mg/dL NEG                       



             (test code = URO)                                        

 

             UA NITRITE DIPSTICK (test NEG          NEG                       



             code = MIKE)                                        

 

             UA LEUKOCYTE ESTERASE NEG          NEG                       



             DIPSTICK (test code =                                        



             LEUU)                                               

 

             UA WBC (test code = WBCU) 3-5 #/hpf    NONE SEEN    A            

 

             UA RBC (test code = RBCU) TOO NUMEROUS TO CNT NONE SEEN    A       

     



                          #/hpf                                  

 

             UA EPITHELIAL CELLS (test FEW #/HPF    RARE-FEW                  



             code = EPIU)                                        

 

             UA BACTERIA (test code = RARE /HPF    RARE-FEW                  



             BACU)                                               

 

             UA MUCUS (test code = 1+           NONE SEEN                 



             MUCU)                                               



URINE SAMPLE: CLEAN CATCH- US PREG UT [id number] 23:57:00
************************************************************Formerly [facility] : 1995 Sex: 
F************************************************************ Patient Name: 
ABEBA REYES Unit No: [id number] EXAMS:  CPT CODE: [id number] US PREG [place] 
TRANSVAGINAL [address]196 Limited obstetrical ultrasound with transvaginal imaging the 
cervix dated 2021. HISTORY: 33 week IUP. Pelvic pressure. Loss of fluid. A 
limited transabdominal obstetrical ultrasound was performed and reveals the 
presence of a single intrauterine pregnancy in cephalic position. Fetal cardiac 
activity is documented with a heart rate of 152 bpm. The placenta is anteriorly 
positioned and demonstrates grade 2 echotexture. There is no evidence of 
placenta previa. Amniotic fluid volume appearswithin normal limits with a 
measured MANUEL of 17.1 cm. The cervix is closed with a measured cervical length of
 3.1 cm using transvaginal imaging. Fetal measurements: The BPD measures 8.52 cm
 compatible with 34 weeks 2 days. The HC measures 30.64 cm compatible with 34 
weeks 1 day. The AC measures 28.02 cm compatible with 32 weeks 0 days. The HL 
measures 5.87 cm compatible with 32 weeks 0 days. The FL measures 6.73 cm 
compatible with 34 weeks 4 days. The estimated fetal weight is 21 52 g (4 
pounds, 12 ounces). The FL/AC ratio of 24.04 is outside of the normal range 
(20.0-24.0). The FL/HC ratio of 21.98is outside of the normal range (19.47-
21.76). Fetal anatomy: A fetal anatomic survey was not performed.  IMPRESSION: 
1. Single living intrauterine pregnancy with an estimated ultrasound gestational
 ageof 32 weeks 6 days and an estimated date of delivery of 3/9/2021 SL: 131  **
 Electronically Signed by Jose Fraser MD on 2021 at 2357 ** 
Reported and signed by: Jose Fraser MD CC:  Technologist: IVORY DURAN RDMS, T Probe: [id number] Trnscrbd D/T: 2021 (2357) tNAINA 
Orig Print D/T: S: 2021 (0000) [facility] NAME: 
REYESERNESTO HERNANDEZABEBA Radiology Department  PHYS: Fabiola Echols MD [address] : 1995 AGE: 25 SEX: A[place] [address] ACCT NO: [id number]
 LOC: TASNEEMDARI  PHONE #: [phone] EXAM DATE: 2021 STATUS: REG ERFAX #: 
[phone] RAD NO: Page 1 Signed Report  Patient Name: ABEBA REYES Unit 
No: [id number] EXAMS: CPT CODE: [id number] [facility] [place] TRANSVAGINAL  [address] 
(Continued) [facility]  Radiology 
Department PHYS: Fabiola Echols MD [address] : 1995 AGE: 
25 SEX: A[place] [address]  ACCT NO: [id number] LOC: NICKIE PHONE #: 
[phone] DATE: 2021 STATUS: REG ER FAX #: [phone] RAD NO: 
Page 2  Signed Report- US PREG AFTER  23:57:00
************************************************************[facility] : 1995 Sex: 
F************************************************************ Patient Name: 
ABEBA REYES Unit No: [id number] EXAMS: CPT CODE: [id number] US PREG AFTER 
 Limited obstetrical ultrasound with transvaginal imaging the cervix
 dated 2021. HISTORY: 33 week IUP. Pelvic pressure. Loss of fluid. A 
limited transabdominal obstetrical ultrasound was performed and reveals the 
presence of a single intrauterine pregnancy in cephalic position. Fetal cardiac
activity is documented with a heart rate of 152 bpm. The placenta is anteriorly 
positioned and demonstrates grade 2 echotexture. There is no evidence of 
placenta previa. Amniotic fluid volume appears within normal limits with a 
measured MANUEL of 17.1 cm. The cervix is closed with a measured cervical length of
 3.1 cm using transvaginal imaging. Fetal measurements: The BPD measures 8.52 cm
 compatible with 34 weeks 2 days. The HC measures 30.64 cm compatible with 34 
weeks 1 day. The AC measures 28.02 cm compatible with 32 weeks 0 days. The HL 
measures 5.87 cm compatible with 32 weeks 0 days. The FL measures 6.73 cm 
compatible with 34 weeks 4 days. The estimated fetal weight is 21 52 g (4 
pounds, 12 ounces). The FL/AC ratio of 24.04 is outside of the normal range 
(20.0-24.0). The FL/HC ratio of 21.98 is outside of the normal range (19.47-
21.76). Fetal anatomy: A fetal anatomic survey was not performed. IMPRESSION: 1.
 Single living intrauterine pregnancy with an estimated ultrasound gestational 
age of33 weeks 6 days and an estimated date of delivery of 3/9/2021 SL: 131 ** 
Electronically Signed by Jose Fraser MD on 2021 at 2357 ** Reported
 and signed by: Jose Fraser MD CC: Jacob Ac MD  
Technologist: IVORY DURAN RDMS, RVT Probe: Trnscrbd D/T: 2021 (3980) 
t.MARE Orig Print D/T: S: 2021 (0000) [facility] 
NAME: REYESERNESTO RAMIREZRIA Radiology Department [facility]  Andrea AcJacob
 [address] : 1995 AGE: 25 SEX: F [place] [address] ACCT NO: 
[id number] LOC: TASNEEMDARI PHONE #: [phone] EXAM DATE: 2021 STATUS: REG
 ER FAX #: [phone] RAD NO: Page 1 Signed Report Patient Name: 
ABEBA REYES Unit No: [id number] EXAMS: CPT CODE: [id number] US PREG AFTER 
1ST TRI 84347 (Continued)  [facility]  Jacob Sanders [address] : 
1995 AGE: 25 SEX: F [place] [address] ACCT NO: [id number] LOC: ALEXSANDER.DARI
 PHONE #: [phone] EXAM DATE: 2021 STATUS: REG ER FAX #: [phone] 
RAD NO: Page 2 Signed ReportURINALYSIS [id number] 21:03:00





             Test Item    Value        Reference Range Interpretation Comments

 

             UA COLOR (test code = COLU) YELLOW       YELLOW                    

 

             UA APPEARANCE (test code = CLEAR        CLEAR                     



             APPU)                                               

 

             UA GLUCOSE DIPSTICK (test code NEGATIVE     NEG                    

   



             = DGLUU)                                            

 

             UA BILIRUBIN DIPSTICK (test NEGATIVE     NEG                       



             code = BILU)                                        

 

             UA KETONE DIPSTICK (test code TRACE        NEG          A          

  



             = KETU)                                             

 

             UA SPECIFIC GRAVITY (test code 1.014        1.001-1.035  N         

   



             = SGU)                                              

 

             UA BLOOD DIPSTICK (test code = 1+           NEG          A         

   



             [place])                                                

 

             UA PH DIPSTICK (test code = 7.0          5-9                       



             CHARLENE)                                                

 

             UA PROTEIN DIPSTICK (test code NEGATIVE     NEG                    

   



             = PROU)                                             

 

             UA UROBILINIOGEN DIPSTICK NEGATIVE mg/dL NEG                       



             (test code = URO)                                        

 

             UA NITRITE DIPSTICK (test code NEG          NEG                    

   



             = MIKE)                                             

 

             UA LEUKOCYTE ESTERASE DIPSTICK TRACE        NEG          A         

   



             (test code = LEUU)                                        

 

             UA WBC (test code = WBCU) 6-10 #/hpf   NONE SEEN    A            

 

             UA RBC (test code = RBCU) 3-5 #/hpf    NONE SEEN    A            

 

             UA EPITHELIAL CELLS (test code FEW #/HPF    RARE-FEW               

   



             = EPIU)                                             

 

             UA BACTERIA (test code = BACU) RARE /HPF    RARE-FEW               

   

 

             UA MUCUS (test code = MUCU) RARE         NONE SEEN                 



URINE SAMPLE: CLEAN CATCHCBC W/AUTO [id number] 20:56:00





             Test Item    Value        Reference Range Interpretation Comments

 

             WHITE BLOOD CELL (test code = WBC) 9.4 K/mm3    6.6-12.1     N     

       

 

             RED BLOOD CELL (test code = RBC) 3.95 M/mm3   3.45-5.01    N       

     

 

             HEMOGLOBIN (test code = HGB) 11.2 g/dL    10.7-13.9    N           

 

 

             HEMATOCRIT (test code = HCT) 34.6 %       32.1-42.1    N           

 

 

             MEAN CELL VOLUME (test code = MCV) 88 fL        84.1-94.8    N     

       

 

             MEAN CELL HGB (test code = MCH) 28.4 pg      27-35        N        

    

 

             MEAN CELL HGB CONCETRATION (test 32.4 gm/dL   32.2-34.1    N       

     



             code = MCHC)                                        

 

             RED CELL DISTRIBUTION WIDTH (test 13.5 %       12.4-16.5    N      

      



             code = RDW)                                         

 

             PLATELET COUNT (test code = PLT) 176 K/mm3    133-385      N       

     

 

             MEAN PLATELET VOLUME (test code = 10.4 fl      9.1-12.7     N      

      



             MPV)                                                

 

             NEUTROPHIL % (test code = NT%) 71.2 %       56.5-79.4    N         

   

 

             LYMPHOCYTE % (test code = LY%) 20.3 %       14.3-34.3    N         

   

 

             MONOCYTE % (test code = MO%) 7.0 %        5.1-10.4     N           

 

 

             EOSINOPHIL % (test code = EO%) 0.4 %        0.1-3.0      N         

   

 

             BASOPHIL % (test code = BA%) 0.2 %        0.1-1.0      N           

 

 

             NEUTROPHIL # (test code = NT#) 6.7 K/mm3                           

   

 

             LYMPHOCYTE # (test code = LY#) 1.9 K/mm3                           

   

 

             MONOCYTE # (test code = MO#) 0.7 K/mm3                             

 

 

             EOSINOPHIL # (test code = EO#) 0.04 K/mm3                          

   

 

             BASOPHIL # (test code = BA#) 0.0 K/mm3                             

 

 

             RBC MORPHOLOGY REQUIRED (test code NORMAL       NORMAL             

       



             = RBCM)                                             

 

             PLATELET MORPHOLOGY REQUIRED (test NORMAL       NORMAL             

       



             code = PLTMR)                                        



-  FET BIO PH FL W/O [id number] 17:14:00
************************************************************[facility] : 1995 Sex: 
F************************************************************ Patient Name: 
ABEBA REYES Unit No: [id number] EXAMS:  CPT CODE: [id number]  FET BIO PH 
FL W/O NST 27668 PROCEDURE: FETAL BIOPHYSICAL PROFILE: INDICATION: 28 weeks 4 
days pregnant with decreased fetal movements. COMPARISON: None FINDINGS: Limited
 scanning of the gravid uterus was performed as part of a fetal biophysical 
profile. Presentation: Cephalic Fetal heart rate: 147 bpm Placenta location:
Anterior, grade 2 Cervical length: 3.3 cm MANUEL: 17.8 cm Fetal breathing 
movements: 2 Gross body movements: 2 Fetal tone: 2 Amniotic fluid volume: 2 
IMPRESSION: Biophysical Profile score of 8 out of 8. SL: SG-H ** Electronically 
Signed by David Cavazos MD on 2020 at 1714 ** Reported and signed by: 
David Cavazos MD CC:  Technologist: Chidi Salas RDMS Probe: Trnscrbd D/T: 
2020 (1714) t.SDR.SG9 Orig Print D/T: S: 2020 (1750) [facility] NAME: REYESERNESTO RAMIREZRIA Radiology Department [facility]YS: [facility]arly[facility]abi[facility] Cortés MD [address]  : 1995 AGE: 25 SEX: F [place] [address] ACCT NO: [id number] LOC: ALEXSANDER.DARI PHONE #: [phone] EXAM DATE: 
2020 STATUS: REG ER FAX #: [phone] RAD NO: Page 1 Signed Report 
Patient Name: ABEBA REYES Unit No: [id number]  EXAMS: CPT CODE: [id number] 
[facility] BIO PH FL W/O NST [address]21 (Continued)  [facility] NAME: 
REYESERNESTO RAMIREZRIA Radiology Department PHYS: SHRAVANCharlyFabiola Cortés MD [address] : 1995 AGE: 25 SEX: F [place] [address] ACCT NO: [id number]
 LOC: TASNEEMDARI PHONE #: [phone] EXAM DATE: 2020 STATUS: REG ER FAX #: 
[phone] RAD NO: Page 2 Signed ZpacxvAMXC-FwT-0 (COVID-19) RNA [Presence] in
 Respiratory specimen by SUREKHA with probe [id number] 19:43:08





             Test Item    Value        Reference Range Interpretation Comments

 

             SARS-CoV-2 (COVID-19) RNA Not detected Not-Detected              



             [Presence] in Respiratory                                        



             specimen by SUREKHA with probe                                        



             detection (test code = 14674-4)                                    

    



[facility] PREG AFTER 1ST TRI2020-10-26 11:43:00
************************************************************[facility] : 1995 Sex: 
F************************************************************ Patient Name: 
ABEBA REYES  Unit No: [id number] EXAMS: CPT CODE: [id number] US PREG AFTER 
1ST [id number]  [facility] [address] [place], [place] [address] 
OBSTETRICAL ULTRASOUND REPORT -----
----------------------------------------------------------------- Pat. Name: 
ABEBA REYES Pat.No: [id number] Study Date: 10/26/2020 10:42am , Age: 
1995, 24 Pregnancies:  1, Para 0000 LMP: 2020 GA by LMP: 
20w2d GA by US: 20w3d GA Selected: 20w2d (LMP) TIAGO: 2021 Referring MD: 
Fabiola Camarena M.D. Sonographer: Kirsten Moreira RDMS CPT4: [id number] 
Admitting MD: LEYDA RAMIREZ Hist/Ind: SCAN#1 ANATOMY 
---------------------------------------------------------------------- 
MEASUREMENTS FETAL AGE FETAL GROWTH EVALUATION Measurement GA Range Srce %for GA
 Ratios ----------- ----- ------------- ---- ------- ------------------------- 
BPD 4.8 cm 20w4d (78a4u-20l1n) Hadl BPD56% FL/BPD 0.73 HC 18.0 cm 20w3d (18w5d-
22w0d) Hadl HC 52% FL/AC 0.23 APD 4.7 cm APD HC/AC 1.20 (1.06 - 1.24) TAD 4.9 cm
 TAD CI 0.78 (0.70 - 0.86) AC 15.1 cm 20w0d (13r1c-90q3x) Hadl AC 44% FL 3.5 cm 
20w5d (56a2k-94p5j) Hadl FL 60% HL 3.4 cm 21w4d (78c1w-73k4n) Darren HL 72% GA for
 sonogram 20w3d (33d5n-76s9b) Fetal Weight Estimate: based on (BPD,HC,AC,FL) 
Hadlock Weight: 369 gm (315-423) Hadlock : 0lbs, 13oz Cervical Length: 4.0 cm 
Fetal Heart Rate: 153 bpm --------------------------------------------
-------------------------- CLINICAL SUMMARY Type of Gestation: Robert 
Intrauterine pregnancy in vertex presentation. Fetal size is appropriate for 
gestational age. Fetal growth: Consistent with normal fetal growth Fetal motion 
and organs seen: Fetal heart motion seen Fetal body and limb movements seen  
Four chamber fetal heart observed Left ventricular outflow tract (LVOT) seen 
Right ventricular outflow tract (RVOT) seen Regular cardiac rhythm observed 
Normal intracranial anatomy seen Fetal face and nasal bone seen Umbilical cord 
insertion in fetus seen Fetal stomach, Renal Fossa, Bladder and Spine seen The 
[facility] NAME: ERICABEBA Radiology Department PHYS: 
Leyda Jeong [address] : 1995 AGE: 24 SEX: F [place] 
[place] [address] ACCT NO: [id number] LOC: TASNEEMRAD PHONE #: [phone] EXAM DATE: 
10/26/2020 STATUS: REG CLI FAX #: [phone] RAD NO:  Page 1 Signed Report 
(CONTINUED) Patient Name: ABEBA REYES Unit No: [id number] EXAMS:  CPT 
CODE:[id number] US PREG AFTER 1ST TRI 25793 (Continued) Three vessel umbilical 
cord noted All four extremities observed  Fetal tone noted Fetal abnormalities 
observed: None seen at this exam Placental location: Anterior Placental maturity
 : Grade 1 There is no evidence of placenta previa. Amniotic fluid volume is 
normal. Uterus and adnexa: No significant abnormality is seen. Thank you for 
allowing us to participate in the care of this patient. Brando Finley M.D 
------------------ Electronic Signature 10/26/2020 11:43am ** Electronically 
Signed by Brando Finley MD on 10/26/2020 at 1143 ** Reported and signed by: 
Brando Finley MD CC: Leyda Ramirez  Technologist: Kirsten Moreira RDMS Probe: 
Trnscrbd D/T:10/26/2020 (1143) t.SDR.BF11 Orig Print D/T: S: 10/26/2020 (1149)  
[facility] NAME: REYES,ABEBA Radiology Department PHYS:
 Leyda Jeong [address] : 1995AGE: 24 SEX: F [place] [address] ACCT NO: [id number] LOC: TASNEEMRAD PHONE #: [phone] EXAM DATE: 
10/26/2020 STATUS: REG CLI FAX #: [phone] RAD NO: Page 2 Signed Report 
Patient Name: ABEBA REYES Unit No: [id number] EXAMS:  CPT CODE: [id number] 
US PREG AFTER 1ST TRI 13480 (Continued)  [facility] NAME: 
ABEBA REYES Radiology Department PHYS: Leyda Jeong PA [address] : 1995 AGE: 24 SEX: F [place] [address] ACCT NO: [id number]
 LOC: F.RAD PHONE #: [phone] EXAM DATE: 10/26/2020 STATUS: REG CLI FAX #: 
[phone] RAD NO: Page 3 Signed SqzaaqQVVJ-ZjX-3 (COVID-19) RNA [Presence] in
 Respiratory specimen by SUREKHA with probe [id number] 07:54:32





             Test Item    Value        Reference Range Interpretation Comments

 

             SARS-CoV-2 (COVID-19) RNA Not detected Not-Detected              



             [Presence] in Respiratory                                        



             specimen by SUREKHA with probe                                        



             detection (test code = 04865-9)                                    

    



[facility] 06[facility]47:00





             Test Item    Value        Reference Range Interpretation Comments

 

             SODIUM (test code = NA) 137 mEq/L    135-145      N            

 

             POTASSIUM (test code = K) 3.4 mEq/L    3.5-5.0      L            

 

             CHLORIDE (test code = CL) 103 mEq/L    100-115      N            

 

             CARBON DIOXIDE (test code = CO2) 25 mEq/L     22-31        N       

     

 

             ANION GAP (test code = GAP) 12.20        10-20        N            

 

             GLUCOSE (test code = GLU) 85 mg/dL            N            

 

             BLOOD UREA NITROGEN (test code = 3 mg/dL      7-18         L       

     



             BUN)                                                

 

             GLOMERULAR FILTRATION RATE (test 153 ml/min   >60          N       

     



             code = GFR)                                         

 

             CREATININE (test code = CREAT) 0.5 mg/dL    0.5-1.0      N         

   

 

             CALCIUM (test code = CA) 8.5 mg/dL    8.4-10.2     N            



CBC W/AUTO DIFF2020-10-09 06:02:00





             Test Item    Value        Reference Range Interpretation Comments

 

             WHITE BLOOD CELL (test code = WBC) 6.1 K/mm3    6.6-12.1     L     

       

 

             RED BLOOD CELL (test code = RBC) 3.53 M/mm3   3.45-5.01    N       

     

 

             HEMOGLOBIN (test code = HGB) 10.2 g/dL    10.7-13.9    L           

 

 

             HEMATOCRIT (test code = HCT) 31.5 %       32.1-42.1    L           

 

 

             MEAN CELL VOLUME (test code = MCV) 89 fL        84.1-94.8    N     

       

 

             MEAN CELL HGB (test code = MCH) 28.9 pg      27-35        N        

    

 

             MEAN CELL HGB CONCETRATION (test 32.4 gm/dL   32.2-34.1    N       

     



             code = MCHC)                                        

 

             RED CELL DISTRIBUTION WIDTH (test 14.1 %       12.4-16.5    N      

      



             code = RDW)                                         

 

             PLATELET COUNT (test code = PLT) 154 K/mm3    133-385      N       

     

 

             MEAN PLATELET VOLUME (test code = 10.1 fl      9.1-12.7     N      

      



             MPV)                                                

 

             NEUTROPHIL % (test code = NT%) 66.5 %       56.5-79.4    N         

   

 

             LYMPHOCYTE % (test code = LY%) 20.5 %       14.3-34.3    N         

   

 

             MONOCYTE % (test code = MO%) 11.0 %       5.1-10.4     H           

 

 

             EOSINOPHIL % (test code = EO%) 0.8 %        0.1-3.0      N         

   

 

             BASOPHIL % (test code = BA%) 0.2 %        0.1-1.0      N           

 

 

             NEUTROPHIL # (test code = NT#) 4.1 K/mm3                           

   

 

             LYMPHOCYTE # (test code = LY#) 1.3 K/mm3                           

   

 

             MONOCYTE # (test code = MO#) 0.7 K/mm3                             

 

 

             EOSINOPHIL # (test code = EO#) 0.05 K/mm3                          

   

 

             BASOPHIL # (test code = BA#) 0.0 K/mm3                             

 

 

             RBC MORPHOLOGY REQUIRED (test code NORMAL       NORMAL             

       



             = RBCM)                                             

 

             PLATELET MORPHOLOGY REQUIRED (test NORMAL       NORMAL             

       



             code = PLTMR)                                        



CHEMISTRY 7 PROFILE2020-10-08 06:04:00





             Test Item    Value        Reference Range Interpretation Comments

 

             SODIUM (test code = NA) 137 mEq/L    135-145      N            

 

             POTASSIUM (test code = 2.9 mEq/L    3.5-5.0      LL           RESUL

TS CALLED TO



             K)                                                  AASHISH/MSU.READ B

ACK &



                                                                 CONFIRMED? Y.BY



                                                                 F.LAB.GF 10/08/

20



                                                                 0603.Results



                                                                 verified by rep

eat



                                                                 analysis

 

             CHLORIDE (test code = 103 mEq/L    100-115      N            



             CL)                                                 

 

             CARBON DIOXIDE (test 24 mEq/L     22-31        N            



             code = CO2)                                         

 

             ANION GAP (test code = 12.60        10-20        N            



             GAP)                                                

 

             GLUCOSE (test code = 94 mg/dL            N            



             GLU)                                                

 

             BLOOD UREA NITROGEN 4 mg/dL      7-18         L            



             (test code = BUN)                                        

 

             GLOMERULAR FILTRATION 124 ml/min   >60          N            



             RATE (test code = GFR)                                        

 

             CREATININE (test code = 0.6 mg/dL    0.5-1.0      N            



             CREAT)                                              

 

             CALCIUM (test code = 8.1 mg/dL    8.4-10.2     L            



             CA)                                                 



CBC W/AUTO DIFF2020-10-08 05:43:00





             Test Item    Value        Reference Range Interpretation Comments

 

             WHITE BLOOD CELL (test code = WBC) 7.5 K/mm3    6.6-12.1           

       

 

             RED BLOOD CELL (test code = RBC) 3.28 M/mm3   3.45-5.01    L       

     

 

             HEMOGLOBIN (test code = HGB) 9.5 g/dL     10.7-13.9    L           

 

 

             HEMATOCRIT (test code = HCT) 29.2 %       32.1-42.1    L           

 

 

             MEAN CELL VOLUME (test code = MCV) 89 fL        84.1-94.8    N     

       

 

             MEAN CELL HGB (test code = MCH) 29.0 pg      27-35        N        

    

 

             MEAN CELL HGB CONCETRATION (test 32.5 gm/dL   32.2-34.1    N       

     



             code = MCHC)                                        

 

             RED CELL DISTRIBUTION WIDTH (test 14.0 %       12.4-16.5    N      

      



             code = RDW)                                         

 

             PLATELET COUNT (test code = PLT) 140 K/mm3    133-385      N       

     

 

             MEAN PLATELET VOLUME (test code = 10.6 fl      9.1-12.7     N      

      



             MPV)                                                

 

             NEUTROPHIL % (test code = NT%) 83.5 %       56.5-79.4    H         

   

 

             LYMPHOCYTE % (test code = LY%) 7.8 %        14.3-34.3    L         

   

 

             MONOCYTE % (test code = MO%) 7.4 %        5.1-10.4     N           

 

 

             EOSINOPHIL % (test code = EO%) 0.1 %        0.1-3.0      N         

   

 

             BASOPHIL % (test code = BA%) 0.1 %        0.1-1.0      N           

 

 

             NEUTROPHIL # (test code = NT#) 6.2 K/mm3                           

   

 

             LYMPHOCYTE # (test code = LY#) 0.6 K/mm3                           

   

 

             MONOCYTE # (test code = MO#) 0.6 K/mm3                             

 

 

             EOSINOPHIL # (test code = EO#) 0.01 K/mm3                          

   

 

             BASOPHIL # (test code = BA#) 0.0 K/mm3                             

 

 

             RBC MORPHOLOGY REQUIRED (test code NORMAL       NORMAL             

       



             = RBCM)                                             

 

             PLATELET MORPHOLOGY REQUIRED (test NORMAL       NORMAL             

       



             code = PLTMR)                                        



CBC W/AUTO DIFF2020-10-07 02:30:00





             Test Item    Value        Reference Range Interpretation Comments

 

             WHITE BLOOD CELL (test code = WBC) 13.4 K/mm3   6.6-12.1     H     

       

 

             RED BLOOD CELL (test code = RBC) 4.00 M/mm3   3.45-5.01    N       

     

 

             HEMOGLOBIN (test code = HGB) 11.3 g/dL    10.7-13.9    N           

 

 

             HEMATOCRIT (test code = HCT) 35.4 %       32.1-42.1    N           

 

 

             MEAN CELL VOLUME (test code = MCV) 89 fL        84.1-94.8    N     

       

 

             MEAN CELL HGB (test code = MCH) 28.3 pg      27-35        N        

    

 

             MEAN CELL HGB CONCETRATION (test 31.9 gm/dL   32.2-34.1    L       

     



             code = MCHC)                                        

 

             RED CELL DISTRIBUTION WIDTH (test 13.7 %       12.4-16.5    N      

      



             code = RDW)                                         

 

             PLATELET COUNT (test code = PLT) 189 K/mm3    133-385      N       

     

 

             MEAN PLATELET VOLUME (test code = 10.9 fl      9.1-12.7     N      

      



             MPV)                                                

 

             NEUTROPHIL % (test code = NT%) 90.7 %       56.5-79.4    H         

   

 

             LYMPHOCYTE % (test code = LY%) 3.5 %        14.3-34.3    L         

   

 

             MONOCYTE % (test code = MO%) 5.3 %        5.1-10.4     N           

 

 

             EOSINOPHIL % (test code = EO%) 0.0 %        0.1-3.0      L         

   

 

             BASOPHIL % (test code = BA%) 0.1 %        0.1-1.0      N           

 

 

             NEUTROPHIL # (test code = NT#) 12.1 K/mm3                          

   

 

             LYMPHOCYTE # (test code = LY#) 0.5 K/mm3                           

   

 

             MONOCYTE # (test code = MO#) 0.7 K/mm3                             

 

 

             EOSINOPHIL # (test code = EO#) 0 K/mm3                             

   

 

             BASOPHIL # (test code = BA#) 0.0 K/mm3                             

 

 

             RBC MORPHOLOGY REQUIRED (test code NORMAL       NORMAL             

       



             = RBCM)                                             

 

             PLATELET MORPHOLOGY REQUIRED (test NORMAL       NORMAL             

       



             code = PLTMR)                                        



CHEMISTRY 7 PROFILE2020-10- 02:27:00





             Test Item    Value        Reference Range Interpretation Comments

 

             SODIUM (test code = NA) 134 mEq/L    135-145      L            

 

             POTASSIUM (test code = K) 3.6 mEq/L    3.5-5.0      N            

 

             CHLORIDE (test code = CL) 100 mEq/L    100-115      N            

 

             CARBON DIOXIDE (test code 24 mEq/L     22-31        N            



             = CO2)                                              

 

             ANION GAP (test code = TEST NOT PERFORMED 10-20                    

 



             GAP)                                                

 

             GLUCOSE (test code = GLU) 116 mg/dL           H            

 

             BLOOD UREA NITROGEN (test 5 mg/dL      7-18         L            



             code = BUN)                                         

 

             GLOMERULAR FILTRATION RATE 78 ml/min    >60          N            



             (test code = GFR)                                        

 

             CREATININE (test code = 0.9 mg/dL    0.5-1.0      N            



             CREAT)                                              

 

             CALCIUM (test code = CA) 8.6 mg/dL    8.4-10.2     N            



LACTIC ACID2020-10-07 02:23:00





             Test Item    Value        Reference Range Interpretation Comments

 

             LACTIC ACID (test code = LACT) 1.6 MMOL/L   0.5-2.2      N         

   



URINALYSIS COMPLETE2020-10-06 14:46:00





             Test Item    Value        Reference Range Interpretation Comments

 

             UA COLOR (test code = COLU) YELLOW       YELLOW                    

 

             UA APPEARANCE (test code = CLEAR        CLEAR                     



             APPU)                                               

 

             UA GLUCOSE DIPSTICK (test code NEGATIVE     NEG                    

   



             = DGLUU)                                            

 

             UA BILIRUBIN DIPSTICK (test NEGATIVE     NEG                       



             code = BILU)                                        

 

             UA KETONE DIPSTICK (test code NEGATIVE     NEG                     

  



             = KETU)                                             

 

             UA SPECIFIC GRAVITY (test code 1.012        1.001-1.035  N         

   



             = SGU)                                              

 

             UA BLOOD DIPSTICK (test code = 1+           NEG          A         

   



             [place])                                                

 

             UA PH DIPSTICK (test code = 8.0          5-9                       



             CHARLENE)                                                

 

             UA PROTEIN DIPSTICK (test code NEGATIVE     NEG                    

   



             = PROU)                                             

 

             UA UROBILINIOGEN DIPSTICK NEGATIVE mg/dL NEG                       



             (test code = URO)                                        

 

             UA NITRITE DIPSTICK (test code NEG          NEG                    

   



             = MIKE)                                             

 

             UA LEUKOCYTE ESTERASE DIPSTICK NEG          NEG                    

   



             (test code = LEUU)                                        

 

             UA WBC (test code = WBCU) 11-15 #/hpf  NONE SEEN    A            

 

             UA RBC (test code = RBCU) 3-5 #/hpf    NONE SEEN    A            

 

             UA EPITHELIAL CELLS (test code RARE #/HPF   RARE-FEW               

   



             = EPIU)                                             

 

             UA MUCUS (test code = MUCU) RARE         NONE SEEN                 



COVID 19 Asymptomatic [facility]2020-10-06 14:14:00





             Test Item    Value        Reference Range Interpretation Comments

 

             COVID 19     NEGATIVE     NEGATIVE                  This test has b

een



             Asymptomatic [facility]                                        authorize

d only for the



             (test code =                                        detection ofpro

teins from



             COVNONPUIAG)                                        SARS-CoV-2, not

 for any



                                                                 other viruses



                                                                 orpathogens. Ne

gative



                                                                 results should 

be treated



                                                                 as presumptive



                                                                 andconfirmed wi

th a



                                                                 molecular assay

, if



                                                                 necessary for



                                                                 patientmanageme

nt.



                                                                 Negative result

s do not



                                                                 rule out COVID-

19



                                                                 andshould not b

e used as



                                                                 the sole basis 

for



                                                                 treatment orpat

ient



                                                                 management deci

sions,



                                                                 including infec

tion



                                                                 controldecision

s.



                                                                 Negative result

s should



                                                                 be considered i

n



                                                                 thecontext of a

 patient's



                                                                 recent exposure

s, history



                                                                 and thepresence

 of



                                                                 clinical signs 

and



                                                                 symptoms consis

tent



                                                                 withCOVID-19. T

his test



                                                                 has not been FD

A cleared



                                                                 or approved; th

e test



                                                                 hasbeen authori

karen by FDA



                                                                 under an [employer]

ncy Use



                                                                 Authorization(E

UA) for



                                                                 use by laborato

afsaneh



                                                                 certified under

 the CLIA



                                                                 thatmeet the re

quirements



                                                                 to perform mode

rate, high



                                                                 or waivedcomple

xity



                                                                 tests. This barak

t is



                                                                 authorized for 

use at



                                                                 thePoint of Car

e (POC),



                                                                 i.e., in patien

t care



                                                                 settingsoperati

ng under a



                                                                 CLIA Certificat

e of



                                                                 Waiver, Certifi

nereyda



                                                                 ofCompliance, o

r



                                                                 Certificate of



                                                                 Accreditation. 

This test



                                                                 is only authori

zed for



                                                                 the duration of



                                                                 thedeclaration 

that



                                                                 circumstances e

xist



                                                                 justifying



                                                                 theauthorizatio

n of



                                                                 emergency use o

f in vitro



                                                                 diagnostic test

sfor



                                                                 detection and/o

r



                                                                 diagnosis of CO

VID-19



                                                                 under [id number]

4(b)(1) of



                                                                 the Act, 21 U.S

.C.



                                                                 360bbb-3(b)(1),

 unless



                                                                 theauthorizatio

n is



                                                                 terminated or r

evoked



                                                                 sooner.



CBC W/AUTO DIFF2020-10-06 13:35:00





             Test Item    Value        Reference Range Interpretation Comments

 

             WHITE BLOOD CELL (test code = WBC) 14.2 K/mm3   6.6-12.1     H     

       

 

             RED BLOOD CELL (test code = RBC) 3.65 M/mm3   3.45-5.01    N       

     

 

             HEMOGLOBIN (test code = HGB) 10.7 g/dL    10.7-13.9    N           

 

 

             HEMATOCRIT (test code = HCT) 32.1 %       32.1-42.1    N           

 

 

             MEAN CELL VOLUME (test code = MCV) 88 fL        84.1-94.8    N     

       

 

             MEAN CELL HGB (test code = MCH) 29.3 pg      27-35        N        

    

 

             MEAN CELL HGB CONCETRATION (test 33.3 gm/dL   32.2-34.1    N       

     



             code = MCHC)                                        

 

             RED CELL DISTRIBUTION WIDTH (test 13.6 %       12.4-16.5    N      

      



             code = RDW)                                         

 

             PLATELET COUNT (test code = PLT) 177 K/mm3    133-385      N       

     

 

             MEAN PLATELET VOLUME (test code = 10.5 fl      9.1-12.7     N      

      



             MPV)                                                

 

             NEUTROPHIL % (test code = NT%) 85.6 %       56.5-79.4    H         

   

 

             LYMPHOCYTE % (test code = LY%) 6.7 %        14.3-34.3    L         

   

 

             MONOCYTE % (test code = MO%) 7.0 %        5.1-10.4     N           

 

 

             EOSINOPHIL % (test code = EO%) 0.0 %        0.1-3.0      L         

   

 

             BASOPHIL % (test code = BA%) 0.1 %        0.1-1.0      N           

 

 

             NEUTROPHIL # (test code = NT#) 12.2 K/mm3                          

   

 

             LYMPHOCYTE # (test code = LY#) 1.0 K/mm3                           

   

 

             MONOCYTE # (test code = MO#) 1.0 K/mm3                             

 

 

             EOSINOPHIL # (test code = EO#) 0 K/mm3                             

   

 

             BASOPHIL # (test code = BA#) 0.0 K/mm3                             

 

 

             RBC MORPHOLOGY REQUIRED (test code NORMAL       NORMAL             

       



             = RBCM)                                             

 

             PLATELET MORPHOLOGY REQUIRED (test NORMAL       NORMAL             

       



             code = PLTMR)                                        







Notes







                Date/Time       Note            Provider        Source

 

                2022 09:07:00-00:00                                 [facility] ([facility])               

  



                                OB Postpart Progr Note                 



                                REPORT#:[id number] REPORT STATUS: Signed         

        



                                DATE:22 TIME: 907                 



                                                                



                                PATIENT: ABEBA REYES UNIT #: [id number]   

              



                                ACCOUNT#: [id number] ROOM/BED: [address]       

          



                                : [birth year] AGE: 26 SEX: F ATTEND: Thaddeus Camarena MD                 



                                ADM DT: 22 AUTHOR: Yary Hsieh MD    

             



                                                                



                                * ALL edits or amendments must be made on the el

[employer]/computer document *                 



                                                                



                                                                



                                Subjective                      



                                                                



                                Subjective                      



                                Admission EGA:                  



                                 Weeks: 35                      



                                 Days: 5                        



                                Status/Day: PPD #2 s/p                  



                                Patient reports:                 



                                 Comments:                      



                                                    Doing well, ready to go home

. Breastfeeding without issue. Ambulating, voiding,

                                                    



                                pain well controlled. Baby had tongue tie clippe

d and circ yesterday, also                 



                                passed car seat safety test. Pt reports baby chintan

ared for d/c by Pedi.                 



                                                                



                                Objective                       



                                                                



                                Nursing Documentation Review                 



                                Nursing Data:                   



                                The data set between the solid lines has been im

ported from nursing                 



                                documentation. Any exceptions have been noted be

low under Provider comments.                 



                                                    

________________________________________________________________________________
                                                    



                                                                



                                Feeding preference:                 



                                                                



                                Post partum hemorrhage risk score:              

   



                                                    

________________________________________________________________________________
                                                    



                                                                



                                Provider comments on imported nursing data: []  

               



                                _____________________________________________   

              



                                                                



                                                                



                                General                         



                                VS:                             



                                Vital Signs:                    



                                 Date Time Temp Pulse Resp B/P B/P Pulse O2 O2 F

low FiO2                 



                                 Mean Ox Delivery Rate                 



                                  0822 98.0 80 20 117/80                 



                                  0007 98.5 89 19 115/78                 



                                  1631 98.7 90 18 93/57                 



                                                                



                                PATIENT WEIGHT:                 



                                                                



                                Weight (lb):                    



                                Weight (oz):                    



                                Weight (kg): 79.080803                 



                                                                



                                                                



                                Physical Exam                   



                                Lungs: respirations non-labored                 



                                Neuro:                          



                                 Exam: alert, oriented x3, normal speech        

         



                                Abdomen: soft, no abnormal tenderness, no guardi

ng                 



                                Uterus: firm, involution appropriate            

     



                                Lacerations:                    



                                 Perineal laceration(s): 2nd Degree w/tita muscl

es                 



                                Lower extremities:                 



                                 Edema: none                    



                                                                



                                                                



                                Diagnosis, Assessment Plan                 



                                                                



                                Diagnosis, Assessment Plan                 



                                        Assessment: nml postpartum progress, aristides

ast feeding w/o diff, acute blood loss 

                                        



                                anemia                          



                                                    Plan: routine postpartum car

e, discharge instructions/precautions reviewed, f/u

                                                    



                                w/ Dr. Camarena in 6 weeks, continue PNV while aristides

astfeeding, start iron                 



                                supplement on discharge                 



                                                                



                                Electronically Signed by Yary Hsieh MD on 

22 at 0912                 



                                                                



                                RPT #:[id number]                 



                                ***END OF REPORT***                 



                                                                



                                                                

 

                2022 08:05:00-00:00                                 [facility]



                                [facility] ([facility])               

  



                                OB Postpart Progr Note                 



                                REPORT#:[id number] REPORT STATUS: Signed         

        



                                DATE:22 TIME: 805                 



                                                                



                                PATIENT: ABEBA REYES UNIT #: [id number]   

              



                                ACCOUNT#: [id number] ROOM/BED: 4406-A       

          



                                : [birth year] AGE: 26 SEX: F ATTEND: Thaddeus Camarena MD                 



                                ADM DT: 22 AUTHOR: Yary Hsieh MD    

             



                                                                



                                * ALL edits or amendments must be made on the [facility] [employer]/computer document *                 



                                                                



                                                                



                                Subjective                      



                                                                



                                Subjective                      



                                Admission EGA:                  



                                 Weeks: 35                      



                                 Days: 5                        



                                Status/Day: PPD #1 s/p                  



                                Patient reports:                 



                                 Comments:                      



                                                    Feels well overall. Having m

ild intermittent cramping, worse with 

breastfeeding.                                      



                                Ambulating and voiding without issue. Breastfeed

ing on demand.                 



                                                                



                                Objective                       



                                                                



                                Nursing Documentation Review                 



                                Nursing Data:                   



                                The data set between the solid lines has been im

ported from nursing                 



                                documentation. Any exceptions have been noted be

low under Provider comments.                 



                                                    

________________________________________________________________________________
                                                    



                                                                



                                Feeding preference:                 



                                                                



                                Post partum hemorrhage risk score:              

   



                                                    

________________________________________________________________________________
                                                    



                                                                



                                Provider comments on imported nursing data: []  

               



                                _____________________________________________   

              



                                                                



                                                                



                                General                         



                                VS:                             



                                PATIENT WEIGHT:                 



                                                                



                                Weight (lb):                    



                                Weight (oz):                    



                                Weight (kg): 79.405894                 



                                                                



                                                                



                                Physical Exam                   



                                Lungs: respirations non-labored                 



                                Neuro:                          



                                 Exam: alert, oriented x3, normal speech        

         



                                Abdomen: soft, no abnormal tenderness, no guardi

ng                 



                                Uterus: firm, involution appropriate            

     



                                Lacerations:                    



                                 Perineal laceration(s): 2nd Degree w/tita muscl

es                 



                                Lower extremities:                 



                                 Edema: none                    



                                                                



                                                                



                                Diagnosis, Assessment Plan                 



                                                                



                                Diagnosis, Assessment Plan                 



                                Assessment: nml postpartum progress, breast feed

ing w/o diff                 



                                Plan: routine postpartum care, circumcision toda

y, f/u CBC, discharge today                 



                                pending  OK for discharge, d/c instructio

ns discussed, f/u in 6 weeks                 



                                                                



                                Electronically Signed by Yary Hsieh MD on 

22 at 0809                 



                                                                



                                RPT #:[id number]                 



                                ***END OF REPORT***                 



                                                                



                                                                

 

                2022 11:45:00-00:00                                 [facility] ([facility])               

  



                                OB Delivery Note                 



                                REPORT#:[id number] REPORT STATUS: Signed         

        



                                DATE:22 TIME: 1145                 



                                                                



                                PATIENT: ABEBA REYES UNIT #: [id number]   

              



                                ACCOUNT#: [id number] ROOM/BED: [address]        

         



                                : [birth year] AGE: 26 SEX: F ATTEND: Thaddeus Camarena MD                 



                                ADM DT: 22 AUTHOR: Yary Hsieh MD    

             



                                                                



                                * ALL edits or amendments must be made on the [facility] [employer]/computer document *                 



                                                                



                                                                



                                OB Delivery                     



                                                                



                                Pre-delivery                    



                                GBS status:                     



                                 GBS status: unknown                 



                                 Prophylaxis administered: vancomycin           

      



                                [place] evaluation at delivery:  team   

              



                                Admission EGA:                  



                                 Weeks: 35                      



                                 Days: 5                        



                                Admission indication:                 



                                 labor                   



                                                                



                                Steroids Prior to Delivery                 



                                Steroids prior to delivery: yes, steroids given 

prior to admission due to                 



                                 dilation                 



                                                                



                                Baby A Information                 



                                Baby A information                 



                                 Delivery date: 22                 



                                 Birth status: live born                 



                                 Wt of baby: not yet available (skin to skin con

tact)                 



                                 Gender: male                   



                                 APGAR 1 minute: 8                 



                                 APGAR 5 minutes: 9                 



                                 Presentation: vertex                 



                                ABG details Baby A                 



                                 Cord blood gases: not collected                

 



                                Nuchal cord Baby A                 



                                 Nuchal cord: no                 



                                                                



                                Vaginal Delivery                 



                                Vaginal delivery:                 



                                  Labor: spontaneous                 



                                 Vaginal delivery: spontaneous                 



                                 Amniotic fluid: clear, AROM performed just prio

r to delivery                 



                                 Anesthesia type: epidural anesthesia           

      



                                 Episiotomy: none                 



                                  Laceration repair: yes, 2-0 suture            

     



                                 Placenta: spontaneous, intact                 



                                 Post delivery meds used: oxytocin              

   



                                 Count: correct                 



                                 Vaginal packing: No                 



                                 Mother's condition: mother stable              

   



                                 Infant's condition: infant stable in room      

           



                                Lacerations:                    



                                 Perineal laceration(s): 2nd Degree w/tita muscl

es                 



                                Extraction details                 



                                 OVD performed: no                 



                                Shoulder dystocia present: no                 



                                Additional comments:                 



                                Called to room by RN as patient was completely d

ilated/+3 station.                 



                                AROM performed just prior to pushing, clear flui

d noted. Pt received one dose                 



                                Vancomycin (for GBS unknown and ) prior t

o ROM.                 



                                                    Pt pushed for two contractio

ns, fetal head delivered in ITALO position. Shoulders

                                                    



                                and body delivered easily thereafter. Infant was

 crying and vigorous, delayed                 



                                cord clamping x60 seconds performed. Cord blood 

sample collected. Cord blood                 



                                collection for donation to [facility] performed

. Placenta delivered                 



                                spontaneously, [place], appeared fractured but in

tact. Bimanual massage was                 



                                                    performed and pitocin infusi

on was initiated. Second degree perineal laceration

                                                    



                                repaired in the usual fashion using 2-0 vicryl. 

Good hemostasis was noted.                 



                                Mother, FOB and baby bonding after delivery.    

             



                                                                



                                Blood Loss/Details                 



                                Blood loss at delivery: <1K: no sx hypovol=no he

m, no more than expected                 



                                EBL at delivery (ml's): 250                 



                                                                



                                Electronically Signed by Yary Hsieh MD on 

22 at 1205                 



                                                                



                                RPT #:[id number]                 



                                ***END OF REPORT***                 



                                                                



                                                                

 

                2022 09:15:00-00:00                                 [facility] ([facility])               

  



                                OB Intrapart Prog Note                 



                                REPORT#:[id number] REPORT STATUS: Signed         

        



                                DATE:22 TIME: 0915                 



                                                                



                                PATIENT: ABEBA REYES UNIT #: [id number]   

              



                                ACCOUNT#: [id number] ROOM/BED: [address]        

         



                                : [birth year] AGE: 26 SEX: F ATTEND: Thaddeus Camarena MD                 



                                ADM DT: 22 AUTHOR: Yary Hsieh MD    

             



                                                                



                                * ALL edits or amendments must be made on the el

ectronic/computer document *                 



                                                                



                                                                



                                Subjective                      



                                                                



                                Subjective                      



                                Patient reports:                 



                                 Comments:                      



                                Comfortable s/p epidural. Feeling intermittent p

elvic pressure, but no pain.                 



                                Wants to take a nap now that she has an epidural

.                 



                                                                



                                Objective                       



                                                                



                                Nursing Documentation Review                 



                                Nursing data:                   



                                The data set between the solid lines has been im

ported from nursing                 



                                documentation. Any exceptions have been noted be

low under Provider comments.                 



                                                    

________________________________________________________________________________
                                                    



                                __                              



                                                                



                                                                



                                ROM date: ROM time:                 



                                                                



                                                    

________________________________________________________________________________
                                                    



                                __                              



                                                                



                                Provider comments on imported nursing data: []  

               



                                _____________________________________________   

              



                                                                



                                                                



                                General                         



                                VS:                             



                                PATIENT WEIGHT:                 



                                                                



                                Weight (lb):                    



                                Weight (oz):                    



                                Weight (kg): [phone]                 



                                                                



                                                                



                                Objective                       



                                Cervical/Fetal exam:                 



                                 Dilatation (cm): 7-8/90/0/exam by Jori at 091

5                 



                                 Fetal presentation: cephalic                 



                                Uterine activity:                 



                                 Monitor: toco                  



                                 Frequency (description): irregular, q2-5min    

             



                                                                



                                FHR Evaluation                  



                                Baby A:                         



                                 Baby A baseline: 150 bpm                 



                                 Baby A variability: moderate 6-25 bpm          

       



                                 Baby A accelerations: 15 X 15                 



                                 Baby A decelerations: none                 



                                 Baby A FHR category: category 1                

 



                                                                



                                                                



                                Diagnosis, Assessment Plan                 



                                Free Text A P:                  



                                27y/o  at 35.5wks admitted in PTL.      

           



                                - Now in active labor, continue expectant manage

ment.                 



                                                    - GBS unknown, receiving van

c for GBS prophylaxis (currently receiving infusion

                                                    



                                of first dose).                 



                                - FHT: category 1, continue to monitor.         

        



                                - Epidural in place, Anesthesia consult PRN.    

             



                                - Desires cord blood donation to [facility], consents c

ompleted.                 



                                - Having a boy, desires circ.                 



                                                                



                                Electronically Signed by Yary Hsieh MD on 

22 at 0924                 



                                                                



                                RPT #:[id number]                 



                                ***END OF REPORT***                 



                                                                



                                                                

 

                2022 06:37[facility]00-00:00                                 [facility]               

  



                                OB Admission / H P                 



                                REPORT#:[id number] REPORT STATUS: Signed         

        



                                DATE:22 TIME: 06                 



                                                                



                                PATIENT: ABEBA REYES UNIT #: [id number]   

              



                                ACCOUNT#: [id number] ROOM/BED: [facility]        

         



                                : [birth year] AGE: 26 SEX: F ATTEND: Thaddeus Camarena MD                 



                                ADM DT: 22 AUTHOR: Lanie Johnson DO   

              



                                                                



                                * ALL edits or amendments must be made on the el

ectronic/computer document *                 



                                                                



                                                                



                                OB History                      



                                Chief complaint: uterine contractions           

      



                                HPI:                            



                                25yo  at 35w5d presented [place] [facility] [place], 

found to be in  labor.                 



                                        She was observed in [facility] for several hour

s yesterday with irregular ctx and did 

                                        



                                                    not have cervical change. To

day, she has made signifcant cervical change and 

ctx                                                 



                                are more regular and painful.                 



                                                                



                                PNC with Dr. Camarena                 



                                PERSONAL INFO:                  



                                                                



                                [facility] [place] /[facility]                 



                                                                



                                PRENATAL LABS:                  



                                Hgb/Hct: 13.0/40.3                 



                                Platelets: 236                  



                                Type/rH: O positive                 



                                IDC: Negative                   



                                Rubella: Immune                 



                                HIV: NR                         



                                HepBsAg: Negative                 



                                RPR: NR                         



                                Hep C: Negative                 



                                TSH/FT4: NA                     



                                Urine culture/Urinalysis: Negative              

   



                                Pap: 10/15/2021 Neg Pap                 



                                GC/CT/Trich: Negative x 3                 



                                QUAD screen: NA                 



                                MSaFP: not done                 



                                Genetic testing: NIPT w/ gender negative, boy   

              



                                Carrier screening: CF/SMA/Fragile X negative w l

ast pregnancy                 



                                Hgb Electro: Negative                 



                                Basilio Sachs: NA                   



                                Buddhist panel: NA                 



                                1hr GTT: 75                     



                                3 hr GTT: NA                    



                                Rhogam: NA                      



                                Ab screen: NA                   



                                3rd trimester Hgb/Hct: 12.4/38.4                

 



                                3rd trimester RPR: NR                 



                                3rd trimester HIV: Not done                 



                                GBS:                            



                                                                



                                OB CHECKLIST                    



                                Zika precautions: disc 10/15                 



                                Pediatrician: Dr. Huang[facility]

al                 



                                Infant feeding: Breast                 



                                Pain management: Will try without but E if neede

d.                 



                                PN Class: NA                    



                                PP Depression: Discussed. Info given            

     



                                PP Contraception: Possible Vasectomy where his m

other works. She may do                 



                                micronor. IUD pamphlets given                 



                                Dietary counseling: disc 10/15                 



                                Calcium: disc 10/15 Dietary                 



                                DHA: disc 10/15                 



                                Flu vaccine: given 10/15                 



                                Tdap vaccine: Given 3/16/22                 



                                Pre-Lando: Discussed                 



                                Blood products: Yes, will accept                

 



                                Circumcision: Yes                 



                                Cord Blood: Discussed. Info given               

  



                                FMLA form: None needed                 



                                Platelet labs:                  



                                24 hr urine protein:                 



                                Pelvis adequate: Yes                 



                                EFW under 5000 gm: Yes                 



                                Pregnancy history:                 



                                 : 2                     



                                 Term: 1                        



                                 : 0                     



                                 Abortus: 0                     



                                 Living children: 1                 



                                Current pregnancy:                 



                                 Best EDC: 22                 



                                 Admission EGA (weeks) 35                 



                                 Admission EGA (days) 5                 



                                 EDC based on: LMP, ultrasound, 1st trimester   

              



                                                                



                                Past History                    



                                Additional Medical History:                 



                                kidney stones                   



                                Additional Surgical History:                 



                                several surgeries for kidney stones - lithotrips

y, renal stent, cysto                 



                                Alcohol Use Denies EtOH use                 



                                Drug Use Denies recreational drugs              

   



                                Smoking status:                 



                                 Smoking status for patients 13 years old or old

er: Never Smoker                 



                                Medications:                    



                                Home Medications:                 



                                PNV WITH FE FUMARATE/FA (PRENATAL) 1 TAB PO CHAVEZ

Y                 



                                                                



                                                                



                                Allergies:                      



                                Coded Allergies:                 



                                Penicillins (Mild, hot/ vomiting 22)      

           



                                                                



                                                                



                                Review of Systems                 



                                All systems rev neg: except as marked           

      



                                                                



                                Objective                       



                                                                



                                General                         



                                VS:                             



                                PATIENT WEIGHT:                 



                                                                



                                Weight (lb):                    



                                Weight (oz):                    



                                Weight (kg): 79.383895                 



                                                                



                                                                



                                Physical Exam                   



                                Neuro:                          



                                 Exam: alert, oriented x3, normal speech        

         



                                Abdomen: gravid, soft, no abnormal tenderness, n

o guarding, no rebound                 



                                tenderness                      



                                Uterine activity:                 



                                 Monitor: toco                  



                                Pelvic exam:                    



                                 Pelvis clinically adequate: yes                

 



                                Cervical/Fetal exam:                 



                                 Dilatation (cm): 6/90/-2 per RN on admission   

              



                                Membranes:                      



                                 Membranes: Intact                 



                                Baby A:                         



                                 Baby A baseline: 150 bpm                 



                                 Baby A variability: moderate 6-25 bpm          

       



                                 Baby A accelerations: 15 X 15                 



                                 Baby A decelerations: none                 



                                 Baby A FHR category: category 1                

 



                                                                



                                                                



                                Diagnosis, Assessment Plan                 



                                                                



                                Diagnosis, Assessment Plan                 



                                Free Text A P:                  



                                25yo  at 35w5d admitted for  labor

                 



                                                                



                                1.  labor                 



                                                    -Observed in MAC for several

 hours yesterday with irregular ctx and no cervical

                                                    



                                        change, today with regular painful ctx c

ervical change to 6cm. Will admit to L 

                                        



                                 D for expectant management                 



                                -GBS unknown, will start vanc for GBS prophylaxi

s                 



                                -FHTs category 1                 



                                -Strongly desires epidural for pain control asap

                 



                                -It's a boy! desires circumcision               

  



                                                                



                                                                



                                                                



                                Electronically Signed by Lanie Johnson DO on

 22 at 0642                 



                                                                



                                RPT #:[id number]                 



                                ***END OF REPORT***                 



                                                                



                                                                

 

                2022 17:24:00-00:00                                 [facility]



                                [facility] ([facility])            

     



                                EMERGENCY PROVIDER REPORT                 



                                REPORT#:[id number] REPORT STATUS: Signed         

        



                                DATE:22 TIME: 172                 



                                                                



                                PATIENT: ABEBA REYES UNIT #: [id number]   

              



                                ACCOUNT#: [id number] ROOM/BED:                

 



                                AGE: 26 SEX: F PCP PHYS: Fabiola Camarena MD      

           



                                SERVICE DT: 22  AUTHOR: Jemma Abdul                 



                                                                



                                * ALL edits or amendments must be made on the [facility] [employer]/computer document *                 



                                                                



                                                                



                                DAR[facility] History                    



                                Chief complaint: pelvic pressure and pain       

          



                                HPI:                            



                                25yo  at 30w1d with pelvic pressure and p

ain. +fetal movement; denies                 



                                leakage of fluid, vaginal bleeding, or contracti

ons. Of note, on initial                 



                                                    assessment, she was found to

 be tachycardic to 130s and hypoxic with SpO2 

88-92%                                              



                                . Denies SOB.                   



                                Pregnancy history:                 



                                 : 2                     



                                 Term: 1                        



                                 : 0                     



                                 Abortus: 0                     



                                 Living children: 1                 



                                 Comments:                      



                                G1   at term                 



                                G2 current                      



                                Current pregnancy:                 



                                 EDC: 22                  



                                Past medical history: nephrolithiasis           

      



                                Past surgical history: denies PSH               

  



                                Social history: no alcohol use, no tobacco use, 

no drug use                 



                                Family history                  



                                Relation not specified for:                 



                                 Congenital heart defect                 



                                 Epilepsy                       



                                 Family History: Diabetes                 



                                 Hypertension                   



                                                                



                                Medications:                    



                                Home Medications:                 



                                 Medication Dose/Rte/Freq Days Qty Entered Last 

                



                                 Max Daily Dose Reviewed                 



                                 PNV WITH FE 1 TAB PO DAILY 20   

              



                                 FUMARATE/FA 1196 1604                 



                                 (PRENATAL)                     



                                 Strength: 1 EACH TAB                 



                                                                



                                                                



                                Allergies                       



                                Coded Allergies:                 



                                Penicillins (Mild, hot/ vomiting 22)      

           



                                                                



                                                                



                                Review of Systems                 



                                All systems rev neg: except as marked (as in HPI

)                 



                                                                



                                Objective                       



                                                                



                                General                         



                                VS:                             



                                PATIENT WEIGHT:                 



                                                                



                                Weight (lb):                    



                                Weight (oz):                    



                                Weight (kg):                    



                                T 98.4; -122/62-66 P 110-131 SpO2 88-97% o

n RA                 



                                                                



                                Physical Exam                   



                                HEENT: normocephalic w/o injury                 



                                Cardiac: tachy, but regular                 



                                Lungs: clear to auscultation                 



                                Neuro:                          



                                 Exam: alert, oriented x3, CNII-XII grossly inta

ct                 



                                Abdomen: gravid, soft, no abnormal tenderness   

              



                                Musculoskeletal: normal inspection, painless ran

ge of motion                 



                                Genitourinary: no flank pain                 



                                Uterine activity:                 



                                 Monitor: toco                  



                                 Frequency (description): none                 



                                Cervical/Fetal exam:                 



                                 Dilatation (cm): 0 - closed                 



                                 FHR evaluation:                 



                                 Baseline: 130 bpm                 



                                 Variability: moderate 6-25 bpm                 



                                 Accelerations: 15 X 15                 



                                 Decelerations: none                 



                                 FHR category: category 1                 



                                Membranes:                      



                                 Membranes: Intact                 



                                Lower extremities:                 



                                 Edema: none                    



                                                                



                                Results                         



                                Findings/Data:                  



                                Laboratory Tests:                 



                                                     



                                 1540 1540                      



                                 Chemistry                      



                                 Sodium (135 - 145 mEq/L) 137                 



                                 Potassium (3.5 - 5.0 mEq/L) 3.5                

 



                                 Chloride (100 - 115 mEq/L) 103                 



                                 Carbon Dioxide (22 - 31 mEq/L)  25             

    



                                 Anion Gap (10 - 20) 12.70                 



                                 BUN (7 - 18 mg/dL) 6 L                 



                                 Creatinine (0.5 - 1.0 mg/dL) 0.5               

  



                                 Glomerular Filtr Rate (>60 ml/min) 150         

        



                                 Glucose (65 - 110 mg/dL) 127 H                 



                                  Calcium (8.4 - 10.2 mg/dL) 8.4                

 



                                 Total Bilirubin (0.2 - 1.0 mg/dL) 0.2          

       



                                 AST (15 - 37 units/L)  15                 



                                 ALT (12 - 78 units/L) 25                 



                                 Total Alk Phosphatase (46 - 116 units/L) 79    

             



                                 B-Natriuretic Peptide (0 - 100 pg/mL) 4.95     

            



                                 Total Protein (6.3 - 8.2 gm/dL) 6.7            

     



                                 Albumin (3.4 - 4.8 gm/dL) 2.9 L                

 



                                 Coagulation                    



                                 PT (10.1 - 12.3 secs) 11.5                 



                                 PTT ([facility]) (22 - 38 secs) 29.7                 



                                 Fibrinogen (297 - 524 mg/dL) 423               

  



                                  D-Dimer (<255 ng/mLDDU) 506 H                 



                                 Hematology                     



                                 WBC (6.5 - 12.3 K/mm3) 8.4                 



                                 RBC (3.51 - 4.69 M/mm3) 4.10                 



                                 Hgb (10.1 - 13.8 g/dL) 11.5                 



                                 Hct (32.5 - 41.8 %) 35.3                 



                                  MCV (84.6 - 96.6 fL) 86.1                 



                                 MCH (27.3 - 33.9 pg) 28.0                 



                                 MCHC (32.0 - 34.2 gm/dL)  32.6                 



                                 RDW (12.2 - 16.3 %) 13.3                 



                                 Plt Count (134 - 363 K/mm3) 193                

 



                                 MPV (9.2 - 12.7 fL)  10.6                 



                                 Neut % (Auto) (57.9 - 77.3 %) 75.8             

    



                                 Lymph % (Auto) (14.5 - 29.7 %) 16.4            

     



                                  Mono % (Auto) (3.6 - 10.2 %) 6.2              

   



                                 Eos % (Auto) (0.0 - 3.0 %) 0.5                 



                                 Baso % (Auto) (0.1 - 0.9 %)  0.1               

  



                                 Neut # (Auto) (K/mm3) 6.4                 



                                 Lymph # (Auto) (K/mm3) 1.4                 



                                 Mono # (Auto) (K/mm3)  0.5                 



                                 Eos # (Auto) (K/mm3) 0.04                 



                                 Baso # (Auto) (K/mm3) 0.0                 



                                 Miscellaneous                  



                                 Fetal Fibronectin NEGATIVE                 



                                                                



                                                           



                                  1450                          



                                 Urines                         



                                 Urine Color (YELLOW) YELLOW                 



                                 Urine Appearance (CLEAR) Slightly-Cloudy       

          



                                  Urine pH (5 - 9) 6.0                 



                                 Ur Specific Gravity (1.001 - 1.035) 1.018      

           



                                 Urine Protein (NEG) NEGATIVE                 



                                 Urine Glucose (UA) (NEG) NEGATIVE              

   



                                 Urine Ketones (NEG) NEGATIVE                 



                                 Urine Blood (NEG)  2+ H                 



                                 Urine Nitrite (NEG) NEG                 



                                 Urine Bilirubin (NEG) NEGATIVE                 



                                 Urine Urobilinogen (NEG mg/dL) NEGATIVE        

         



                                  Ur Leukocyte Esterase (NEG) NEG               

  



                                 Urine RBC (NONE SEEN #/hpf) TOO NUMEROUS TO CNT

 H                 



                                 Urine WBC (NONE SEEN #/hpf) 11-15 H            

     



                                 Ur Epithelial Cells (RARE - FEW #/HPF) FEW     

            



                                 Urine Mucus (NONE SEEN) 2+                 



                                                                



                                Microbiology:                   



                                 Date/Time Procedure - Status                 



                                 Source Growth                  



                                  1540 Influenza Virus Type B Antigen - COM

P                 



                                 NASOPHARG                      



                                  1540 Influenza Virus Type A Antigen - COM

P                 



                                 NASOPHARG                      



                                                                



                                Recent Impressions:                 



                                RADIOLOGY - XR CHEST 1 V  1549             

    



                                *** Report Impression - Status: SIGNED Entered: 

2022 1609                 



                                                                



                                IMPRESSION:                     



                                No acute cardiopulmonary findings.              

   



                                Impression By: [email or web address] - Bryn Pitts MD    

             



                                                                



                                                                



                                                                



                                                                



                                Diagnosis, Assessment Plan                 



                                                                



                                Diagnosis, Assessment Plan                 



                                Free Text A P:                  



                                A: 25yo  at 30w1d with pelvic pain and pr

essure                 



                                2. tachycardia                  



                                3. pseudohypoxia                 



                                                                



                                P: Patient evaluated in D[facility]. UA negative for in

fection. PE workout negative.                 



                                Hypoxia likely false as patient has on nail kimberlee

sh and reading improved once                 



                                pulse ox repositioned. Discharge home with labor

 precautions. Increase po                 



                                fluid intake and counseled re: symptomatic relie

f. Followup up with OB as                 



                                scheduled. RTC prn                 



                                                                



                                Electronically Signed by Jemma Abdul MD on 

22 at 1734                 



                                                                



                                RPT #:[id number]                 



                                ***END OF REPORT***                 



                                                                



                                                                

 

                2022 15:36:00-00:00                                 [facility]



                                [facility] ([facility])            

     



                                EMERGENCY PROVIDER REPORT                 



                                REPORT#:[id number] REPORT STATUS: Signed         

        



                                DATE:22 TIME:                  



                                                                



                                PATIENT: ABEBA REYES UNIT #: [id number]   

              



                                ACCOUNT#: [id number]  ROOM/BED:               

  



                                AGE: 26 SEX: F PCP PHYS: Fabiola Camarena MD      

           



                                SERVICE DT: 22 AUTHOR: Kim Castillo MD                 



                                                                



                                * ALL edits or amendments must be made on the [facility] [employer]/computer document *                 



                                                                



                                                                



                                HPI-Dyspnea/Wheezing                 



                                                                



                                Free Text HPI Notes                 



                                Free Text HPI Notes                 



                                                    25yo HF with PMH of renal st

ones p/w abdominal pain. Called to D[facility]I/MAC after 

Pt                                                  



                                                    noted to be tachycardic (110

s) and hypoxic (88% on RA). Pt has no complaints of

                                                    



                                chest pain or SOB.                 



                                                                



                                OB: [facility]                      



                                                                



                                General                         



                                Initial Greet Date/Time 22           

      



                                                                



                                Presentation                    



                                Chief Complaint Hypoxia                 



                                )( Sudden in Onset? No                 



                                                                



                                Review of Systems                 



                                                                



                                ROS Statements                  



                                All systems rev neg except as marked.           

      



                                                                



                                Focused Review of Systems                 



                                Constitutional                  



                                Denies: Chills, Fever, Lethargy.                

 



                                Cardiovascular                  



                                Denies: Chest pain, Syncope.                 



                                Musculoskeletal                 



                                Denies: Back pain, Extremity pain.              

   



                                Skin                            



                                Denies: Diaphoresis, Rash.                 



                                Allergy/Immun                   



                                Denies: Hives, Itching.                 



                                                                



                                Additional Review of Systems                 



                                GI                              



                                Reports: Abdominal pain. Denies: Diarrhea.      

           



                                 Female                       



                                Reports: Pregnant. Denies: Urinary frequency.   

              



                                                                



                                Past Medical History - Adult                 



                                Stated Complaint CTX/ ABD PAIN                 



                                Allergies                       



                                Coded Allergies:                 



                                Penicillins (Mild, hot/ vomiting 22)      

           



                                                                



                                Home Medications                 



                                Discontinued Scripts                 



                                IBUPROFEN (MOTRIN) 600 MG PO Q6H PRN PRN MILD PA

IN (SCALE 1-3)                 



                                 IBUPROFEN (MOTRIN) 600 MG PO Q6H PRN PRN MILD P

AIN (SCALE 1-3) #30 TAB                 



                                 Prov: 21                 



                                 DC: 22 1505 DC prior to admit            

     



                                DOCUSATE SODIUM (COLACE) 200 MG PO BEDTIME PRN c

onstipation                 



                                 DOCUSATE SODIUM (COLACE) 200 MG PO BEDTIME PRN 

constipation #60 CAP                 



                                 Prov: 21                 



                                 DC: 22 1505 DC prior to admit            

     



                                                                



                                Reported Medications                 



                                PNV WITH FE FUMARATE/FA (PRENATAL) 1 TAB PO CHAVEZ

Y                 



                                                                



                                Discontinued Reported Medications               

  



                                ACETAMINOPHEN (TYLENOL) 500 MG PO ASDIR PRN HEAD

ACHE                 



                                 ACETAMINOPHEN (TYLENOL) 500 MG PO ASDIR PRN HEA

DACHE #2                 



                                 DC: 22 1505 DC prior to admit            

     



                                                                



                                                                



                                Calculated Suicide Risk (nurs) No Risk          

       



                                                                



                                Physical Exam                   



                                                                



                                Vital Signs                     



                                Review of Vital Signs Reviewed, Vital signs norm

al                 



                                                                



                                Focused PE                      



                                General/Const **                 



                                 General/Const Awake, Alert                 



                                Ears/Nose/Throat                 



                                 Ears/Nose/Throat Airway patent, Mucous membrane

s moist, Pharynx NL                 



                                MS Neck **                      



                                 Neck Atraumatic, Supple, No meningismus, Full r

inna of motion, No swelling,                 



                                Non-tender, No masses                 



                                Resp/Chest **                   



                                 Respiratory/Chest Breath sounds NL, Breath soun

ds = bilat, No respiratory                 



                                distress, No rales, No rhonchi, No wheezing, No 

retractions, No stridor                 



                                Cardiovascular **                 



                                 Cardiovascular Heart rate NL, Regular rhythm, H

eart sounds NL, Peripheral                 



                                circulation NL                  



                                Abdomen/GI                      



                                 Abdomen/GI Soft, Non-tender, No guarding, No re

bound                 



                                MS Back                         



                                 Back Inspection NL, Non-tender, No CVA tenderne

ss                 



                                MS Lower Extrem                 



                                 Lower Ext/Pelvis/MS Inspection NL, No swelling,

 Non-tender, No erythema, No                 



                                deformity, Neurologic intact, No edema          

       



                                Skin                            



                                 Skin Color NL, No rash, Warm, Dry, Turgor NL   

              



                                Neurologic                      



                                 Neurologic Oriented X3, Speech NL, No motor def

icits, No sensory deficits                 



                                                                



                                Interpretation Diagnostics                 



                                                                



                                Lab Results Interpretation                 



                                Results                         



                                Laboratory Tests                 



                                                                



                                22 1540:                  



                                [Embedded Image Not Available]                 



                                Laboratory Tests:                 



                                                     



                                 1540 1540                      



                                 Chemistry                      



                                 Sodium (135 - 145 mEq/L) 137                 



                                 Potassium (3.5 - 5.0 mEq/L) 3.5                

 



                                 Chloride (100 - 115 mEq/L) 103                 



                                 Carbon Dioxide (22 - 31 mEq/L) 25              

   



                                 Anion Gap (10 - 20) 12.70                 



                                 BUN (7 - 18 mg/dL)  6 L                 



                                 Creatinine (0.5 - 1.0 mg/dL) 0.5               

  



                                 Glomerular Filtr Rate (>60 ml/min) 150         

        



                                 Glucose (65 - 110 mg/dL) 127 H                 



                                 Calcium (8.4 - 10.2 mg/dL) 8.4                 



                                 Total Bilirubin (0.2 - 1.0 mg/dL) 0.2          

       



                                  AST (15 - 37 units/L) 15                 



                                 ALT (12 - 78 units/L) 25                 



                                 Total Alk Phosphatase (46 - 116 units/L)  79   

              



                                 B-Natriuretic Peptide (0 - 100 pg/mL) 4.95     

            



                                 Total Protein (6.3 - 8.2 gm/dL) 6.7            

     



                                 Albumin (3.4 - 4.8 gm/dL)  2.9 L               

  



                                 Coagulation                    



                                 PT (10.1 - 12.3 secs) 11.5                 



                                 PTT ([place]) (22 - 38 secs) 29.7                 



                                 Fibrinogen (297 - 524 mg/dL) 423               

  



                                 D-Dimer (<255 ng/mLDDU) 506 H                 



                                 Hematology                     



                                 WBC (6.5 - 12.3 K/mm3)  8.4                 



                                 RBC (3.51 - 4.69 M/mm3) 4.10                 



                                 Hgb (10.1 - 13.8 g/dL) 11.5                 



                                 Hct (32.5 - 41.8 %) 35.3                 



                                 MCV (84.6 - 96.6 fL) 86.1                 



                                 MCH (27.3 - 33.9 pg) 28.0                 



                                  MCHC (32.0 - 34.2 gm/dL) 32.6                 



                                 RDW (12.2 - 16.3 %) 13.3                 



                                 Plt Count (134 - 363 K/mm3)  193               

  



                                 MPV (9.2 - 12.7 fL) 10.6                 



                                 Neut % (Auto) (57.9 - 77.3 %) 75.8             

    



                                 Lymph % (Auto) (14.5 - 29.7 %) 16.4            

     



                                 Mono % (Auto) (3.6 - 10.2 %) 6.2               

  



                                 Eos % (Auto) (0.0 - 3.0 %) 0.5                 



                                 Baso % (Auto) (0.1 - 0.9 %) 0.1                

 



                                 Neut # (Auto) (K/mm3) 6.4                 



                                 Lymph # (Auto) (K/mm3) 1.4                 



                                 Mono # (Auto) (K/mm3) 0.5                 



                                 Eos # (Auto) (K/mm3) 0.04                 



                                 Baso # (Auto) (K/mm3)  0.0                 



                                 Miscellaneous                  



                                 Fetal Fibronectin NEGATIVE                 



                                                                



                                                           



                                 1450                           



                                 Urines                         



                                 Urine Color (YELLOW) YELLOW                 



                                 Urine Appearance (CLEAR) Slightly-Cloudy       

          



                                 Urine pH (5 - 9) 6.0                 



                                 Ur Specific Gravity (1.001 - 1.035) 1.018      

           



                                  Urine Protein (NEG) NEGATIVE                 



                                 Urine Glucose (UA) (NEG) NEGATIVE              

   



                                 Urine Ketones (NEG) NEGATIVE                 



                                 Urine Blood (NEG) 2+ H                 



                                 Urine Nitrite (NEG) NEG                 



                                 Urine Bilirubin (NEG) NEGATIVE                 



                                 Urine Urobilinogen (NEG mg/dL) NEGATIVE        

         



                                 Ur Leukocyte Esterase (NEG) NEG                

 



                                 Urine RBC (NONE SEEN #/hpf) TOO NUMEROUS TO CNT

 H                 



                                 Urine WBC (NONE SEEN #/hpf) 11-15 H            

     



                                 Ur Epithelial Cells (RARE - FEW #/HPF) FEW     

            



                                 Urine Mucus (NONE SEEN) 2+                 



                                                                



                                Microbiology:                   



                                 Date/Time Procedure - Status                 



                                 Source Growth                  



                                  1540 Influenza Virus Type B Antigen - COM

P                 



                                 NASOPHARG                      



                                  1540 Influenza Virus Type A Antigen - COM

P                 



                                 NASOPHARG                      



                                                                



                                Recent Impressions:                 



                                RADIOLOGY - XR CHEST 1 V  1549             

    



                                *** Report Impression - Status: SIGNED Entered: 

2022 1609                 



                                                                



                                IMPRESSION:                     



                                No acute cardiopulmonary findings.              

   



                                Impression By: [email or web address] - Bryn Pitts MD    

             



                                                                



                                                                



                                 Lab Imaging Statement                 



                                Laboratory radiographic studies reviewed and con

sidered in the medical                 



                                decision-making.                 



                                                                



                                                                



                                Point of Care Testing                 



                                Micro Interpretation Influenza rapid - neg      

           



                                                                



                                Re-Evaluation MDM                 



                                                                



                                Free Text MDM Notes                 



                                Free Text MDM Notes                 



                                                    A/P: 25yo  HF with PMH a

s above noted to be hypoxic (SpO2 88% on RA); 

called                                              



                                to eval.                        



                                1. Labs                         



                                2. O2 by NC                     



                                3. CXR                          



                                4. Re-assess                    



                                **ADDENDUM**                    



                                Time: 1800                      



                                                    Labs wnl. Rapid Flu -ve. D-d

jimbo appropriate for third trimester. CXR clear. Pt

                                                    



                                                    re-assessed; symptoms improv

ed. Results of work-up d/w Pt; voiced 

understanding.                                      



                                Cleared from ER perspective, if able to tolerate

 being off O2.                 



                                                                



                                )( Re-Evaluation/Progress #1                 



                                )( Re-Eval Status Improved                 



                                                                



                                Patient Discharge Departure                 



                                                                



                                Vital Signs/Condition                 



                                Vital Signs                     



                                All vital signs available at the time of this en

try have been reviewed.                 



                                                                



                                                                



                                Clinical Impression                 



                                Clinical Impression                 



                                Primary Impression: Hypoxia                 



                                Secondary Impressions: Third trimester pregnancy

                 



                                                                



                                Disposition Decision                 



                                Other                           



                                 )( Time 1800                   



                                 )( Date 22                 



                                 Text/Dict Note                 



                                Cleared from ED perspective                 



                                                                



                                Discharge/Care Plan                 



                                Counseled Regarding Diagnosis, Lab results, Imag

ing studies                 



                                                                



                                Electronically Signed by Kim Castillo MD 

on 22 at 1427                 



                                RPT #:[id number]                 



                                ***END OF REPORT***                 

 

                2021 08:47:00-00:00                                 [facility]               

  



                                OB Postpart Progr Note                 



                                REPORT#:[id number] REPORT STATUS: Signed         

        



                                DATE:21 TIME: 0847                 



                                                                



                                PATIENT: ABEBA REYES UNIT #: [id number]   

              



                                ACCOUNT#: [id number] ROOM/BED: [address]       

          



                                : [birth year] AGE: 25 SEX: F ATTEND: Thaddeus Camarena MD                 



                                ADM DT: 21 AUTHOR: Fabiola Camarena MD      

           



                                                                



                                * ALL edits or amendments must be made on the el

ectronic/computer document *                 



                                                                



                                                                



                                Subjective                      



                                                                



                                Subjective                      



                                Comments:                       



                                No complaints. Would like to go home today. Good

 pain control with motrin                 



                                                                



                                Objective                       



                                                                



                                Nursing Documentation Review                 



                                Nursing data:                   



                                The data set between the solid lines has been im

ported from nursing                 



                                documentation. Any exceptions have been noted be

low under Provider comments.                 



                                                    

________________________________________________________________________________
                                                    



                                                                



                                Feeding preference:                 



                                                                



                                Post partum hemorrhage risk score: Low Risk for 

Hemorrhage.                 



                                                    

________________________________________________________________________________
                                                    



                                                                



                                Provider comments on imported nursing data: []  

               



                                _____________________________________________   

              



                                                                



                                                                



                                General                         



                                VS:                             



                                Vital Signs                     



                                 Date Temp Pulse Resp B/P B/P Mean Pulse Ox FiO2

                 



                                  98.3 82 20 119/75                 



                                                                



                                Last Documented:                 



                                  Result Date Time                 



                                 B/P 119                 



                                 Temp 98.3  1618                 



                                  Pulse 82  1618                 



                                 Resp 20  1618                 



                                 B/P Mean 92.0  0610                 



                                                                



                                PATIENT WEIGHT:                 



                                                                



                                Weight (lb): 172                 



                                Weight (oz):                    



                                Weight (kg): 78.018                 



                                                                



                                                                



                                Physical Exam                   



                                Fundus: firm, below the umbilicus               

  



                                Lower extremities:                 



                                 Edema: none                    



                                                                



                                Result                          



                                Findings/data:                  



                                Laboratory Tests                 



                                                           



                                 1330                           



                                 Hematology                     



                                  WBC (6.5 - 12.3 K/mm3) 9.6                 



                                 RBC (3.51 - 4.69 M/mm3) 4.25                 



                                 Hgb (10.1 - 13.8 g/dL) 11.5                 



                                 Hct (32.5 - 41.8 %) 35.5                 



                                 MCV (84.6 - 96.6 fL) 83.5 L                 



                                 MCH (27.3 - 33.9 pg) 27.1 L                 



                                 MCHC (32.0 - 34.2 gm/dL) 32.4                 



                                 RDW (12.2 - 16.3 %) 13.7                 



                                 Plt Count (134 - 363 K/mm3) 167                

 



                                 MPV (9.2 - 12.7 fL) 10.9                 



                                 Neut % (Auto) (57.9 - 77.3 %) 76.4             

    



                                 Lymph % (Auto) (14.5 - 29.7 %) 16.8            

     



                                 Mono % (Auto) (3.6 - 10.2 %) 5.5               

  



                                 Eos % (Auto) (0.0 - 3.0 %) 0.2                 



                                 Baso % (Auto) (0.1 - 0.9 %) 0.2                

 



                                 Neut # (Auto) (K/mm3)  7.4                 



                                 Lymph # (Auto) (K/mm3) 1.6                 



                                 Mono # (Auto) (K/mm3) 0.5                 



                                 Eos # (Auto) (K/mm3)  0.02                 



                                 Baso # (Auto) (K/mm3) 0.0                 



                                                                



                                Laboratory Tests                 



                                                     



                                  1330 1309                     



                                 Serology                       



                                 Treponema pallidum Ab (NONREACTIVE) NONREACTIVE

                 



                                 Hep Bs Antigen (NONREACTIVE) NONREACTIVE       

          



                                 Hepatitis C Antibody (NONREACTIVE) NONREACTIVE 

                



                                 Hep C Ab Signal/Cutoff (<0.80) <0.02           

      



                                 HIV 1 2 Antibody (NONREACTIVE) NONREACTIVE     

            



                                 SARS-CoV-2 Ag (Rapid) (NEGATIVE) NEGATIVE      

           



                                                                



                                                                



                                                                



                                                                



                                Diagnosis, Assessment Plan                 



                                                                



                                Diagnosis, Assessment Plan                 



                                Comments:                       



                                PPD 1 . Doing well                 



                                Home today per her request                 



                                FU 6 weeks                      



                                Pelvic rest                     



                                Rx motrin                       



                                Prec given                      



                                                                



                                Electronically Signed by Fabiola Camarena MD on  at 0848                 



                                                                



                                RPT #:[id number]                 



                                ***END OF REPORT***                 



                                                                



                                                                

 

                2021 03:34:00-00:00                                 [facility]



                                [facility] ([facility])               

  



                                OB Delivery Note                 



                                REPORT#:[id number] REPORT STATUS: Signed         

        



                                DATE:21 TIME: 033                 



                                                                



                                PATIENT: ABEBA REYES UNIT #: [id number]   

              



                                ACCOUNT#: [id number] ROOM/BED: [address]        

         



                                : [birth year] AGE: 25 SEX: F ATTEND: Thaddeus Camarena MD                 



                                ADM DT: 21 AUTHOR: Waldemar Ortiz MD      

           



                                                                



                                * ALL edits or amendments must be made on the el

[employer]/computer document *                 



                                                                



                                                                



                                OB Delivery                     



                                                                



                                Nursing Documentation Review                 



                                Nursing data:                   



                                The data set between the solid lines has been im

ported from nursing                 



                                documentation. Any exceptions have been noted be

low under Provider comments.                 



                                                    

________________________________________________________________________________
                                                    



                                _                               



                                                                



                                ROM date: ROM time:                 



                                Membranes rupture method: AROM                 



                                Amniotic fluid color: Clear                 



                                Amniotic fluid amount:                 



                                Steroids prior to arrival:                 



                                Antibiotic prophylaxis given:                 



                                [place] evaluation at delivery:                 



                                Post partum hemorrhage risk score: Low Risk for 

Hemorrhage.                 



                                                                



                                Delivery date infant A: 21 Delivery time i

nfant                 



                                A: 0319                         



                                Birthweight (gm) infant A:                 



                                Weight (lb) infant A: Weight (oz)               

  



                                infant A:                       



                                Gender infant A: Female                 



                                Apgar 1 minute infant A:                 



                                Apgar 5 minutes infant A:                 



                                Apgar 10 minutes infant A:                 



                                Cord pH obtained infant A:                 



                                Vacuum time infant A:                 



                                Vacuum # pulls infant A:                 



                                Vacuum # popoffs infant A:                 



                                                                



                                QBL at delivery:                 



                                                    

________________________________________________________________________________
                                                    



                                __                              



                                                                



                                Provider comments on imported nursing data: []  

               



                                _____________________________________________   

              



                                                                



                                                                



                                Pre-delivery                    



                                [place] evaluation at delivery: NRP certified pe

[facility]                 



                                Admission EGA:                  



                                 Weeks: 37                      



                                 Days: 1                        



                                EGA at delivery (wks/days): 37 weeks (2d)       

          



                                                                



                                Baby A Information                 



                                Baby A information                 



                                 Delivery date: 21                 



                                 Delivery time: 319                 



                                 Birth status: live born                 



                                 Wt of baby: not yet available                 



                                 Gender: female                 



                                 APGAR 1 minute: 8                 



                                 APGAR 5 minutes: 9                 



                                 Presentation: vertex                 



                                ABG details Baby A                 



                                 Cord blood gases: not collected                

 



                                Nuchal cord Baby A                 



                                 Nuchal cord: no                 



                                                                



                                Vaginal Delivery                 



                                Vaginal delivery:                 



                                 Labor: spontaneous                 



                                 Vaginal delivery: spontaneous                 



                                 Amniotic fluid: clear                 



                                 Anesthesia type: epidural anesthesia           

      



                                 Episiotomy: none                 



                                 Episiotomy repair: no                 



                                 Laceration repair: yes, 3-0 suture (vicryl)    

             



                                 Placenta: spontaneous, intact                 



                                 Post delivery meds used: oxytocin              

   



                                 Count: correct                 



                                 Vaginal packing: No                 



                                 Mother's condition: mother stable              

   



                                 Infant's condition: infant stable in room      

           



                                Lacerations:                    



                                 Perineal laceration(s): 2nd Degree w/tita muscl

es                 



                                Extraction details                 



                                 OVD performed: no                 



                                Shoulder dystocia present: no                 



                                Note dictated: No                 



                                Additional comments:                 



                                cord blood collection for donation to [facility]       

          



                                                                



                                Blood Loss/Details                 



                                Blood loss at delivery: <1000 ml, no more than e

xpected                 



                                EBL at delivery (ml's): 300                 



                                                                



                                Electronically Signed by Waldemar Ortiz MD on  at 0337                 



                                                                



                                RPT #:[id number]                 



                                ***END OF REPORT***                 



                                                                



                                                                

 

                2021 20:06:00-00:00                                 [facility]               

  



                                OB Intrapart Prog Note                 



                                REPORT#:[id number] REPORT STATUS: Signed         

        



                                DATE:21 TIME:                  



                                                                



                                PATIENT: ABEBA REYES UNIT #: [id number]   

              



                                ACCOUNT#: [id number] ROOM/BED: [address]        

         



                                : [birth year] AGE: 25 SEX: F ATTEND: Thaddeus Camarena MD                 



                                ADM DT: 21 AUTHOR: Waldemar Ortiz MD      

           



                                                                



                                * ALL edits or amendments must be made on the [employer]/computer document *                 



                                                                



                                                                



                                Subjective                      



                                                                



                                Subjective                      



                                Admission EGA (wks/days): 37 weeks (1d)         

        



                                Patient reports:                 



                                 Patient reports:                 



                                 Yes contractions, Yes normal fetal movement, Ye

s coping well w/o pain meds,                 



                                No complaints, No abdominal pain, No vaginal ble

eding, No leaking fluid, No                 



                                                    headache, No blurred vision,

 No scotomata, No fever, No chills, No shortness of

                                                    



                                breath, No new complaints                 



                                                                



                                Objective                       



                                                                



                                Nursing Documentation Review                 



                                Nursing data:                   



                                The data set between the solid lines has been im

ported from nursing                 



                                documentation. Any exceptions have been noted be

low under Provider comments.                 



                                                    

________________________________________________________________________________
                                                    



                                __                              



                                                                



                                                                



                                ROM date: ROM time:                 



                                                                



                                                    

________________________________________________________________________________
                                                    



                                __                              



                                                                



                                Provider comments on imported nursing data: []  

               



                                _____________________________________________   

              



                                                                



                                                                



                                General                         



                                VS:                             



                                Last Documented:                 



                                 Result Date Time                 



                                 B/P Mean 102.0 1936                 



                                 B/P 126/85 02/21 1936                 



                                 Pulse 88  1936                 



                                 Temp 98.8  1558                 



                                                                



                                Vital Signs                     



                                 Date Temp Pulse Resp B/P B/P Mean Pulse Ox FiO2

                 



                                  98.8-99.0  /50-85 66.0-102.0 

                



                                                                



                                PATIENT WEIGHT:                 



                                                                



                                Weight (lb): 172                 



                                Weight (oz):                    



                                Weight (kg): 78.018                 



                                                                



                                                                



                                Objective                       



                                Cervical/Fetal exam:                 



                                 Dilatation (cm): 6                 



                                 Effacement (%): 90                 



                                 Fetal station: 0                 



                                 Fetal presentation: cephalic                 



                                 Est. fetal wt (grams) 3200.00                 



                                Pelvis exam:                    



                                 Clinically adequate for this fetus: yes        

         



                                Uterine activity:                 



                                 Monitor: toco                  



                                 Frequency (description): irregular             

    



                                 Frequency (minutes): 3                 



                                Procedures: artificial rupture memb, vaginal exa

m                 



                                                                



                                FHR Evaluation                  



                                Baby A:                         



                                 Baby A baseline: 125 bpm                 



                                 Baby A variability: moderate 6-25 bpm          

       



                                 Baby A accelerations: 15 X 15                 



                                 Baby A decelerations: none                 



                                 Baby A FHR category: category 1                

 



                                                                



                                Result                          



                                Findings/Data:                  



                                Laboratory Tests:                 



                                                     



                                 1330 1309                      



                                 Hematology                     



                                 WBC (6.5 - 12.3 K/mm3)  9.6                 



                                 RBC (3.51 - 4.69 M/mm3) 4.25                 



                                 Hgb (10.1 - 13.8 g/dL) 11.5                 



                                 Hct (32.5 - 41.8 %) 35.5                 



                                 MCV (84.6 - 96.6 fL) 83.5 L                 



                                 MCH (27.3 - 33.9 pg) 27.1 L                 



                                 MCHC (32.0 - 34.2 gm/dL) 32.4                 



                                 RDW (12.2 - 16.3 %) 13.7                 



                                 Plt Count (134 - 363 K/mm3) 167                

 



                                 MPV (9.2 - 12.7 fL) 10.9                 



                                  Neut % (Auto) (57.9 - 77.3 %) 76.4            

     



                                 Lymph % (Auto) (14.5 - 29.7 %) 16.8            

     



                                 Mono % (Auto) (3.6 - 10.2 %) 5.5               

  



                                  Eos % (Auto) (0.0 - 3.0 %) 0.2                

 



                                 Baso % (Auto) (0.1 - 0.9 %) 0.2                

 



                                 Neut # (Auto) (K/mm3) 7.4                 



                                 Lymph # (Auto) (K/mm3) 1.6                 



                                 Mono # (Auto) (K/mm3) 0.5                 



                                 Eos # (Auto) (K/mm3) 0.02                 



                                 Baso # (Auto) (K/mm3) 0.0                 



                                 Serology                       



                                 Treponema pallidum Ab (NONREACTIVE) NONREACTIVE

                 



                                 Hep Bs Antigen (NONREACTIVE) NONREACTIVE       

          



                                  Hepatitis C Antibody (NONREACTIVE) NONREACTIVE

                 



                                 Hep C Ab Signal/Cutoff (<0.80) <0.02           

      



                                 HIV 1 2 Antibody (NONREACTIVE) NONREACTIVE     

            



                                  SARS-CoV-2 Ag (Rapid) (NEGATIVE) NEGATIVE     

            



                                                                



                                Microbiology:                   



                                 Date/Time Procedure - Status                 



                                 Source Growth                  



                                   1020 Streptococcus Culture - RECD       

          



                                 VAGINAL                        



                                                                



                                                                



                                                                



                                Diagnosis, Assessment Plan                 



                                Problem List/A P:                 



                                 1. 37 weeks gestation of pregnancy             

    



                                                                



                                 2. Spontaneous onset of labor                 



                                                                



                                Assessment: normal FHR pattern, normal progress 

of labor                 



                                        Plan: anticipate vag delivery, continue 

current managmnt, epidural anesthesia, 

                                        



                                anesthesia consult                 



                                                                



                                Electronically Signed by Waldemar Ortiz MD on  at                  



                                                                



                                RPT #:[id number]                 



                                ***END OF REPORT***                 



                                                                



                                                                

 

                2021 16:53:00-00:00                                 [facility]



                                [facility] ([facility])               

  



                                OB Intrapart Prog Note                 



                                REPORT#:[id number] REPORT STATUS: Signed         

        



                                DATE:21 TIME: 1653                 



                                                                



                                PATIENT: ABEBA REYES UNIT #: [id number]   

              



                                ACCOUNT#: [id number] ROOM/BED: [address]        

         



                                : [birth year] AGE: 25 SEX: F ATTEND: Thaddeus Camarena MD                 



                                ADM DT: 21 AUTHOR: Waldemar Ortiz MD      

           



                                                                



                                * ALL edits or amendments must be made on the [employer]/computer document *                 



                                                                



                                                                



                                Subjective                      



                                                                



                                Subjective                      



                                Admission EGA (wks/days): 37 weeks (1d)         

        



                                Patient reports:                 



                                 Patient reports:                 



                                 Yes contractions, Yes normal fetal movement, Ye

s coping well w/o pain meds,                 



                                No complaints, No abdominal pain, No vaginal ble

eding, No leaking fluid, No                 



                                                    headache, No blurred vision,

 No scotomata, No fever, No chills, No shortness of

                                                    



                                breath, No new complaints                 



                                                                



                                Objective                       



                                                                



                                Nursing Documentation Review                 



                                Nursing data:                   



                                The data set between the solid lines has been im

ported from nursing                 



                                documentation. Any exceptions have been noted be

low under Provider comments.                 



                                                    

________________________________________________________________________________
                                                    



                                __                              



                                                                



                                                                



                                ROM date: ROM time:                 



                                                                



                                                    

________________________________________________________________________________
                                                    



                                __                              



                                                                



                                Provider comments on imported nursing data: []  

               



                                _____________________________________________   

              



                                                                



                                                                



                                General                         



                                VS:                             



                                Last Documented:                 



                                 Result Date Time                 



                                 Temp 98.8  1558                 



                                 B/P Mean 91.0  1432                 



                                 B/P 116/76  1432                 



                                 Pulse 120  1432                 



                                                                



                                Vital Signs                     



                                 Date Temp Pulse Resp B/P B/P Mean Pulse Ox FiO2

                 



                                  98.8 120 116/76 91.0                 



                                                                



                                PATIENT WEIGHT:                 



                                                                



                                Weight (lb):                    



                                Weight (oz):                    



                                Weight (kg):                    



                                                                



                                                                



                                Objective                       



                                Cervical/Fetal exam:                 



                                 Dilatation (cm): 5                 



                                 Effacement (%): 90                 



                                 Fetal station: 0                 



                                 Fetal presentation: cephalic                 



                                 Est. fetal wt (grams) 3200.00                 



                                Pelvis exam:                    



                                 Clinically adequate for this fetus: yes        

         



                                Uterine activity:                 



                                 Monitor: toco                  



                                 Frequency (description): irregular             

    



                                 Frequency (minutes): 3                 



                                                                



                                FHR Evaluation                  



                                Baby A:                         



                                 Baby A baseline: 140 bpm                 



                                 Baby A variability: moderate 6-25 bpm          

       



                                 Baby A accelerations: 15 X 15                 



                                 Baby A decelerations: none                 



                                 Baby A FHR category: category 1                

 



                                                                



                                Result                          



                                Findings/Data:                  



                                Laboratory Tests:                 



                                                     



                                 1330 1309                      



                                 Hematology                     



                                 WBC (6.5 - 12.3 K/mm3) 9.6                 



                                 RBC (3.51 - 4.69 M/mm3) 4.25                 



                                 Hgb (10.1 - 13.8 g/dL) 11.5                 



                                 Hct (32.5 - 41.8 %) 35.5                 



                                 MCV (84.6 - 96.6 fL) 83.5 L                 



                                 MCH (27.3 - 33.9 pg) 27.1 L                 



                                  MCHC (32.0 - 34.2 gm/dL) 32.4                 



                                 RDW (12.2 - 16.3 %) 13.7                 



                                 Plt Count (134 - 363 K/mm3) 167                

 



                                  MPV (9.2 - 12.7 fL) 10.9                 



                                 Neut % (Auto) (57.9 - 77.3 %) 76.4             

    



                                 Lymph % (Auto) (14.5 - 29.7 %) 16.8            

     



                                 Mono % (Auto) (3.6 - 10.2 %) 5.5               

  



                                 Eos % (Auto) (0.0 - 3.0 %) 0.2                 



                                 Baso % (Auto) (0.1 - 0.9 %) 0.2                

 



                                 Neut # (Auto) (K/mm3) 7.4                 



                                 Lymph # (Auto) (K/mm3) 1.6                 



                                 Mono # (Auto) (K/mm3) 0.5                 



                                 Eos # (Auto) (K/mm3) 0.02                 



                                 Baso # (Auto) (K/mm3) 0.0                 



                                 Serology                       



                                 Treponema pallidum Ab (NONREACTIVE) NONREACTIVE

                 



                                  Hep Bs Antigen (NONREACTIVE) NONREACTIVE      

           



                                 Hepatitis C Antibody (NONREACTIVE) NONREACTIVE 

                



                                 Hep C Ab Signal/Cutoff (<0.80) <0.02           

      



                                  HIV 1 2 Antibody (NONREACTIVE) NONREACTIVE    

             



                                 SARS-CoV-2 Ag (Rapid) (NEGATIVE) NEGATIVE      

           



                                                                



                                Microbiology:                   



                                 Date/Time Procedure - Status                 



                                 Source Growth                  



                                  1020 Streptococcus Culture - RECD        

         



                                 VAGINAL                        



                                                                



                                                                



                                                                



                                Diagnosis, Assessment Plan                 



                                Problem List/A P:                 



                                 1. 37 weeks gestation of pregnancy             

    



                                                                



                                 2. Spontaneous onset of labor                 



                                                                



                                Assessment: normal FHR pattern, normal progress 

of labor                 



                                        Plan: anticipate vag delivery, continue 

current managmnt, epidural anesthesia, 

                                        



                                anesthesia consult                 



                                                                



                                Electronically Signed by Waldemar Ortiz MD on  at 1724                 



                                                                



                                RPT #:[id number]                 



                                ***END OF REPORT***                 



                                                                



                                                                

 

                2021 13:29:00-00:00                                 [facility]



                                [facility] ([facility])               

  



                                OB Admission / H P                 



                                REPORT#:[id number] REPORT STATUS: Signed         

        



                                DATE:21 TIME: 1329                 



                                                                



                                PATIENT: ABEBA REYES UNIT #: [id number]   

              



                                ACCOUNT#: [id number] ROOM/BED: [facility]        

         



                                : [birth year] AGE: 25 SEX: F ATTEND: Thaddeus Camarena MD                 



                                ADM DT: 21 AUTHOR: Waldemar Ortiz MD      

           



                                                                



                                * ALL edits or amendments must be made on the el

ectronic/computer document *                 



                                                                



                                                                



                                OB History                      



                                                                



                                Nursing Documentation Review                 



                                Nursing data:                   



                                The data set between the solid lines has been im

ported from nursing                 



                                documentation. Any exceptions have been noted be

low under Provider comments.                 



                                                    

________________________________________________________________________________
                                                    



                                                                



                                Current pregnancy data                 



                                Steroids prior to arrival:                 



                                ROM date: ROM time:                 



                                EDC date: 21                 



                                Post partum hemorrhage risk score:              

   



                                                                



                                Prior pregnancy history                 



                                : 1 Para: 0 Term:                 



                                :                        



                                Abortions spontaneous: Abortions induced:       

          



                                Living children: Ectopic:                 



                                Stillbirths: Live births:                 



                                 deaths:                 



                                Number of previous C/S:                 



                                                                



                                Reported maternal labs/data                 



                                Blood type:                     



                                Rh type:                        



                                Rubella:                        



                                Hepatitis B:                    



                                HIV exposure test:                 



                                VDRL:                           



                                Group B beta strep:                 



                                Rho(D) immune globulin this preg:               

  



                                Monitor mode - UA:                 



                                Feeding preference:                 



                                                    

________________________________________________________________________________
                                                    



                                                                



                                Provider comments on imported nursing data: []  

               



                                ____________________________________________    

             



                                                                



                                Chief complaint: uterine contractions, vaginal b

leeding (postcoital)                 



                                HPI:                            



                                24 yo  with SIUP @ 37w1d                 



                                c/o contractions and vaginal bleeding beginning 

after intercourse last night                 



                                +FM, denies LOF/HA/RUQ pain                 



                                Pregnancy history:                 



                                 : 1                     



                                 Term: 0                        



                                 : 0                     



                                 Abortus: 0                     



                                 Living children: 0                 



                                 Complications (prev preg): none                

 



                                 Previous : none                 



                                Current pregnancy:                 



                                 Admission EGA (weeks) 37                 



                                 Admission EGA (days) 1                 



                                Conditions of pregnancy: nephrolithiasis        

         



                                Labs:                           



                                 Blood type: O                  



                                 Rh: positive                   



                                 Rubella: immune                 



                                 Hepatitis B: negative                 



                                 HIV: negative                  



                                 STD: negative                  



                                 Syphilis: currently negative                 



                                  GBS: unknown                  



                                Past medical history: nephrolithiasis           

      



                                Past surgical history: lithotripsy ?ureteral alejandro

nt                 



                                Social history: no alcohol use, no tobacco use, 

no drug use                 



                                Family history                  



                                Relation not specified for:                 



                                 Congenital heart defect                 



                                 Epilepsy                       



                                 Family History: Diabetes                 



                                 Hypertension                   



                                                                



                                Medications:                    



                                Home Medications:                 



                                PNV WITH FE FUMARATE/FA (PRENATAL) 1 TAB PO CHAVEZ

Y                 



                                FERROUS SULFATE ER (SLOW RELEASE IRON) 142 MG PO

 DAILY                 



                                ACETAMINOPHEN (TYLENOL) 500 MG PO ASDIR PRN HEAD

ACHE                 



                                                                



                                                                



                                Allergies                       



                                Coded Allergies:                 



                                No Known Allergies (21)                 



                                                                



                                                                



                                Review of Systems                 



                                Constitutional:                 



                                Denies: chills, fever, malaise.                 



                                Skin:                           



                                Denies: rash.                   



                                Respiratory:                    



                                Denies: SOB, wheezing.                 



                                Cardiovascular:                 



                                Denies: chest pain, palpitations.               

  



                                GI:                             



                                Denies: diarrhea, nausea, vomiting.             

    



                                :                             



                                Denies: dysuria, urgency.                 



                                Neuro:                          



                                Denies: dizziness, focal weakness, headache, lig

htheaded, numbness, seizure,                 



                                spinning sensation, syncope, vision change, weak

ness.                 



                                Psych:                          



                                Denies: anxiety, depression.                 



                                                                



                                Objective                       



                                                                



                                General                         



                                VS:                             



                                PATIENT WEIGHT:                 



                                                                



                                Weight (lb):                    



                                Weight (oz):                    



                                Weight (kg):                    



                                                                



                                                                



                                Physical Exam                   



                                HEENT: normocephalic w/o injury                 



                                                    Cardiac: regular rate and rh

ythm, no clinically sig murmur, no gallops, no rubs

                                                    



                                Lungs: clear to auscultation, no rales, no rhonc

hi, unlabored breathing                 



                                Breasts: deferred                 



                                Neuro:                          



                                 Exam: alert, oriented x3, normal speech        

         



                                Abdomen: soft, no abnormal tenderness, no guardi

ng, no rebound tenderness                 



                                Uterine activity:                 



                                 Monitor: toco                  



                                 Frequency (description): regular               

  



                                Pelvic exam:                    



                                 Pelvis clinically adequate: yes                

 



                                Membranes:                      



                                 Membranes: Intact                 



                                Lower extremities:                 



                                 Edema: trace                   



                                 Calf tenderness: negative                 



                                Baby A:                         



                                 Baby A baseline: 140 bpm                 



                                 Baby A variability: moderate 6-25 bpm          

       



                                 Baby A accelerations: 15 X 15                 



                                 Baby A decelerations: none                 



                                 Baby A FHR category: category 1                

 



                                                                



                                Result                          



                                Findings/Data:                  



                                Microbiology:                   



                                 Date/Time Procedure - Status                 



                                 Source Growth                  



                                  1020 Streptococcus Culture - RECD        

         



                                 VAGINAL                        



                                                                



                                                                



                                Results: labs reviewed (some still pending), vit

al signs stable                 



                                                                



                                Diagnosis, Assessment Plan                 



                                                                



                                Diagnosis, Assessment Plan                 



                                Problem List/A P:                 



                                 1. 37 weeks gestation of pregnancy             

    



                                                                



                                 2. Spontaneous onset of labor                 



                                                                



                                        Assessment/Impression: spont.active labo

r<39 wks, fetal growth restriction, no 

                                        



                                evidence of infection                 



                                                    Plan: admit to inpatient, de

livery, transfer to [facility], begin antibiotic therapy (

                                                    



                                since GBS unknown)                 



                                Consultation(s):                 



                                 Consultation performed: anesthesia             

    



                                                                



                                Quality                         



                                                                



                                Current Medications                 



                                Current medication review:                 



                                I attest that the foregoing medication list in t

 medical record is true,                 



                                accurate, and complete to the best of my knowled

ge.                 



                                                                



                                                                



                                Electronically Signed by Waldemar Ortiz MD on  at 1722                 



                                                                



                                RPT #:[id number]                 



                                ***END OF REPORT***                 



                                                                



                                                                

 

                2021 06:33:00-00:00                                 [facility]



                                [facility] ([facility])            

     



                                EMERGENCY PROVIDER REPORT                 



                                REPORT#:[id number] REPORT STATUS: Signed         

        



                                DATE:21 TIME: 633                 



                                                                



                                PATIENT: ABEBA REYES UNIT #: [id number]   

              



                                ACCOUNT#: [id number] ROOM/BED:                

 



                                AGE: 25 SEX: F PCP PHYS: Fabiola Camarena MD      

           



                                SERVICE DT: 21 AUTHOR: Gretel Olivares M D                 



                                                                



                                * ALL edits or amendments must be made on the el

[employer]/computer document *                 



                                                                



                                                                



                                [facility] History                    



                                Chief complaint: headache                 



                                HPI:                            



                                Patient 25 y old  at 35.1wk presents to the 

[facility] with compliants of                 



                                headaches since 1 wk , denies any nausea, vomitt

ing ,NO vaginal bleeding , no                 



                                contraction , No LOF ,fetal movments present she

 had taken the tylenol but it                 



                                                    did not go away completly , 

she denies any migraines , she has glasses and wear

                                                    



                                contact lenses she gets her prenatal care with DEMARIO Camarena                 



                                Pregnancy history:                 



                                 : 1                     



                                 Term: 0                        



                                 : 0                     



                                 Abortus: 0                     



                                 Living children: 0                 



                                Current pregnancy:                 



                                 EDC: 21                  



                                 EGA (weeks/days): 35.1wk                 



                                Conditions of pregnancy: kidney/bladder infectio

n, Kidney stones                 



                                Past medical history: Kidney stones             

    



                                Past surgical history: stents for kidney stones 

and basketing                 



                                Social history: no alcohol use, no tobacco use, 

no drug use                 



                                Family history                  



                                Relation not specified for:                 



                                 Congenital heart defect                 



                                 Epilepsy                       



                                 Family History: Diabetes                 



                                 Hypertension                   



                                                                



                                Medications:                    



                                Home Medications:                 



                                Medication  Dose/Rte/Freq Days Qty Entered Last 

                



                                 Max Daily Dose Reviewed                 



                                 PNV WITH FE 1 TAB PO DAILY  20  

               



                                 FUMARATE/FA 1714 0008                 



                                 (PRENATAL)                     



                                Strength: 1 EACH TAB                 



                                 FERROUS SULFATE  MG PO DAILY  21                 



                                 (SLOW RELEASE IRON) 2019                 



                                Strength: 142 MG (45 MG                 



                                IRON) TAB.ER                    



                                 ACETAMINOPHEN (TYLENOL) 500 MG PO 2 21                 



                                Strength: 500 MG TAB ASDIR PRN HEADACHE 0008 000

8                 



                                                                



                                                                



                                Allergies                       



                                Coded Allergies:                 



                                No Known Allergies (21)                 



                                                                



                                                                



                                Review of Systems                 



                                Constitutional:                 



                                Denies: chills, fever.                 



                                Respiratory:                    



                                Denies: GRANGER (dyspnea on exertion), SOB.         

        



                                Cardiovascular:                 



                                Denies: chest pain, palpitations.               

  



                                GI:                             



                                Denies: nausea, vomiting.                 



                                :                             



                                Reports: pregnant. Denies: dysuria, flank pain, 

frequency, vaginal bleeding,                 



                                vaginal discharge.                 



                                Psych:                          



                                Denies: anxiety, depression, stress.            

     



                                All systems rev neg: except as marked           

      



                                                                



                                Objective                       



                                                                



                                General                         



                                VS:                             



                                PATIENT WEIGHT:                 



                                                                



                                Weight (lb):                    



                                Weight (oz):                    



                                Weight (kg): 78.464210                 



                                /68, 96,20                 



                                                                



                                Physical Exam                   



                                HEENT: normocephalic w/o injury                 



                                Cardiac: regular rate and rhythm                

 



                                Lungs: clear to auscultation                 



                                Abdomen: gravid, no guarding                 



                                Genitourinary: no flank pain                 



                                Uterine activity:                 



                                 Monitor: toco                  



                                 Frequency (description): none                 



                                Pelvic exam:                    



                                 Pelvis clinically adequate: yes                

 



                                Cervical/Fetal exam:                 



                                 Dilatation (cm): 0 - closed                 



                                 FHR evaluation:                 



                                 Baseline: 150 bpm                 



                                 Variability: moderate 6-25 bpm                 



                                 Accelerations: 15 X 15                 



                                 Decelerations: none                 



                                 FHR category: category 1                 



                                Membranes:                      



                                 Membranes: Intact                 



                                Lower extremities:                 



                                 Edema: none                    



                                                                



                                Results                         



                                Findings/Data:                  



                                Laboratory Tests:                 



                                                           



                                 0050                           



                                 Urines                         



                                 Urine Color (YELLOW) YELLOW                 



                                 Urine Appearance (CLEAR)  CLOUDY A             

    



                                 Urine pH (5 - 9) 6.0                 



                                 Ur Specific Gravity (1.001 - 1.035) 1.020      

           



                                 Urine Protein (NEG) NEGATIVE                 



                                 Urine Glucose (UA) (NEG) NEGATIVE              

   



                                 Urine Ketones (NEG) NEGATIVE                 



                                 Urine Blood (NEG)  2+ H                 



                                 Urine Nitrite (NEG) NEG                 



                                 Urine Bilirubin (NEG) NEGATIVE                 



                                 Urine Urobilinogen (NEG mg/dL) NEGATIVE        

         



                                 Ur Leukocyte Esterase (NEG) NEG                

 



                                 Urine RBC (NONE SEEN #/hpf) TOO NUMEROUS TO CNT

 H                 



                                 Urine WBC (NONE SEEN #/hpf)  3-5 H             

    



                                 Ur Epithelial Cells (RARE - FEW #/HPF) FEW     

            



                                 Urine Bacteria (RARE - FEW /HPF) RARE          

       



                                 Urine Mucus (NONE SEEN)  1+                 



                                                                



                                                                



                                Results: labs reviewed, vital signs stable      

           



                                                                



                                                                



                                Diagnosis, Assessment Plan                 



                                                                



                                Diagnosis, Assessment Plan                 



                                Free Text A P:                  



                                Patient 25 y old  at 35.1wk with headaches s

facundo 1wk                 



                                Plan:                           



                                Observation                     



                                NST                             



                                [place]                        



                                Hydration                       



                                d/c home if gets better                 



                                follow up with primary OB this week             

    



                                Return to [facility] as needed PRN                 



                                Plan: discharge home, labor precautions, preecla

mpsia precautions, follow up                 



                                Plan discussed with: patient, spouse/partner    

             



                                                                



                                Electronically Signed by Gretel Olivares MD on

 02/10/21 at                  



                                                                



                                RPT #:[id number]                 



                                ***END OF REPORT***                 



                                                                



                                                                

 

                2021 21:44:00-00:00                                 [facility] ([facility])               

  



                                OB Admission / H P                 



                                REPORT#:[id number] REPORT STATUS: Signed         

        



                                DATE:21 TIME:                  



                                                                



                                PATIENT: ABEBA REYES  UNIT #: [id number]  

               



                                ACCOUNT#: [id number] ROOM/BED:                

 



                                : [birth year] AGE: 25 SEX: F ATTEND: Thaddeus Camarena MD                 



                                ADM DT: AUTHOR: Jacob Sanders MD         

        



                                                                



                                * ALL edits or amendments must be made on the el

[employer]/computer document *                 



                                                                



                                                                



                                OB History                      



                                                                



                                Nursing Documentation Review                 



                                Nursing data:                   



                                The data set between the solid lines has been im

ported from nursing                 



                                documentation. Any exceptions have been noted be

low under Provider comments.                 



                                                    

________________________________________________________________________________
                                                    



                                                                



                                Current pregnancy data                 



                                Steroids prior to arrival:                 



                                ROM date: ROM time:                 



                                EDC date: 21                 



                                                                



                                Prior pregnancy history                 



                                : 1 Para: 0 Term:                 



                                :                        



                                Abortions spontaneous: Abortions induced:       

          



                                Living children: Ectopic:                 



                                Stillbirths: Live births:                 



                                 deaths:                 



                                Number of previous C/S:                 



                                                                



                                Reported maternal labs/data                 



                                Blood type:                     



                                Rh type:                        



                                Rubella:                        



                                Hepatitis B:                    



                                HIV exposure test:                 



                                VDRL:                           



                                Group B beta strep:                 



                                Rho(D) immune globulin this preg:               

  



                                Monitor mode - UA:                 



                                Feeding preference:                 



                                                    

________________________________________________________________________________
                                                    



                                                                



                                Provider comments on imported nursing data: []  

               



                                ____________________________________________    

             



                                                                



                                Chief complaint: suspected ruptured memb, pelvic

 pressure                 



                                HPI:                            



                                                    25 y G1 at 33w2d, here for p

elvic pressure and vaginal discharge. She had sex , and since then has felt a pelvic press

ure, constant. She has also                 



                                noticed a few episodes of a gush of discharge or

 fluid. She has not noted                 



                                                    discharge this pregnancy, so

 feels this is unusual. No itching/odor/irritation.

                                                    



                                         No fever. She had a h/o nephrolithiasis

 this pregnancy, with a ureteral stent 

                                        



                                placed in October, which has since been removed.

 No issues with that since.                 



                                                                



                                Pregnancy history:                 



                                 : 1                     



                                Current pregnancy:                 



                                 Best EDC: 21                 



                                 Admission EGA (weeks) 33                 



                                 Admission EGA (days) 2                 



                                Labs:                           



                                 Blood type: O                  



                                 Rh: positive                   



                                 Rubella: immune                 



                                 Hepatitis B: negative                 



                                 HIV: negative                  



                                 STD: negative                  



                                 Syphilis: currently negative                 



                                 GBS: unknown                   



                                Past medical history: nephrolithiasis, lactose i

ntolerance, trichomonas                  



                                        Past surgical history: lithotripsy, kidn

ey stone removal, kidney surgeries for 

                                        



                                obstruction from stones and removal of scar tiss

ue, cysto/left ureteral stent                 



                                placement 10/7/2020- removed 2020         

        



                                Social history: employed, no alcohol use, no tob

acco use, no drug use                 



                                Family history                  



                                Relation not specified for:                 



                                 Congenital heart defect                 



                                 Epilepsy                       



                                 Family History: Diabetes                 



                                 Hypertension                   



                                                                



                                Medications:                    



                                Home Medications:                 



                                PNV WITH FE FUMARATE/FA (PRENATAL) 1 TAB PO CHAVEZ

Y                 



                                FERROUS SULFATE ER (SLOW RELEASE IRON) 142 MG PO

 DAILY                 



                                                                



                                                                



                                Allergies                       



                                Coded Allergies:                 



                                No Known Allergies (21)                 



                                                                



                                                                



                                Review of Systems                 



                                :                             



                                Reports: pelvic pain, pregnant, vaginal discharg

e.                 



                                All systems rev neg: except as marked           

      



                                                                



                                Objective                       



                                                                



                                General                         



                                VS:                             



                                PATIENT WEIGHT:                 



                                                                



                                Weight (lb): 170                 



                                Weight (oz):                    



                                Weight (kg): 77.858189                 



                                119/80 HR 97                    



                                                                



                                Physical Exam                   



                                Neuro:                          



                                 Exam: alert, oriented x3, normal speech        

         



                                Abdomen: gravid, soft, no abnormal tenderness, n

o guarding, no rebound                 



                                tenderness                      



                                Uterine activity:                 



                                 Monitor: toco                  



                                 Frequency (description): irritability          

       



                                Pelvic exam:                    



                                 Pelvis clinically adequate: yes                

 



                                 Sterile speculum exam: physiological discharge,

 no pooling, no bleeding                 



                                Cervical/Fetal exam:                 



                                 Dilatation (cm): 1/long/-5/ firm / posterior AH

M                 



                                 Suspected fetal macrosomia: No                 



                                 Suspected > 5000 grams: No                 



                                 Fetal presentation: transverse                 



                                Lower extremities:                 



                                 Edema: none                    



                                Baby A:                         



                                 Baby A baseline: 150 bpm                 



                                 Baby A variability: moderate 6-25 bpm          

       



                                 Baby A accelerations: 15 X 15                 



                                 Baby A decelerations: variable (few, irregular)

                 



                                 Baby A FHR category: category 2                

 



                                                                



                                Result                          



                                Findings/Data:                  



                                Laboratory Tests:                 



                                  2045                           



                                 Hematology                     



                                 WBC (6.6 - 12.1 K/mm3) 9.4                 



                                 RBC (3.45 - 5.01 M/mm3) 3.95                 



                                 Hgb (10.7 - 13.9 g/dL) 11.2                 



                                 Hct (32.1 - 42.1 %) 34.6                 



                                 MCV (84.1 - 94.8 fL) 88                 



                                 MCH (27 - 35 pg) 28.4                 



                                  MCHC (32.2 - 34.1 gm/dL) 32.4                 



                                 RDW (12.4 - 16.5 %) 13.5                 



                                 Plt Count (133 - 385 K/mm3) 176                

 



                                  MPV (9.1 - 12.7 fl) 10.4                 



                                 Neut % (Auto) (56.5 - 79.4 %) 71.2             

    



                                 Lymph % (Auto) (14.3 - 34.3 %) 20.3            

     



                                 Mono % (Auto) (5.1 - 10.4 %) 7.0               

  



                                 Eos % (Auto) (0.1 - 3.0 %) 0.4                 



                                 Baso % (Auto) (0.1 - 1.0 %)  0.2               

  



                                 Neut # (Auto) (K/mm3) 6.7                 



                                 Lymph # (Auto) (K/mm3) 1.9                 



                                 Mono # (Auto) (K/mm3)  0.7                 



                                 Eos # (Auto) (K/mm3) 0.04                 



                                 Baso # (Auto) (K/mm3) 0.0                 



                                 Urines                         



                                  Urine Color (YELLOW) YELLOW                 



                                 Urine Appearance (CLEAR) CLEAR                 



                                 Urine pH (5 - 9) 7.0                 



                                  Ur Specific Gravity (1.001 - 1.035) 1.014     

            



                                 Urine Protein (NEG) NEGATIVE                 



                                 Urine Glucose (UA) (NEG) NEGATIVE              

   



                                 Urine Ketones (NEG) TRACE H                 



                                 Urine Blood (NEG) 1+ H                 



                                 Urine Nitrite (NEG)  NEG                 



                                 Urine Bilirubin (NEG) NEGATIVE                 



                                 Urine Urobilinogen (NEG mg/dL) NEGATIVE        

         



                                 Ur Leukocyte Esterase (NEG)  TRACE H           

      



                                 Urine RBC (NONE SEEN #/hpf) 3-5 H              

   



                                 Urine WBC (NONE SEEN #/hpf) 6-10 H             

    



                                 Ur Epithelial Cells (RARE - FEW #/HPF) FEW     

            



                                 Urine Bacteria (RARE - FEW /HPF) RARE          

       



                                 Urine Mucus (NONE SEEN) RARE                 



                                                                



                                Laboratory Tests:                 



                                  2045                           



                                 Hematology                     



                                 WBC (6.6 - 12.1 K/mm3)  9.4                 



                                 RBC (3.45 - 5.01 M/mm3) 3.95                 



                                 Hgb (10.7 - 13.9 g/dL) 11.2                 



                                 Hct (32.1 - 42.1 %) 34.6                 



                                 MCV (84.1 - 94.8 fL) 88                 



                                 MCH (27 - 35 pg) 28.4                 



                                 MCHC (32.2 - 34.1 gm/dL) 32.4                 



                                 RDW (12.4 - 16.5 %) 13.5                 



                                 Plt Count (133 - 385 K/mm3) 176                

 



                                  MPV (9.1 - 12.7 fl) 10.4                 



                                 Neut % (Auto) (56.5 - 79.4 %) 71.2             

    



                                 Lymph % (Auto) (14.3 - 34.3 %) 20.3            

     



                                  Mono % (Auto) (5.1 - 10.4 %) 7.0              

   



                                 Eos % (Auto) (0.1 - 3.0 %) 0.4                 



                                 Baso % (Auto) (0.1 - 1.0 %) 0.2                

 



                                 Neut # (Auto) (K/mm3) 6.7                 



                                 Lymph # (Auto) (K/mm3) 1.9                 



                                 Mono # (Auto) (K/mm3)  0.7                 



                                 Eos # (Auto) (K/mm3) 0.04                 



                                 Baso # (Auto) (K/mm3) 0.0                 



                                 Urines                         



                                 Urine Color (YELLOW) YELLOW                 



                                 Urine Appearance (CLEAR) CLEAR                 



                                 Urine pH (5 - 9) 7.0                 



                                  Ur Specific Gravity (1.001 - 1.035) 1.014     

            



                                 Urine Protein (NEG) NEGATIVE                 



                                 Urine Glucose (UA) (NEG) NEGATIVE              

   



                                  Urine Ketones (NEG) TRACE H                 



                                 Urine Blood (NEG) 1+ H                 



                                 Urine Nitrite (NEG) NEG                 



                                 Urine Bilirubin (NEG) NEGATIVE                 



                                 Urine Urobilinogen (NEG mg/dL) NEGATIVE        

         



                                 Ur Leukocyte Esterase (NEG) TRACE H            

     



                                 Urine RBC (NONE SEEN #/hpf) 3-5 H              

   



                                 Urine WBC (NONE SEEN #/hpf) 6-10 H             

    



                                 Ur Epithelial Cells (RARE - FEW #/HPF) FEW     

            



                                 Urine Bacteria (RARE - FEW /HPF) RARE          

       



                                 Urine Mucus (NONE SEEN) RARE                 



                                                                



                                Recent Impressions:                 



                                ULTRASOUND - US PREG UT TRANSVAGINAL 2239 

                



                                *** Report Impression - Status: SIGNED Entered: 

2021 0000                 



                                                                



                                IMPRESSION:                     



                                1. Single living intrauterine pregnancy with an 

estimated ultrasound                 



                                gestational age of 33 weeks 6 days and an estima

maddy date of delivery                 



                                of 3/9/2021                     



                                                                



                                SL: 131                         



                                Impression By: KIMO Stone                 



                                ULTRASOUND - US PREG AFTER 1ST TRI 2239   

              



                                *** Report Impression - Status: SIGNED Entered: 

2021 0000                 



                                                                



                                IMPRESSION:                     



                                1. Single living intrauterine pregnancy with an 

estimated ultrasound                 



                                gestational age of 33 weeks 6 days and an estima

maddy date of delivery                 



                                of 3/9/2021                     



                                                                



                                SL: 131                         



                                Impression By: KIMO Stone                 



                                                                



                                                                



                                                                



                                Diagnosis, Assessment Plan                 



                                                                



                                Diagnosis, Assessment Plan                 



                                Problem List/A P:                 



                                 1. Vaginal discharge during pregnancy          

       



                                                                



                                 2. Pelvic pressure in pregnancy                

 



                                                                



                                Free Text A P:                  



                                25 y G1 at 33w2d, here for pelvic pressure/vagin

al discharge.                 



                                                    *irritability on monitor onl

y after SVE- pt not feeling contractions. Reports a

                                                    



                                rare contraction the last few weeks, but none co

nsistent.                 



                                *UA not consistent with UTI.                 



                                *ROM+ negative, sterile spec negative, but with 

her description she may have                 



                                lost all her fluid over the past 1-2 days. Will 

check u/s to confirm MANUEL.                 



                                If normal MANUEL- strict  labor precautions 

given.                 



                                                                



                                Update: reassuring u/s with normal MANUEL, no furth

er leaking or discharge. mild                 



                                irritability on toco after sve, resolved on nst 

after u/s. Suspect pelvic                 



                                        pressure and discharge is related to adv

ancing pregnancy. Strict  labor 

                                        



                                precautions given. NST >20 min reviewed by [facility]. 

                



                                                                



                                Electronically Signed by Jacob Sanders MD

 on 21 at 0215                 



                                                                



                                RPT #:[id number]                 



                                ***END OF REPORT***                 



                                                                



                                                                

 

                2020-10-09 09:27:00-00:00                                 [facility]



                                [facility] ([facility])               

  



                                OB Disch Undelivered                 



                                REPORT#:[id number] REPORT STATUS: Signed         

        



                                DATE:10/09/20 TIME: 927                 



                                                                



                                PATIENT: ABEBA REYES UNIT #: [id number]   

              



                                ACCOUNT#: [id number] ROOM/BED: [address]       

          



                                : [birth year] AGE: 24 SEX: F ATTEND: Thaddeus Camarena MD                 



                                ADM DT: 10/07/20 AUTHOR: Jacob Snaders MD

                 



                                                                



                                * ALL edits or amendments must be made on the [facility] [employer]/computer document *                 



                                                                



                                                                



                                Subjective                      



                                                                



                                Subjective                      



                                Admission EGA:                  



                                 Weeks: 17                      



                                 Days: 3                        



                                Current EGA weeks/days: 17w6d                 



                                Comments:                       



                                feeling much improved, soreness is improved. Afe

brile.                 



                                                                



                                Objective                       



                                                                



                                General                         



                                VS:                             



                                Last Documented:                 



                                  Result Date Time                 



                                 Pulse Ox 97 10/09 0834                 



                                 B/P 106/71 10/09 0834                 



                                  B/P Mean 82.5 10/09 0834                 



                                 O2 Delivery Room air 10/09 0834                

 



                                 Temp 98.6 10/09 0834                 



                                 Pulse  79 10/09 0834                 



                                 Resp 18 10/09 0834                 



                                 FiO2 98 10/07 0951                 



                                 O2 Flow Rate 10 10/07 0951                 



                                                                



                                Vital Signs                     



                                Date Temp Pulse Resp B/P B/P Mean Pulse Ox FiO2 

                



                                10/08-10/09 98.6-99.1  16-18 104-107/70-74

 81.8-84.9 95-99                 



                                                                



                                Patient Weight                  



                                                                



                                Weight (lb):                    



                                Weight (oz):                    



                                Weight (kg):                    



                                                                



                                                                



                                Physical Exam                   



                                Neuro:                          



                                 Exam: alert, oriented x3, normal speech        

         



                                Abdomen: gravid, soft, minimal tenderness in the

 left flank                 



                                Lower extremities:                 



                                 Edema: none                    



                                                                



                                Discharge Undelivered                 



                                                                



                                General                         



                                Problem List/A P:                 



                                 1. Left nephrolithiasis                 



                                                                



                                 2. 17 weeks gestation of pregnancy             

    



                                                                



                                Assessment:                     



                                Patient is a 24 year old G 1 P [] at 17w3d gesta

tion admitted for left                 



                                                    nephrolithiasis and hydronep

hrosis. She developed a fever on HD 1, and rocephin

                                                    



                                was started. Later that night, a code sepsis was

 called for fever and                 



                                tachycardia. Urology Dr. Cao was consulted, an

d performed a cystoscopy with                 



                                        left double J ureteral stent placement, 

uncomplicated. She remained afebrile x 

                                        



                                48 hours, and will be discharged to home this af

ternoon (will await one                 



                                additional dose of rocephin, and also progress o

n the cultures to guide PO                 



                                antibiotic decision making).                 



                                Has appt with her regular urologist Dr. Amalia del real on Monday.                 



                                                                



                                Plan:                           



                                discharge to home.                 



                                Admission diagnosis: nephrolithiasis, hydronephr

osis                 



                                Hospital course:                 



                                see above                       



                                Discharge to: Home/Self Care                 



                                Discharge condition: stable                 



                                Discharge diagnosis: fever, left hydronephrosis,

 left nephrolithiasis                 



                                Discharge management: less than 30 mins         

        



                                                                



                                Discharge Instructions                 



                                Instructions: instr and warnings rev'd          

       



                                Warnings: bleeding, nausea/vomit/dehydration, fe

cesar                 



                                Diet: Resume Home Diet/Feeds                 



                                Activity: Resume Normal Activity                

 



                                Additional Discharge Routines: PCP Follow-Up, Co

nsultant Follow-Up                 



                                Consultation(s) performed:                 



                                 Consultation performed: anesthesia, urologist  

               



                                                                



                                Electronically Signed by Jaocb Sanders MD

 on 10/09/20 at 0933                 



                                                                



                                [facility] #:[id number]                 



                                ***END OF REPORT***                 



                                                                



                                                                

 

                2020-10-08 23:22:00-00:00 [id number] [facility]

S                 [facility]



                                 [address] [address]                 



                                                                



                                                                



                                PATIENT NAME: ABEBA REYES ADMIT DATE: 10/0

7/20                 



                                ACCOUNT NO: [id number] ROOM NO: .2618        

         



                                MEDICAL RECORD NO: [id number] AGE: 24           

      



                                 SEX: F                         



                                                                



                                ADMITTING PHYSICIAN: Fabiola Camarena MD          

       



                                ATTENDING PHYSICIAN: Fabiola Camarena MD          

       



                                                                



                                                                



                                DATE: 10/08/2020                 



                                                                



                                TIME OF PROGRESS NOTE: 9:10 a.m.                

 



                                                                



                                                    SUBJECTIVE: The patient is d

oing well, sleeping well, still complains of just a

                                                    



                                little bit of dysuria after her stent placement 

yesterday. Otherwise, she is                 



                                more comfortable than before her stent was place

d.                 



                                                                



                                OBJECTIVE:                      



                                VITAL SIGNS: She is afebrile. Her temperature is

 37.0, blood pressure is                 



                                100/66, heart rate 93, respiratory rate 16, and 

saturating 96% on room air.                 



                                GENERAL: Alert and oriented x3, in no acute dist

ress.                 



                                LUNGS: She is breathing comfortably without whee

zing, cough, or stridor.                 



                                HEART: Regular rate and rhythm.                 



                                ABDOMEN: Soft, nontender, 17 weeks gravid.      

           



                                EXTREMITIES: She moves all extremities. There is

 no clubbing, cyanosis, or                 



                                edema.                          



                                NEUROLOGIC: Intact.                 



                                                                



                                LABORATORY DATA: Her BMP is as follows: Sodium i

s 137, potassium is 2.9,                 



                                chloride is 103, bicarbonate is 24, BUN is 4, cr

eatinine 0.6. Her CBC; white                 



                                count is 7.5, hemoglobin is 9.5, hematocrit is 2

9.2, platelets are 140.                 



                                                                



                                                    Her urine culture is still p

ending, was reported as low counts of gram-positive

                                                    



                                cocci.                          



                                                                



                                ASSESSMENT AND PLAN: The patient is a 24-year-ol

d female, 17-week pregnancy,                 



                                presenting with left-sided renal stones, left fl

ank pain and severe                 



                                hydronephrosis of the left kidney, status post c

ystoscopy and left ureteral                 



                                stenting with intraoperative ultrasound. She is 

doing much better today. Her                 



                                fevers have resolved. Her white count seems to h

ave improved. No specific                 



                                organism seen on urine culture yet. Cultures are

 still pending. At this time,                 



                                I would recommend continued antibiotics for a to

kevin of another week. She can                 



                                                    follow up with her usual uro

logist, Dr. Amalia Arechiga, who is aware of her 

stent                                               



                                procedure and she has an appointment already arr

anged to follow up on Monday.                 



                                The patient remains afebrile. She is cleared to 

be discharged.                 



                                                                



                                Dictated By: Giovanni Cao MD                 



                                                                



                                WT: PN:F.HIM/ANITAKRISTEN                 



                                DD: 10/08/2020 23:22:08                 



                                DT: 10/08/2020 23:40:13                 



                                Conf#: [id number]/DID#: [id number]                 



                                                                



                                PATIENT NAME: ABEBA REYES ACCOUNT #: 

[id number]                 



                                                                



                                                                



                                                                



                                                                



                                Authenticated by Giovanni Cao MD On 10/29/2020

 03:02:53 PM                 



                                                                



                                                                



                                                                



                                                                



                                                                



                                Electronically Signed by Giovanni Cao MD on 1

 at 1503                 



                                                                



                                                                



                                                                



                                                                



                                                                



                                                                



                                                                



                                                                



                                                                



                                                                



                                                                



                                                                



                                                                



                                                                



                                                                



                                                                



                                                                



                                                                



                                                                



                                                                



                                                                



                                                                



                                                                



                                                                



                                                                



                                                                



                                                                



                                                                



                                                                



                                                                



                                                                



                                                                



                                                                



                                                                



                                                                



                                                                



                                                                



                                                                



                                                                



                                                                



                                                                



                                                                



                                                                



                                                                



                                                                



                                                                



                                                                



                                                                



                                PATIENT NAME: ABEBA REYES ACCOUNT #: 

[id number]                 

 

                2020-10-08 08:09:00-00:00                                 [facility]



                                [facility] ([facility])               

  



                                Clinical Note                   



                                REPORT#:[id number] REPORT STATUS: Signed         

        



                                DATE:10/08/20 TIME: 809                 



                                                                



                                PATIENT: ABEBA REYES UNIT #: [id number]   

              



                                ACCOUNT#: [id number] ROOM/BED: [address]       

          



                                : [birth year] AGE: 24 SEX: F ATTEND: Thaddeus Camarena MD                 



                                ADM DT: 10/07/20 AUTHOR: Jacob Sanders MD

                 



                                                                



                                * ALL edits or amendments must be made on the [facility] [employer]/computer document *                 



                                                                



                                                                



                                Clinical Note                   



                                Note:                           



                                Gyn Progress Note                 



                                                                



                                s/p left double J stent placement yesterday. Doi

ng well today, pain much                 



                                improved, just sore. no more fevers. + fetal mov

ement. no vaginal bleeding.                 



                                                                



                                                                



                                Vital Signs:                    



                                 Date Time Temp Pulse  Resp B/P B/P Pulse O2 O2 

Flow FiO2                 



                                 Mean Ox Delivery Rate                 



                                 10/08 0721 98.6 93 16 100/66 77.0 96           

      



                                 10/08 0434 98.4 95 18 95/59 70.8 96            

     



                                 10/08 0024 99.1 109 18 102/66 78.2 96          

       



                                 10/07 2020 98.4 105 18 99/66 77.3 98           

      



                                 10/07 1633 98.4 105 14 101/69 79.5 99          

       



                                 10/07 1152 98.8 110 14 109/73 84.9 97          

       



                                 10/07 1034 97.5 119 20 111/70 83.2 97          

       



                                 10/07 1015 98.5 116 19 115/64 95 Room air      

           



                                 10/07 1000 119 18 113/65 96 Room air           

      



                                 10/07 0951 Simple 10 98                 



                                 mask                           



                                 10/07 0945 115 20 111/67 96 Room air           

      



                                 10/07 0930 112 20 110/70 98 Simple 6           

      



                                  mask                          



                                 10/07 0916 98.8 120 16 112/66 98 Simple 10     

            



                                 mask                           



                                                                



                                                                



                                Gen: A Ox3, NAD                 



                                Abd: gravid, uterus nontender, left flank mildly

 tender.                 



                                Ext: no edema                   



                                                                



                                Laboratory Tests:                 



                                 10/08                          



                                  0438                          



                                 Chemistry                      



                                 Sodium (135 - 145 mEq/L) 137                 



                                 Potassium (3.5 - 5.0 mEq/L) 2.9 *L             

    



                                  Chloride (100 - 115 mEq/L) 103                

 



                                 Carbon Dioxide (22 - 31 mEq/L) 24              

   



                                 Anion Gap (10 - 20) 12.60                 



                                  BUN (7 - 18 mg/dL) 4 L                 



                                 Creatinine (0.5 - 1.0 mg/dL) 0.6               

  



                                 Glomerular Filtr Rate (>60 ml/min) 124         

        



                                  Glucose (65 - 110 mg/dL) 94                 



                                 Calcium (8.4 - 10.2 mg/dL) 8.1 L               

  



                                 Hematology                     



                                 WBC (6.6 - 12.1 K/mm3) 7.5                 



                                 RBC (3.45 - 5.01 M/mm3) 3.28 L                 



                                 Hgb (10.7 - 13.9 g/dL) 9.5 L                 



                                 Hct (32.1 - 42.1 %) 29.2 L                 



                                 MCV (84.1 - 94.8 fL) 89                 



                                 MCH (27 - 35 pg) 29.0                 



                                 MCHC (32.2 - 34.1 gm/dL) 32.5                 



                                 RDW (12.4 - 16.5 %) 14.0                 



                                 Plt Count (133 - 385 K/mm3) 140                

 



                                 MPV (9.1 - 12.7 fl) 10.6                 



                                 Neut % (Auto) (56.5 - 79.4 %) 83.5 H           

      



                                 Lymph % (Auto) (14.3 - 34.3 %) 7.8 L           

      



                                 Mono % (Auto) (5.1 - 10.4 %) 7.4               

  



                                 Eos % (Auto) (0.1 - 3.0 %) 0.1                 



                                 Baso % (Auto) (0.1 - 1.0 %) 0.1                

 



                                 Neut # (Auto) (K/mm3) 6.2                 



                                 Lymph # (Auto) (K/mm3) 0.6                 



                                 Mono # (Auto) (K/mm3) 0.6                 



                                 Eos # (Auto) (K/mm3) 0.01                 



                                 Baso # (Auto) (K/mm3) 0.0                 



                                                                



                                Microbiology:                   



                                 Date/Time Procedure - Status                 



                                  Source Growth                 



                                 10/07 0852 Urine Culture - RECD                

 



                                 URINE                          



                                                                



                                                                



                                                                



                                A/P: 17w5d, here for left nephrolithiasis, with 

overlying pyelonephritis now                 



                                improving s/p left double J stent placement.    

             



                                *f/u with her regular urologist in 1-2 weeks.   

              



                                *cultures pending.                 



                                *SCDs.                          



                                *continue rocephin until cultures confirm a PO r

egimen. If continuing to                 



                                improve, possibly home tomorrow.                

 



                                                                



                                Electronically Signed by Jacob Sanders MD

 on 10/08/20 at 0813                 



                                                                



                                [facility] #:[id number]                 



                                ***END OF REPORT***                 



                                                                



                                                                

 

                2020-10-07 12:44:00-00:00 [id number] [facility]



                                 [address], [place] [address]                 



                                                                



                                                                



                                PATIENT NAME: ABEBA REYES ADMIT DATE: 10/0

7/20                 



                                ACCOUNT NO: [id number] ROOM NO: .2618        

         



                                MEDICAL RECORD NO: [id number] AGE: 24           

      



                                 SEX: F                         



                                                                



                                ADMITTING PHYSICIAN: Fabiola Camarena MD          

       



                                ATTENDING PHYSICIAN: Fabiola Camarena MD          

       



                                                                



                                                                



                                OPERATION DATE: 10/07/2020                 



                                                                



                                RADIOGRAPHIC INTERPRETATION OF INTRAOPERATIVE FI

NDINGS                 



                                                                



                                PROCEDURE PERFORMED: Intraoperative ultrasound o

f the left kidney with stent                 



                                placement.                      



                                                                



                                SURGEON: Giovanni Cao MD                 



                                                                



                                DESCRIPTION OF PROCEDURE: Ultrasound of the left

 kidney was done live in the                 



                                                    surgery and shows moderate t

o severe left-sided hydronephrosis with 

hydroureter.                                        



                                 During live ultrasound, I was able to visualize

 a wire being advanced up the                 



                                                    ureter and into the renal pe

lvis, at which point we were then able to visualize

                                                    



                                a double-J stent, advanced up into the ureter an

d pelvis as well. Once the                 



                                stent was deployed, we could see the curl of the

 double-J stent in the left                 



                                renal pelvis. Hydronephrosis appeared to remain 

approximately the same and at                 



                                this time, the procedure was concluded.         

        



                                                                



                                CONCLUSION: Left moderate to severe hydronephros

is, left double-J stent                 



                                placement.                      



                                                                



                                Dictated By: Giovanni Cao MD                 



                                                                



                                WT: OP:[email or web address]/[email or web address]/[email or web address]                 



                                DD: 10/07/2020 12:44:07                 



                                DT: 10/07/2020 14:47:36                 



                                Conf#: [id number]/DID#: [id number]                 



                                                                



                                Authenticated and Edited by Giovanni Cao MD On

 10/29/20 3:02:39 PM                 



                                                                



                                                                



                                                                



                                                                



                                                                



                                Electronically Signed by Giovanni Cao MD on  at 1507                 



                                                                



                                                                



                                                                



                                                                



                                                                



                                                                



                                                                



                                                                



                                                                



                                PATIENT NAME: ABEBA REYES ACCOUNT #: 

[id number]                 

 

                2020-10-07 12:41:00-00:00 [id number] [facility]



                                  [address], [place] [address]                 



                                                                



                                                                



                                PATIENT NAME: ABEBA REYES ADMIT DATE: 10/0

7/20                 



                                ACCOUNT NO: [id number]  ROOM NO: [facility]       

          



                                MEDICAL RECORD NO: [id number] AGE: 24           

      



                                 SEX: F                         



                                                                



                                ADMITTING PHYSICIAN: Fabiola Camarena MD          

       



                                ATTENDING PHYSICIAN: Fabiola Camarena MD          

       



                                                                



                                                                



                                OPERATION DATE: 10/07/2020                 



                                                                



                                PREOPERATIVE DIAGNOSIS: Left hydronephrosis with

 left renal stones.                 



                                                                



                                POSTOPERATIVE DIAGNOSIS: Left hydronephrosis wit

h left renal stones.                 



                                                                



                                PROCEDURES PERFORMED: Cystoscopy, left ureteral 

stent placement, and                 



                                intraoperative ultrasound.                 



                                                                



                                SURGEON: Giovanni Cao MD                 



                                                                



                                ASSISTANT:                      



                                                                



                                ANESTHESIA: General.                 



                                                                



                                INDICATIONS: The patient is a 24-year-old female

, 17 weeks' pregnant, with a                 



                                history of renal stones and a left-sided pyelopl

asty, normally seen by Dr. Arechiga, presents to the hospital with left-sided

 flank pain and then became                 



                                                    febrile and was suspicious f

or possible sepsis due to ureteral obstruction. The

                                                    



                                patient was taken to the OR for a left ureteral 

stent.                 



                                                                



                                PROCEDURE IN DETAIL: The patient was brought to 

the operating room and                 



                                underwent anesthesia, was placed in supine posit

ion. She was then sterilely                 



                                        prepped and draped in the usual manner f

or cystoscopy. Then, a rigid 22-French 

                                        



                                cystoscope with 30-degree lens was introduced un

farhat direct vision into the                 



                                        bladder. Bladder was then systemically s

coped and noted to be normal. Ureteral 

                                        



                                                    orifice appeared to be somew

hat edematous, but we were able to access it with 

an                                                  



                                                    angled Sensor wire.  We were

 then able to advance the wire all the way up to 

the                                                 



                                level of the kidney. There was a live intraopera

tive ultrasound of the left                 



                                                    kidney, which revealed the w

mitzi in the left kidney, at which point we then were

                                                    



                                able to advance a 6 x 24 double-J stent over the

 wire and we were able to                 



                                                    visualize the stent curling 

in the left renal pelvis and deployed the stent 

with                                                



                                a curl visualized in the bladder. Efflux of clou

dy/pink urine came out of the                 



                                                    left ureteral orifice. I did

 take a sample and sent that off for urine culture.

                                                    



                                 At this time, we concluded the procedure. The p

atient was then returned to                 



                                supine position, was then extubated and returned

 to recovery room without any                 



                                complication.                   



                                                                



                                I was present for the entire case.              

   



                                                                



                                COMPLICATIONS: None.                 



                                                                



                                ESTIMATED BLOOD LOSS: Minimal.                 



                                                                



                                PATIENT NAME: ABEBA REYES ACCOUNT #: 

[id number]                 



                                                                



                                                                



                                                                



                                                                



                                TUBES AND DRAINS: None.                 



                                                                



                                IMPLANTS: A 6 x 24 double-J stent in the left ur

eter.                 



                                                                



                                SPECIMENS: Urine for culture.                 



                                                                



                                                    FOLLOWUP: Once she is afebri

le and cleared for discharge, to follow up with Dr. Arechiga within the next week or two.             

    



                                                                



                                Dictated By: Giovanni Cao MD                 



                                                                



                                WT: OP:F.HIM/JEANNA/KRISTEN                 



                                DD: 10/07/2020 12:41:46                 



                                DT: 10/07/2020 15:21:54                 



                                Conf#: [id number]/DID#: [id number]                 



                                                                



                                Authenticated by Giovanni Cao MD On 10/29/2020

 03:02:20 PM                 



                                                                



                                                                



                                                                



                                                                



                                                                



                                Electronically Signed by Giovanni Cao MD on  at 1502                 



                                                                



                                                                



                                                                



                                                                



                                                                



                                                                



                                                                



                                                                



                                                                



                                                                



                                                                



                                                                



                                                                



                                                                



                                                                



                                                                



                                                                



                                                                



                                                                



                                                                



                                                                



                                                                



                                                                



                                                                



                                                                



                                                                



                                                                



                                                                



                                                                



                                                                



                                                                



                                                                



                                PATIENT NAME: ABEBA REYES ACCOUNT #: 

[id number]                 

 

                2020-10-07 12:37:00-00:00 [id number] [facility]



                                 [address] [address]                 



                                                                



                                                                



                                PATIENT NAME: ABEBA REYES ADMIT DATE: 10/0

7/20                 



                                ACCOUNT NO: [id number] ROOM NO: [facility]        

         



                                MEDICAL RECORD NO: [id number]  AGE: 24          

       



                                 SEX: F                         



                                                                



                                ADMITTING PHYSICIAN: Fabiola Camarena MD          

       



                                ATTENDING PHYSICIAN: Fabiola Camarena MD          

       



                                                                



                                                                



                                DATE: 10/07/2020                 



                                                                



                                SUBJECTIVE: The patient was febrile overnight ye

 and was started on IV                 



                                antibiotics. She continues to have left sided fl

ank pain. She is going to the                 



                                                    OR this morning for a cystos

copy, left ureteral stent placement. The patient is

                                                    



                                otherwise doing okay.                 



                                                                



                                OBJECTIVE:                      



                                VITAL SIGNS: T-max is 38.8, current temperature 

was 36.4, blood pressure is                 



                                111/70, heart rate is 119, she is saturating 97%

 on room air.                 



                                GENERAL: Alert and oriented x3, no acute distres

s.                 



                                LUNGS: She is breathing comfortably without whee

zing, cough, or stridor.                 



                                HEART: She is mildly tachycardic, regular rhythm

.                 



                                        ABDOMEN: Soft, tender on the left upper 

quadrant, left flank, 17 weeks gravid. 

                                        



                                EXTREMITIES: Moves all extremities. No clubbing,

 cyanosis, or edema.                 



                                                                



                                LABORATORY DATA: No new laboratory or imaging re

sults.                 



                                                                



                                ASSESSMENT AND PLAN: The patient is a 24-year-ol

d female with history of                 



                                stones, history of left-sided pyeloplasty and pr

esents with left sided flank                 



                                pain, moderate-to-severe hydronephrosis with lef

t-sided renal stones and then                 



                                spiked a fever overnight, which is suspicious fo

r sepsis. She will go to the                 



                                operating room today for a cystoscopy, left uret

eral stent placement with                 



                                intraoperative ultrasound.                 



                                                                



                                                    I answered all the questions

 regarding the procedure. The patient is willing to

                                                    



                                proceed.                        



                                                                



                                Dictated By: Giovanni Cao MD                 



                                                                



                                WT: PN:F.HIM/MARCIAL.                 



                                DD: 10/07/2020 12:37:55                 



                                DT: 10/07/2020 13:01:29                 



                                Conf#: [id number]/DID#: [id number]                 



                                                                



                                Authenticated by Giovanni Cao MD On 10/29/2020

 03:02:11 PM                 



                                                                



                                                                



                                                                



                                                                



                                                                



                                Electronically Signed by Giovanni Cao MD on  at 1502                 



                                                                



                                                                



                                PATIENT NAME: ABEBA REYES ACCOUNT #: 

[id number]                 

 

                2020-10-07 06:25:00-00:00                                 [facility] ([facility])               

  



                                Clinical Note                   



                                REPORT#:[id number] REPORT STATUS: Signed         

        



                                DATE:10/07/20 TIME: 625                 



                                                                



                                PATIENT: ABEBA REYES UNIT #: [id number]   

              



                                ACCOUNT#: [id number] ROOM/BED: [facility]-A       

          



                                : [birth year] AGE: 24 SEX: F ATTEND: Thaddeus Camarena MD                 



                                ADM DT: 10/06/20 AUTHOR: Jacob Sanders MD

                 



                                                                



                                * ALL edits or amendments must be made on the [facility] [employer]/computer document *                 



                                                                



                                                                



                                Clinical Note                   



                                Note:                           



                                Gyn Progress Note                 



                                                                



                                Code sepsis called overnight due to increased fe

cesar and maternal tachycardia.                 



                                Rocephin started last evening when her fever sta

rted.                 



                                                                



                                Feeling a little feverish, headache this AM. lissette

n controlled, but still                 



                                present.                        



                                                                



                                                                



                                Vital Signs:                    



                                 Date Time Temp Pulse Resp B/P B/P Pulse O2 O2 F

low FiO2                 



                                 Mean Ox Delivery Rate                 



                                 10/07 0333 99.3 133 19 124/70 88.0 97 Room air 

                



                                 10/07 0045 113 28 98                 



                                 10/07 003 101.8 120 97/59 71.9 97             

    



                                 10/06 2318 103.3 138 19 106/63 77.5 98 Room air

                 



                                 10/06 2012 100.2 116 96/66 76.2 95             

    



                                 10/06 191 101.7 113 19 98/63 74.4 95 Room air 

                



                                 10/06 1249 99.0 85 18 117/76 89.5 99 Room air  

               



                                 10/06 1003 18                  



                                 10/06 1003 99.0 93 111/76 87.9 98              

   



                                                                



                                Gen: A Ox3, mildly uncomfortable.               

  



                                Abd: gravid, left abdomen/flank tender          

       



                                Ext: no edema                   



                                                                



                                Laboratory Tests:                 



                                  10/06 10/06                   



                                 2330 2330                      



                                 Chemistry                      



                                 Sodium (135 - 145 mEq/L) 134 L                 



                                  Potassium (3.5 - 5.0 mEq/L) 3.6               

  



                                 Chloride (100 - 115 mEq/L) 100                 



                                 Carbon Dioxide (22 - 31 mEq/L) 24              

   



                                 Anion Gap (10 - 20) ND                 



                                 BUN (7 - 18 mg/dL) 5 L                 



                                 Creatinine (0.5 - 1.0 mg/dL) 0.9               

  



                                 Glomerular Filtr Rate (>60 ml/min) 78          

       



                                 Glucose (65 - 110 mg/dL) 116 H                 



                                 Lactic Acid (0.5 - 2.2 MMOL/L)  1.6            

     



                                 Calcium (8.4 - 10.2 mg/dL) 8.6                 



                                 Hematology                     



                                 WBC (6.6 - 12.1 K/mm3) 13.4 H                 



                                 RBC (3.45 - 5.01 M/mm3) 4.00                 



                                 Hgb (10.7 - 13.9 g/dL) 11.3                 



                                 Hct (32.1 - 42.1 %) 35.4                 



                                  MCV (84.1 - 94.8 fL) 89                 



                                 MCH (27 - 35 pg) 28.3                 



                                 MCHC (32.2 - 34.1 gm/dL) 31.9 L                

 



                                 RDW (12.4 - 16.5 %) 13.7                 



                                 Plt Count (133 - 385 K/mm3) 189                

 



                                 MPV (9.1 - 12.7 fl)  10.9                 



                                 Neut % (Auto) (56.5 - 79.4 %) 90.7 H           

      



                                 Lymph % (Auto) (14.3 - 34.3 %) 3.5 L           

      



                                 Mono % (Auto) (5.1 - 10.4 %)  5.3              

   



                                 Eos % (Auto) (0.1 - 3.0 %) 0.0 L               

  



                                 Baso % (Auto) (0.1 - 1.0 %) 0.1                

 



                                 Neut # (Auto) (K/mm3)  12.1                 



                                 Lymph # (Auto) (K/mm3) 0.5                 



                                 Mono # (Auto) (K/mm3) 0.7                 



                                 Eos # (Auto) (K/mm3) 0                 



                                 Baso # (Auto) (K/mm3) 0.0                 



                                                                



                                Microbiology:                   



                                 Date/Time Procedure - Status                 



                                  Source Growth                 



                                 10/06 2330 Blood Culture - RECD                

 



                                 BLOOD                          



                                 10/06 2330 Blood Culture Gram Stain - RECD     

            



                                 BLOOD                          



                                 10/06 2325 Blood Culture - RECD                

 



                                 BLOOD                          



                                 10/06 2325 Blood Culture Gram Stain - RECD     

            



                                 BLOOD                          



                                                                



                                                                



                                A/P: 17w4d, here for left nephrolithiasis, with 

overlying pyelonephritis now                 



                                evolved.                        



                                *NPO for stent placement with Dr. Cao this AM.

                 



                                *Will resume regular diet after that.           

      



                                *continue IV hydration.                 



                                *cultures pending.                 



                                *SCDs.                          



                                                                



                                Electronically Signed by Jacob Sanders MD

 on 10/07/20 at 0705                 



                                                                



                                RPT #:[id number]                 



                                ***END OF REPORT***                 



                                                                



                                                                

 

                2020-10-07 00:03:00-00:00 [id number] [facility]



                                  [address] [address]                 



                                                                



                                                                



                                PATIENT NAME: ABEBA REYES ADMIT DATE: 10/0

7/20                 



                                ACCOUNT NO: [id number]  ROOM NO: .[id number]       

          



                                MEDICAL RECORD NO: [id number] AGE: 24           

      



                                 SEX: F                         



                                                                



                                ADMITTING PHYSICIAN: Fabiola Camarena MD          

       



                                ATTENDING PHYSICIAN: Fabiola Camarena MD          

       



                                                                



                                                                



                                CONSULTATION DATE: 10/06/2020                 



                                                                



                                CONSULTING PHYSICIAN: Giovanni Cao MD        

         



                                                                



                                ATTENDING: Giovanni Cao MD                 



                                                                



                                ATTENDING AND REQUESTING CONSULTATION: Jacob Ac MD                 



                                                                



                                CHIEF COMPLAINT: Left hydronephrosis.           

      



                                                                



                                        HISTORY OF PRESENT ILLNESS: The patient 

is a 24-year-old female with a history 

                                        



                                of renal stones and history of nephrostomy tube,

 normally managed by her                 



                                        urologist, Dr. Amalia Arechiga, presents w

ith a 17-week pregnancy and left-sided 

                                        



                                flank pain and on ultrasound, there is seen left

-sided renal stones with a                 



                                                    moderate-to-severe left-side

d hydronephrosis. She initially presented with only

                                                    



                                pain, but then developed fever of 101.7.        

         



                                                                



                                PAST MEDICAL HISTORY: Significant for renal ston

es.                 



                                                                



                                PAST SURGICAL HISTORY: Stone procedures and neph

rostomy tube placement.                 



                                                                



                                ALLERGIES: NO KNOWN DRUG ALLERGIES.             

    



                                                                



                                MEDICATIONS: Prenatal vitamins at home. Please r

efer to the medical                 



                                reconciliation for inpatient medications.       

          



                                                                



                                FAMILY HISTORY: No significant family history of

 any urologic issues.                 



                                                                



                                SOCIAL HISTORY: The patient is employed. Denies 

any tobacco, alcohol, or                 



                                drugs.                          



                                                                



                                REVIEW OF SYSTEMS: A complete review of systems 

is negative except for                 



                                above-stated HPI.                 



                                                                



                                PHYSICAL EXAMINATION:                 



                                VITAL SIGNS: Her T-max is 39.6, her blood pressu

re is 106/63, heart rate is                 



                                128, respiratory rate is 19. She is saturating 9

8% on room air.                 



                                GENERAL: The patient is alert and oriented x3, i

n no acute distress.                 



                                        HEENT: Normocephalic and atraumatic. Cra

nial nerves II through XII are intact. 

                                        



                                LUNGS: She is breathing comfortably without whee

zing, cough, or stridor.                 



                                HEART: Regular rate and rhythm.                 



                                ABDOMEN: Gravid, left flank is warm with conside

rable tenderness.                 



                                EXTREMITIES: She moves all extremities. There is

 no clubbing, cyanosis, or                 



                                                                



                                PATIENT NAME: ABEBA REYES ACCOUNT #: 

[id number]                 



                                                                



                                                                



                                                                



                                edema.                          



                                NEUROLOGIC: She is grossly intact and witout def

ecits.                 



                                                                



                                LABORATORY DATA: Her white count is 14.2, hemogl

obin is 10.7, hematocrit is                 



                                32.1, and platelets are 177. She does have a not

ed neutrophilia of 85.6%.                 



                                                                



                                COVID screen is negative. Her urinalysis shows 1

+ blood, negative leukocyte                 



                                esterase, negative nitrites.                 



                                                                



                                IMAGING: Outside ultrasound report reveals left 

moderate-to-severe                 



                                hydronephrosis with left-sided renal stones grea

ter than 1 cm found.                 



                                                                



                                ASSESSMENT AND PLAN: A 24-year-old female with h

istory of renal stones,                 



                                presents with a 17-week pregnancy and left-sided

 flank pain and                 



                                moderate-to-severe hydronephrosis with leukocyto

sis and new onset of fever.                 



                                        Given these findings, suspicious that sh

e is passing a left ureteral stone and 

                                        



                                                    also likely that she is havi

ng an active infection due to the stone 

obstruction.                                        



                                1. Left hydronephrosis: Moderate to severe. We w

ill take her to the operating                 



                                        room tomorrow morning for cystoscopy, le

ft retrograde pyelogram, left ureteral 

                                        



                                stent placement. N.p.o. at midnight.            

     



                                2. Left-sided renal stones: Once a stent is plac

ed and her infection is                 



                                        treated, [facility] urologist, Dr. Arechiga, for further 

                                        



                                stone procedures.                 



                                3. Fever up to 101.7: This is suspicious for pos

sibly developing urosepsis.                 



                                4. Left flank pain. This is likely secondary to 

obstruction of the left                 



                                ureter. We will attempt to place a left ureteral

 stent. If unsuccessful, she                 



                                will need a nephrostomy tube.                 



                                5. A 17-week pregnancy. Given the pregnancy, we 

will not place a stent under                 



                                        fluoroscopic guidance. We will place it 

under ultrasound guidance. She will be 

                                        



                                added on for tomorrow with intraoperative ultras

ound.                 



                                                                



                                Dictated By: Giovanni Cao MD                 



                                                                



                                WT: CON:[email or web address]/                 



                                DD: 10/07/2020 00:03:02                 



                                DT: 10/07/2020 05:02:10                 



                                Conf#: [id number]/DID#: [id number]                 



                                                                



                                Authenticated and Edited by Giovanni Cao MD On

 10/29/20 3:02:03 PM                 



                                                                



                                                                



                                                                



                                                                



                                                                



                                Electronically Signed by Giovanni Cao MD on  at 1504                 



                                                                



                                                                



                                                                



                                                                



                                                                



                                                                



                                                                



                                                                



                                                                



                                                                



                                                                



                                PATIENT NAME: ABEBA REYES ACCOUNT #: 

[id number]                 

 

                2020-10-06 23:49:00-00:00 [id number] [facility]



                                 [address] [address]                 



                                                                



                                                                



                                PATIENT NAME: ABEBA REYES ADMIT DATE: 10/0

7/20                 



                                ACCOUNT NO: [id number] ROOM NO: [facility]        

         



                                MEDICAL RECORD NO: [id number] AGE: 24           

      



                                 SEX: F                         



                                                                



                                ADMITTING PHYSICIAN: Fabiola Camarena MD          

       



                                ATTENDING PHYSICIAN: Fabiola Camarena MD          

       



                                                                



                                                                



                                Order:                          



                                [id number]                   



                                Test Reason : TACHYCARDIA                 



                                 Test Date/Time Stamp:                 



                                Tue Oct 06 2020 23:49:13                 



                                Blood Pressure : ***/*** mmHG                 



                                Vent. Rate : 135 BPM Atrial Rate : 135 BPM      

           



                                 P-R Int : 136 ms QRS Dur : 086 ms              

   



                                 QT Int : 290 ms P-R-T Axes : 036 014 012 degree

s                 



                                 QTc Int : 435 ms                 



                                                                



                                Sinus tachycardia                 



                                Nonspecific ST and T wave abnormality           

      



                                Abnormal ECG                    



                                No previous ECGs available                 



                                Confirmed by ALYSSA FRY MD ([id number]) on 10/13/2

020 9:34:05 AM                 



                                                                



                                Referred By: Fabiola Camarena Confirmed by:ALYSSA TRIMBLE

[facility] MD                 



                                                                



                                                                



                                                                



                                                                



                                                                



                                Electronically Signed by Alyssa Fry MD on 1

 at 0934                 



                                                                



                                                                



                                                                



                                                                



                                                                



                                                                



                                                                



                                                                



                                                                



                                                                



                                                                



                                                                



                                                                



                                                                



                                                                



                                                                



                                                                



                                                                



                                                                



                                                                



                                PATIENT NAME: ABEBA REYES ACCOUNT #: 

[id number]                 

 

                2020-10-06 23:34:00-00:00                                 [facility] ([facility])               

  



                                Clinical Note                   



                                REPORT#:[id number] REPORT STATUS: Signed         

        



                                DATE:10/06/20 TIME:                  



                                                                



                                PATIENT: ABEBA REYES UNIT #: [id number]   

              



                                ACCOUNT#: [id number] ROOM/BED: [address]       

          



                                : [birth year] AGE: 24 SEX: F ATTEND: Thaddeus Camarena MD                 



                                ADM DT: 10/06/20 AUTHOR: Latoya Abreu MD    

             



                                                                



                                * ALL edits or amendments must be made on the [facility] [employer]/computer document *                 



                                                                



                                                                



                                Clinical Note                   



                                Note:                           



                                responded to code sepsis. pt IUP @ 17+ week GA w

ith kidney stone and severe                 



                                        sepsis secondary to pyelonephritis. oswaldo

 nurse spoke with Andrea Ac sepsis 

                                        



                                bundle initiated. pt already been on rocephin th

at was started this evening.                 



                                                    Charge nurse reported no nee

d for hospitalist evaluation. Dr. Andrea Ac aware

                                                    



                                of patient and has initiated sepsis bundle.     

            



                                                                



                                Care and management of patient under Dr. Loli Ac                 



                                                                



                                Electronically Signed by Latoya Abreu MD on 

10/06/20 at 2337                 



                                                                



                                RPT #:[id number]                 



                                ***END OF REPORT***                 



                                                                



                                                                

 

                2020-10-06 21:04:00-00:00                                 [facility] ([facility])               

  



                                Clinical Note                   



                                REPORT#:[id number] REPORT STATUS: Signed         

        



                                DATE:10/06/20 TIME:                  



                                                                



                                PATIENT: ABEBA REYES UNIT #: [id number]   

              



                                ACCOUNT#: [id number] ROOM/BED: F.2618-A       

          



                                : [birth year] AGE: 24 SEX: F ATTEND: Thaddeus Camarena MD                 



                                ADM DT: 10/06/20 AUTHOR: Jacob Snaders MD

                 



                                                                



                                * ALL edits or amendments must be made on the [facility] [employer] document *                 



                                                                



                                                                



                                Clinical Note                   



                                Note:                           



                                Gyn on Call                     



                                                                



                                Pain better controlled, no nausea. Fever to 101.

7 at 1915.                 



                                                                



                                Vital Signs:                    



                                 Date Time Temp Pulse Resp B/P B/P Pulse O2 O2 F

low FiO2                 



                                 Mean Ox Delivery Rate                 



                                 10/06 2012 100.2 116 96/66 76.2 95             

    



                                 10/06 191 101.7 113 19 98/63 74.4 95 Room air 

                



                                 10/06 1249 99.0 85 18 117/76 89.5 99 Room air  

               



                                 10/06 1003 18                  



                                 10/06 1003 99.0 93 111/76 87.9 98              

   



                                                                



                                                                



                                                                



                                A Ox3, mildly uncomfortable.                 



                                                                



                                A/P: 17w3d with left nephrolithiasis, now with f

ever.                 



                                Starting rocephin. tylenol prn. Discussed with DEMARIO Cao (who was in emergent                 



                                                    surgery)- he agreed with ant

ibiotic choice, will plan to proceed with procedure

                                                    



                                in the AM. Pt can eat now, NPO after midnight. D

iscussed the plan with pt and                 



                                her partner, all questions answered.            

     



                                                                



                                Electronically Signed by Jacob Sanders MD

 on 10/06/20 at 2107                 



                                                                



                                RPT #:[id number]                 



                                ***END OF REPORT***                 



                                                                



                                                                

 

                2020-10-06 10:49:00-00:00                                 [facility] ([facility])               

  



                                OB Admission / H P                 



                                REPORT#:[id number] REPORT STATUS: Signed         

        



                                DATE:10/06/20 TIME: 1049                 



                                                                



                                PATIENT: ABEBA REYES UNIT #: [id number]   

              



                                ACCOUNT#: [id number] ROOM/BED: 2618-A       

          



                                : [birth year] AGE: 24 SEX: F ATTEND: Thaddeus Camarena MD                 



                                ADM DT: 10/06/20 AUTHOR: Jacob Sanders MD

                 



                                                                



                                * ALL edits or amendments must be made on the [facility] [employer] document *                 



                                                                



                                                                



                                OB History                      



                                                                



                                Nursing Documentation Review                 



                                Nursing data:                   



                                The data set between the solid lines has been im

ported from nursing                 



                                documentation. Any exceptions have been noted be

low under Provider comments.                 



                                                    

________________________________________________________________________________
                                                    



                                                                



                                Current pregnancy data                 



                                Steroids prior to arrival:                 



                                ROM date: ROM time:                 



                                EDC date:                       



                                                                



                                Prior pregnancy history                 



                                : Para:  Term:                 



                                :                        



                                Abortions spontaneous: Abortions induced:       

          



                                Living children: Ectopic:                 



                                Stillbirths: Live births:                 



                                 deaths:                 



                                Number of previous C/S:                 



                                                                



                                Reported maternal labs/data                 



                                Blood type:                     



                                Rh type:                        



                                Rubella:                        



                                Hepatitis B:                    



                                HIV exposure test:                 



                                VDRL:                           



                                Group B beta strep:                 



                                Rho(D) immune globulin this preg:               

  



                                Monitor mode - UA:                 



                                Feeding preference:                 



                                                    

________________________________________________________________________________
                                                    



                                                                



                                Provider comments on imported nursing data: []  

               



                                ____________________________________________    

             



                                                                



                                Chief complaint: multiple right kidney stones   

              



                                HPI:                            



                                24y G1 at 17w3d, transferred from [place] d

ue to multiple right kidney                 



                                stones. Given morphine 4mg x 3 doses over 5-6 ho

urs in [place]- pain not                 



                                well controlled. H/o kidney stones prior to this

 pregnancy, had left stent,                 



                                        percutaneous nephrostomy tube for stones

 in the past. + mild nausea currently, 

                                        



                                last emesis 0530 am. No fever.                 



                                                    Mild discomfort for the past

 few days, became severe and associated wtih 

nausea/                                             



                                emesis at approx midnight. no vaginal bleeding. 

intermittent fetal movements,                 



                                hasn't noticed as much today. [place] nephrologi

 Dr. Amalia Arechiga- at                 



                                [place].                      



                                                                



                                        Pregnancy complicated by a small subchor

ionic hematoma with bleeding at 7 wks. 

                                        



                                                                



                                Outside records reviewed: Creatinine 0.57, hector

l electrolytes, WBC 10.4 Hgb                 



                                        12.6 / Plt 196 ALT 48 AST 16, lipase 98.

 UA with 1+ blood. U/S disc reviewed ( 

                                        



                                report not available- called to request): left k

idney 11.21 x 6.5 x 4.54 cm.                 



                                Hyerechoic mass measured 1.38 cm (?stone- labele

d on image Lt KIDLOW).                 



                                                    Hypoechoic area measured 1.2

2cm (?ureter- labeled only as Lt Kidney on image; 

is                                                  



                                located at the periphery of area of hydronephros

is). Also clip attached                 



                                demonstrating hydronephrosis extending down to t

he bladder. Fetus in breech                 



                                presentation, anterior placenta, + normal FHTs. 

                



                                Pregnancy history:                 



                                 : 1                     



                                Current pregnancy:                 



                                 Best EDC: 21                 



                                 Admission EGA (weeks) 17                 



                                 Admission EGA (days) 3                 



                                Labs:                           



                                 Blood type: O                  



                                 Rh: positive                   



                                 Rubella: immune                 



                                 Hepatitis B: negative                 



                                 HIV: negative                  



                                 STD: negative                  



                                 Syphilis: currently negative                 



                                 GBS: unknown                   



                                                    Past medical history: h/o ne

phrolithiasis with prior stents, trichomonas 2018

                                                    



                                Past surgical history: lithotripsy , kidney 

stone removal , stent for                 



                                obstruction 2019, removal of scar tissue from 

kidney 2019, percutaneous                 



                                nephrostomy tube                 



                                Social history: employed, no alcohol use, no tob

acco use, no drug use                 



                                Family history                  



                                Relation not specified for:                 



                                 Congenital heart defect                 



                                 Epilepsy                       



                                 Family History: Diabetes                 



                                 Hypertension                   



                                                                



                                Medications:                    



                                HOme meds: PNV                  



                                Allergies                       



                                Coded Allergies:                 



                                No Known Allergies (10/06/20)                 



                                                                



                                                                



                                Objective                       



                                                                



                                General                         



                                VS:                             



                                Last Documented:                 



                                 Result Date Time                 



                                 Pulse Ox 98 10/06 1003                 



                                 B/P 111/76 10/06 1003                 



                                 B/P Mean 87.9 10/06 1003                 



                                 Temp 99.0 10/06 1003                 



                                 Pulse 93 10/06 1003                 



                                                                



                                Vital Signs                     



                                 Date Temp Pulse Resp B/P B/P Mean Pulse Ox FiO2

                 



                                 10/06 99.0 93 111/76 87.9 98                 



                                                                



                                Patient Weight                  



                                                                



                                Weight (lb):                    



                                Weight (oz):                    



                                Weight (kg):                    



                                                                



                                                                



                                Physical Exam                   



                                Neuro:                          



                                 Exam: alert, oriented x3, normal speech, writhi

ng in pain during exam                 



                                                    Abdomen: gravid, soft, + ten

farhat in the LLQ, + left CVA tenderness. no right CVA

                                                    



                                tenderness                      



                                Genitourinary: flank pain (left)                

 



                                Membranes:                      



                                 Membranes: Intact                 



                                Lower extremities:                 



                                 Edema: none                    



                                                                



                                Result                          



                                Findings/Data:                  



                                Microbiology:                   



                                 Date/Time Procedure - Status                 



                                 Source  Growth                 



                                 10/06 1049 Urine Culture - ORD                 



                                 URINE                          



                                                                



                                                                



                                                                



                                Diagnosis, Assessment Plan                 



                                                                



                                Diagnosis, Assessment Plan                 



                                Problem List/A P:                 



                                 1. Left nephrolithiasis                 



                                                                



                                 2. 17 weeks gestation of pregnancy             

    



                                                                



                                Free Text A P:                  



                                24y G1 at 17w3d, transferred here for admission 

due to left nephrolithiasis,                 



                                hydronephrosis.                 



                                Fentanyl PCA, IV fluid hydration. NPO for now un

til plan made per urology.                 



                                                    Reviewed outside records, ca

lled the ER, and left voicemail with the u/s dept 

to                                                  



                                        try to obtain the report for the renal u

/s performed at 2AM. Anti-emetics prn. 

                                        



                                        Discussed plan with the patient to consu

lt urology (Dr. Pina on call, Dr. Arechiga 

                                        



                                does not have privileges here), and proceed with

 treatment per his                 



                                recommendations. All questions answered.        

         



                                                                



                                Electronically Signed by Jacob Sanders MD

 on 10/06/20 at 1207                 



                                                                



                                RPT #:[id number]                 



                                ***END OF REPORT***

## 2023-07-27 NOTE — ER
Nurse's Notes                                                                                     

 [facility]                                                                 

Name: Selin Cui                                                                           

Age: 27 yrs                                                                                       

Sex: Female                                                                                       

: 1995                                                                                   

MRN: [id number]                                                                                   

Arrival Date: 2023                                                                          

Time: 17:18                                                                                       

Account#: [id number]                                                                            

Bed 12                                                                                            

Private MD:                                                                                       

Diagnosis: Kidney stone, passed ureteral stone, hydronephrosis, flank pain                        

                                                                                                  

Presentation:                                                                                     

                                                                                             

17:27 Chief complaint: Patient states: pain to left flank x 1 week and worse today. Pt        aa5 

      reports hx of kidney stones . Coronavirus screen: At this time, the client does not         

      indicate any symptoms associated with coronavirus-19. Ebola Screen: Patient denies          

      travel to an Ebola-affected area in the 21 days before illness onset. Initial Sepsis        

      Screen: Does the patient meet any 2 criteria? No. Patient's initial sepsis screen is        

      negative. Does the patient have a suspected source of infection? No. Patient's initial      

      sepsis screen is negative. Risk Assessment: Do you want to hurt yourself or someone         

      else? Patient reports no desire to harm self or others. Onset of symptoms was 2023.    

17:27 Acuity: AWILDA 3                                                                           aa5 

17:27 Method Of Arrival: Ambulatory                                                           aa5 

                                                                                                  

OB/GYN:                                                                                           

17:29 LMP 7/10/2023                                                                           aa5 

                                                                                                  

Historical:                                                                                       

- Allergies:                                                                                      

17:29 PENICILLINS;                                                                            aa5 

17:29 Hydrocodone-Acetaminophen (Upset stomach);                                              aa5 

- Home Meds:                                                                                      

17:29 None [Active];                                                                          aa5 

- PMHx:                                                                                           

17:29 HYDRONEPHROSIS; Kidney stones;                                                          aa5 

- PSHx:                                                                                           

17:29 kidney stones removed;                                                                  aa5 

                                                                                                  

- Immunization history:: Adult Immunizations unknown.                                             

- Social history:: Smoking status: Patient denies any tobacco usage or history of.                

                                                                                                  

                                                                                                  

Screenin:02 [facility] ED Fall Risk Assessment (Adult) History of falling in the last 3 months,       ph  

      including since admission No falls in past 3 months (0 pts) Confusion or Disorientation     

      No (0 pts) Intoxicated or Sedated No (0 pts) Impaired Gait No (0 pts) Mobility Assist       

      Device Used No (0 pt) Altered Elimination No (0 pt) Score/Fall Risk Level 0 - 2 = Low       

      Risk Maintained a safe environment, Educated pt \T\ family on fall prevention, incl call    

      for assistance when getting out of bed, Hourly rounding (assess needs \T\ fall              

      precautionary measures) done. Abuse screen: Denies threats or abuse. Denies injuries        

      from another. Nutritional screening: No deficits noted. Tuberculosis screening: No          

      symptoms or risk factors identified.                                                        

                                                                                                  

Assessment:                                                                                       

18:01 General: Appears in no apparent distress. uncomfortable, Behavior is calm, cooperative, ph  

      appropriate for age. Pain: Complains of pain in left mid back Pain radiates to              

      posterior aspect of left lateral abdomen, anterior aspect of left lateral abdomen and       

      left lower quadrant. Neuro: Powers Agitation-Sedation Scale (RASS): 0 - Alert and         

      Calm Level of Consciousness is awake, alert, obeys commands, Oriented to person, place,     

      time, situation. Cardiovascular: Capillary refill < 3 seconds in bilateral fingers          

      Patient's skin is warm and dry. Respiratory: Airway is patent Respiratory effort is         

      even, unlabored. : Reports pain in left flank(s), in lower back. Derm: Skin is pink,      

      warm \T\ dry. Musculoskeletal: Range of motion: intact in all extremities.                  

19:30 Reassessment: Patient and/or family updated on plan of care and expected duration. Pain eh3 

      level reassessed. Patient is alert, oriented x 3, equal unlabored respirations, skin        

      warm/dry/pink. Patient states symptoms have not improved. Pain: Complains of pain in        

      left flank Pain currently is 8 out of 10 on a pain scale.                                   

20:04 Reassessment: Pt is discharged by provider, states her ride will be here to pick her up eh3 

      in about 30 minutes.                                                                        

                                                                                                  

Vital Signs:                                                                                      

17:27  / 100; Pulse 77; Resp 20 S; Temp 97.5(TE); Pulse Ox 99% on R/A; Weight 77.11 kg  aa5 

      (R); Height 5 ft. 2 in. (R);                                                                

19:02  / 87; Pulse 76; Resp 18; Pulse Ox 99% on R/A;                                    aa5 

19:52  / 77; Pulse 80; Resp 16; Pulse Ox 98% on R/A;                                    eh3 

17:27 Body Mass Index 31.09 (77.11 kg, 157.48 cm)                                             aa5 

                                                                                                  

ED Course:                                                                                        

17:23 Patient arrived in ED.                                                                  am2 

17:27 Pantera Munoz MD is Attending Physician.                                                sp3 

17:27 Arm band placed on.                                                                     aa5 

17:28 Triage completed.                                                                       aa5 

17:34 Annie Benites, RN is Primary Nurse.                                                    ph  

17:35 Radiology exam delayed due to pregnancy test not completed at this time.                nj  

18:00 Initial lab(s) drawn, by me, sent to lab. Inserted saline lock: 20 gauge in right       ph  

      antecubital area, using aseptic technique. Blood collected.                                 

18:02 Patient has correct armband on for positive identification. Bed in low position. Call     

      light in reach. Side rails up X 1. Pulse ox on. NIBP on. Door closed. Noise minimized.      

      Warm blanket given.                                                                         

18:08 CBC with Diff Sent.                                                                     ph  

18:08 CMP Sent.                                                                               ph  

18:08 Lipase Sent.                                                                            ph  

18:20 Radiology exam delayed due to pregnancy test not completed at this time.                eh4 

18:36 Radiology exam delayed due to pregnancy test not completed at this time.                eh4 

19:02 No provider procedures requiring assistance completed.                                  aa5 

19:03 Radiology exam delayed due to pregnancy test not completed at this time.                eh4 

19:28 CT Abd/Pelvis - Without Contrast In Process Unspecified.                                EDMS

20:04 Provided Education on: N/A.                                                             eh3 

20:04 IV discontinued, intact, bleeding controlled, No redness/swelling at site. Pressure     eh3 

      dressing applied.                                                                           

                                                                                                  

Administered Medications:                                                                         

18:03 Drug: NS 0.9% IV 1000 ml Route: IV; Rate: 1 bolus; Site: right antecubital;             ph  

19:02 Follow up: Response: No adverse reaction; IV Status: Completed infusion; IV Intake:     aa5 

      1000ml                                                                                      

18:03 Drug: TORadol - Ketorolac IVP 15 mg Route: IVP; Site: right antecubital;                ph  

19:01 Follow up: Response: No adverse reaction                                                aa5 

18:03 Drug: Ondansetron IVP 4 mg Route: IVP; Site: right antecubital;                         ph  

19:01 Follow up: Response: No adverse reaction                                                aa5 

18:40 Drug: morphine IVP or IV 4 mg Route: IVP; Infused Over: 4 mins; Site: right antecubital;aa5 

19:40 Follow up: Response: No adverse reaction; Pain is unchanged, physician notified         eh3 

19:53 Drug: HYDROmorphone IVP 1 mg Route: IVP; Site: right antecubital;                       eh3 

20:17 Follow up: Response: No adverse reaction; Pain is decreased                             eh3 

                                                                                                  

                                                                                                  

Medication:                                                                                       

18:02 VIS not applicable for this client.                                                     ph  

                                                                                                  

Intake:                                                                                           

19:02 IV: 1000ml; Total: 1000ml.                                                              aa5 

                                                                                                  

Outcome:                                                                                          

20:01 Discharge ordered by MD.                                                                3 

20:17 Discharged to home ambulatory, with family.                                             eh3 

20:17 Condition: stable                                                                           

20:17 Discharge instructions given to patient, Instructed on discharge instructions, follow       

      up and referral plans. medication usage, Demonstrated understanding of instructions,        

      follow-up care, medications, Prescriptions given X 1.                                       

20:18 Patient left the ED.                                                                    eh3 

                                                                                                  

Signatures:                                                                                       

Dispatcher [employer]                           EDMS                                                 

Ann Marie Duff, RN                     RN   aa5                                                  

Annie Benites RN                      RN                                                      

Ilan, Henny Williamson                               2                                                  

Pantera Munoz MD MD   sp3                                                  

Dayan Benites RN                          RN   3                                                  

Benites, Yonatan                              4                                                  

                                                                                                  

Corrections: (The following items were deleted from the chart)                                    

18:03 18:03 NS 0.9% IV 1000 ml IV at 1 bolus in left antecubital ph                           ph  

19:47 19:00 Reassessment: Patient and/or family updated on plan of care and expected          eh3 

      duration. Pain level reassessed. Patient is alert, oriented x 3, equal unlabored            

      respirations, skin warm/dry/pink. Patient states symptoms have not improved. eh3            

19:47 19:00 Pain: Complains of pain in left flank Pain currently is 8 out of 10 on a pain     eh3 

      scale. eh3                                                                                  

                                                                                                  

**************************************************************************************************

## 2023-09-21 ENCOUNTER — HOSPITAL ENCOUNTER (EMERGENCY)
Dept: HOSPITAL 97 - ER | Age: 28
Discharge: HOME | End: 2023-09-21
Payer: SELF-PAY

## 2023-09-21 VITALS — SYSTOLIC BLOOD PRESSURE: 102 MMHG | OXYGEN SATURATION: 98 % | DIASTOLIC BLOOD PRESSURE: 64 MMHG

## 2023-09-21 VITALS — TEMPERATURE: 98.2 F

## 2023-09-21 DIAGNOSIS — Z87.442: ICD-10-CM

## 2023-09-21 DIAGNOSIS — N21.1: ICD-10-CM

## 2023-09-21 DIAGNOSIS — N13.4: ICD-10-CM

## 2023-09-21 DIAGNOSIS — Z88.0: ICD-10-CM

## 2023-09-21 DIAGNOSIS — Z88.5: ICD-10-CM

## 2023-09-21 DIAGNOSIS — N20.0: Primary | ICD-10-CM

## 2023-09-21 LAB
ALBUMIN SERPL BCP-MCNC: 4.4 G/DL (ref 3.4–5)
ALP SERPL-CCNC: 78 U/L (ref 45–117)
ALT SERPL W P-5'-P-CCNC: 132 U/L (ref 13–56)
AST SERPL W P-5'-P-CCNC: 47 U/L (ref 15–37)
BUN BLD-MCNC: 12 MG/DL (ref 7–18)
GLUCOSE SERPLBLD-MCNC: 111 MG/DL (ref 74–106)
HCT VFR BLD CALC: 42.4 % (ref 36–45)
LIPASE SERPL-CCNC: 37 U/L (ref 13–75)
LYMPHOCYTES # SPEC AUTO: 2.6 K/UL (ref 0.7–4.9)
MCV RBC: 87 FL (ref 80–100)
PMV BLD: 7.8 FL (ref 7.6–11.3)
POTASSIUM SERPL-SCNC: 3.8 MEQ/L (ref 3.5–5.1)
RBC # BLD: 4.87 M/UL (ref 3.86–4.86)
WBC # BLD AUTO: 8.3 THOU/UL (ref 4.3–10.9)

## 2023-09-21 PROCEDURE — 80053 COMPREHEN METABOLIC PANEL: CPT

## 2023-09-21 PROCEDURE — 99284 EMERGENCY DEPT VISIT MOD MDM: CPT

## 2023-09-21 PROCEDURE — 74018 RADEX ABDOMEN 1 VIEW: CPT

## 2023-09-21 PROCEDURE — 85025 COMPLETE CBC W/AUTO DIFF WBC: CPT

## 2023-09-21 PROCEDURE — 83690 ASSAY OF LIPASE: CPT

## 2023-09-21 PROCEDURE — 36415 COLL VENOUS BLD VENIPUNCTURE: CPT

## 2023-09-21 PROCEDURE — 81001 URINALYSIS AUTO W/SCOPE: CPT

## 2023-09-21 NOTE — ER
Nurse's Notes                                                                                     

 [facility]                                                                 

Name: Selin Cui                                                                           

Age: 27 yrs                                                                                       

Sex: Female                                                                                       

: 1995                                                                                   

MRN: [id number]                                                                                   

Arrival Date: 2023                                                                          

Time: 15:14                                                                                       

Account#: [id number]                                                                            

Bed 14                                                                                            

Private MD:                                                                                       

Diagnosis: Disorder of kidney and ureter, unspecified;Kidney Stone/ Calculus in                   

  urethra;Hydroureter-normal creatinine                                                           

                                                                                                  

Presentation:                                                                                     

                                                                                             

15:23 Chief complaint: Patient states: Kidney stone - left sided flank pain/abdominal pain.   ld1 

      Coronavirus screen: At this time, the client does not indicate any symptoms associated      

      with coronavirus-19. Ebola Screen: No symptoms or risks identified at this time.            

      Initial Sepsis Screen: Does the patient meet any 2 criteria? No. Patient's initial          

      sepsis screen is negative. Does the patient have a suspected source of infection? No.       

      Patient's initial sepsis screen is negative. Risk Assessment: Do you want to hurt           

      yourself or someone else? Patient reports no desire to harm self or others. Onset of        

      symptoms was 2023.                                                            

15:23 Method Of Arrival: Ambulatory                                                           ld1 

15:23 Acuity: AWILDA 3                                                                           ld1 

                                                                                                  

Triage Assessment:                                                                                

15:24 General: Appears in no apparent distress. uncomfortable, Behavior is cooperative,       ld1 

      anxious, crying. Pain: Complains of pain in left low back and left lower quadrant Pain      

      does not radiate. Pain currently is 9 out of 10 on a pain scale. Quality of pain is         

      described as throbbing, Pain began suddenly. EENT: No signs and/or symptoms were            

      reported regarding the EENT system. Neuro: Level of Consciousness is awake, alert,          

      obeys commands, Oriented to person, place, time, situation. Cardiovascular: Capillary       

      refill < 3 seconds Patient's skin is warm and dry. Respiratory: Airway is patent            

      Respiratory effort is even, unlabored. GI: Abdomen is flat, non-distended, Reports          

      lower abdominal pain, nausea. : No signs and/or symptoms were reported regarding the      

      genitourinary system. Derm: No signs and/or symptoms reported regarding the                 

      dermatologic system. Musculoskeletal: No signs and/or symptoms reported regarding the       

      musculoskeletal system.                                                                     

                                                                                                  

Historical:                                                                                       

- Allergies:                                                                                      

15:24 PENICILLINS;                                                                            ld1 

15:24 Hydrocodone-Acetaminophen (Upset stomach);                                              ld1 

- PMHx:                                                                                           

15:24 HYDRONEPHROSIS; Kidney stones;                                                          ld1 

- PSHx:                                                                                           

15:24 kidney stones removed;                                                                  ld1 

                                                                                                  

- Immunization history:: Adult Immunizations up to date.                                          

- Social history:: Smoking status: Patient denies any tobacco usage or history of.                

  Patient/guardian denies using alcohol.                                                          

                                                                                                  

                                                                                                  

Screening:                                                                                        

15:51 [facility] ED Fall Risk Assessment (Adult) History of falling in the last 3 months,       mb9 

      including since admission No falls in past 3 months (0 pts) Confusion or Disorientation     

      No (0 pts) Intoxicated or Sedated No (0 pts) Impaired Gait No (0 pts) Mobility Assist       

      Device Used No (0 pt) Altered Elimination No (0 pt) Score/Fall Risk Level 0 - 2 = Low       

      Risk Oriented to surroundings, Maintained a safe environment, Educated pt \T\ family on     

      fall prevention, incl call for assistance when getting out of bed. Abuse screen: Denies     

      threats or abuse. Nutritional screening: No deficits noted. Tuberculosis screening: No      

      symptoms or risk factors identified.                                                        

                                                                                                  

Assessment:                                                                                       

15:50 General: Appears uncomfortable, Behavior is crying. Pain: Complains of pain in back     mb9 

      Pain radiates to left lower quadrant and left low back Quality of pain is described as      

      sharp, shooting, Is continuous. Neuro: Powers Agitation-Sedation Scale (RASS): 0 -        

      Alert and Calm Level of Consciousness is awake, alert, obeys commands, Oriented to          

      person, place, time, situation, Appropriate for age. Cardiovascular: Patient's skin is      

      warm and dry. Respiratory: Airway is patent Respiratory effort is even, unlabored,          

      Respiratory pattern is regular, symmetrical, Breath sounds are clear bilaterally. GI:       

      Abdomen is flat, non-distended, Bowel sounds present X 4 quads. Abd is soft Abdomen is      

      tender to palpation in left lower quadrant Reports nausea. : Urine is clear. Derm:        

      Skin is pink, warm \T\ dry.                                                                 

16:58 Reassessment: Patient and/or family updated on plan of care and expected duration. Pain mb9 

      level reassessed. Patient is alert, oriented x 3, equal unlabored respirations, skin        

      warm/dry/pink.                                                                              

17:21 Reassessment: Patient is alert, oriented x 3, equal unlabored respirations, skin        mb9 

      warm/dry/pink. Patient states feeling better. Patient states symptoms have improved.        

                                                                                                  

Vital Signs:                                                                                      

15:23  / 100; Pulse 98; Resp 18; Temp 98.2(TE); Pulse Ox 98% on R/A; Weight 72.57 kg;   ld1 

      Height 5 ft. 2 in. ; Pain 9/10;                                                             

16:24  / 73; Pulse 90; Resp 16; Pulse Ox 99% on R/A;                                    mb9 

17:21  / 64; Pulse 66; Resp 16; Pulse Ox 98% ;                                          mb9 

15:23 Body Mass Index 29.26 (72.57 kg, 157.48 cm)                                             ld1 

15:23 Pain Scale: Adult                                                                       ld1 

                                                                                                  

ED Course:                                                                                        

15:16 Patient arrived in ED.                                                                  im  

15:18 [facility]                                                          snw 

15:18 Roldan Avila MD is Attending Physician.                                            snw 

15:24 Triage completed.                                                                       ld1 

15:24 Arm band placed on right wrist.                                                         ld1 

15:29 Initial lab(s) drawn, by me, sent to lab. Urine collected: clean catch specimen.        kj1 

      Inserted saline lock: 22 gauge in right antecubital area, using aseptic technique.          

      Blood collected.                                                                            

15:31 Alis Jansen, RN is Primary Nurse.                                               mb9 

15:51 Placed in gown. Bed in low position. Call light in reach. Side rails up X 1. Client     mb9 

      placed on continuous cardiac and pulse oximetry monitoring. NIBP monitoring applied.        

15:52 No provider procedures requiring assistance completed.                                  mb9 

16:02 Abdomen 1 View (KUB) XRAY In Process Unspecified.                                       EDMS

17:28 IV discontinued, intact, bleeding controlled, No redness/swelling at site. Pressure     mb9 

      dressing applied.                                                                           

                                                                                                  

Administered Medications:                                                                         

15:36 Drug: NS 0.9% IV 1000 ml IV at 1 bolus Per protocol; 1000 mL bolus Route: IV; Rate: 1   mb9 

      bolus; Site: left antecubital;                                                              

15:38 Drug: NS 0.9%  ml IV at bolus once Route: IV; Rate: bolus; Site: left antecubital;mb9 

15:38 Drug: Promethazine IVP 25 mg IVP once; in 250ml NS Route: IVP; Site: left antecubital;  mb9 

16:23 Follow up: Response: No adverse reaction                                                mb9 

15:40 Drug: TORadol - Ketorolac IVP 15 mg IVP once Route: IVP; Site: left antecubital;        mb9 

16:23 Follow up: Response: No adverse reaction                                                mb9 

15:43 Drug: morphine IVP or IV 4 mg IVP once over 4 mins Route: IVP; Infused Over: 4 mins;    mb9 

      Site: left antecubital;                                                                     

16:23 Follow up: Response: No adverse reaction                                                mb9 

16:58 Drug: HYDROmorphone IVP 1 mg IVP once Route: IVP; Site: right antecubital;              mb9 

17:22 Follow up: Response: No adverse reaction                                                mb9 

                                                                                                  

                                                                                                  

Medication:                                                                                       

15:51 VIS not applicable for this client.                                                     mb9 

                                                                                                  

Outcome:                                                                                          

17:20 Discharge ordered by MD.                                                                snlu 

18:02 Discharged to home ambulatory,                                                          mb9 

18:02 Condition: stable                                                                           

18:02 Discharge instructions given to patient, Instructed on discharge instructions, follow       

      up and referral plans. Demonstrated understanding of instructions, follow-up care,          

      medications, Prescriptions given X 1,                                                       

18:02 Patient left the ED.                                                                    mb9 

                                                                                                  

Signatures:                                                                                       

Dispatcher [employer]                           EDMS                                                 

Bobbi Chilel, SUNNYP-C                   FNP-Yris Reina                              kj1                                                  

Rebekah Bach, RN                        RN   ld1                                                  

Alis Jansen RN                 RN   mb9                                                  

Viviana Crane                                                   

                                                                                                  

**************************************************************************************************

## 2023-09-21 NOTE — XMS REPORT
Continuity of Care Document

                          Created on:2023



Patient:ABEBA REYES

Sex:Female

:1995

External Reference #:[id number]





Demographics







                          Address                   [address]



                                                    [place] [address]

 

                          Home Phone                [phone]

 

                          Work Phone                [phone]

 

                          Mobile Phone              [phone]

 

                          Email Address             [email or web address]

 

                          Preferred Language        English

 

                          Marital Status            Unknown

 

                          Anabaptism Affiliation     Unknown

 

                          Race                      Unknown

 

                          Additional Race(s)        Unavailable



                                                    White



                                                    Unavailable

 

                          Ethnic Group              Unknown









Author







                          Organization              [facility]

t

 

                          Address                   [address] [place] [address] [address] [place] [id number]

 

                          Phone                     [phone]









Support







                Name            Relationship    Address         Phone

 

                Gudelia Ugarte Grandparent     [address]   [phone]



                                                [place] [address] 

 

                PATIENT, NO ONE ELSE PER Unavailable     Unavailable     Unavail

able

 

                one else per patient, No Unavailable     Unavailable     Unavail

able

 

                Rebeca Reyes   Parent          Unavailable     [phone]

 

                Geoff Reyes   Parent          [address]    [phone]



                                                [place] [address] 

 

                GUDELIA UGARTE Unavailable     [address]       [phone]



                                                [place] [id number] 

 

                NONE, OTHER     Unavailable     719       [phone]



                                                [place] [id number] 

 

                SIERRA GAY    Domestic partner [address]       [phone]



                                                [place] [id number] 

 

                SIERRA GAY                  [address]   [phone]



                                                [place] [id number] 









Care Team Providers







                    Name                Role                Phone

 

                    Pcp, Patient Does Not Have A Primary Care Physician [phone]



 

                    Fabiola Camarena     Attending Clinician Unavailable

 

                    Leyda Ramirez     Attending Clinician Unavailable

 

                    Kylah Nunez MA     Attending Clinician Unavailable

 

                    Sergio LOPEZ, Amalia   Attending Clinician [phone]

 

                    Asked, No Pcp       Attending Clinician Unavailable

 

                    Pedro Luis LOPEZ, Magali Burgess Attending Clinician [phone]

 

                    Taylor LOPEZ, Taiwo Oneill Attending Clinician [phone]

 

                    Aisha LOPEZ, Zoe Brown Attending Clinician [phone]

 

                    Lizbet Luna RN    Attending Clinician Unavailable

 

                    Demario Lilly RN Attending Clinician Unavailable

 

                    Paul Erwin Attending Clinician [phone]

 

                    Unknown, Attending  Attending Clinician Unavailable

 

                    PAUL AHMADI   Attending Clinician Unavailable

 

                    Doctor Unassigned, No Name Attending Clinician Unavailable

 

                    Lanie Johnson  Attending Clinician Unavailable

 

                    MD AMALIA ARECHIGA   Attending Clinician Unavailable

 

                    SHAI DAY      Attending Clinician Unavailable

 

                    DO STEFANO HAMEED Attending Clinician Unavailable

 

                    Fabiola Camarena     Admitting Clinician Unavailable

 

                    Leyda Ramirez     Admitting Clinician Unavailable

 

                    TAIWO VAZQUEZ         Admitting Clinician Unavailable

 

                    Physician, No Primary or Family Admitting Clinician UnavailAMALIA Mccann      Admitting Clinician Unavailable

 

                    MD AMALIA ARECHIGA   Admitting Clinician Unavailable

 

                    FABIAN CROWLEY     Admitting Clinician Unavailable

 

                    DO STEFANO HAMEED Admitting Clinician Unavailable









Payers







           Payer Name Policy Type Policy Number Effective Date Expiration Date S

ource







Problems







       Condition Condition Condition Status Onset  Resolution Last   Treating Co

mments 

Source



       Name   Details Category        Date   Date   Treatment Clinician        



                                                 Date                 

 

       Flank pain Flank pain Disease Active                              [facility]

ethodi



                                                                  st



                                   00:00:                             [facility]osp[facility]



                                   00                                 l

 

       Staghorn Staghorn Disease Active                              [facility]

di



       kidney kidney               



       stones stones               00:00:                             [facility]osp[facility]



                                   00                                 l

 

       Pyelonephr Pyelonephr Disease Active 2021                             [facility]

ethodi



       itis   itis                 



       affecting affecting               00:00:                             Hosp

halle



       pregnancy pregnancy               00                                 l



       in first in first                                                  



       trimester trimester                                                  

 

       Calculus Calculus Disease Active 2020                             [facility]

di



       of kidney of kidney               0                               st



                                   00:00:                             [facility]osp[facility]



                                   00                                 l

 

       Fever  Fever  Disease Active 2019                             [facility]



                                   0                               st



                                   00:00:                             [facility]ospita



                                   00                                 l

 

       UPJ    UPJ    Disease Active                              [facility]



       obstructio obstructio               9



       n,     n,                   00:00:                             [facility]osp[facility]



       congenital congenital               00                                 l

 

       Complicate Complicate Disease Active                              M

ethodi



       d UTI  d UTI                



       (urinary (urinary               00:00:                             Hospit

a



       tract  tract                00                                 l



       infection) infection)                                                  

 

       Hydronephr Hydronephr Disease Active                              M

ethodi



       osis, left osis, left               7                               st



                                   00:00:                             Hospita



                                   00                                 l

 

       Acute pain Acute pain Disease Active                              M

ethodi



       of left of left               914                               st



       shoulder shoulder               00:00:                             Hospit

a



                                   00                                 l

 

       Acute pain Acute pain Disease Active                              M

ethodi



       of left of left               9-14                               st



       shoulder shoulder               00:00:                             Hospit

a



                                   00                                 l

 

       Left   Left   Disease Active                              [facility]



       ureteral ureteral               5-04                               st



       stone  stone                00:00:                             Hospita



                                   00                                 l

 

       Hospital Hospital Diagnosis Active                                    Com

mon



       discharge discharge                                                  S[facility]

it



       follow-up follow-up                                                  - 

I



                                                                      [facility]

 

       Fatigue, Fatigue, Diagnosis Active                                    Com

mon



       unspecifie unspecifie                                                  Sp

cari



       d type d type                                                  - [facility]



                                                                      [facility]

 

       No known No known Disease                                           Un[facility]

rs



       active active                                                  ity of



       problems problems                                                  [facility]







Allergies, Adverse Reactions, Alerts







       Allergy Allergy Status Severity Reaction(s) Onset  Inactive Treating Comm

ents 

Source



       Name   Type                        Date   Date   Clinician        

 

       Penicill DA     Active MI     hot/                         [facility]



       ins                         vomiting 5-02                        [facility]



                                          [facility]                      [facility]



                                          00                          l of



                                                                      [place]

 

       Penicill DA     Active MI     hot/                         [facility]



       ins                         vomiting 5-01                        [facility]



                                          [facility]:                      [facility]



                                          00                          l of



                                                                      [place]

 

       Penicill DA     Active MI     hot/                         [facility]



       ins                         vomiting 3-24                        [facility]



                                          [facility]:                      [facility]



                                          00                          l of



                                                                      [place]

 

       Penicill Propensi Active        Hives  2021               Nausea Method

i



       ins    ty to                       0-31                        st



              adverse                      00:00:                      [facility]osp[facility]



              reaction                      00                          l



              s to                                                    



              drug                                                    

 

       No Known DA     Active U             0                      [facility]



       Allergie                             2-21                        [facility]



       [facility]                                  00:00:                      [facility]



                                          00                          l of



                                                                      [place]

 

       No Known DA     Active U             -0                      [facility]



       Allergie                             2-21                        [facility]



       [facility]                                  00:00:                      [facility]



                                          00                          l of



                                                                      [place]

 

       No Known DA     Active U             -0                      [facility]



       Allergie                             1-25                        [facility]



       [facility]                                  00:00:                      [facility]



                                          00                          l of



                                                                      [place]

 

       No Known DA     Active U             -0                      [facility]



       Allergie                             1-25                        [facility]



       [facility]                                  00:00:                      [facility]



                                          00                          l of



                                                                      [place]

 

       No Known DA     Active U             -                      [facility]



       Allergie                             0-06                        [facility]



       [facility]                                  00:00:                      [facility]



                                          00                          l of



                                                                      [place]

 

       No Known DA     Active U             -                      [facility]



       Allergie                             0-06                        [facility]



       [facility]                                  00:00:                      [facility]



                                          00                          l of



                                                                      [place]

 

       Hydrocod Propensi Active        GI                    Nausea Method

i



       one    ty to                Intolerance 7-16                        st



              adverse                      00:00:                      [facility]ospita



              reaction                      00                          l



              s to                                                    



              drug                                                    

 

       NO KNOWN Drug   Active                                           [facility]



       ALLERGIE Class                                                   [facility] of



       S                                                              [facility]







Family History







           Family Member Diagnosis  Comments   Start Date Stop Date  Source

 

           Natural father Diabetes                                    [facility]

 

           Natural mother Seizures                                    [facility]







Social History







           Social Habit Start Date Stop Date  Quantity   Comments   Source

 

           ASSERTION  2020            Pregnancy             [facility]



                      00:00:00                                    [facility]

 

           Gender identity                                             [facility]

 

           Sexual orientation                                             [facility]

[facility]



                                                                  [facility]

 

           Alcohol intake 2023 Ex-drinker            [facility]



                      00:00:00   00:00:00   (finding)             [facility]ospital

 

           History of Social 2023                       [facility]

st



           function   00:00:00   00:00:00                         Hospital

 

           Exposure to 2022-10-26 2022 Not sure              [facility] 



           SARS-CoV-2 (event) 00:00:00   16:40:00                         [facility]

 

           Tobacco use and 2021 Smokeless             [facility]



           exposure   00:00:00   00:00:00   tobacco non-user            Hospital

 

           Alcohol Comment 2019-10-19 2019-10-19 8 drinks on            [facility]

t



                      00:00:00   00:00:00   weekends              Hospital

 

           Sex Assigned At [birth year]                       [facility]



           Birth      00:00:00   00:00:00                         Hospital









                Smoking Status  Start Date      Stop Date       Source

 

                [facility]                                 [facility]

 

                [facility] smoked tobacco                                 [facility]

[facility]







Medications







       Ordered Filled Start  Stop   Current Ordering Indication Dosage Frequency

 Signature

                    Comments            Components          Source



     Medication Medication Date Date Medication? Clinician                (SIG) 

          



     Name Name                                                   

 

     prenatal            Yes            1{tbl} QD   Take 1           [facility]

di



     vit,calc76-      8-24                               tablet by           st



     iron-folic      13:09:                               mouth           Hospit

a



     29 mg iron-      33                                 daily.           l



     1 mg tablet                                                        



     per tablet                                                        

 

     levoFLOXaci            Yes            500mg QD   Take 1           [facility]

hodi



     n         8-24                               tablet           st



     (LEVAQUIN)      00:00:                               (500 mg           Hosp

halle



     500 MG      00                                 total) by           l



     tablet                                         mouth           



                                                  daily.           

 

     traMADoL            Yes       [id number] 50mg Q6H  Take 1           [facility]

i



     (Ultram) 50      8-24                               tablet (50           st



     mg tablet      00:00:                               mg total)           Hos

mack



               00                                 by mouth           l



                                                  every 6           



                                                  (six)           



                                                  hours as           



                                                  needed for           



                                                  moderate           



                                                  pain           



                                                  .acute           



                                                  pain.           

 

     fexofenadin      2022      Yes       [id number] 1{tbl}      Take 1         

  [facility]



     e-pseudoeph      1-05                               tablet by           ity

 of



     edrine      00:00:                               mouth in           [place]



     (ALLEGRA-D)      00                                 the            Medical



      mg                                         morning           [facility]



     per tablet                                         and 1           



                                                  tablet in           



                                                  the            



                                                  evening.           

 

     fluticasone      2022      Yes       [id number] 2{spray      Use 2         

  [facility]



     propionate      1-05                     }         Sprays in           ity 

of



     50        00:00:                               each           [place]



     mcg/actuati      00                                 nostril in           Me

dical



     on nasal                                         the            [facility]



     spray                                         morning.           

 

     fexofenadin      2022      Yes       [id number] 1{tbl}      Take 1         

  [facility]



     e-pseudoeph      1-05                               tablet by           ity

 of



     edrine      00:00:                               mouth in           [place]



     (ALLEGRA-D)      00                                 the            Medical



      mg                                         morning           [facility]



     per tablet                                         and 1           



                                                  tablet in           



                                                  the            



                                                  evening.           

 

     fluticasone      2022      Yes       [id number] 2{spray      Use 2         

  [facility]



     propionate      1-05                     }         Sprays in           ity 

of



     50        00:00:                               each           [place]



     mcg/actuati      00                                 nostril in           Me

dical



     on nasal                                         the            [facility]



     spray                                         morning.           

 

     amoxicillin      2022- No        [id number] 1{tbl}      Take 1        

   [facility]



     -clavulanat      1-05 11-16                          tablet by           it

y of



     e         00:00: 05:59                          mouth in           [place]



     (AUGMENTIN)      00   :00                           the            Medical



     875-125 mg                                         morning           [facility]



     per tablet                                         and 1           



                                                  tablet in           



                                                  the            



                                                  evening.           



                                                  Do all           



                                                  this for           



                                                  10 days.           

 

     prenatal      2021      Yes            1{tbl} QD   Take 1           [facility]

di



     vit,calc76-      1-10                               tablet by           st



     iron-folic      13:06:                               mouth           Hospit

a



     29 mg iron-      31                                 daily.           l



     1 mg tablet                                                        



     per tablet                                                        

 

     naproxen            Yes       [id number] 250mg      Take 1           U

nivers



     (NAPROSYN)      9-22                               tablet by           ity 

of



     250 mg      00:00:                               mouth 2           [place]



     tablet      00                                 (two)           Medical



                                                  times           [facility]



                                                  daily with           



                                                  meals.           

 

     medroxyPROG            Yes       [id number]           Take 1 tab       

    [facility]



     ESTERone      9-22                               po TID x 7           ity o

f



     (PROVERA)      00:00:                               days           [place]



     10 mg      00                                                [facility]



     tablet                                                        [facility]

 

     naproxen            Yes       [id number] 250mg      Take 1           U

nivers



     (NAPROSYN)      9-22                               tablet by           ity 

of



     250 mg      00:00:                               mouth 2           [place]



     tablet      00                                 (two)           Medical



                                                  times           Branch



                                                  daily with           



                                                  meals.           

 

     medroxyPROG            Yes       [id number]           Take 1 tab       

    [facility]



     ESTERone      9-22                               po TID x 7           ity o

f



     (PROVERA)      00:00:                               days           [place]



     10 mg      00                                                Medical



     tablet                                                        Branch

 

     naproxen            Yes       [id number] 250mg      Take 1           U

nivers



     (NAPROSYN)      9-22                               tablet by           ity 

of



     250 mg      00:00:                               mouth 2           [place]



     tablet      00                                 (two)           Medical



                                                  times           Branch



                                                  daily with           



                                                  meals.           

 

     medroxyPROG            Yes       [id number]           Take 1 tab       

    [facility]



     ESTERone      9-22                               po TID x 7           ity o

f



     (PROVERA)      00:00:                               days           [place]



     10 mg      00                                                Medical



     tablet                                                        Branch

 

     Norethindro      2015      Yes       [id number] 1{tbl}      Take 1 Tab     

      [facility]



     ne        1-12                               by mouth           ity of



     Acet-Ethiny      00:00:                               daily.           [place]

[place]



     l Est      00                                                Medical



     (LOESTRIN                                                        Branch



     .,                                                        



     ,) 1.5-30                                                        



     mg-mcg per                                                        



     tablet                                                        

 

     Norethindro      2015      Yes       [id number] 1{tbl}      Take 1 Tab     

      [facility]



     ne        1-12                               by mouth           ity of



     Acet-Ethiny      00:00:                               daily.           [place]

[place]



     l Est      00                                                Medical



     (LOESTRIN                                                        Branch



     .,                                                        



     21,) 1.5-30                                                        



     mg-mcg per                                                        



     tablet                                                        

 

     Norethindro      2015      Yes       [id number] 1{tbl}      Take 1 Tab     

      [facility]



     ne        1-12                               by mouth           ity of



     Acet-Ethiny      00:00:                               daily.           [place]

[place]



     l Est      00                                                Medical



     (LOESTRIN                                                        Branch



     .,                                                        



     ,) 1.5-30                                                        



     mg-mcg per                                                        



     tablet                                                        

 

     Ondansetron Ondansetron           Yes  Rita                1 tablet       

    Common



                              [place]                on the           [facility]



                                                  tongue and           - [facility]



                                                  allow to           [facility]

 

     Oxybutynin Oxybutynin           Yes  Rita                as             Co

mmon



     Chloride Chloride                [place]                directed           Sp

cari



                                                                 - [facility]



                                                                 [facility]

 

     Ferrous Ferrous           Yes  Rita                as             Common



     Sulfate Sulfate                [place]                directed           Spir

it



                                                                 - [facility]



                                                                 [facility]

 

     Methocarbam Methocarbam           Yes  Rita                1.5            

Common



     ol   ol                  [place]                tablets           [facility]



                                                                 - [facility]



                                                                 [facility]

 

     Butalbital- Butalbital-           Yes  Rita                1 tablet       

    Common



     Acetaminoph Acetaminoph                [place]                as needed      

     [facility]



     en   en                                                     - [facility]



                                                                 [facility]

 

     Acetaminoph Acetaminoph           Yes  Rita                2 tablet       

    Common



     en   en                  [place]                as needed           [facility]



                                                                 - [facility]



                                                                 [facility]







Vital Signs







             Vital Name   Observation Time Observation Value Comments     Source

 

             Systolic blood 2022 21:42:00 113 mm[Hg]                Univer

sity of



             [facility]

 

             Diastolic blood 2022 21:42:00 83 mm[Hg]                 Unive

rsity [facility] [place]

 

             Heart rate   2022 21:42:00 93 /min                   [facility]

 

             Body temperature 2022 21:42:00 36.94 Giselle                 [facility]

 

             Respiratory rate 2022 21:42:00 17 /min                   [facility]

 

             Body height  2022 21:42:00 157.5 cm                  [facility]

 

             Body weight  2022 21:42:00 73.936 kg                 [facility]

 

             BMI          2022 21:42:00 29.81 kg/m2               [facility] hospital

 

             Oxygen saturation in 2022 21:42:00 97 /min                   

[facility]



             Arterial blood by                                        [place] [facility]

jessa



             Pulse oximetry                                        [place]

 

             Systolic blood 2023 16:45:09 110 mm[Hg]                [facility]



             pressure                                            

 

             Diastolic blood 2023 16:45:09 76 mm[Hg]                 [facility]



             pressure                                            

 

             Heart rate   2023 16:45:09 68 /min                   [facility]

 

             Body temperature 2023 16:45:09 36.67 Gieslle                 [facility]

 

             Respiratory rate 2023 16:45:09 18 /min                   [facility] [place] [facility]

 

             Oxygen saturation in 2023 16:45:09 96 /min                   

[facility]



             Arterial blood by                                        



             Pulse oximetry                                        

 

             Body weight  2023 05:28:57 75.615 kg                 [facility]

 

             BMI          2023 05:28:57 30.49 kg/m2               [facility]

 

             Body height  2023 20:48:00 157.5 cm                  [facility]







Procedures







                Procedure       Date / Time     Performing Clinician Source



                                Performed                       

 

                CBC WITH PLATELET AND 2023 06:07:00 Eder Ojeda

[facility]



                DIFFERENTIAL                                    

 

                COMPREHENSIVE METABOLIC 2023 06:07:00 [facility]



                PANEL                                           

 

                ESTIMATED GFR   2023 06:07:00 [facility]

 

                TYPE AND SCREEN 2023 06:06:00 [facility]

 

                LACTIC ACID LEVEL, 2023 06:05:00 [facility] [place]



                SEPSIS - NOW AND REPEAT                                 



                2X EVERY 3 HOURS                                 

 

                PROTHROMBIN TIME WITH 2023 06:05:00 [facility]



                INR                                             

 

                LACTIC ACID LEVEL, 2023 02:46:00 [facility] [place]



                SEPSIS - NOW AND REPEAT                                 



                2X EVERY 3 HOURS                                 

 

                XR ABDOMEN 1 VW 2023 00:52:00 [facility]

 

                BLOOD CULTURE, AEROBIC & 2023 00:13:00 [facility]



                ANAEROBIC                                       

 

                LACTIC ACID LEVEL, 2023 00:13:00 [facility] [place]



                SEPSIS - NOW AND REPEAT                                 



                2X EVERY 3 HOURS                                 

 

                URINE CULTURE   2023 22:12:00 Vanesa [facility]

 

                HCG QUALITATIVE, URINE 2023 21:42:00 Vanesa [facility]



                SCREEN                                          

 

                URINALYSIS SCREEN AND 2023 21:42:00 Vanesa [facility]



                MICROSCOPY, WITH REFLEX                                 



                TO CULTURE                                      

 

                CBC WITH PLATELET AND 2023 20:55:00 Vanesa [facility]



                DIFFERENTIAL                                    

 

                COMPREHENSIVE METABOLIC 2023 20:55:00 [facility]



                PANEL                                           

 

                ESTIMATED GFR   2023 20:55:00 [facility]

 

                CT ABD/PELVIC EXTERNAL 2023 00:26:28 Amalia Arechiga

[facility]



                STUDY                                           

 

                POCT MOLECULAR STREP 2022 22:34:00 Unknown, Attending [facility]

 

                ASSIGNMENT OF BENEFITS 2022 21:31:11 Doctor Unassigned, No

 [facility]

 

                [id number]         2022 00:00:00 MURCH.05        [facility]

 

                [id number]         2022 00:00:00 MURCH.05        [facility]

 

                [id number]         2022 00:00:00 MURCH.05        [facility]

 

                [id number]         2021 00:00:00 DRYDA           [facility]

 

                [id number]         2021 00:00:00 DRYDA           [facility]

 

                [id number]         2021 00:00:00 DRYDA           [facility]

 

                [id number]         2020-10-07 00:00:00 CHEJE.02        [facility]

 

                [id number]         2020-10-07 00:00:00 CHEJE.02        [facility]







Plan of Care







             Planned Activity Planned Date Details      Comments     Source

 

             Future Scheduled Test 2023   COVID-19 VACCINE (#1)           

   [facility]



                          09:32:13     [code = COVID-19              



                                       VACCINE (#1)]              

 

             Future Scheduled Test 2023   Screening for              [facility]



                          09:32:13     malignant neoplasm of              



                                       cervix (procedure)              



                                       [code = 049932920]              

 

             Future Scheduled Test 2023   INFLUENZA VACCINE              [facility]



                          09:32:13     (#1) [code =              



                                       INFLUENZA VACCINE              



                                       (#1)]                     

 

             Future Scheduled Test 2023   COVID-19 VACCINE (#1)           

   [facility]



                          17:02:31     [code = COVID-19              



                                       VACCINE (#1)]              

 

             Future Scheduled Test 2023   Screening for              [facility]



                          17:02:31     malignant neoplasm of              



                                       cervix (procedure)              



                                       [code = 258004910]              

 

             Future Scheduled Test 2023   INFLUENZA VACCINE              [facility]



                          17:02:31     [code = INFLUENZA              



                                       VACCINE]                  

 

             Future Appointment 2023-10-12   Amalia Arechiga MD,              [facility] [place] [facility]



                          07:30:00     [address] [place] [address];              



                                       [address] [place] [id number]                  

 

             Future Appointment 2023-10-12   Amalia Arechiga MD,              [facility] [place] [facility]



                          07:30:00     [address] [place] [address];              



                                       [address], [place] [address]                  

 

             Future Appointment 2023-10-12   Josias GIORDANO,              

[facility]



                          07:30:00     [address]; [address],              



                                       [place] [phone]              

 

             Procedure    2023-10-12   NEPHROLITHOTOMY,              [facility]

ospital



                          12:30:00     PERCUTANEOUS              







Encounters







        Start   End     Encounter Admission Attending Care    Care    Encounter 

Source



        Date/Time Date/Time Type    Type    Clinicians Facility Department ID   

   

 

        2022         [facility]    [id number] 

[facility]



        06:24:00                         Fabiola                   [id number]  [facility]



                                                                        l of



                                                                        [place]

 

        2022         Inpatient ZAHRA Camarena, [facility]   [facility]    [id number] 

[facility]



        00:06:00                         Fabiola                   37      [facility]



                                                                        l of



                                                                        [place]

 

        2021         Inpatient         Migue[facility]   [facility]    [id number] 

[facility]



        10:01:00                         Fabiola                   99      [facility]



                                                                        l of



                                                                        [place]

 

        2021         Inpatient         [facility]   [facility]    [id number] 

[facility]



        23:46:00                         Fabiola                   30      [facility]



                                                                        l of



                                                                        [place]

 

        2021         Inpatient         [facility]   [facility]    [id number] 

[facility]



        20:01:00                         Fabiola                   54      [facility]



                                                                        l of



                                                                        [place]

 

        2020         Inpatient         [facility]   [facility]    [id number] 

[facility]



        15:26:00                         Fabiola                   93      [facility]



                                                                        l of



                                                                        [place]

 

        2020-10-26         Inpatient MARIELA Ramirez, [facility]   RADI    [id number] 

[facility]



        14:00:00                         Leyda                 17      [facility]



                                                                        l of



                                                                        [place]

 

        2020-10-07         Inpatient [facility]A [facility]   MEDI.01 [id number] 

[facility]



        14:22:00                         Fabiola                   47      [facility]



                                                                        l of



                                                                        [place]

 

        2023 Prep for         Enrique,   1.2.840.1 [id number]

[id number] Methodi



        00:00:00 00:00:00 Surgery         Kylah  38263.1.1         [address]



                                                3.430.2.7                 Hospit

a



                                                .3.300894                 l



                                                .8                      

 

        2023-09-15 2023-09-15 Quinten Espinal.2.840.1 [id number]

[id number] Methodi



        00:00:00 00:00:00                 Amalia  15993.1.1         [address]     [address]



                                                3.430.2.7                 Hospit

a



                                                .3.758415                 l



                                                .8                      

 

        2023 Telephone         Sergio, 1.2.840.1 [id number]

[id number] Methodi



        00:00:00 00:00:00                 Amalia  58043.1.1         [address]     st



                                                3.430.2.7                 Hospit

a



                                                .3.655668                 l



                                                .8                      

 

        2023 Documentat         Asked, No 1.2.840.1 [id number] 2

[id number] 

Methodi



        00:00:00 00:00:00 ion             Pcp     63285.1.1         [address]     st



                                                3.430.2.7                 Hospit

a



                                                .3.399348                 l



                                                .8                      

 

        2023 Documentat         Asked, No 1.2.840.1 [id number] 2

[id number] 

Methodi



        00:00:00 00:00:00 ion             Pcp     04735.1.1         167     st



                                                3.430.2.7                 Hospit

a



                                                .3.938903                 l



                                                .8                      

 

        2023 Emergency         Pedro LuisMagali 1.2.840.1 1

[id number] 

[id number]                              Methodi



        15:50:00 13:09:00                 Amalia Arechiga 08567.1.1         [address]  

   [address]



                                        Tai[facility] Vazquez 3.430.2.7           

      [facility]



                                        Zoe Leonard .3.150540         

        l



                                                .8                      

 

        2023 Outpatient         [facility]     [id number]

[address]



        00:00:00 00:00:00                 ZOE                   [address]     Method

i



                                                                        st

 

        2023 Documentat         Jeremy,  1.2.840.1 [id number]

[id number] Methodi



        00:00:00 00:00:00 ion             Lizbet  09945.1.1         [address]     st



                                                3.430.2.7                 Hospit

a



                                                .3.556799                 l



                                                .8                      

 

        2023 Telephone         Enrique   1.2.840.1 [id number] 2100

[id number] Methodi



        00:00:00 00:00:00                 Kylah  66251.1.1         [address]     [address]



                                                3.430.2.7                 Hospit

a



                                                .3.337290                 l



                                                .8                      

 

        2023 Oncology         Lilly, 1.2.840.1 [id number] 2100

[id number] Methodi



        00:00:00 00:00:00 [facility]         Demario 45626.1.1         092     s

t



                        ip                      3.430.2.7                 Hospit

a



                                                .3.287609                 l



                                                .8                      

 

        2023 Telephone         Enrique,   1.2.840.1 [id number] 2100

[id number] Methodi



        00:00:00 00:00:00                 Kylah  82261.1.1         [address]     [address]



                                                3.430.2.7                 Hospit

a



                                                .3.231144                 l



                                                .8                      

 

        2023 Prep for         Nunez,   1.2.840.1 [id number]

[id number] Methodi



        00:00:00 00:00:00 Surgery         Kylah  49086.1.1         [address]     [address]



                                                3.430.2.7                 Hospit

a



                                                .3.619511                 l



                                                .8                      

 

        2023 Hospital         Sergio, 1.2.840.1 [id number]

[id number] Methodi



        11:18:33 23:59:00 Encounter         Amalia  99817.1.1         542     st



                                                3.430.2.7                 Hospit

a



                                                .[id number]                 l



                                                .8                      

 

        2023 Office          Sergio, 1.2.840.1 [id number]

[address]



        10:45:00 11:00:00 Visit           Amalia  14554.1.1         [address]     [address]



                                                3.430.2.7                 Hospit

a



                                                .3.897983                 l



                                                .8                      

 

        2023 [facility]          Sergio, 1.2.840.1 [id number]

[address]



        00:00:00 00:00:00 Only            Amalia  68217.1.1         [address]     st



                                                3.430.2.7                 Hospit

a



                                                .3.211110                 l



                                                .8                      

 

        202331 Outpatient         SERGIO [facility]     [id number]

[address] [place]



        00:00:00 00:00:00                 AMALIA                  461     Method

i



                                                                        st

 

        2023 Outpatient         SERGIO [facility]     [id number]

[address] [place]



        00:00:00 00:00:00                 AMALIA                  542     Method

i



                                                                        st

 

        2023 Telephone         Sergio, 1.2.840.1 [id number] 

991511 Methodi



        00:00:00 00:00:00                 Amalia  48213.1.1         [address]     st



                                                3.430.2.7                 Hospit

a



                                                .3.701785                 l



                                                .8                      

 

        2023 Telephone         Sergio 1.2.840.1 [id number] 

346762 Methodi



        00:00:00 00:00:00                 Amalia  02286.1.1         [address]     st



                                                3.430.2.7                 Hospit

a



                                                .3.027606                 l



                                                .8                      

 

        2022 Urgent          Paul Ahmadi [facility]    1.2.840.

114 [id number] 

Univers



        16:40:00 17:00:00 Care            Unknown, Attending HEALTH  350.1.13.10

         ity of



                                                [place] 4.2.7.2.686         [place]ago

as



                                                PRANAY?BLEA [phone]         [address]



                                                MEDICAL                 



                                                OFFICE                  



                                                BUILDING                 

 

        2022 Outpatient R       DAIANA [facility]    [id number]

[id number] [facility]



        16:40:00 16:40:00                 PAUL hughes o

f



                                                                        [facility]

 

        2022 Letter          [facility]    1.2.840.114 [id number]

100 [facility]



        00:00:00 00:00:00 (Out)           [facility]  350.1.13.10         i

ty [facility]



                                                [facility] 4.2.7.2.686         [place]

as



                                                PRAANY?BLEA [phone]         [address]



                                                MEDICAL                 



                                                OFFICE                  



                                                BUILDING                 

 

        2022 Orders          Doctor PLATA    1.2.840.114 [id number]

64 [facility]



        00:00:00 00:00:00 Only            Unassigned, BARRETT   350.1.13.10       

  ity [facility] 4.2.7.2.686         [facility]

as



                                                        [phone]         Medi

jessa



                                                        009             Branch

 

        2022 Inpatient EM      Migue [facility]   OBPP    [id number]

64 [facility]



        06:24:00 12:00:00                 Fabiola                   96      Woman'

s



                                                                        Hospita



                                                                        l of



                                                                        [place]

 

        2022 Emergency EM      Elziabeth, [facility]   DARI    

[address] [facility]



        06:31:00 12:15:00                 Lanie                   48      Woman'

s



                                                                        Hospita



                                                                        l of



                                                                        [place]

 

        2022 Outpatient EM      Migue [facility]   OBOP    [id number]

275 [facility]



        17:25:00 17:25:00                 Fabiola                   00      Woman'

s



                                                                        Hospita



                                                                        l of



                                                                        [place]

 

        2022 Emergency ZAHRA Camarena [facility]   DARI    [id number]

[id number] [facility]



        14:54:00 17:35:00                 Fabiola                   91      Woman'

s



                                                                        Hospita



                                                                        l of



                                                                        [place]

 

        2022 Outpatient MARIELA Camarena [facility]   RADI    [id number]

343 [facility]



        10:08:00 10:08:00                 Fabiola                   24      Woman'

s



                                                                        Hospita



                                                                        l of



                                                                        [place]

 

        2021-11-15 2021-11-15 Outpatient         SERGIO, [facility]     [id number]

[address] [place]



        00:00:00 00:00:00                 AMALIA                  579     Method

i



                                                                        

 

        2021-11-10 2021-11-10 [facility]     [facility]     [id number]

[address] [place]



        00:00:00 00:00:00                 AMALIA                  533     Method

i



                                                                        

 

        2021 Outpatient         SERGIO, [facility]     [id number]

[address] [place]



        00:00:00 00:00:00                 AMALIA                  810     Method

i



                                                                        

 

        2021 Outpatient         SERGIO, [facility]     [id number]

[address] [place]



        00:00:00 00:00:00                 AMALIA                  792     Method

i



                                                                        

 

        2021 Outpatient                 [facility]     [id number]

[address] [place]



        00:00:00 00:00:00                                         843     Method

i



                                                                        

 

        2021-10-31 2021 Inpatient         BARB, [facility]     064     [id number]

[address] [place]



        00:00:00 00:00:00                 SHAI                   282     Method

i



                                                                        st

 

        2021 Outpatient         SERGIO, [facility]     [id number]

[address] [place]



        00:00:00 00:00:00                 AMALIA                  445     Method

i



                                                                        st

 

        2020 Outpatient         SERGIO, [facility]     [id number]

[address] [place]



        00:00:00 00:00:00                 AMALIA                  785     Method

i



                                                                        st

 

        2020-10-30 2020 Inpatient         SERGIO, [facility]     033     [id number]

[address] [place]



        00:00:00 00:00:00                 AMALIA                  104     Method

i



                                                                        st

 

        2020-10-28 2020-10-28 Outpatient         SERGIO, [facility]     [id number]

[address] [place]



        00:00:00 00:00:00                 AMALIA                  064     Method

i



                                                                        st

 

        2020-10-28 2020-10-28 Outpatient         SERGIO, [facility]     [id number]

[address] [place]



        00:00:00 00:00:00                 AMALIA                  123     Method

i



                                                                        st

 

        2020-10-19 2020-10-19 Outpatient         SERGIO, [facility]     [id number]

[address] [place]



        00:00:00 00:00:00                 AMALIA                  278     Method

i



                                                                        st

 

        2020-10-13 2020-10-13 Outpatient         SERGIO, [facility]     [id number]

[address] [place]



        00:00:00 00:00:00                 AMALIA                  559     Method

i



                                                                        st

 

        2020-10-12 2020-10-12 Outpatient         SERGIO, [facility]     [id number]

[address] [place]



        00:00:00 00:00:00                 AMALIA                  878     Method

i



                                                                        st

 

        2020 Outpatient         SERGIO, [facility]     [id number]

[address] [place]



        00:00:00 00:00:00                 AMALIA                  221     Method

i



                                                                        st

 

        2019-10-30 2019-10-30 Outpatient                 [facility]

[id number] [address]



        11:00:00 11:00:00                         [facility]

 

        2019-10-24 2019-10-24 Outpatient                 [facility]

[id number] [address]



        10:00:00 10:00:00                         [facility]







Results







           Test Description Test Time  Test Comments Results    Result Comments 

Source









                    Urine culture       2023 02:10:00 









                      Test Item  Value      Reference Range Interpretation Comme

nts









             Urine culture isolate Mixed seda <=10-3                           

Specimen InformationSpecimen



             (test code = 92457-4) col/cc                                 Source

: UrineSpecimen Site: Clean



                                                                 catch



[facility] MOLECULAR [id number] 22:42:10





             Test Item    Value        Reference Range Interpretation Comments

 

             POCT Molecular Strep (test code = Negative     Negative            

      



             41046-3)                                            

 

             Lab Interpretation (test code = Normal                             

    



             99193-4)                                            



[id number]





             Test Item    Value        Reference Range Interpretation Comments

 

             HEMOGLOBIN (test code = HGB) 9.5 g/dL     10.1-13.8    L           

 

 

             HEMATOCRIT (test code = HCT) 30.0 %       32.5-41.8    L           

 



AG HEPATITIS B [id number] 07:49:00





             Test Item    Value        Reference Range Interpretation Comments

 

             AG HEPATITIS B SURFACE (test code NONREACTIVE  NONREACTIVE         

      



             = HBSAG)                                            



AB HEPATITIS C [id number] 07:49:00





             Test Item    Value        Reference Range Interpretation Comments

 

             AB HEPATITIS C (test code = NONREACTIVE  NONREACTIVE               



             HCVAB)                                              

 

             SIGNAL TO CUTOFF (test code = 0.05         <0.80        N          

  



             CUTOFF)                                             



AB [id number] 07:49:00





             Test Item    Value        Reference Range Interpretation Comments

 

             AB TREPONEMA (test code = TREPAB) NONREACTIVE  NONREACTIVE         

      



AB HIV 1  07:49:00





             Test Item    Value        Reference Range Interpretation Comments

 

             AB HIV 1 2 (test NONREACTIVE  NONREACTIVE               [facility]



             code = [id number])                                        4th Gen HIV 

Ag/Ab Combo



                                                                 Screen



COVID 19 Asymptomatic IH [id number] 07:15:00





             Test Item    Value        Reference Range Interpretation Comments

 

             COVID 19     NEGATIVE     NEGATIVE                  This test has b

een



             Asymptomatic IH AG                                        authorize

d only for the



             (test code =                                        detection ofpro

teins from



             COVNONPUIAG)                                        SARS-CoV-2, not

 for any



                                                                 other viruses



                                                                 orpathogens. Ne

gative



                                                                 results should 

be treated



                                                                 as presumptive



                                                                 andconfirmed wi

th a



                                                                 molecular assay

, if



                                                                 necessary for



                                                                 patientmanageme

nt.



                                                                 Negative result

s do not



                                                                 rule out COVID-

19



                                                                 andshould not b

e used as



                                                                 the sole basis 

for



                                                                 treatment orpat

ient



                                                                 management deci

sions,



                                                                 including infec

tion



                                                                 controldecision

s.



                                                                 Negative result

s should



                                                                 be considered i

n



                                                                 thecontext of a

 patient's



                                                                 recent exposure

s, history



                                                                 and thepresence

 of



                                                                 clinical signs 

and



                                                                 symptoms consis

tent



                                                                 withCOVID-19. T

his test



                                                                 has not been FD

A cleared



                                                                 or approved; th

e test



                                                                 hasbeen authori

karen by FDA



                                                                 under an Emerge

ncy Use



                                                                 Authorization(E

UA) for



                                                                 use by deep shelby



                                                                 certified under

 the CLIA



                                                                 thatmeet the re

quirements



                                                                 to perform mode

rate, high



                                                                 or waivedcomple

xity



                                                                 tests. This barak

t is



                                                                 authorized for 

use at



                                                                 thePoint of Car

e (POC),



                                                                 i.e., in patien

t care



                                                                 settingsoperati

ng under a



                                                                 CLIA Certificat

e of



                                                                 Waiver, Certifi

nereyda



                                                                 ofCompliance, o

r



                                                                 Certificate of



                                                                 Accreditation. 

This test



                                                                 is only authori

karen for



                                                                 the duration of



                                                                 thedeclaration 

that



                                                                 circumstances e

xist



                                                                 justifying



                                                                 theauthorizatio

n of



                                                                 emergency use o

f in vitro



                                                                 diagnostic test

sfor



                                                                 detection and/o

r



                                                                 diagnosis of CO

VID-19



                                                                 under [id number]

4(b)(1) of



                                                                 the Act, 21 U.S

.C.



                                                                 360bbb-3(b)(1),

 unless



                                                                 theauthorizatio

n is



                                                                 terminated or r

evoked



                                                                 sooner.



CBC W/AUTO [id number] 06:48:00





             Test Item    Value        Reference Range Interpretation Comments

 

             WHITE BLOOD CELL (test code = WBC) 11.2 K/mm3   6.5-12.3     N     

       

 

             RED BLOOD CELL (test code = RBC) 4.39 M/mm3   3.51-4.69    N       

     

 

             HEMOGLOBIN (test code = HGB) 11.7 g/dL    10.1-13.8    N           

 

 

             HEMATOCRIT (test code = HCT) 35.9 %       32.5-41.8    N           

 

 

             MEAN CELL VOLUME (test code = MCV) 81.8 fL      84.6-96.6    L     

       

 

             MEAN CELL HGB (test code = MCH) 26.7 pg      27.3-33.9    L        

    

 

             MEAN CELL HGB CONCETRATION (test 32.6 gm/dL   32.0-34.2    N       

     



             code = MCHC)                                        

 

             RED CELL DISTRIBUTION WIDTH (test 14.0 %       12.2-16.3    N      

      



             code = RDW)                                         

 

             PLATELET COUNT (test code = PLT) 184 K/mm3    134-363      N       

     

 

             MEAN PLATELET VOLUME (test code = 10.9 fL      9.2-12.7     N      

      



             MPV)                                                

 

             NEUTROPHIL % (test code = NT%) 77.3 %       57.9-77.3    N         

   

 

             LYMPHOCYTE % (test code = LY%) 14.8 %       14.5-29.7    N         

   

 

             MONOCYTE % (test code = MO%) 6.8 %        3.6-10.2     N           

 

 

             EOSINOPHIL % (test code = EO%) 0.1 %        0.0-3.0      N         

   

 

             BASOPHIL % (test code = BA%) 0.2 %        0.1-0.9      N           

 

 

             NEUTROPHIL # (test code = NT#) 8.7 K/mm3                           

   

 

             LYMPHOCYTE # (test code = LY#) 1.7 K/mm3                           

   

 

             MONOCYTE # (test code = MO#) 0.8 K/mm3                             

 

 

             EOSINOPHIL # (test code = EO#) 0.01 K/mm3                          

   

 

             BASOPHIL # (test code = BA#) 0.0 K/mm3                             

 

 

             RBC MORPHOLOGY REQUIRED (test code NORMAL       NORMAL             

       



             = RBCM)                                             

 

             PLATELET MORPHOLOGY REQUIRED (test NORMAL       NORMAL             

       



             code = PLTMR)                                        



UA RFLX MICR CULT IF [id number] 07:38:00





             Test Item    Value        Reference Range Interpretation Comments

 

             UA COLOR (test code = COLU) STRAW        YELLOW                    

 

             UA APPEARANCE (test code = CLEAR        CLEAR                     



             APPU)                                               

 

             UA GLUCOSE DIPSTICK (test NEGATIVE     NEG                       



             code = DGLUU)                                        

 

             UA BILIRUBIN DIPSTICK (test NEGATIVE     NEG                       



             code = BILU)                                        

 

             UA KETONE DIPSTICK (test code NEGATIVE     NEG                     

  



             = KETU)                                             

 

             UA SPECIFIC GRAVITY (test 1.006        1.001-1.035  N            



             code = SGU)                                         

 

             UA BLOOD DIPSTICK (test code 3+           NEG          A           

 



             = [place])                                              

 

             UA PH DIPSTICK (test code = 7.0          5-9                       



             CHARLENE)                                                

 

             UA PROTEIN DIPSTICK (test NEGATIVE     NEG                       



             code = PROU)                                        

 

             UA UROBILINIOGEN DIPSTICK NEGATIVE mg/dL NEG                       



             (test code = URO)                                        

 

             UA NITRITE DIPSTICK (test NEG          NEG                       



             code = MIKE)                                        

 

             UA LEUKOCYTE ESTERASE NEG          NEG                       



             DIPSTICK (test code = LEUU)                                        

 

             UA WBC (test code = WBCU) NONE SEEN #/hpf NONE SEEN                

 

 

             UA RBC (test code = RBCU) 3-5 #/hpf    NONE SEEN    A            

 

             UA EPITHELIAL CELLS (test RARE #/HPF   RARE-FEW                  



             code = EPIU)                                        

 

             UA BACTERIA (test code = RARE /HPF    RARE-FEW                  



             BACU)                                               

 

             UA MUCUS (test code = MUCU) RARE         NONE SEEN                 



Indication for culture: Suprapubic PainSpecimen Description: CLEAN CATCHD-DIMER 
[id number] 16:57:00





             Test Item    Value        Reference Range Interpretation Comments

 

             D-DIMER QUANT 506 ng/mLDDU <255         H              Reference Ra

nge in



             (test code =                                        Pregnancy: <570



             DDIMER)                                             ng/ml**********

********



                                                                 ***************

********



                                                                 ***************

****A



                                                                 positive test d

oes not



                                                                 provide a defin

itive



                                                                 diagnosis ofDVT

 and



                                                                 indicates the n

eed for



                                                                 follow up clini

jessa



                                                                 studies. The pr

edictive



                                                                 value of a nega

tive



                                                                 test is 98% for



                                                                 rulingout DVT.



B-TYPE NATRIURETIC [id number] 16:53:00





             Test Item    Value        Reference Range Interpretation Comments

 

             B-TYPE NATRIURETIC PEPTIDE (test 4.95 pg/mL   0-100        N       

     



             code = BNP)                                         



FETAL [id number] 16:50:00





             Test Item    Value        Reference Range Interpretation Comments

 

             FETAL FIBRONECTIN NEGATIVE                               Among symp

tomatic



             (test code = FFN)                                        women, coby

vated levels



                                                                 (>0.05 ug/mL) o

ffFN



                                                                 between 24 week

s and 34



                                                                 weeks, 6 days i

ndicate



                                                                 increasedrisk o

f



                                                                 delivery in <= 

7 or <=



                                                                 14 days from



                                                                 samplecollectio

n.



                                                                 Similarly, dagmar

g



                                                                 asymptomatic wo

men,



                                                                 elevated levels

 of



                                                                 fFNbetween 22 w

eeks and



                                                                 30 weeks, 6 day

s



                                                                 indicate increa

sedrisk



                                                                 of delivery in 

<= 34



                                                                 weeks, 6 days o

f



                                                                 gestation.



PROTHROMBIN [id number] 16:37:00





             Test Item    Value        Reference Range Interpretation Comments

 

             PROTHROMBIN TIME PATIENT (test code 11.5 secs    10.1-12.3    N    

        



             = PTP)                                              



THROMBOPLASTIN TIME [id number] 16:37:00





             Test Item    Value        Reference Range Interpretation Comments

 

             THROMBOPLASTIN TIME PARTIAL (test 29.7 secs    22-38        N      

      



             code = PTT)                                         



[id number] 16:37:00





             Test Item    Value        Reference Range Interpretation Comments

 

             FIBRINOGEN (test code = 423 mg/dL    297-524      N            Plea

se note new



             FIB)                                                normal range 2022



COMPREHENSIVE METABOLIC [id number] 16:33:00





             Test Item    Value        Reference Range Interpretation Comments

 

             SODIUM (test code = NA) 137 mEq/L    135-145      N            

 

             POTASSIUM (test code = K) 3.5 mEq/L    3.5-5.0      N            

 

             CHLORIDE (test code = CL) 103 mEq/L    100-115      N            

 

             CARBON DIOXIDE (test code = CO2) 25 mEq/L     22-31        N       

     

 

             ANION GAP (test code = GAP) 12.70        10-20        N            

 

             GLUCOSE (test code = GLU) 127 mg/dL           H            

 

             BLOOD UREA NITROGEN (test code = 6 mg/dL      7-18         L       

     



             BUN)                                                

 

             GLOMERULAR FILTRATION RATE (test 150 ml/min   >60          N       

     



             code = GFR)                                         

 

             CREATININE (test code = CREAT) 0.5 mg/dL    0.5-1.0      N         

   

 

             TOTAL PROTEIN (test code = PROT) 6.7 gm/dL    6.3-8.2      N       

     

 

             ALBUMIN (test code = ALB) 2.9 gm/dL    3.4-4.8      L            

 

             CALCIUM (test code = CA) 8.4 mg/dL    8.4-10.2     N            

 

             BILIRUBIN TOTAL (test code = BILT) 0.2 mg/dL    0.2-1.0      N     

       

 

             SGOT/AST (test code = AST) 15 units/L   15-37        N            

 

             SGPT/ALT (test code = ALT) 25 units/L   12-78        N            

 

             ALKALINE PHOSPHATASE TOTAL (test 79 units/L          N       

     



             code = ALKP)                                        



CBC W/AUTO [id number] 16:20:00





             Test Item    Value        Reference Range Interpretation Comments

 

             WHITE BLOOD CELL (test code = WBC) 8.4 K/mm3    6.5-12.3     N     

       

 

             RED BLOOD CELL (test code = RBC) 4.10 M/mm3   3.51-4.69    N       

     

 

             HEMOGLOBIN (test code = HGB) 11.5 g/dL    10.1-13.8    N           

 

 

             HEMATOCRIT (test code = HCT) 35.3 %       32.5-41.8    N           

 

 

             MEAN CELL VOLUME (test code = MCV) 86.1 fL      84.6-96.6    N     

       

 

             MEAN CELL HGB (test code = MCH) 28.0 pg      27.3-33.9    N        

    

 

             MEAN CELL HGB CONCETRATION (test 32.6 gm/dL   32.0-34.2    N       

     



             code = MCHC)                                        

 

             RED CELL DISTRIBUTION WIDTH (test 13.3 %       12.2-16.3    N      

      



             code = RDW)                                         

 

             PLATELET COUNT (test code = PLT) 193 K/mm3    134-363      N       

     

 

             MEAN PLATELET VOLUME (test code = 10.6 fL      9.2-12.7     N      

      



             MPV)                                                

 

             NEUTROPHIL % (test code = NT%) 75.8 %       57.9-77.3    N         

   

 

             LYMPHOCYTE % (test code = LY%) 16.4 %       14.5-29.7    N         

   

 

             MONOCYTE % (test code = MO%) 6.2 %        3.6-10.2     N           

 

 

             EOSINOPHIL % (test code = EO%) 0.5 %        0.0-3.0      N         

   

 

             BASOPHIL % (test code = BA%) 0.1 %        0.1-0.9      N           

 

 

             NEUTROPHIL # (test code = NT#) 6.4 K/mm3                           

   

 

             LYMPHOCYTE # (test code = LY#) 1.4 K/mm3                           

   

 

             MONOCYTE # (test code = MO#) 0.5 K/mm3                             

 

 

             EOSINOPHIL # (test code = EO#) 0.04 K/mm3                          

   

 

             BASOPHIL # (test code = BA#) 0.0 K/mm3                             

 

 

             RBC MORPHOLOGY REQUIRED (test code NORMAL       NORMAL             

       



             = RBCM)                                             

 

             PLATELET MORPHOLOGY REQUIRED (test NORMAL       NORMAL             

       



             code = PLTMR)                                        



URINALYSIS [id number] 16:01:00





             Test Item    Value        Reference Range Interpretation Comments

 

             UA COLOR (test code = YELLOW       YELLOW                    



             COLU)                                               

 

             UA APPEARANCE (test code Slightly-Cloudy CLEAR                     



             = APPU)                                             

 

             UA GLUCOSE DIPSTICK (test NEGATIVE     NEG                       



             code = DGLUU)                                        

 

             UA BILIRUBIN DIPSTICK NEGATIVE     NEG                       



             (test code = BILU)                                        

 

             UA KETONE DIPSTICK (test NEGATIVE     NEG                       



             code = KETU)                                        

 

             UA SPECIFIC GRAVITY (test 1.018        1.001-1.035  N            



             code = SGU)                                         

 

             UA BLOOD DIPSTICK (test 2+           NEG          A            



             code = [place])                                         

 

             UA PH DIPSTICK (test code 6.0          5-9                       



             = CHARLENE)                                              

 

             UA PROTEIN DIPSTICK (test NEGATIVE     NEG                       



             code = PROU)                                        

 

             UA UROBILINIOGEN DIPSTICK NEGATIVE mg/dL NEG                       



             (test code = URO)                                        

 

             UA NITRITE DIPSTICK (test NEG          NEG                       



             code = MIKE)                                        

 

             UA LEUKOCYTE ESTERASE NEG          NEG                       



             DIPSTICK (test code =                                        



             LEUU)                                               

 

             UA WBC (test code = WBCU) 11-15 #/hpf  NONE SEEN    A            

 

             UA RBC (test code = RBCU) TOO NUMEROUS TO CNT NONE SEEN    A       

     



                          #/hpf                                  

 

             UA EPITHELIAL CELLS (test FEW #/HPF    RARE-FEW                  



             code = EPIU)                                        

 

             UA MUCUS (test code = 2+           NONE SEEN                 



             MUCU)                                               



URINE SAMPLE: CLEAN CATCHComment On arrival if delivery is not imminent- XR 
CHEST 1 -34-42 00:00:00
********************************[facility] 1995 Sex: 
F************************************************************ Patient Name: 
ABEBA REYES Unit No: [id number]  EXAMS: CPT CODE: [id number] XR CHEST 1 V 
05774 PROCEDURE INFORMATION: Exam: XR Chest Exam date and time: 3/24/2022 3:49 
PM Age: 26 years old Clinical indication: Other: Hypoxia + tachycardia 
TECHNIQUE: Imaging protocol: XR of the chest. Views: 1 view.COMPARISON: No 
relevant prior studies available. FINDINGS: Lungs: Mild decreased lung volumes. 
No consolidation. Pleural spaces: No pleural effusion. No pneumothorax. 
Heart/Mediastinum: Heart size is within normal limits. Vasculature is 
unremarkable. Bones/joints: No acute osseous abnormalities. IMPRESSION: No acute
cardiopulmonary findings. ** Electronically Signed by Bryn Pitts MD on 
2022 at 1609 ** Reported and signed by: Bryn Pitts MD CC: Kim Castillo MD  Technologist: Finn Medley, RT,MR,CT Trnscrbd D/T: 2022 (1601) 
GCD.CPS Orig Print D/T: S: 2022 (5265) [facility] NAME:
ABEBA REYES Radiology Department PHYS: PETCA.01 - Kim Castillo [address] : 1995 AGE: 26 SEX: F [place], [place] [address] ACCT NO: [id number]
LOC: F.DARI PHONE#: [phone] EXAM DATE: 2022 STATUS: REG ER FAX #: 
[phone] RAD NO: Page 1 Signed Report- US PREG AFTER  [id number] 
00:00:00************************************************************[facility] 1995 Sex: 
F************************************************************ Patient Name: 
ABEBA REYES Unit No: [id number] EXAMS: CPT CODE: [id number] US PREG AFTER 
 TRI 52066 PROCEDURE INFORMATION: Exam: US Pregnancy After First Trimester, 
Transabdominal Exam date and time: 2022 10:47 AM Age: 26 years old Clinical
indication: Screening exam; Routine US, pregnant uterus; Additional info: 
Anatomy LABS AND CLINICAL REPORTS: Gestational age (Established): 20 weeks, 2
days Estimated due date (Established): 2022  TECHNIQUE: Imaging protocol: 
Real-time transabdominal obstetrical ultrasound of the maternal pelvis and a 
second or third trimester pregnancy with image documentation. COMPARISON: OT US 
PREG AFTER 1ST TRI 2021 10:31 PM FINDINGS: Gestation: Singlelive 
intrauterine gestation. Fetal heart rate: Fetal heart rate of 146 bpm. Fetal 
presentation: Breech presentation. Placenta: Anterior grade 1 placenta without 
placenta previa. Amniotic fluid: Amniotic fluid is normal for gestational age. 
FETAL ANATOMY: Fetal midline falx: Normal Fetal cerebellum: Normal Fetal lateral
ventricles: Normal Fetal cisterna magna: Normal Fetal choroid plexus: Normal 
Fetalupper lip and nose: Normal Fetal heart four-chamber view, heart size and 
position: Normal situs, four chamber view, RVOT/LVOT Fetal kidneys: Normal Fetal
stomach: Normal Fetal urinary bladder: Normal Fetal spine: Normal Umbilical cord
insertion site into the fetal abdomen: Normal Umbilical cord vesselnumber: 3 
vessel cord Fetal arms and hands: Visualized portions are normal Fetal legs and 
feet: Visualized portions are normal Fetal external genitalia: No visualized 
abnormalities BIOMETRY: Estimated due date (AUA): 2022 Gestational age 
(AUA): 21 weeks, 3 days Estimated fetal weight: 426 g (15 oz) Estimated fetal 
weight percentile: Not calculated at this gestational age Biparietal diameter 
(BPD): 5.3 cm (22 weeks, 1 day) Head circumference (HC): 19.2 cm (21 weeks, 3 
days) [facility] NAME: ABEBA REYES Radiology Department 
PHYS: Fabiola Echols MD [address]DOB: 1995 AGE: 26  SEX: F 
[place], [place] [address] ACCT NO: [id number] LOC: TASNEEMRAD PHONE #: [phone] EXAM
DATE: 2022 STATUS: REG CLI FAX #: [phone]  RAD NO: Page 1 Signed 
Report (CONTINUED) Patient Name: ABEBA REYES Unit No: [id number] EXAMS:  
CPT CODE: [id number] US PREG AFTER 1ST TRI 09452 (Continued) Abdominal 
circumference (AC): 16.9 cm (21 weeks, 6 days) Humerus length (HL): 3.2 cm (20 
weeks, 5 days) Femur length (FL): 3.5 cm (20 weeks, 6 days) Cephalic Index (CI):
79.6 FL/AC: 20.6 FL/BPD: 65 HC/AC: 1.14 MATERNAL: Uterus: No significant 
abnormality. Cervix: The cervix measures 3.1 cm. Right ovary/adnexa: The right 
ovary was not visualized. No adnexal mass. Left ovary/adnexa: The left ovary was
not visualized. No adnexal mass. Intraperitoneal space: No intraperitoneal free 
fluid. IMPRESSION: 1. Single intrauterine pregnancy with estimated gestational 
age of 21 weeks, 3days by today's exam. 2. Echogenic intracardiac focus in the 
left ventricle. 3. Otherwise, no visualized fetal anomalies. ** Electronically 
Signed by Sergio Devlin MD on 2022 at 1156 ** Reported and signed by:
Sergio Devlin MD CC:  Technologist: Marleni Jung RDMS Probe: 
TrnscrbdD/T: 2022 (1156) GCD.CPS Orig Print D/T: S: 2022 (1157) [facility] NAME: REYESERNESTO RAMIREZRIA Radiology Department PHYS: 
[facility] Wilma Fabiola Camarena MD [address] : 1995 AGE: 26 SEX: F [place] [address] ACCT NO: [id number] LOC: TASNEEMRAD PHONE #: [phone] EXAM DATE: 
2022 STATUS: REG CLI FAX #: [phone] RAD NO: Page 2 Signed Report 
Patient Name: ABEBA REYES  Unit No: [id number] EXAMS: CPT CODE: [id number] 
US PREG AFTER  TRI 51004 (Continued)[facility]: [facility]APHAEL[facility]arlyFabi[facility] Cortés MD [address] : 1995 AGE: 26 SEX: F [place] [address] ACCT NO: [id number]
LOC: CharlyRAD PHONE #: [phone] EXAM DATE: 2022 STATUS: REG CLI FAX #: 
[phone] RAD NO: Page 3 Signed KxloslULIF-YsC-4 (COVID-19) RNA [Presence] in
 Respiratory specimen by SUREKHA with probe [id number] 19:12:49





             Test Item    Value        Reference Range Interpretation Comments

 

             SARS-CoV-2 (COVID-19) RNA Not detected Not-Detected              



             [Presence] in Respiratory                                        



             specimen by SUREKHA with probe                                        



             detection (test code = 29988-3)                                    

    

 

             Whether patient is employed in a                                   

     



             healthcare setting (test code =                                    

    



             20486-8)                                            

 

             Whether the patient has symptoms                                   

     



             related to condition of interest                                   

     



             (test code = 36070-0)                                        

 

             Patient was hospitalized because                                   

     



             of this condition (test code =                                     

   



             29612-8)                                            

 

             Whether the patient was admitted                                   

     



             to intensive care unit (ICU) for                                   

     



             condition of interest (test code                                   

     



             = 66259-6)                                          

 

             Whether patient resides in a                                       

 



             congregate care setting (test                                      

  



             code = 39922-1)                                        



[facility] SSYLDXWM-[id number]-9 (COVID-19) RNA [Presence] in Respiratory 
specimen by SUREKHA with probe [id number] 00:41:15





             Test Item    Value        Reference Range Interpretation Comments

 

             SARS-CoV-2 (COVID-19) RNA Not detected Not-Detected              



             [Presence] in Respiratory                                        



             specimen by SUREKHA with probe                                        



             detection (test code = 11894-4)                                    

    

 

             Whether patient is employed in a                                   

     



             healthcare setting (test code =                                    

    



             37048-5)                                            

 

             Whether the patient has symptoms                                   

     



             related to condition of interest                                   

     



             (test code = 20291-2)                                        

 

             Patient was hospitalized because                                   

     



             of this condition (test code =                                     

   



             07740-6)                                            

 

             Whether the patient was admitted                                   

     



             to intensive care unit (ICU) for                                   

     



             condition of interest (test code                                   

     



             = 71828-5)                                          

 

             Whether patient resides in a                                       

 



             congregate care setting (test                                      

  



             code = 09250-7)                                        



[facility] WESTAG HEPATITIS B [id number] 15:13:00





             Test Item    Value        Reference Range Interpretation Comments

 

             AG HEPATITIS B SURFACE (test code NONREACTIVE  NONREACTIVE         

      



             = HBSAG)                                            



IS CONSENT FORM SIGNED FOR HIV TESTING? NAB HEPATITIS C [id number] 
15:13:00





             Test Item    Value        Reference Range Interpretation Comments

 

             AB HEPATITIS C (test code = NONREACTIVE  NONREACTIVE               



             HCVAB)                                              

 

             SIGNAL TO CUTOFF (test code = <0.02        <0.80        N          

  



             CUTOFF)                                             



IS CONSENT FORM SIGNED FOR HIV TESTING? NAB [id number] 15:13:00





             Test Item    Value        Reference Range Interpretation Comments

 

             AB TREPONEMA (test code = TREPAB) NONREACTIVE  NONREACTIVE         

      



IS CONSENT FORM SIGNED FOR HIV TESTING? NAB HIV 1  15:13:00





             Test Item    Value        Reference Range Interpretation Comments

 

             AB HIV 1 2 (test NONREACTIVE  NONREACTIVE               [facility]



             code = KAG48OF)                                        4th Gen HIV 

Ag/Ab Combo



                                                                 Screen



IS CONSENT FORM SIGNED FOR HIV TESTING? NCBC W/AUTO [id number] 14:07:00





             Test Item    Value        Reference Range Interpretation Comments

 

             WHITE BLOOD CELL (test code = WBC) 9.6 K/mm3    6.5-12.3     N     

       

 

             RED BLOOD CELL (test code = RBC) 4.25 M/mm3   3.51-4.69    N       

     

 

             HEMOGLOBIN (test code = HGB) 11.5 g/dL    10.1-13.8    N           

 

 

             HEMATOCRIT (test code = HCT) 35.5 %       32.5-41.8    N           

 

 

             MEAN CELL VOLUME (test code = MCV) 83.5 fL      84.6-96.6    L     

       

 

             MEAN CELL HGB (test code = MCH) 27.1 pg      27.3-33.9    L        

    

 

             MEAN CELL HGB CONCETRATION (test 32.4 gm/dL   32.0-34.2    N       

     



             code = MCHC)                                        

 

             RED CELL DISTRIBUTION WIDTH (test 13.7 %       12.2-16.3    N      

      



             code = RDW)                                         

 

             PLATELET COUNT (test code = PLT) 167 K/mm3    134-363      N       

     

 

             MEAN PLATELET VOLUME (test code = 10.9 fL      9.2-12.7     N      

      



             MPV)                                                

 

             NEUTROPHIL % (test code = NT%) 76.4 %       57.9-77.3    N         

   

 

             LYMPHOCYTE % (test code = LY%) 16.8 %       14.5-29.7    N         

   

 

             MONOCYTE % (test code = MO%) 5.5 %        3.6-10.2     N           

 

 

             EOSINOPHIL % (test code = EO%) 0.2 %        0.0-3.0      N         

   

 

             BASOPHIL % (test code = BA%) 0.2 %        0.1-0.9      N           

 

 

             NEUTROPHIL # (test code = NT#) 7.4 K/mm3                           

   

 

             LYMPHOCYTE # (test code = LY#) 1.6 K/mm3                           

   

 

             MONOCYTE # (test code = MO#) 0.5 K/mm3                             

 

 

             EOSINOPHIL # (test code = EO#) 0.02 K/mm3                          

   

 

             BASOPHIL # (test code = BA#) 0.0 K/mm3                             

 

 

             PLATELET MORPHOLOGY REQUIRED (test NORMAL       NORMAL             

       



             code = PLTMR)                                        



COVID 19 Asymptomatic IH [id number] 13:40:00





             Test Item    Value        Reference Range Interpretation Comments

 

             COVID 19     NEGATIVE     NEGATIVE                  This test has b

een



             Asymptomatic [facility] AG                                        authorize

d only for the



             (test code =                                        detection ofpro

teins from



             COVNONPUIAG)                                        SARS-CoV-2, not

 for any



                                                                 other viruses



                                                                 orpathogens. Ne

gative



                                                                 results should 

be treated



                                                                 as presumptive



                                                                 andconfirmed wi

th a



                                                                 molecular assay

, if



                                                                 necessary for



                                                                 patientmanageme

nt.



                                                                 Negative result

s do not



                                                                 rule out COVID-

19



                                                                 andshould not b

e used as



                                                                 the sole basis 

for



                                                                 treatment orpat

ient



                                                                 management deci

sions,



                                                                 including infec

tion



                                                                 controldecision

s.



                                                                 Negative result

s should



                                                                 be considered i

n



                                                                 thecontext of a

 patient's



                                                                 recent exposure

s, history



                                                                 and thepresence

 of



                                                                 clinical signs 

and



                                                                 symptoms consis

tent



                                                                 withCOVID-19. T

his test



                                                                 has not been FD

A cleared



                                                                 or approved; th

e test



                                                                 hasbeen authorjeana jones by FDA



                                                                 under an Emerge

ncy Use



                                                                 Authorization(E

UA) for



                                                                 use by laborato

afsaneh



                                                                 certified under

 the CLIA



                                                                 thatmeet the re

quirements



                                                                 to perform mode

rate, high



                                                                 or waivedcomple

xity



                                                                 tests. This barak

t is



                                                                 authorized for 

use at



                                                                 thePoint of Car

e (POC),



                                                                 i.e., in patien

t care



                                                                 settingsoperati

ng under a



                                                                 CLIA Certificat

e of



                                                                 Waiver, Certifi

nereyda



                                                                 ofCompliance, o

r



                                                                 Certificate of



                                                                 Accreditation. 

This test



                                                                 is only authori

karen for



                                                                 the duration of



                                                                 thedeclaration 

that



                                                                 circumstances e

xist



                                                                 justifying



                                                                 theauthorizatio

n of



                                                                 emergency use o

f in vitro



                                                                 diagnostic test

sfor



                                                                 detection and/o

r



                                                                 diagnosis of CO

VID-19



                                                                 under Bzdczjt14

4(b)(1) of



                                                                 the Act, 21 U.S

.C.



                                                                 360bbb-3(b)(1),

 unless



                                                                 theauthorizatio

n is



                                                                 terminated or r

evoked



                                                                 sooner.



Comments to Phlebotomist: RAPID COVID TESTURINALYSIS [id number] 01:47:00





             Test Item    Value        Reference Range Interpretation Comments

 

             UA COLOR (test code = YELLOW       YELLOW                    



             COLU)                                               

 

             UA APPEARANCE (test code CLOUDY       CLEAR        A            



             = APPU)                                             

 

             UA GLUCOSE DIPSTICK (test NEGATIVE     NEG                       



             code = DGLUU)                                        

 

             UA BILIRUBIN DIPSTICK NEGATIVE     NEG                       



             (test code = BILU)                                        

 

             UA KETONE DIPSTICK (test NEGATIVE     NEG                       



             code = KETU)                                        

 

             UA SPECIFIC GRAVITY (test 1.020        1.001-1.035  N            



             code = SGU)                                         

 

             UA BLOOD DIPSTICK (test 2+           NEG          A            



             code = [place]STON)                                         

 

             UA PH DIPSTICK (test code 6.0          5-9                       



             = CHARLENE)                                              

 

             UA PROTEIN DIPSTICK (test NEGATIVE     NEG                       



             code = PROU)                                        

 

             UA UROBILINIOGEN DIPSTICK NEGATIVE mg/dL NEG                       



             (test code = URO)                                        

 

             UA NITRITE DIPSTICK (test NEG          NEG                       



             code = MIKE)                                        

 

             UA LEUKOCYTE ESTERASE NEG          NEG                       



             DIPSTICK (test code =                                        



             LEUU)                                               

 

             UA WBC (test code = WBCU) 3-5 #/hpf    NONE SEEN    A            

 

             UA RBC (test code = RBCU) TOO NUMEROUS TO CNT NONE SEEN    A       

     



                          #/hpf                                  

 

             UA EPITHELIAL CELLS (test FEW #/HPF    RARE-FEW                  



             code = EPIU)                                        

 

             UA BACTERIA (test code = RARE /HPF    RARE-FEW                  



             BACU)                                               

 

             UA MUCUS (test code = 1+           NONE SEEN                 



             MUCU)                                               



URINE SAMPLE: CLEAN CATCH- US PREG UT [id number] 23:57:00
************************************************************[facility] 1995 Sex: 
F************************************************************ Patient Name: 
ABEBA REYES Unit No: [id number] EXAMS: CPT CODE: [id number] US PREG UT 
TRANSVAGINAL 17606 Limited obstetrical ultrasound with transvaginal imaging the 
cervix dated 2021. HISTORY:33 week IUP. Pelvic pressure. Loss of fluid. A 
limited transabdominal obstetrical ultrasound was performed and reveals the 
presence of a single intrauterine pregnancy in cephalic position. Fetal cardiac 
activity is documented with a heart rate of 152 bpm. The placenta is anteriorly 
positioned and demonstrates grade 2 echotexture. There is no evidence of 
placenta previa. Amniotic fluid volume appears within normal limits with a 
measured MANUEL of 17.1 cm. The cervix is closed with a measured cervical length of
 3.1 cm using transvaginal imaging. Fetal measurements: The BPD measures 8.52 cm
 compatible with 34 weeks 2 days. The HC measures 30.64 cm compatible with 34 
weeks 1 day. The AC measures 28.02 cmcompatible with 32 weeks 0 days. The HL 
measures 5.87 cm compatible with 32 weeks 0 days. The FL measures 6.73 cm 
compatible with 34 weeks 4 days. The estimated fetal weight is 21 52 g (4 
pounds, 12 ounces). The FL/AC ratio of 24.04 is outside of the normal range 
(20.0-24.0). The FL/HC ratio of 21.98 is outside of the normal range (19.47-
21.76). Fetal anatomy: A fetal anatomic survey was not performed. IMPRESSION: 1.
 Single living intrauterine pregnancy with an estimated ultrasound gestational 
age of 33 weeks 6 days and an estimated date of delivery of 3/9/2021 SL: 131 ** 
Electronically Signed by Jose Fraser MD on 2021 at 2357 ** Reported
 and signed by: Jose Fraser MD CC:  Technologist: IVORY DURAN RDMS, 
RVT Probe: [id number] Trnscrbd D/T: 2021 (4169) t.MARE Orig Print D/T: 
S: 2021 (0000) [facility] NAME: [facility] 
Radiology Department [facility] [address] : 
1995 AGE: 25 SEX: [place] [address] ACCT NO: [id number] LOC: ALEXSANDER.DARI
 PHONE #: [phone] EXAM DATE: 2021 STATUS: REG ER FAX #: [phone] 
RAD NO: Page 1 Signed Report Patient Name: ABEBA REYES Unit No: [id number]
 EXAMS: CPT CODE: [id number] US PREG UT TRANSVAGINAL 47220 (Continued) [facility] NAME: [facility]  Radiology Department PHYS: 
Fabiola Echols MD [address] : 1995 AGE: 25 SEX: F [place] [address] ACCT NO: [id number] LOC: TASNEEMDARI PHONE #: [phone] EXAM DATE: 
2021 STATUS: REG ER FAX #: [phone] RAD NO: Page 2 Signed Report- US 
PREG AFTER  23:57:00
************************************************************[facility] : 1995 Sex: 
F************************************************************ Patient Name: 
ABEBA REYES Unit No: [id number] EXAMS: CPT CODE: [id number] US PREG AFTER 
 [id number] Limited obstetrical ultrasound with transvaginal imaging the cervix
 dated 2021. HISTORY: 33 week IUP. Pelvic pressure. Loss of fluid. A 
limited transabdominal obstetrical ultrasound was performed and reveals the 
presence of a single intrauterine pregnancy in cephalic position. Fetal cardiac
activity is documented with a heart rate of 152 bpm. The placenta is anteriorly 
positioned and demonstrates grade 2 echotexture. There is no evidence of 
placenta previa. Amniotic fluid volume appears within normal limits with a 
measured MANUEL of 17.1 cm. The cervix is closed with a measured cervical length of
 3.1 cm using transvaginal imaging. Fetal measurements: The BPD measures 8.52 cm
 compatible with 34 weeks 2 days. The HC measures 30.64 cm compatible with 34 
weeks 1 day. The AC measures 28.02 cm compatible with 32 weeks 0 days. The HL 
measures 5.87 cm compatible with 32 weeks 0 days. The FL measures 6.73 cm 
compatible with 34 weeks 4 days. The estimated fetal weight is 21 52 g (4 
pounds, 12 ounces). The FL/AC ratio of 24.04 is outside of the normal range 
(20.0-24.0). The FL/HC ratio of 21.98 is outside of the normal range (19.47-
21.76). Fetal anatomy: A fetal anatomic survey was not performed. IMPRESSION: 1.
 Single living intrauterine pregnancy with an estimated ultrasound gestational 
age of33 weeks 6 days and an estimated date of delivery of 3/9/2021 SL: 131 ** 
Electronically Signed by Jose Fraser MD on 2021 at 2357 ** Reported
 and signed by: Jose Fraser MD CC: Jacob Ac MD  
Technologist: IVORY DURAN RDMS, RVT Probe: Trnscrbd D/T: 2021 (5517) 
t.SDR.DMM Orig Print D/T: S: 2021 (0000) [facility] 
NAME: ERNESTO REYESRIA Radiology Department PHYS: [facility]. - Jacob Sanders
 [address]  : 1995 AGE: 25 SEX: F [place] [address] ACCT NO: 
[id number] LOC: TASNEEMDARI PHONE #: [phone] EXAM DATE: 2021 STATUS: REG
 ER FAX #: [phone] RAD NO: Page 1 Signed Report Patient Name: 
ABEBA REYES Unit No: [id number] EXAMS: CPT CODE: [id number] US PREG AFTER 
1ST TRI 62856 (Continued)  [facility] Department PHYS: [facility].  Jacob Sanders [address] : 
1995 AGE: 25 SEX: F [place] [address] ACCT NO: [id number] LOC: TASNEEMDARI
 PHONE #: [phone]  EXAM DATE: 2021 STATUS: REG ER FAX #: [phone]
 RAD NO: Page 2 Signed ReportURINALYSIS [id number] 21:03:00





             Test Item    Value        Reference Range Interpretation Comments

 

             UA COLOR (test code = COLU) YELLOW       YELLOW                    

 

             UA APPEARANCE (test code = CLEAR        CLEAR                     



             APPU)                                               

 

             UA GLUCOSE DIPSTICK (test code NEGATIVE     NEG                    

   



             = DGLUU)                                            

 

             UA BILIRUBIN DIPSTICK (test NEGATIVE     NEG                       



             code = BILU)                                        

 

             UA KETONE DIPSTICK (test code TRACE        NEG          A          

  



             = KETU)                                             

 

             UA SPECIFIC GRAVITY (test code 1.014        1.001-1.035  N         

   



             = SGU)                                              

 

             UA BLOOD DIPSTICK (test code = 1+           NEG          A         

   



             [place])                                                

 

             UA PH DIPSTICK (test code = 7.0          5-9                       



             CHARLENE)                                                

 

             UA PROTEIN DIPSTICK (test code NEGATIVE     NEG                    

   



             = PROU)                                             

 

             UA UROBILINIOGEN DIPSTICK NEGATIVE mg/dL NEG                       



             (test code = URO)                                        

 

             UA NITRITE DIPSTICK (test code NEG          NEG                    

   



             = MIKE)                                             

 

             UA LEUKOCYTE ESTERASE DIPSTICK TRACE        NEG          A         

   



             (test code = LEUU)                                        

 

             UA WBC (test code = WBCU) 6-10 #/hpf   NONE SEEN    A            

 

             UA RBC (test code = RBCU) 3-5 #/hpf    NONE SEEN    A            

 

             UA EPITHELIAL CELLS (test code FEW #/HPF    RARE-FEW               

   



             = EPIU)                                             

 

             UA BACTERIA (test code = BACU) RARE /HPF    RARE-FEW               

   

 

             UA MUCUS (test code = MUCU) RARE         NONE SEEN                 



URINE SAMPLE: CLEAN CATCHCBC W/AUTO [id number] 20:56:00





             Test Item    Value        Reference Range Interpretation Comments

 

             WHITE BLOOD CELL (test code = WBC) 9.4 K/mm3    6.6-12.1     N     

       

 

             RED BLOOD CELL (test code = RBC) 3.95 M/mm3   3.45-5.01    N       

     

 

             HEMOGLOBIN (test code = HGB) 11.2 g/dL    10.7-13.9    N           

 

 

             HEMATOCRIT (test code = HCT) 34.6 %       32.1-42.1    N           

 

 

             MEAN CELL VOLUME (test code = MCV) 88 fL        84.1-94.8    N     

       

 

             MEAN CELL HGB (test code = MCH) 28.4 pg      27-35        N        

    

 

             MEAN CELL HGB CONCETRATION (test 32.4 gm/dL   32.2-34.1    N       

     



             code = MCHC)                                        

 

             RED CELL DISTRIBUTION WIDTH (test 13.5 %       12.4-16.5    N      

      



             code = RDW)                                         

 

             PLATELET COUNT (test code = PLT) 176 K/mm3    133-385      N       

     

 

             MEAN PLATELET VOLUME (test code = 10.4 fl      9.1-12.7     N      

      



             MPV)                                                

 

             NEUTROPHIL % (test code = NT%) 71.2 %       56.5-79.4    N         

   

 

             LYMPHOCYTE % (test code = LY%) 20.3 %       14.3-34.3    N         

   

 

             MONOCYTE % (test code = MO%) 7.0 %        5.1-10.4     N           

 

 

             EOSINOPHIL % (test code = EO%) 0.4 %        0.1-3.0      N         

   

 

             BASOPHIL % (test code = BA%) 0.2 %        0.1-1.0      N           

 

 

             NEUTROPHIL # (test code = NT#) 6.7 K/mm3                           

   

 

             LYMPHOCYTE # (test code = LY#) 1.9 K/mm3                           

   

 

             MONOCYTE # (test code = MO#) 0.7 K/mm3                             

 

 

             EOSINOPHIL # (test code = EO#) 0.04 K/mm3                          

   

 

             BASOPHIL # (test code = BA#) 0.0 K/mm3                             

 

 

             RBC MORPHOLOGY REQUIRED (test code NORMAL       NORMAL             

       



             = RBCM)                                             

 

             PLATELET MORPHOLOGY REQUIRED (test NORMAL       NORMAL             

       



             code = PLTMR)                                        



-  FET BIO PH MD W/O [id number] 17:14:00
********************************[facility]****[facility] 1995 Sex: 
F************************************************************ Patient Name: 
ABEBA REYES Unit No: [id number] EXAMS:  CPT CODE: [id number]  FET BIO PH 
MD W/O NST 83199 PROCEDURE: FETAL BIOPHYSICAL PROFILE: INDICATION: 28 weeks 4 
days pregnant with decreased fetal movements. COMPARISON: None FINDINGS: Limited
 scanning of the gravid uterus was performed as part of a fetal biophysical 
profile. Presentation: Cephalic Fetal heart rate: 147 bpm Placenta location:
Anterior, grade 2 Cervical length: 3.3 cm MANUEL: 17.8 cm Fetal breathing 
movements: 2 Gross body movements: 2 Fetal tone: 2 Amniotic fluid volume: 2  
IMPRESSION: Biophysical Profile score of 8 out of 8. SL: SG-H ** Electronically 
Signed by David Cavazos MD on 2020 at 0895 ** Reported and signed by: 
David Cavazos MD CC:  Technologist: Chidi Salas RDMS Probe: Trnscrbd D/T: 
2020 (6429) [email or web address].SG9 Orig Print D/T: S: 2020 (1750)  [facility]YS: SHRAVANCharlyJimenez Wilma 
Fabiola Camarena MD [address] : 1995 AGE: 25 SEX: F [place] [address] ACCT NO: [id number] LOC: TASNEEMDARI PHONE #: [phone] EXAM DATE: 
2020 STATUS: REG ER FAX #: [phone] RAD NO: Page 1 Signed Report 
Patient Name: ABEBA REYES Unit No:[id number] EXAMS:  CPT CODE: [id number] 
US FET BIO PH MD W/O NST 55672 (Continued)  [facility] NAME: 
REYESERNESTO RAMIREZRIA Radiology Department PHYS: SHRAVANCharlyJimenez Wilma Fabiola Camarena MD [address] : 1995 AGE: 25 SEX: F [place], [place] [address] ACCT NO: [id number]
 LOC: F.DARI PHONE #: [phone] EXAM DATE: 2020 STATUS: REG ER FAX #: 
[phone] RAD NO: Page 2 Signed OmgjxeCPRO-EpT-1 (COVID-19) RNA [Presence] in
 Respiratory specimen by SUREKHA with probe [id number] 19:43:08





             Test Item    Value        Reference Range Interpretation Comments

 

             SARS-CoV-2 (COVID-19) RNA Not detected Not-Detected              



             [Presence] in Respiratory                                        



             specimen by SUREKHA with probe                                        



             detection (test code = 43345-1)                                    

    



[facility] PREG AFTER 1ST TRI2020-10-26 11:43:00
************************************************************[facility] 1995 Sex: 
F************************************************************ Patient Name: 
ABEBA REYES Unit No: [id number] EXAMS: CPT CODE: [id number] US PREG AFTER 
1ST TRI [address] [facility] [address] [place], [place] [address] 
OBSTETRICAL ULTRASOUND REPORT -------
--------------------------------------------------------------- Pat. Name: 
ABEBA REYES Pat. No: [id number] Study Date: 10/26/2020 10:42am , Age: 
1995, 24 Pregnancies:  1, Para 0000 LMP: 2020 GA by LMP: 
20w2d GA by US: 20w3d GA Selected: 20w2d (LMP) TIAGO: 2021 Referring MD: 
Fabiola Camarena M.D. Sonographer: Kirsten Moreira RDMS CPT4: [id number] 
Admitting MD: LEYDA RAMIREZ Hist/Ind: SCAN#1 ANATOMY 
---------------------------------------------------------------------- 
MEASUREMENTS FETAL AGE FETAL GROWTH EVALUATION Measurement GA Range Srce %for GA
 Ratios ---------------- ------------- ---- ------- ------------------------- 
BPD 4.8 cm 20w4d (53i6o-31n4b) Hadl BPD 56% FL/BPD 0.73 HC 18.0 cm 20w3d (18w5d-
22w0d) Hadl HC 52% FL/AC 0.23 APD 4.7 cm APD HC/AC 1.20 (1.06 - 1.24) TAD 4.9 cm
 TAD CI 0.78 (0.70 - 0.86) AC 15.1 cm 20w0d (20c0m-72q6t) Hadl AC 44% FL 3.5 cm 
20w5d (42d5z-12c0i) Hadl FL 60% HL 3.4 cm 21w4d (50b4e-42l2v) Darren HL 72% GA for
 sonogram 20w3d (56h9j-01d2c) Fetal Weight Estimate: based on (BPD,HC,AC,FL) 
Hadlock Weight: 369 gm (315-423) Hadlock : 0lbs,13oz Cervical Length: 4.0 cm 
Fetal Heart Rate: 153 bpm ----------------------------------------------
------------------------ CLINICAL SUMMARY Type of Gestation: Robert 
Intrauterine pregnancy in vertex presentation. Fetal size is appropriate for 
gestational age. Fetal growth: Consistent with normalfetal growth Fetal motion 
and organs seen: Fetal heart motion seen Fetal body and limb movements seen Four
 chamber fetal heart observed Left ventricular outflow tract (LVOT) seen Right 
ventricular outflow tract (RVOT) seen Regular cardiac rhythm observed Normal 
intracranial anatomy seen Fetal face andnasal bone seen Umbilical cord insertion
 in fetus seen Fetal stomach, Renal Fossa, Bladder and Spineseen [facility] NAME: REYES,ABEBA Radiology Department PHYS: Leyda Jeong [address] : 1995 AGE: 24 SEX: F [place], [place] 
[address] ACCT NO: [id number] LOC: [facility] PHONE #: [phone] EXAM DATE: 
10/26/2020 STATUS: REG CLI FAX #: [phone] RAD NO: Page 1 Signed Report 
(CONTINUED) Patient Name: ABEBA REYES Unit No: [id number] EXAMS:  CPT 
CODE: [id number] US PREG AFTER 1ST TRI 99048 (Continued) Three vessel umbilical 
cord noted All four extremitiesobserved Fetal tone noted Fetal abnormalities 
observed: None seen at this exam Placental location: Anterior Placental maturity
 : Grade 1 There is no evidence of placenta previa. Amniotic fluid volume is 
normal. Uterus and adnexa: No significant abnormality is seen. Thank you for 
allowing us to participate in the care of this patient. Brando Finley M.D 
------------------ Electronic Signature 10/26/2020 11:43am ** Electronically 
Signed by Brando Finley MD on 10/26/2020 at 1143 ** Reported and signed by: 
Brando Finley MD CC: Leyda Ramirez  Technologist: Kirsten Moreira RDMS Probe: 
Trnscrbd D/T: 10/26/2020 (1143) [email or web address] Orig Print D/T: S: 10/26/2020 (1149) 
[facility] NAME: [facility] Radiology Department PHYS:
 Leyda Jeong [address] : 1995 AGE: 24 SEX: F [place] [address] ACCT NO: [id number] LOC: [email or web address].RAD PHONE #: [phone] EXAM DATE: 
10/26/2020 STATUS: REG CLI FAX #: [phone]  RAD NO: Page 2 Signed Report 
Patient Name: ABEBA REYES Unit No: [id number] EXAMS:  CPT CODE: [id number] 
US PREG AFTER 1ST TRI 89935 (Continued)  [facility] NAME: 
[facility] Radiology Department PHYS: Leyda Jeong [address]  : 1995 AGE: 24 SEX: F [place] [address] ACCT NO: 
[id number] LOC: [facility].RAD PHONE#: [phone] EXAM DATE: 10/26/2020 STATUS: REG 
CLI FAX #: [phone] RAD NO: Page 3 Signed KsodhoUIFY-SsG-7 (COVID-19) RNA 
[Presence] in Respiratory specimen by SUREKHA with probe [id number] 
07:54:32





             Test Item    Value        Reference Range Interpretation Comments

 

             SARS-CoV-2 (COVID-19) RNA Not detected Not-Detected              



             [Presence] in Respiratory                                        



             specimen by SUREKHA with probe                                        



             detection (test code = 09047-5)                                    

    



FISCHER Temple WESTCHEMISTRY 7 PROFILE2020-10-09 06:47:00





             Test Item    Value        Reference Range Interpretation Comments

 

             SODIUM (test code = NA) 137 mEq/L    135-145      N            

 

             POTASSIUM (test code = K) 3.4 mEq/L    3.5-5.0      L            

 

             CHLORIDE (test code = CL) 103 mEq/L    100-115      N            

 

             CARBON DIOXIDE (test code = CO2) 25 mEq/L     22-31        N       

     

 

             ANION GAP (test code = GAP) 12.20        10-20        N            

 

             GLUCOSE (test code = GLU) 85 mg/dL            N            

 

             BLOOD UREA NITROGEN (test code = 3 mg/dL      7-18         L       

     



             BUN)                                                

 

             GLOMERULAR FILTRATION RATE (test 153 ml/min   >60          N       

     



             code = GFR)                                         

 

             CREATININE (test code = CREAT) 0.5 mg/dL    0.5-1.0      N         

   

 

             CALCIUM (test code = CA) 8.5 mg/dL    8.4-10.2     N            



CBC W/AUTO DIFF2020-10-09 06:02:00





             Test Item    Value        Reference Range Interpretation Comments

 

             WHITE BLOOD CELL (test code = WBC) 6.1 K/mm3    6.6-12.1     L     

       

 

             RED BLOOD CELL (test code = RBC) 3.53 M/mm3   3.45-5.01    N       

     

 

             HEMOGLOBIN (test code = HGB) 10.2 g/dL    10.7-13.9    L           

 

 

             HEMATOCRIT (test code = HCT) 31.5 %       32.1-42.1    L           

 

 

             MEAN CELL VOLUME (test code = MCV) 89 fL        84.1-94.8    N     

       

 

             MEAN CELL HGB (test code = MCH) 28.9 pg      27-35        N        

    

 

             MEAN CELL HGB CONCETRATION (test 32.4 gm/dL   32.2-34.1    N       

     



             code = MCHC)                                        

 

             RED CELL DISTRIBUTION WIDTH (test 14.1 %       12.4-16.5    N      

      



             code = RDW)                                         

 

             PLATELET COUNT (test code = PLT) 154 K/mm3    133-385      N       

     

 

             MEAN PLATELET VOLUME (test code = 10.1 fl      9.1-12.7     N      

      



             MPV)                                                

 

             NEUTROPHIL % (test code = NT%) 66.5 %       56.5-79.4    N         

   

 

             LYMPHOCYTE % (test code = LY%) 20.5 %       14.3-34.3    N         

   

 

             MONOCYTE % (test code = MO%) 11.0 %       5.1-10.4     H           

 

 

             EOSINOPHIL % (test code = EO%) 0.8 %        0.1-3.0      N         

   

 

             BASOPHIL % (test code = BA%) 0.2 %        0.1-1.0      N           

 

 

             NEUTROPHIL # (test code = NT#) 4.1 K/mm3                           

   

 

             LYMPHOCYTE # (test code = LY#) 1.3 K/mm3                           

   

 

             MONOCYTE # (test code = MO#) 0.7 K/mm3                             

 

 

             EOSINOPHIL # (test code = EO#) 0.05 K/mm3                          

   

 

             BASOPHIL # (test code = BA#) 0.0 K/mm3                             

 

 

             RBC MORPHOLOGY REQUIRED (test code NORMAL       NORMAL             

       



             = RBCM)                                             

 

             PLATELET MORPHOLOGY REQUIRED (test NORMAL       NORMAL             

       



             code = PLTMR)                                        



CHEMISTRY 7 PROFILE2020-10-08 06:04:00





             Test Item    Value        Reference Range Interpretation Comments

 

             SODIUM (test code = NA) 137 mEq/L    135-145      N            

 

             POTASSIUM (test code = 2.9 mEq/L    3.5-5.0      LL           RESUL

TS CALLED TO



             K)                                                  [facility].READ B

ACK &



                                                                 CONFIRMED? Y.BY



                                                                 F.LAB.GF 10/08/

20



                                                                 0603.Results



                                                                 verified by rep

eat



                                                                 analysis

 

             CHLORIDE (test code = 103 mEq/L    100-115      N            



             CL)                                                 

 

             CARBON DIOXIDE (test 24 mEq/L     22-31        N            



             code = CO2)                                         

 

             ANION GAP (test code = 12.60        10-20        N            



             GAP)                                                

 

             GLUCOSE (test code = 94 mg/dL            N            



             GLU)                                                

 

             BLOOD UREA NITROGEN 4 mg/dL      7-18         L            



             (test code = BUN)                                        

 

             GLOMERULAR FILTRATION 124 ml/min   >60          N            



             RATE (test code = GFR)                                        

 

             CREATININE (test code = 0.6 mg/dL    0.5-1.0      N            



             CREAT)                                              

 

             CALCIUM (test code = 8.1 mg/dL    8.4-10.2     L            



             CA)                                                 



CBC W/AUTO DIFF2020-10-08 05:43:00





             Test Item    Value        Reference Range Interpretation Comments

 

             WHITE BLOOD CELL (test code = WBC) 7.5 K/mm3    6.6-12.1           

       

 

             RED BLOOD CELL (test code = RBC) 3.28 M/mm3   3.45-5.01    L       

     

 

             HEMOGLOBIN (test code = HGB) 9.5 g/dL     10.7-13.9    L           

 

 

             HEMATOCRIT (test code = HCT) 29.2 %       32.1-42.1    L           

 

 

             MEAN CELL VOLUME (test code = MCV) 89 fL        84.1-94.8    N     

       

 

             MEAN CELL HGB (test code = MCH) 29.0 pg      27-35        N        

    

 

             MEAN CELL HGB CONCETRATION (test 32.5 gm/dL   32.2-34.1    N       

     



             code = MCHC)                                        

 

             RED CELL DISTRIBUTION WIDTH (test 14.0 %       12.4-16.5    N      

      



             code = RDW)                                         

 

             PLATELET COUNT (test code = PLT) 140 K/mm3    133-385      N       

     

 

             MEAN PLATELET VOLUME (test code = 10.6 fl      9.1-12.7     N      

      



             MPV)                                                

 

             NEUTROPHIL % (test code = NT%) 83.5 %       56.5-79.4    H         

   

 

             LYMPHOCYTE % (test code = LY%) 7.8 %        14.3-34.3    L         

   

 

             MONOCYTE % (test code = MO%) 7.4 %        5.1-10.4     N           

 

 

             EOSINOPHIL % (test code = EO%) 0.1 %        0.1-3.0      N         

   

 

             BASOPHIL % (test code = BA%) 0.1 %        0.1-1.0      N           

 

 

             NEUTROPHIL # (test code = NT#) 6.2 K/mm3                           

   

 

             LYMPHOCYTE # (test code = LY#) 0.6 K/mm3                           

   

 

             MONOCYTE # (test code = MO#) 0.6 K/mm3                             

 

 

             EOSINOPHIL # (test code = EO#) 0.01 K/mm3                          

   

 

             BASOPHIL # (test code = BA#) 0.0 K/mm3                             

 

 

             RBC MORPHOLOGY REQUIRED (test code NORMAL       NORMAL             

       



             = RBCM)                                             

 

             PLATELET MORPHOLOGY REQUIRED (test NORMAL       NORMAL             

       



             code = PLTMR)                                        



CBC W/AUTO DIFF2020-10-07 02:30:00





             Test Item    Value        Reference Range Interpretation Comments

 

             WHITE BLOOD CELL (test code = WBC) 13.4 K/mm3   6.6-12.1     H     

       

 

             RED BLOOD CELL (test code = RBC) 4.00 M/mm3   3.45-5.01    N       

     

 

             HEMOGLOBIN (test code = HGB) 11.3 g/dL    10.7-13.9    N           

 

 

             HEMATOCRIT (test code = HCT) 35.4 %       32.1-42.1    N           

 

 

             MEAN CELL VOLUME (test code = MCV) 89 fL        84.1-94.8    N     

       

 

             MEAN CELL HGB (test code = MCH) 28.3 pg      27-35        N        

    

 

             MEAN CELL HGB CONCETRATION (test 31.9 gm/dL   32.2-34.1    L       

     



             code = MCHC)                                        

 

             RED CELL DISTRIBUTION WIDTH (test 13.7 %       12.4-16.5    N      

      



             code = RDW)                                         

 

             PLATELET COUNT (test code = PLT) 189 K/mm3    133-385      N       

     

 

             MEAN PLATELET VOLUME (test code = 10.9 fl      9.1-12.7     N      

      



             MPV)                                                

 

             NEUTROPHIL % (test code = NT%) 90.7 %       56.5-79.4    H         

   

 

             LYMPHOCYTE % (test code = LY%) 3.5 %        14.3-34.3    L         

   

 

             MONOCYTE % (test code = MO%) 5.3 %        5.1-10.4     N           

 

 

             EOSINOPHIL % (test code = EO%) 0.0 %        0.1-3.0      L         

   

 

             BASOPHIL % (test code = BA%) 0.1 %        0.1-1.0      N           

 

 

             NEUTROPHIL # (test code = NT#) 12.1 K/mm3                          

   

 

             LYMPHOCYTE # (test code = LY#) 0.5 K/mm3                           

   

 

             MONOCYTE # (test code = MO#) 0.7 K/mm3                             

 

 

             EOSINOPHIL # (test code = EO#) 0 K/mm3                             

   

 

             BASOPHIL # (test code = BA#) 0.0 K/mm3                             

 

 

             RBC MORPHOLOGY REQUIRED (test code NORMAL       NORMAL             

       



             = RBCM)                                             

 

             PLATELET MORPHOLOGY REQUIRED (test NORMAL       NORMAL             

       



             code = PLTMR)                                        



CHEMISTRY 7 PROFILE2020-10-07 02:27:00





             Test Item    Value        Reference Range Interpretation Comments

 

             SODIUM (test code = NA) 134 mEq/L    135-145      L            

 

             POTASSIUM (test code = K) 3.6 mEq/L    3.5-5.0      N            

 

             CHLORIDE (test code = CL) 100 mEq/L    100-115      N            

 

             CARBON DIOXIDE (test code 24 mEq/L     22-31        N            



             = CO2)                                              

 

             ANION GAP (test code = TEST NOT PERFORMED 10-20                    

 



             GAP)                                                

 

             GLUCOSE (test code = GLU) 116 mg/dL           H            

 

             BLOOD UREA NITROGEN (test 5 mg/dL      7-18         L            



             code = BUN)                                         

 

             GLOMERULAR FILTRATION RATE 78 ml/min    >60          N            



             (test code = GFR)                                        

 

             CREATININE (test code = 0.9 mg/dL    0.5-1.0      N            



             CREAT)                                              

 

             CALCIUM (test code = CA) 8.6 mg/dL    8.4-10.2     N            



LACTIC ACID2020-10-07 02:23:00





             Test Item    Value        Reference Range Interpretation Comments

 

             LACTIC ACID (test code = LACT) 1.6 MMOL/L   0.5-2.2      N         

   



URINALYSIS COMPLETE2020-10-06 14:46:00





             Test Item    Value        Reference Range Interpretation Comments

 

             UA COLOR (test code = COLU) YELLOW       YELLOW                    

 

             UA APPEARANCE (test code = CLEAR        CLEAR                     



             APPU)                                               

 

             UA GLUCOSE DIPSTICK (test code NEGATIVE     NEG                    

   



             = DGLUU)                                            

 

             UA BILIRUBIN DIPSTICK (test NEGATIVE     NEG                       



             code = BILU)                                        

 

             UA KETONE DIPSTICK (test code NEGATIVE     NEG                     

  



             = KETU)                                             

 

             UA SPECIFIC GRAVITY (test code 1.012        1.001-1.035  N         

   



             = SGU)                                              

 

             UA BLOOD DIPSTICK (test code = 1+           NEG          A         

   



             [place])                                                

 

             UA PH DIPSTICK (test code = 8.0          5-9                       



             CHARLENE)                                                

 

             UA PROTEIN DIPSTICK (test code NEGATIVE     NEG                    

   



             = PROU)                                             

 

             UA UROBILINIOGEN DIPSTICK NEGATIVE mg/dL NEG                       



             (test code = URO)                                        

 

             UA NITRITE DIPSTICK (test code NEG          NEG                    

   



             = MIKE)                                             

 

             UA LEUKOCYTE ESTERASE DIPSTICK NEG          NEG                    

   



             (test code = LEUU)                                        

 

             UA WBC (test code = WBCU) 11-15 #/hpf  NONE SEEN    A            

 

             UA RBC (test code = RBCU) 3-5 #/hpf    NONE SEEN    A            

 

             UA EPITHELIAL CELLS (test code RARE #/HPF   RARE-FEW               

   



             = EPIU)                                             

 

             UA MUCUS (test code = MUCU) RARE         NONE SEEN                 



COVID 19 Asymptomatic [facility]2020-10-06 14:14:00





             Test Item    Value        Reference Range Interpretation Comments

 

             COVID 19     NEGATIVE     NEGATIVE                  This test has b

een



             Asymptomatic [facility]                                        authorize

d only for the



             (test code =                                        detection ofpro

teins from



             COVNONPUIAG)                                        SARS-CoV-2, not

 for any



                                                                 other viruses



                                                                 orpathogens. Ne

gative



                                                                 results should 

be treated



                                                                 as presumptive



                                                                 andconfirmed wi

th a



                                                                 molecular assay

, if



                                                                 necessary for



                                                                 patientmanageme

nt.



                                                                 Negative result

s do not



                                                                 rule out COVID-

19



                                                                 andshould not b

e used as



                                                                 the sole basis 

for



                                                                 treatment orpat

ient



                                                                 management deci

sions,



                                                                 including infec

tion



                                                                 controldecision

s.



                                                                 Negative result

s should



                                                                 be considered i

n



                                                                 thecontext of a

 patient's



                                                                 recent exposure

s, history



                                                                 and thepresence

 of



                                                                 clinical signs 

and



                                                                 symptoms consis

tent



                                                                 withCOVID-19. T

his test



                                                                 has not been FD

A cleared



                                                                 or approved; th

e test



                                                                 hasbeen authori

karen by FDA



                                                                 under an Emerge

ncy Use



                                                                 Authorization(E

UA) for



                                                                 use by deep shelby



                                                                 certified under

 the CLIA



                                                                 thatmeet the re

quirements



                                                                 to perform mode

rate, high



                                                                 or waivedcomple

xity



                                                                 tests. This barak

t is



                                                                 authorized for 

use at



                                                                 thePoint of Car

e (POC),



                                                                 i.e., in patien

t care



                                                                 settingsoperati

ng under a



                                                                 CLIA Certificat

e of



                                                                 Waiver, Certifi

nereyda



                                                                 ofCompliance, o

r



                                                                 Certificate of



                                                                 Accreditation. 

This test



                                                                 is only authori

zed for



                                                                 the duration of



                                                                 thedeclaration 

that



                                                                 circumstances e

xist



                                                                 justifying



                                                                 theauthorizatio

n of



                                                                 emergency use o

f in vitro



                                                                 diagnostic test

sfor



                                                                 detection and/o

r



                                                                 diagnosis of CO

VID-19



                                                                 under [id number]

4(b)(1) of



                                                                 the Act, 21 U.S

.C.



                                                                 360bbb-3(b)(1),

 unless



                                                                 theauthorizatio

n is



                                                                 terminated or r

evoked



                                                                 sooner.



CBC W/AUTO DIFF2020-10-06 13:35:00





             Test Item    Value        Reference Range Interpretation Comments

 

             WHITE BLOOD CELL (test code = WBC) 14.2 K/mm3   6.6-12.1     H     

       

 

             RED BLOOD CELL (test code = RBC) 3.65 M/mm3   3.45-5.01    N       

     

 

             HEMOGLOBIN (test code = HGB) 10.7 g/dL    10.7-13.9    N           

 

 

             HEMATOCRIT (test code = HCT) 32.1 %       32.1-42.1    N           

 

 

             MEAN CELL VOLUME (test code = MCV) 88 fL        84.1-94.8    N     

       

 

             MEAN CELL HGB (test code = MCH) 29.3 pg      27-35        N        

    

 

             MEAN CELL HGB CONCETRATION (test 33.3 gm/dL   32.2-34.1    N       

     



             code = MCHC)                                        

 

             RED CELL DISTRIBUTION WIDTH (test 13.6 %       12.4-16.5    N      

      



             code = RDW)                                         

 

             PLATELET COUNT (test code = PLT) 177 K/mm3    133-385      N       

     

 

             MEAN PLATELET VOLUME (test code = 10.5 fl      9.1-12.7     N      

      



             MPV)                                                

 

             NEUTROPHIL % (test code = NT%) 85.6 %       56.5-79.4    H         

   

 

             LYMPHOCYTE % (test code = LY%) 6.7 %        14.3-34.3    L         

   

 

             MONOCYTE % (test code = MO%) 7.0 %        5.1-10.4     N           

 

 

             EOSINOPHIL % (test code = EO%) 0.0 %        0.1-3.0      L         

   

 

             BASOPHIL % (test code = BA%) 0.1 %        0.1-1.0      N           

 

 

             NEUTROPHIL # (test code = NT#) 12.2 K/mm3                          

   

 

             LYMPHOCYTE # (test code = LY#) 1.0 K/mm3                           

   

 

             MONOCYTE # (test code = MO#) 1.0 K/mm3                             

 

 

             EOSINOPHIL # (test code = EO#) 0 K/mm3                             

   

 

             BASOPHIL # (test code = BA#) 0.0 K/mm3                             

 

 

             RBC MORPHOLOGY REQUIRED (test code NORMAL       NORMAL             

       



             = RBCM)                                             

 

             PLATELET MORPHOLOGY REQUIRED (test NORMAL       NORMAL             

       



             code = PLTMR)

## 2023-09-21 NOTE — RAD REPORT
EXAM DESCRIPTION:  RAD - Abdomen 1 View ([facility]) - 9/21/2023 4:00 pm

 

CLINICAL HISTORY:  Kidney stones;Flank pain

 

COMPARISON:  Abdomen 1 View ([facility]) dated 9/12/2019; Abdomen   Pelvis Wo Contrast dated 7/27/2023

 

TECHNIQUE:   Single AP view of the abdomen.

 

FINDINGS:  Nonobstructive bowel gas pattern. No air-fluid levels, free air, or pneumatosis.

 

8 millimeter left pelvic ovoid radiodensity may represent a ureter calculus. Other large left mid to 
lower pole renal calculi, similar to prior CT.

 

No significant bony abnormality.

 

IMPRESSION:  8 millimeter left pelvic ovoid radiodensity may represent a ureter calculus. Other large
 left mid to lower pole renal calculi, similar to prior CT.

## 2024-11-07 ENCOUNTER — HOSPITAL ENCOUNTER (EMERGENCY)
Dept: HOSPITAL 97 - ER | Age: 29
Discharge: HOME | End: 2024-11-07
Payer: COMMERCIAL

## 2024-11-07 VITALS — DIASTOLIC BLOOD PRESSURE: 77 MMHG | SYSTOLIC BLOOD PRESSURE: 110 MMHG | TEMPERATURE: 98.9 F

## 2024-11-07 VITALS — OXYGEN SATURATION: 100 %

## 2024-11-07 DIAGNOSIS — N39.0: Primary | ICD-10-CM

## 2024-11-07 DIAGNOSIS — Z87.442: ICD-10-CM

## 2024-11-07 LAB
ALBUMIN SERPL BCP-MCNC: 4.1 G/DL (ref 3.4–5)
ALBUMIN/GLOB SERPL: 1.1 {RATIO} (ref 1.1–1.8)
ALP SERPL-CCNC: 65 U/L (ref 45–117)
ALT SERPL W P-5'-P-CCNC: 25 U/L (ref 13–56)
ANION GAP SERPL CALC-SCNC: 7.8 MEQ/L (ref 5–15)
AST SERPL W P-5'-P-CCNC: < 10 U/L (ref 15–37)
BUN BLD-MCNC: 17 MG/DL (ref 7–18)
GLOBULIN SER CALC-MCNC: 3.9 G/DL (ref 2.3–3.5)
GLUCOSE SERPLBLD-MCNC: 93 MG/DL (ref 74–106)
HCT VFR BLD CALC: 40.4 % (ref 36–45)
HGB BLD-MCNC: 13.6 G/DL (ref 12–15)
LYMPHOCYTES # SPEC AUTO: 2.1 K/UL (ref 0.7–4.9)
MCH RBC QN AUTO: 29.9 PG (ref 27–35)
MCHC RBC AUTO-ENTMCNC: 33.7 G/DL (ref 32–36)
MCV RBC: 88.7 FL (ref 80–100)
NRBC # BLD: 0 10*3/UL (ref 0–0)
NRBC BLD AUTO-RTO: 0.1 % (ref 0–0)
PMV BLD: 7.6 FL (ref 7.6–11.3)
POTASSIUM SERPL-SCNC: 3.8 MEQ/L (ref 3.5–5.1)
RBC # BLD: 4.55 M/UL (ref 3.86–4.86)
SQUAMOUS URNS QL MICRO: <5 /HPF
UA COMPLETE W REFLEX CULTURE PNL UR: (no result)
UA DIPSTICK W REFLEX MICRO PNL UR: (no result)
WBC # BLD AUTO: 9 THOU/UL (ref 4.3–10.9)

## 2024-11-07 PROCEDURE — 85025 COMPLETE CBC W/AUTO DIFF WBC: CPT

## 2024-11-07 PROCEDURE — 87086 URINE CULTURE/COLONY COUNT: CPT

## 2024-11-07 PROCEDURE — 81001 URINALYSIS AUTO W/SCOPE: CPT

## 2024-11-07 PROCEDURE — 96375 TX/PRO/DX INJ NEW DRUG ADDON: CPT

## 2024-11-07 PROCEDURE — 87077 CULTURE AEROBIC IDENTIFY: CPT

## 2024-11-07 PROCEDURE — 76377 3D RENDER W/INTRP POSTPROCES: CPT

## 2024-11-07 PROCEDURE — 96374 THER/PROPH/DIAG INJ IV PUSH: CPT

## 2024-11-07 PROCEDURE — 99284 EMERGENCY DEPT VISIT MOD MDM: CPT

## 2024-11-07 PROCEDURE — 87088 URINE BACTERIA CULTURE: CPT

## 2024-11-07 PROCEDURE — 81025 URINE PREGNANCY TEST: CPT

## 2024-11-07 PROCEDURE — 87186 SC STD MICRODIL/AGAR DIL: CPT

## 2024-11-07 PROCEDURE — 80053 COMPREHEN METABOLIC PANEL: CPT

## 2024-11-07 PROCEDURE — 36415 COLL VENOUS BLD VENIPUNCTURE: CPT

## 2024-11-07 PROCEDURE — 74176 CT ABD & PELVIS W/O CONTRAST: CPT

## 2024-11-21 ENCOUNTER — HOSPITAL ENCOUNTER (EMERGENCY)
Dept: HOSPITAL 97 - ER | Age: 29
Discharge: HOME | End: 2024-11-21
Payer: COMMERCIAL

## 2024-11-21 VITALS — SYSTOLIC BLOOD PRESSURE: 115 MMHG | DIASTOLIC BLOOD PRESSURE: 74 MMHG | TEMPERATURE: 98.7 F | OXYGEN SATURATION: 98 %

## 2024-11-21 DIAGNOSIS — N39.0: Primary | ICD-10-CM

## 2024-11-21 DIAGNOSIS — Z87.442: ICD-10-CM

## 2024-11-21 DIAGNOSIS — N13.30: ICD-10-CM

## 2024-11-21 DIAGNOSIS — N20.0: ICD-10-CM

## 2024-11-21 LAB
ALBUMIN SERPL BCP-MCNC: 3.9 G/DL (ref 3.4–5)
ALBUMIN/GLOB SERPL: 1.1 {RATIO} (ref 1.1–1.8)
ALP SERPL-CCNC: 60 U/L (ref 45–117)
ALT SERPL W P-5'-P-CCNC: 36 U/L (ref 13–56)
ANION GAP SERPL CALC-SCNC: 6.9 MEQ/L (ref 5–15)
AST SERPL W P-5'-P-CCNC: 15 U/L (ref 15–37)
BUN BLD-MCNC: 6 MG/DL (ref 7–18)
GLOBULIN SER CALC-MCNC: 3.7 G/DL (ref 2.3–3.5)
GLUCOSE SERPLBLD-MCNC: 92 MG/DL (ref 74–106)
HCT VFR BLD CALC: 40.5 % (ref 36–45)
HGB BLD-MCNC: 13.6 G/DL (ref 12–15)
LIPASE SERPL-CCNC: 39 U/L (ref 13–75)
LYMPHOCYTES # SPEC AUTO: 2 K/UL (ref 0.7–4.9)
MCH RBC QN AUTO: 29.8 PG (ref 27–35)
MCHC RBC AUTO-ENTMCNC: 33.7 G/DL (ref 32–36)
MCV RBC: 88.4 FL (ref 80–100)
NRBC # BLD: 0 10*3/UL (ref 0–0)
NRBC BLD AUTO-RTO: 0.1 % (ref 0–0)
PMV BLD: 7.8 FL (ref 7.6–11.3)
POTASSIUM SERPL-SCNC: 3.9 MEQ/L (ref 3.5–5.1)
RBC # BLD: 4.58 M/UL (ref 3.86–4.86)
SQUAMOUS URNS QL MICRO: <5 /HPF
UA COMPLETE W REFLEX CULTURE PNL UR: (no result)
UA DIPSTICK W REFLEX MICRO PNL UR: (no result)
WBC # BLD AUTO: 9.7 THOU/UL (ref 4.3–10.9)

## 2024-11-21 PROCEDURE — 81025 URINE PREGNANCY TEST: CPT

## 2024-11-21 PROCEDURE — 36415 COLL VENOUS BLD VENIPUNCTURE: CPT

## 2024-11-21 PROCEDURE — 96375 TX/PRO/DX INJ NEW DRUG ADDON: CPT

## 2024-11-21 PROCEDURE — 99284 EMERGENCY DEPT VISIT MOD MDM: CPT

## 2024-11-21 PROCEDURE — 87088 URINE BACTERIA CULTURE: CPT

## 2024-11-21 PROCEDURE — 87186 SC STD MICRODIL/AGAR DIL: CPT

## 2024-11-21 PROCEDURE — 80053 COMPREHEN METABOLIC PANEL: CPT

## 2024-11-21 PROCEDURE — 87086 URINE CULTURE/COLONY COUNT: CPT

## 2024-11-21 PROCEDURE — 96365 THER/PROPH/DIAG IV INF INIT: CPT

## 2024-11-21 PROCEDURE — 76377 3D RENDER W/INTRP POSTPROCES: CPT

## 2024-11-21 PROCEDURE — 74176 CT ABD & PELVIS W/O CONTRAST: CPT

## 2024-11-21 PROCEDURE — 81001 URINALYSIS AUTO W/SCOPE: CPT

## 2024-11-21 PROCEDURE — 87077 CULTURE AEROBIC IDENTIFY: CPT

## 2024-11-21 PROCEDURE — 85025 COMPLETE CBC W/AUTO DIFF WBC: CPT

## 2024-11-21 PROCEDURE — 83690 ASSAY OF LIPASE: CPT

## 2025-04-12 ENCOUNTER — HOSPITAL ENCOUNTER (OUTPATIENT)
Dept: HOSPITAL 97 - ER | Age: 30
Setting detail: OBSERVATION
LOS: 1 days | Discharge: HOME | End: 2025-04-13
Attending: STUDENT IN AN ORGANIZED HEALTH CARE EDUCATION/TRAINING PROGRAM | Admitting: STUDENT IN AN ORGANIZED HEALTH CARE EDUCATION/TRAINING PROGRAM
Payer: COMMERCIAL

## 2025-04-12 VITALS — BODY MASS INDEX: 23.8 KG/M2

## 2025-04-12 DIAGNOSIS — R11.0: ICD-10-CM

## 2025-04-12 DIAGNOSIS — K80.12: Primary | ICD-10-CM

## 2025-04-12 DIAGNOSIS — R10.11: ICD-10-CM

## 2025-04-12 DIAGNOSIS — Z87.442: ICD-10-CM

## 2025-04-12 LAB
ALBUMIN/GLOB SERPL: 1.2 {RATIO} (ref 1.1–1.8)
ALP SERPL-CCNC: 58 U/L (ref 45–117)
ALT SERPL W P-5'-P-CCNC: 29 U/L (ref 13–56)
ANION GAP SERPL CALC-SCNC: 10.2 MEQ/L (ref 5–15)
AST SERPL W P-5'-P-CCNC: < 10 U/L (ref 15–37)
BUN BLD-MCNC: 15 MG/DL (ref 7–18)
GLOBULIN SER CALC-MCNC: 3.4 G/DL (ref 2.3–3.5)
GLUCOSE SERPLBLD-MCNC: 105 MG/DL (ref 74–106)
HGB BLD-MCNC: 13.7 G/DL (ref 12–15)
LIPASE SERPL-CCNC: 33 U/L (ref 13–75)
LYMPHOCYTES # SPEC AUTO: 2.2 K/UL (ref 0.7–4.9)
MCH RBC QN AUTO: 29.6 PG (ref 27–35)
MCHC RBC AUTO-ENTMCNC: 34.2 G/DL (ref 32–36)
MCV RBC: 86.6 FL (ref 80–100)
NRBC BLD AUTO-RTO: 0.2 % (ref 0–0)
PMV BLD: 7.6 FL (ref 7.6–11.3)
POTASSIUM SERPL-SCNC: 4.2 MEQ/L (ref 3.5–5.1)
RBC # BLD: 4.61 M/UL (ref 3.86–4.86)
SQUAMOUS URNS QL MICRO: <5 /HPF
UA COMPLETE W REFLEX CULTURE PNL UR: (no result)
UA DIPSTICK W REFLEX MICRO PNL UR: (no result)

## 2025-04-12 PROCEDURE — 0FT44ZZ RESECTION OF GALLBLADDER, PERCUTANEOUS ENDOSCOPIC APPROACH: ICD-10-PCS

## 2025-04-12 PROCEDURE — 99285 EMERGENCY DEPT VISIT HI MDM: CPT

## 2025-04-12 PROCEDURE — 81025 URINE PREGNANCY TEST: CPT

## 2025-04-12 PROCEDURE — 88304 TISSUE EXAM BY PATHOLOGIST: CPT

## 2025-04-12 PROCEDURE — 96375 TX/PRO/DX INJ NEW DRUG ADDON: CPT

## 2025-04-12 PROCEDURE — 80053 COMPREHEN METABOLIC PANEL: CPT

## 2025-04-12 PROCEDURE — 96361 HYDRATE IV INFUSION ADD-ON: CPT

## 2025-04-12 PROCEDURE — 96365 THER/PROPH/DIAG IV INF INIT: CPT

## 2025-04-12 PROCEDURE — 83735 ASSAY OF MAGNESIUM: CPT

## 2025-04-12 PROCEDURE — 85025 COMPLETE CBC W/AUTO DIFF WBC: CPT

## 2025-04-12 PROCEDURE — 36415 COLL VENOUS BLD VENIPUNCTURE: CPT

## 2025-04-12 PROCEDURE — 81001 URINALYSIS AUTO W/SCOPE: CPT

## 2025-04-12 PROCEDURE — 96367 TX/PROPH/DG ADDL SEQ IV INF: CPT

## 2025-04-12 PROCEDURE — 47562 LAPAROSCOPIC CHOLECYSTECTOMY: CPT

## 2025-04-12 PROCEDURE — 94010 BREATHING CAPACITY TEST: CPT

## 2025-04-12 PROCEDURE — 83690 ASSAY OF LIPASE: CPT

## 2025-04-12 PROCEDURE — 84100 ASSAY OF PHOSPHORUS: CPT

## 2025-04-12 PROCEDURE — 76705 ECHO EXAM OF ABDOMEN: CPT

## 2025-04-12 PROCEDURE — 85610 PROTHROMBIN TIME: CPT

## 2025-04-12 RX ADMIN — PROMETHAZINE HYDROCHLORIDE PRN MG: 25 INJECTION INTRAMUSCULAR; INTRAVENOUS at 22:16

## 2025-04-12 RX ADMIN — FENTANYL CITRATE ONE MCG: 50 INJECTION, SOLUTION INTRAMUSCULAR; INTRAVENOUS at 13:40

## 2025-04-12 RX ADMIN — HYDROMORPHONE HYDROCHLORIDE PRN MG: 1 INJECTION, SOLUTION INTRAMUSCULAR; INTRAVENOUS; SUBCUTANEOUS at 15:39

## 2025-04-12 RX ADMIN — SUCCINYLCHOLINE CHLORIDE ONE: 20 INJECTION, SOLUTION INTRAMUSCULAR; INTRAVENOUS at 11:38

## 2025-04-12 RX ADMIN — CIPROFLOXACIN SCH MLS: 2 INJECTION, SOLUTION INTRAVENOUS at 21:13

## 2025-04-12 RX ADMIN — METRONIDAZOLE SCH MLS: 500 INJECTION, SOLUTION INTRAVENOUS at 16:24

## 2025-04-12 RX ADMIN — SODIUM CHLORIDE, SODIUM LACTATE, POTASSIUM CHLORIDE, AND CALCIUM CHLORIDE ONE MLS: .6; .31; .03; .02 INJECTION, SOLUTION INTRAVENOUS at 11:05

## 2025-04-12 RX ADMIN — SODIUM CHLORIDE SCH MLS: 0.9 INJECTION, SOLUTION INTRAVENOUS at 15:37

## 2025-04-12 RX ADMIN — ONDANSETRON PRN MG: 2 INJECTION INTRAMUSCULAR; INTRAVENOUS at 18:10

## 2025-04-13 VITALS — OXYGEN SATURATION: 99 %

## 2025-04-13 VITALS — TEMPERATURE: 98.4 F | DIASTOLIC BLOOD PRESSURE: 52 MMHG | SYSTOLIC BLOOD PRESSURE: 96 MMHG

## 2025-04-13 LAB
ALBUMIN SERPL BCP-MCNC: 3.2 G/DL (ref 3.4–5)
ALBUMIN/GLOB SERPL: 1.1 {RATIO} (ref 1.1–1.8)
ANION GAP SERPL CALC-SCNC: 6.8 MEQ/L (ref 5–15)
HCT VFR BLD CALC: 34.6 % (ref 36–45)
HGB BLD-MCNC: 11.9 G/DL (ref 12–15)
INR BLD: 1.14
LYMPHOCYTES # SPEC AUTO: 1.5 K/UL (ref 0.7–4.9)
MAGNESIUM SERPL-MCNC: 2.1 MG/DL (ref 1.6–2.4)
MCH RBC QN AUTO: 30.5 PG (ref 27–35)
MCHC RBC AUTO-ENTMCNC: 34.4 G/DL (ref 32–36)
MCV RBC: 88.6 FL (ref 80–100)
PMV BLD: 7.9 FL (ref 7.6–11.3)
POTASSIUM SERPL-SCNC: 3.8 MEQ/L (ref 3.5–5.1)
PROTHROMBIN TIME: 12.9 SECONDS (ref 10–13)

## 2025-04-13 RX ADMIN — POTASSIUM CHLORIDE ONE MEQ: 10 TABLET, FILM COATED, EXTENDED RELEASE ORAL at 08:56
